# Patient Record
Sex: FEMALE | Race: OTHER | Employment: UNEMPLOYED
[De-identification: names, ages, dates, MRNs, and addresses within clinical notes are randomized per-mention and may not be internally consistent; named-entity substitution may affect disease eponyms.]

---

## 2017-03-07 ENCOUNTER — TELEPHONE (OUTPATIENT)
Dept: NEUROSURGERY | Facility: CLINIC | Age: 42
End: 2017-03-07

## 2017-05-03 ENCOUNTER — APPOINTMENT (OUTPATIENT)
Dept: GENERAL RADIOLOGY | Age: 42
DRG: 042 | End: 2017-05-03
Payer: MEDICARE

## 2017-05-03 ENCOUNTER — HOSPITAL ENCOUNTER (INPATIENT)
Age: 42
LOS: 9 days | Discharge: HOME | DRG: 042 | End: 2017-05-12
Attending: EMERGENCY MEDICINE | Admitting: INTERNAL MEDICINE
Payer: MEDICARE

## 2017-05-03 DIAGNOSIS — D75.839 THROMBOCYTOSIS: ICD-10-CM

## 2017-05-03 DIAGNOSIS — D64.9 ANEMIA, UNSPECIFIED TYPE: ICD-10-CM

## 2017-05-03 DIAGNOSIS — G70.00 MYASTHENIA GRAVIS (HCC): Primary | ICD-10-CM

## 2017-05-03 DIAGNOSIS — R79.89 ELEVATED LFTS: ICD-10-CM

## 2017-05-03 DIAGNOSIS — J10.1 INFLUENZA A: ICD-10-CM

## 2017-05-03 DIAGNOSIS — D72.829 LEUKOCYTOSIS, UNSPECIFIED TYPE: ICD-10-CM

## 2017-05-03 PROBLEM — G70.01 MYASTHENIA GRAVIS IN CRISIS (HCC): Status: ACTIVE | Noted: 2017-05-03

## 2017-05-03 LAB
ABSOLUTE EOS #: 0 K/UL (ref 0–0.4)
ABSOLUTE EOS #: 0.15 K/UL (ref 0–0.4)
ABSOLUTE LYMPH #: 1.04 K/UL (ref 1–4.8)
ABSOLUTE LYMPH #: 3.17 K/UL (ref 1–4.8)
ABSOLUTE MONO #: 0.15 K/UL (ref 0.1–0.8)
ABSOLUTE MONO #: 0.23 K/UL (ref 0.1–0.8)
ABSOLUTE RETIC #: 0.13 M/UL (ref 0.02–0.1)
ALBUMIN SERPL-MCNC: 4 G/DL (ref 3.5–5.2)
ALBUMIN/GLOBULIN RATIO: 1.1 (ref 1–2.5)
ALP BLD-CCNC: 69 U/L (ref 35–104)
ALT SERPL-CCNC: 38 U/L (ref 5–33)
ANGIOTENSIN-CONVERTING ENZYME: 31 U/L (ref 8–52)
ANION GAP SERPL CALCULATED.3IONS-SCNC: 11 MMOL/L (ref 9–17)
AST SERPL-CCNC: 39 U/L
BASOPHILS # BLD: 1 %
BASOPHILS # BLD: 1 %
BASOPHILS ABSOLUTE: 0.12 K/UL (ref 0–0.2)
BASOPHILS ABSOLUTE: 0.15 K/UL (ref 0–0.2)
BILIRUB SERPL-MCNC: 0.41 MG/DL (ref 0.3–1.2)
BUN BLDV-MCNC: 8 MG/DL (ref 6–20)
BUN/CREAT BLD: ABNORMAL (ref 9–20)
C-REACTIVE PROTEIN: 1 MG/L (ref 0–5)
CALCIUM SERPL-MCNC: 8.6 MG/DL (ref 8.6–10.4)
CHLORIDE BLD-SCNC: 103 MMOL/L (ref 98–107)
CO2: 25 MMOL/L (ref 20–31)
CREAT SERPL-MCNC: 0.58 MG/DL (ref 0.5–0.9)
DIFFERENTIAL TYPE: ABNORMAL
DIFFERENTIAL TYPE: ABNORMAL
DIRECT EXAM: ABNORMAL
EOSINOPHILS RELATIVE PERCENT: 0 %
EOSINOPHILS RELATIVE PERCENT: 1 %
FERRITIN: 5 UG/L (ref 13–150)
GFR AFRICAN AMERICAN: >60 ML/MIN
GFR NON-AFRICAN AMERICAN: >60 ML/MIN
GFR SERPL CREATININE-BSD FRML MDRD: ABNORMAL ML/MIN/{1.73_M2}
GFR SERPL CREATININE-BSD FRML MDRD: ABNORMAL ML/MIN/{1.73_M2}
GLUCOSE BLD-MCNC: 128 MG/DL (ref 70–99)
HCT VFR BLD CALC: 28.6 % (ref 36–46)
HCT VFR BLD CALC: 28.9 % (ref 36–46)
HEMOGLOBIN: 8.7 G/DL (ref 12–16)
HEMOGLOBIN: 8.8 G/DL (ref 12–16)
INR BLD: 0.9
IRON SATURATION: 3 % (ref 20–55)
IRON: 16 UG/DL (ref 37–145)
LACTIC ACID, WHOLE BLOOD: 1.5 MMOL/L (ref 0.7–2.1)
LYMPHOCYTES # BLD: 21 %
LYMPHOCYTES # BLD: 9 %
Lab: ABNORMAL
MAGNESIUM: 2.4 MG/DL (ref 1.6–2.6)
MCH RBC QN AUTO: 18 PG (ref 26–34)
MCH RBC QN AUTO: 18.1 PG (ref 26–34)
MCHC RBC AUTO-ENTMCNC: 30.4 G/DL (ref 31–37)
MCHC RBC AUTO-ENTMCNC: 30.4 G/DL (ref 31–37)
MCV RBC AUTO: 59.3 FL (ref 80–100)
MCV RBC AUTO: 59.5 FL (ref 80–100)
MONOCYTES # BLD: 1 %
MONOCYTES # BLD: 2 %
MORPHOLOGY: ABNORMAL
MORPHOLOGY: ABNORMAL
PARTIAL THROMBOPLASTIN TIME: 21.7 SEC (ref 21.3–31.3)
PDW BLD-RTO: 21.9 % (ref 12.5–15.4)
PDW BLD-RTO: 22.3 % (ref 12.5–15.4)
PLATELET # BLD: 718 K/UL (ref 140–450)
PLATELET # BLD: 784 K/UL (ref 140–450)
PLATELET ESTIMATE: ABNORMAL
PLATELET ESTIMATE: ABNORMAL
PMV BLD AUTO: 7.9 FL (ref 6–12)
PMV BLD AUTO: 8.7 FL (ref 6–12)
POTASSIUM SERPL-SCNC: 4.2 MMOL/L (ref 3.7–5.3)
PROTHROMBIN TIME: 9.9 SEC (ref 9.4–12.6)
RBC # BLD: 4.82 M/UL (ref 4–5.2)
RBC # BLD: 4.86 M/UL (ref 4–5.2)
RBC # BLD: ABNORMAL 10*6/UL
RBC # BLD: ABNORMAL 10*6/UL
RETIC %: 2.8 % (ref 0.5–2)
SEDIMENTATION RATE, ERYTHROCYTE: 10 MM (ref 0–20)
SEG NEUTROPHILS: 76 %
SEG NEUTROPHILS: 88 %
SEGMENTED NEUTROPHILS ABSOLUTE COUNT: 10.11 K/UL (ref 1.8–7.7)
SEGMENTED NEUTROPHILS ABSOLUTE COUNT: 11.48 K/UL (ref 1.8–7.7)
SODIUM BLD-SCNC: 139 MMOL/L (ref 135–144)
SPECIMEN DESCRIPTION: ABNORMAL
STATUS: ABNORMAL
TOTAL CK: 36 U/L (ref 26–192)
TOTAL IRON BINDING CAPACITY: 482 UG/DL (ref 250–450)
TOTAL PROTEIN: 7.8 G/DL (ref 6.4–8.3)
UNSATURATED IRON BINDING CAPACITY: 466 UG/DL (ref 112–347)
WBC # BLD: 11.5 K/UL (ref 3.5–11)
WBC # BLD: 15.1 K/UL (ref 3.5–11)
WBC # BLD: ABNORMAL 10*3/UL
WBC # BLD: ABNORMAL 10*3/UL

## 2017-05-03 PROCEDURE — 83516 IMMUNOASSAY NONANTIBODY: CPT

## 2017-05-03 PROCEDURE — 99255 IP/OBS CONSLTJ NEW/EST HI 80: CPT | Performed by: PSYCHIATRY & NEUROLOGY

## 2017-05-03 PROCEDURE — 94150 VITAL CAPACITY TEST: CPT

## 2017-05-03 PROCEDURE — 87804 INFLUENZA ASSAY W/OPTIC: CPT

## 2017-05-03 PROCEDURE — 85610 PROTHROMBIN TIME: CPT

## 2017-05-03 PROCEDURE — 2000000000 HC ICU R&B

## 2017-05-03 PROCEDURE — 82728 ASSAY OF FERRITIN: CPT

## 2017-05-03 PROCEDURE — 86038 ANTINUCLEAR ANTIBODIES: CPT

## 2017-05-03 PROCEDURE — 36415 COLL VENOUS BLD VENIPUNCTURE: CPT

## 2017-05-03 PROCEDURE — 1200000000 HC SEMI PRIVATE

## 2017-05-03 PROCEDURE — 81001 URINALYSIS AUTO W/SCOPE: CPT

## 2017-05-03 PROCEDURE — 85025 COMPLETE CBC W/AUTO DIFF WBC: CPT

## 2017-05-03 PROCEDURE — 2580000003 HC RX 258: Performed by: HOSPITALIST

## 2017-05-03 PROCEDURE — 85651 RBC SED RATE NONAUTOMATED: CPT

## 2017-05-03 PROCEDURE — 86235 NUCLEAR ANTIGEN ANTIBODY: CPT

## 2017-05-03 PROCEDURE — 83550 IRON BINDING TEST: CPT

## 2017-05-03 PROCEDURE — 87040 BLOOD CULTURE FOR BACTERIA: CPT

## 2017-05-03 PROCEDURE — 99285 EMERGENCY DEPT VISIT HI MDM: CPT

## 2017-05-03 PROCEDURE — 94799 UNLISTED PULMONARY SVC/PX: CPT

## 2017-05-03 PROCEDURE — 96375 TX/PRO/DX INJ NEW DRUG ADDON: CPT

## 2017-05-03 PROCEDURE — 82550 ASSAY OF CK (CPK): CPT

## 2017-05-03 PROCEDURE — 87086 URINE CULTURE/COLONY COUNT: CPT

## 2017-05-03 PROCEDURE — 71020 XR CHEST STANDARD TWO VW: CPT

## 2017-05-03 PROCEDURE — 6370000000 HC RX 637 (ALT 250 FOR IP): Performed by: HOSPITALIST

## 2017-05-03 PROCEDURE — 83540 ASSAY OF IRON: CPT

## 2017-05-03 PROCEDURE — 87205 SMEAR GRAM STAIN: CPT

## 2017-05-03 PROCEDURE — 85045 AUTOMATED RETICULOCYTE COUNT: CPT

## 2017-05-03 PROCEDURE — 83605 ASSAY OF LACTIC ACID: CPT

## 2017-05-03 PROCEDURE — 82164 ANGIOTENSIN I ENZYME TEST: CPT

## 2017-05-03 PROCEDURE — 83519 RIA NONANTIBODY: CPT

## 2017-05-03 PROCEDURE — 80053 COMPREHEN METABOLIC PANEL: CPT

## 2017-05-03 PROCEDURE — 6360000002 HC RX W HCPCS: Performed by: HOSPITALIST

## 2017-05-03 PROCEDURE — 96374 THER/PROPH/DIAG INJ IV PUSH: CPT

## 2017-05-03 PROCEDURE — 85730 THROMBOPLASTIN TIME PARTIAL: CPT

## 2017-05-03 PROCEDURE — 83735 ASSAY OF MAGNESIUM: CPT

## 2017-05-03 PROCEDURE — 6360000002 HC RX W HCPCS: Performed by: EMERGENCY MEDICINE

## 2017-05-03 PROCEDURE — 2580000003 HC RX 258: Performed by: EMERGENCY MEDICINE

## 2017-05-03 PROCEDURE — 96376 TX/PRO/DX INJ SAME DRUG ADON: CPT

## 2017-05-03 PROCEDURE — 86140 C-REACTIVE PROTEIN: CPT

## 2017-05-03 RX ORDER — 0.9 % SODIUM CHLORIDE 0.9 %
1000 INTRAVENOUS SOLUTION INTRAVENOUS ONCE
Status: COMPLETED | OUTPATIENT
Start: 2017-05-03 | End: 2017-05-03

## 2017-05-03 RX ORDER — PREDNISONE 20 MG/1
20 TABLET ORAL DAILY
Status: DISCONTINUED | OUTPATIENT
Start: 2017-05-03 | End: 2017-05-12 | Stop reason: HOSPADM

## 2017-05-03 RX ORDER — SODIUM CHLORIDE 0.9 % (FLUSH) 0.9 %
10 SYRINGE (ML) INJECTION PRN
Status: DISCONTINUED | OUTPATIENT
Start: 2017-05-03 | End: 2017-05-12 | Stop reason: HOSPADM

## 2017-05-03 RX ORDER — POTASSIUM CHLORIDE 7.45 MG/ML
10 INJECTION INTRAVENOUS PRN
Status: DISCONTINUED | OUTPATIENT
Start: 2017-05-03 | End: 2017-05-04

## 2017-05-03 RX ORDER — HEPARIN SODIUM 5000 [USP'U]/ML
5000 INJECTION, SOLUTION INTRAVENOUS; SUBCUTANEOUS EVERY OTHER DAY
Status: DISCONTINUED | OUTPATIENT
Start: 2017-05-03 | End: 2017-05-11

## 2017-05-03 RX ORDER — ACETAMINOPHEN 325 MG/1
650 TABLET ORAL EVERY 4 HOURS PRN
Status: DISCONTINUED | OUTPATIENT
Start: 2017-05-03 | End: 2017-05-12 | Stop reason: HOSPADM

## 2017-05-03 RX ORDER — MAGNESIUM SULFATE 1 G/100ML
1 INJECTION INTRAVENOUS PRN
Status: DISCONTINUED | OUTPATIENT
Start: 2017-05-03 | End: 2017-05-12 | Stop reason: HOSPADM

## 2017-05-03 RX ORDER — OSELTAMIVIR PHOSPHATE 75 MG/1
75 CAPSULE ORAL 2 TIMES DAILY
Status: COMPLETED | OUTPATIENT
Start: 2017-05-03 | End: 2017-05-08

## 2017-05-03 RX ORDER — FENTANYL CITRATE 50 UG/ML
50 INJECTION, SOLUTION INTRAMUSCULAR; INTRAVENOUS ONCE
Status: COMPLETED | OUTPATIENT
Start: 2017-05-03 | End: 2017-05-03

## 2017-05-03 RX ORDER — PREDNISONE 20 MG/1
20 TABLET ORAL DAILY
Status: ON HOLD | COMMUNITY
End: 2017-05-12 | Stop reason: HOSPADM

## 2017-05-03 RX ORDER — ACETAMINOPHEN 325 MG/1
650 TABLET ORAL ONCE
Status: DISCONTINUED | OUTPATIENT
Start: 2017-05-03 | End: 2017-05-12 | Stop reason: HOSPADM

## 2017-05-03 RX ORDER — ONDANSETRON 2 MG/ML
4 INJECTION INTRAMUSCULAR; INTRAVENOUS ONCE
Status: COMPLETED | OUTPATIENT
Start: 2017-05-03 | End: 2017-05-03

## 2017-05-03 RX ORDER — ONDANSETRON 2 MG/ML
4 INJECTION INTRAMUSCULAR; INTRAVENOUS EVERY 6 HOURS PRN
Status: DISCONTINUED | OUTPATIENT
Start: 2017-05-03 | End: 2017-05-12 | Stop reason: HOSPADM

## 2017-05-03 RX ORDER — ALBUMIN, HUMAN INJ 5% 5 %
5000 SOLUTION INTRAVENOUS EVERY OTHER DAY
Status: DISCONTINUED | OUTPATIENT
Start: 2017-05-04 | End: 2017-05-12 | Stop reason: HOSPADM

## 2017-05-03 RX ORDER — DIPHENHYDRAMINE HYDROCHLORIDE 50 MG/ML
25 INJECTION INTRAMUSCULAR; INTRAVENOUS EVERY 6 HOURS PRN
Status: DISCONTINUED | OUTPATIENT
Start: 2017-05-03 | End: 2017-05-12 | Stop reason: HOSPADM

## 2017-05-03 RX ORDER — SODIUM CHLORIDE 0.9 % (FLUSH) 0.9 %
10 SYRINGE (ML) INJECTION EVERY 12 HOURS SCHEDULED
Status: DISCONTINUED | OUTPATIENT
Start: 2017-05-03 | End: 2017-05-12 | Stop reason: HOSPADM

## 2017-05-03 RX ORDER — MYCOPHENOLATE MOFETIL 250 MG/1
500 CAPSULE ORAL 2 TIMES DAILY
Status: DISCONTINUED | OUTPATIENT
Start: 2017-05-03 | End: 2017-05-12 | Stop reason: HOSPADM

## 2017-05-03 RX ORDER — DEXTROSE AND SODIUM CHLORIDE 5; .45 G/100ML; G/100ML
INJECTION, SOLUTION INTRAVENOUS CONTINUOUS
Status: DISCONTINUED | OUTPATIENT
Start: 2017-05-03 | End: 2017-05-04

## 2017-05-03 RX ADMIN — OSELTAMIVIR PHOSPHATE 75 MG: 75 CAPSULE ORAL at 21:12

## 2017-05-03 RX ADMIN — ENOXAPARIN SODIUM 40 MG: 40 INJECTION SUBCUTANEOUS at 21:12

## 2017-05-03 RX ADMIN — DEXTROSE AND SODIUM CHLORIDE: 5; 450 INJECTION, SOLUTION INTRAVENOUS at 21:13

## 2017-05-03 RX ADMIN — FENTANYL CITRATE 100 MCG: 50 INJECTION INTRAMUSCULAR; INTRAVENOUS at 14:53

## 2017-05-03 RX ADMIN — FENTANYL CITRATE 50 MCG: 50 INJECTION INTRAMUSCULAR; INTRAVENOUS at 13:34

## 2017-05-03 RX ADMIN — SODIUM CHLORIDE 1000 ML: 9 INJECTION, SOLUTION INTRAVENOUS at 15:12

## 2017-05-03 RX ADMIN — Medication 10 ML: at 21:21

## 2017-05-03 RX ADMIN — VANCOMYCIN HYDROCHLORIDE 1500 MG: 10 INJECTION, POWDER, LYOPHILIZED, FOR SOLUTION INTRAVENOUS at 15:40

## 2017-05-03 RX ADMIN — DIPHENHYDRAMINE HYDROCHLORIDE 25 MG: 50 INJECTION, SOLUTION INTRAMUSCULAR; INTRAVENOUS at 15:12

## 2017-05-03 RX ADMIN — ONDANSETRON 4 MG: 2 INJECTION, SOLUTION INTRAMUSCULAR; INTRAVENOUS at 13:34

## 2017-05-03 ASSESSMENT — PAIN DESCRIPTION - ONSET: ONSET: ON-GOING

## 2017-05-03 ASSESSMENT — PAIN SCALES - GENERAL
PAINLEVEL_OUTOF10: 0
PAINLEVEL_OUTOF10: 8
PAINLEVEL_OUTOF10: 0
PAINLEVEL_OUTOF10: 8

## 2017-05-03 ASSESSMENT — ENCOUNTER SYMPTOMS
DIARRHEA: 0
SORE THROAT: 0
WHEEZING: 0
VOMITING: 0
COUGH: 0

## 2017-05-03 ASSESSMENT — PAIN DESCRIPTION - LOCATION: LOCATION: HEAD

## 2017-05-03 ASSESSMENT — PAIN DESCRIPTION - FREQUENCY: FREQUENCY: CONTINUOUS

## 2017-05-03 ASSESSMENT — PAIN SCALES - WONG BAKER: WONGBAKER_NUMERICALRESPONSE: 8;6

## 2017-05-03 ASSESSMENT — PAIN DESCRIPTION - PAIN TYPE: TYPE: ACUTE PAIN;CHRONIC PAIN

## 2017-05-04 ENCOUNTER — APPOINTMENT (OUTPATIENT)
Dept: GENERAL RADIOLOGY | Age: 42
DRG: 042 | End: 2017-05-04
Payer: MEDICARE

## 2017-05-04 ENCOUNTER — APPOINTMENT (OUTPATIENT)
Dept: MRI IMAGING | Age: 42
DRG: 042 | End: 2017-05-04
Payer: MEDICARE

## 2017-05-04 PROBLEM — J10.1 INFLUENZA A: Status: ACTIVE | Noted: 2017-05-04

## 2017-05-04 PROBLEM — D50.9 IRON DEFICIENCY ANEMIA: Status: ACTIVE | Noted: 2017-05-04

## 2017-05-04 PROBLEM — G70.00 MYASTHENIA GRAVIS (HCC): Status: ACTIVE | Noted: 2017-05-03

## 2017-05-04 PROBLEM — Z90.89 H/O THYMECTOMY: Status: ACTIVE | Noted: 2017-05-04

## 2017-05-04 LAB
ABSOLUTE EOS #: 0.14 K/UL (ref 0–0.4)
ABSOLUTE LYMPH #: 3.24 K/UL (ref 1–4.8)
ABSOLUTE MONO #: 0.68 K/UL (ref 0.1–1.2)
ALBUMIN SERPL-MCNC: 3.4 G/DL (ref 3.5–5.2)
ALBUMIN/GLOBULIN RATIO: 0.9 (ref 1–2.5)
ALP BLD-CCNC: 63 U/L (ref 35–104)
ALT SERPL-CCNC: 38 U/L (ref 5–33)
ANION GAP SERPL CALCULATED.3IONS-SCNC: 14 MMOL/L (ref 9–17)
ANTI SSA: 14 U/ML
ANTI SSB: 42 U/ML
ANTI-NUCLEAR ANTIBODY (ANA): NEGATIVE
AST SERPL-CCNC: 42 U/L
BASOPHILS # BLD: 2 %
BASOPHILS ABSOLUTE: 0.27 K/UL (ref 0–0.2)
BILIRUB SERPL-MCNC: 0.39 MG/DL (ref 0.3–1.2)
BUN BLDV-MCNC: 5 MG/DL (ref 6–20)
BUN/CREAT BLD: ABNORMAL (ref 9–20)
CALCIUM IONIZED: 1.13 MMOL/L (ref 1.13–1.33)
CALCIUM SERPL-MCNC: 8.4 MG/DL (ref 8.6–10.4)
CHLORIDE BLD-SCNC: 99 MMOL/L (ref 98–107)
CHOLESTEROL/HDL RATIO: 2.3
CHOLESTEROL: 115 MG/DL
CO2: 23 MMOL/L (ref 20–31)
CREAT SERPL-MCNC: 0.61 MG/DL (ref 0.5–0.9)
DIFFERENTIAL TYPE: ABNORMAL
EOSINOPHILS RELATIVE PERCENT: 1 %
FIBRINOGEN: 206 MG/DL (ref 140–420)
GFR AFRICAN AMERICAN: >60 ML/MIN
GFR NON-AFRICAN AMERICAN: >60 ML/MIN
GFR SERPL CREATININE-BSD FRML MDRD: ABNORMAL ML/MIN/{1.73_M2}
GFR SERPL CREATININE-BSD FRML MDRD: ABNORMAL ML/MIN/{1.73_M2}
GLUCOSE BLD-MCNC: 103 MG/DL (ref 70–99)
HCT VFR BLD CALC: 26.5 % (ref 36–46)
HDLC SERPL-MCNC: 49 MG/DL
HEMOGLOBIN: 8.2 G/DL (ref 12–16)
LDL CHOLESTEROL: 48 MG/DL (ref 0–130)
LYMPHOCYTES # BLD: 24 %
MCH RBC QN AUTO: 18.3 PG (ref 26–34)
MCHC RBC AUTO-ENTMCNC: 31 G/DL (ref 31–37)
MCV RBC AUTO: 58.9 FL (ref 80–100)
MONOCYTES # BLD: 5 %
MORPHOLOGY: ABNORMAL
PDW BLD-RTO: 21.6 % (ref 12.5–15.4)
PLATELET # BLD: 593 K/UL (ref 140–450)
PLATELET ESTIMATE: ABNORMAL
PMV BLD AUTO: 7.8 FL (ref 6–12)
POTASSIUM SERPL-SCNC: 3.3 MMOL/L (ref 3.7–5.3)
RBC # BLD: 4.5 M/UL (ref 4–5.2)
RBC # BLD: ABNORMAL 10*6/UL
SEG NEUTROPHILS: 68 %
SEGMENTED NEUTROPHILS ABSOLUTE COUNT: 9.17 K/UL (ref 1.8–7.7)
SODIUM BLD-SCNC: 136 MMOL/L (ref 135–144)
SURGICAL PATHOLOGY REPORT: NORMAL
TOTAL PROTEIN: 7.2 G/DL (ref 6.4–8.3)
TRIGL SERPL-MCNC: 88 MG/DL
TSH SERPL DL<=0.05 MIU/L-ACNC: 2.74 MIU/L (ref 0.3–5)
VLDLC SERPL CALC-MCNC: NORMAL MG/DL (ref 1–30)
WBC # BLD: 13.5 K/UL (ref 3.5–11)
WBC # BLD: ABNORMAL 10*3/UL

## 2017-05-04 PROCEDURE — 70551 MRI BRAIN STEM W/O DYE: CPT

## 2017-05-04 PROCEDURE — 02HV33Z INSERTION OF INFUSION DEVICE INTO SUPERIOR VENA CAVA, PERCUTANEOUS APPROACH: ICD-10-PCS | Performed by: EMERGENCY MEDICINE

## 2017-05-04 PROCEDURE — 84443 ASSAY THYROID STIM HORMONE: CPT

## 2017-05-04 PROCEDURE — 99223 1ST HOSP IP/OBS HIGH 75: CPT | Performed by: INTERNAL MEDICINE

## 2017-05-04 PROCEDURE — G8978 MOBILITY CURRENT STATUS: HCPCS

## 2017-05-04 PROCEDURE — 2500000003 HC RX 250 WO HCPCS

## 2017-05-04 PROCEDURE — 71010 XR CHEST PORTABLE: CPT

## 2017-05-04 PROCEDURE — 1200000000 HC SEMI PRIVATE

## 2017-05-04 PROCEDURE — 99233 SBSQ HOSP IP/OBS HIGH 50: CPT | Performed by: PSYCHIATRY & NEUROLOGY

## 2017-05-04 PROCEDURE — 94799 UNLISTED PULMONARY SVC/PX: CPT

## 2017-05-04 PROCEDURE — 6370000000 HC RX 637 (ALT 250 FOR IP): Performed by: PSYCHIATRY & NEUROLOGY

## 2017-05-04 PROCEDURE — 83036 HEMOGLOBIN GLYCOSYLATED A1C: CPT

## 2017-05-04 PROCEDURE — P9041 ALBUMIN (HUMAN),5%, 50ML: HCPCS | Performed by: PSYCHIATRY & NEUROLOGY

## 2017-05-04 PROCEDURE — 76937 US GUIDE VASCULAR ACCESS: CPT

## 2017-05-04 PROCEDURE — 6370000000 HC RX 637 (ALT 250 FOR IP): Performed by: INTERNAL MEDICINE

## 2017-05-04 PROCEDURE — 85384 FIBRINOGEN ACTIVITY: CPT

## 2017-05-04 PROCEDURE — 6360000002 HC RX W HCPCS: Performed by: EMERGENCY MEDICINE

## 2017-05-04 PROCEDURE — G8980 MOBILITY D/C STATUS: HCPCS

## 2017-05-04 PROCEDURE — 2580000003 HC RX 258: Performed by: HOSPITALIST

## 2017-05-04 PROCEDURE — 2580000003 HC RX 258: Performed by: PSYCHIATRY & NEUROLOGY

## 2017-05-04 PROCEDURE — 6370000000 HC RX 637 (ALT 250 FOR IP): Performed by: HOSPITALIST

## 2017-05-04 PROCEDURE — 94762 N-INVAS EAR/PLS OXIMTRY CONT: CPT

## 2017-05-04 PROCEDURE — 85025 COMPLETE CBC W/AUTO DIFF WBC: CPT

## 2017-05-04 PROCEDURE — 36415 COLL VENOUS BLD VENIPUNCTURE: CPT

## 2017-05-04 PROCEDURE — 36556 INSERT NON-TUNNEL CV CATH: CPT

## 2017-05-04 PROCEDURE — G8979 MOBILITY GOAL STATUS: HCPCS

## 2017-05-04 PROCEDURE — 2580000003 HC RX 258: Performed by: STUDENT IN AN ORGANIZED HEALTH CARE EDUCATION/TRAINING PROGRAM

## 2017-05-04 PROCEDURE — 2000000003 HC NEURO ICU R&B

## 2017-05-04 PROCEDURE — 80061 LIPID PANEL: CPT

## 2017-05-04 PROCEDURE — 36515 HC APHERESIS THER W/PLASMA REINF: CPT

## 2017-05-04 PROCEDURE — 6360000002 HC RX W HCPCS: Performed by: PSYCHIATRY & NEUROLOGY

## 2017-05-04 PROCEDURE — 97161 PT EVAL LOW COMPLEX 20 MIN: CPT

## 2017-05-04 PROCEDURE — 80053 COMPREHEN METABOLIC PANEL: CPT

## 2017-05-04 PROCEDURE — 6360000002 HC RX W HCPCS: Performed by: HOSPITALIST

## 2017-05-04 PROCEDURE — 82330 ASSAY OF CALCIUM: CPT

## 2017-05-04 RX ORDER — AZITHROMYCIN 250 MG/1
250 TABLET, FILM COATED ORAL DAILY
Status: COMPLETED | OUTPATIENT
Start: 2017-05-05 | End: 2017-05-08

## 2017-05-04 RX ORDER — POTASSIUM CHLORIDE 20 MEQ/1
40 TABLET, EXTENDED RELEASE ORAL PRN
Status: DISCONTINUED | OUTPATIENT
Start: 2017-05-04 | End: 2017-05-12 | Stop reason: HOSPADM

## 2017-05-04 RX ORDER — AZITHROMYCIN 250 MG/1
500 TABLET, FILM COATED ORAL ONCE
Status: COMPLETED | OUTPATIENT
Start: 2017-05-04 | End: 2017-05-04

## 2017-05-04 RX ORDER — 0.9 % SODIUM CHLORIDE 0.9 %
500 INTRAVENOUS SOLUTION INTRAVENOUS ONCE
Status: COMPLETED | OUTPATIENT
Start: 2017-05-04 | End: 2017-05-04

## 2017-05-04 RX ORDER — HEPARIN SODIUM 5000 [USP'U]/ML
5000 INJECTION, SOLUTION INTRAVENOUS; SUBCUTANEOUS ONCE
Status: COMPLETED | OUTPATIENT
Start: 2017-05-04 | End: 2017-05-04

## 2017-05-04 RX ORDER — POTASSIUM CHLORIDE 7.45 MG/ML
10 INJECTION INTRAVENOUS PRN
Status: DISCONTINUED | OUTPATIENT
Start: 2017-05-04 | End: 2017-05-12 | Stop reason: HOSPADM

## 2017-05-04 RX ORDER — FLUTICASONE PROPIONATE 50 MCG
2 SPRAY, SUSPENSION (ML) NASAL DAILY
Status: DISCONTINUED | OUTPATIENT
Start: 2017-05-04 | End: 2017-05-12 | Stop reason: HOSPADM

## 2017-05-04 RX ORDER — OXYMETAZOLINE HYDROCHLORIDE 0.05 G/100ML
2 SPRAY NASAL 2 TIMES DAILY
Status: DISCONTINUED | OUTPATIENT
Start: 2017-05-04 | End: 2017-05-12 | Stop reason: HOSPADM

## 2017-05-04 RX ORDER — POTASSIUM CHLORIDE 20MEQ/15ML
40 LIQUID (ML) ORAL PRN
Status: DISCONTINUED | OUTPATIENT
Start: 2017-05-04 | End: 2017-05-12 | Stop reason: HOSPADM

## 2017-05-04 RX ORDER — LIDOCAINE HYDROCHLORIDE 10 MG/ML
INJECTION, SOLUTION INFILTRATION; PERINEURAL
Status: COMPLETED
Start: 2017-05-04 | End: 2017-05-04

## 2017-05-04 RX ORDER — SODIUM CHLORIDE 9 MG/ML
INJECTION, SOLUTION INTRAVENOUS CONTINUOUS
Status: DISCONTINUED | OUTPATIENT
Start: 2017-05-04 | End: 2017-05-05

## 2017-05-04 RX ADMIN — SODIUM CHLORIDE 500 ML: 0.9 INJECTION, SOLUTION INTRAVENOUS at 13:57

## 2017-05-04 RX ADMIN — SODIUM CHLORIDE: 9 INJECTION, SOLUTION INTRAVENOUS at 20:46

## 2017-05-04 RX ADMIN — PREDNISONE 20 MG: 20 TABLET ORAL at 08:26

## 2017-05-04 RX ADMIN — CALCIUM GLUCONATE 1 G: 94 INJECTION, SOLUTION INTRAVENOUS at 12:47

## 2017-05-04 RX ADMIN — SODIUM CHLORIDE: 9 INJECTION, SOLUTION INTRAVENOUS at 13:57

## 2017-05-04 RX ADMIN — AZITHROMYCIN 500 MG: 250 TABLET, FILM COATED ORAL at 13:54

## 2017-05-04 RX ADMIN — FLUTICASONE PROPIONATE 2 SPRAY: 50 SPRAY, METERED NASAL at 13:52

## 2017-05-04 RX ADMIN — OSELTAMIVIR PHOSPHATE 75 MG: 75 CAPSULE ORAL at 08:26

## 2017-05-04 RX ADMIN — HEPARIN SODIUM 5000 UNITS: 5000 INJECTION, SOLUTION INTRAVENOUS; SUBCUTANEOUS at 14:10

## 2017-05-04 RX ADMIN — OSELTAMIVIR PHOSPHATE 75 MG: 75 CAPSULE ORAL at 20:40

## 2017-05-04 RX ADMIN — POTASSIUM CHLORIDE 40 MEQ: 20 TABLET, EXTENDED RELEASE ORAL at 11:21

## 2017-05-04 RX ADMIN — ALBUMIN (HUMAN) 5000 ML: 12.5 INJECTION, SOLUTION INTRAVENOUS at 12:00

## 2017-05-04 RX ADMIN — ENOXAPARIN SODIUM 40 MG: 40 INJECTION SUBCUTANEOUS at 08:27

## 2017-05-04 RX ADMIN — MYCOPHENOLATE MOFETIL 500 MG: 250 CAPSULE ORAL at 20:40

## 2017-05-04 RX ADMIN — Medication: at 02:53

## 2017-05-04 RX ADMIN — OXYMETAZOLINE HYDROCHLORIDE 2 SPRAY: 5 SPRAY NASAL at 13:52

## 2017-05-04 RX ADMIN — CALCIUM GLUCONATE 2 G: 94 INJECTION, SOLUTION INTRAVENOUS at 12:05

## 2017-05-04 RX ADMIN — IRON SUCROSE 200 MG: 20 INJECTION, SOLUTION INTRAVENOUS at 04:30

## 2017-05-04 RX ADMIN — MYCOPHENOLATE MOFETIL 500 MG: 250 CAPSULE ORAL at 08:26

## 2017-05-04 RX ADMIN — HEPARIN SODIUM 5000 UNITS: 5000 INJECTION, SOLUTION INTRAVENOUS; SUBCUTANEOUS at 02:49

## 2017-05-04 RX ADMIN — OXYMETAZOLINE HYDROCHLORIDE 2 SPRAY: 5 SPRAY NASAL at 20:41

## 2017-05-04 ASSESSMENT — PAIN SCALES - GENERAL
PAINLEVEL_OUTOF10: 3
PAINLEVEL_OUTOF10: 0

## 2017-05-05 LAB
CULTURE: NORMAL
CULTURE: NORMAL
ESTIMATED AVERAGE GLUCOSE: 137 MG/DL
HBA1C MFR BLD: 6.4 % (ref 4–6)
HCT VFR BLD CALC: 27.3 % (ref 36–46)
HEMOGLOBIN: 8.2 G/DL (ref 12–16)
Lab: NORMAL
MCH RBC QN AUTO: 18.1 PG (ref 26–34)
MCHC RBC AUTO-ENTMCNC: 30.1 G/DL (ref 31–37)
MCV RBC AUTO: 60.2 FL (ref 80–100)
PDW BLD-RTO: 22.1 % (ref 12.5–15.4)
PLATELET # BLD: 328 K/UL (ref 140–450)
PMV BLD AUTO: 8.2 FL (ref 6–12)
RBC # BLD: 4.53 M/UL (ref 4–5.2)
SPECIMEN DESCRIPTION: NORMAL
STATUS: NORMAL
WBC # BLD: 17.8 K/UL (ref 3.5–11)

## 2017-05-05 PROCEDURE — 6360000002 HC RX W HCPCS: Performed by: HOSPITALIST

## 2017-05-05 PROCEDURE — 1200000000 HC SEMI PRIVATE

## 2017-05-05 PROCEDURE — 6370000000 HC RX 637 (ALT 250 FOR IP): Performed by: HOSPITALIST

## 2017-05-05 PROCEDURE — 6370000000 HC RX 637 (ALT 250 FOR IP): Performed by: PSYCHIATRY & NEUROLOGY

## 2017-05-05 PROCEDURE — 85027 COMPLETE CBC AUTOMATED: CPT

## 2017-05-05 PROCEDURE — 99233 SBSQ HOSP IP/OBS HIGH 50: CPT | Performed by: INTERNAL MEDICINE

## 2017-05-05 PROCEDURE — 94799 UNLISTED PULMONARY SVC/PX: CPT

## 2017-05-05 PROCEDURE — 94762 N-INVAS EAR/PLS OXIMTRY CONT: CPT

## 2017-05-05 PROCEDURE — 6360000002 HC RX W HCPCS: Performed by: PSYCHIATRY & NEUROLOGY

## 2017-05-05 PROCEDURE — 36415 COLL VENOUS BLD VENIPUNCTURE: CPT

## 2017-05-05 PROCEDURE — 99233 SBSQ HOSP IP/OBS HIGH 50: CPT | Performed by: PSYCHIATRY & NEUROLOGY

## 2017-05-05 PROCEDURE — 2580000003 HC RX 258: Performed by: HOSPITALIST

## 2017-05-05 RX ADMIN — PREDNISONE 20 MG: 20 TABLET ORAL at 09:04

## 2017-05-05 RX ADMIN — ENOXAPARIN SODIUM 40 MG: 40 INJECTION SUBCUTANEOUS at 09:05

## 2017-05-05 RX ADMIN — AZITHROMYCIN 250 MG: 250 TABLET, FILM COATED ORAL at 09:05

## 2017-05-05 RX ADMIN — FLUTICASONE PROPIONATE 2 SPRAY: 50 SPRAY, METERED NASAL at 09:05

## 2017-05-05 RX ADMIN — IRON SUCROSE 200 MG: 20 INJECTION, SOLUTION INTRAVENOUS at 03:28

## 2017-05-05 RX ADMIN — OSELTAMIVIR PHOSPHATE 75 MG: 75 CAPSULE ORAL at 21:31

## 2017-05-05 RX ADMIN — Medication 10 ML: at 21:00

## 2017-05-05 RX ADMIN — MYCOPHENOLATE MOFETIL 500 MG: 250 CAPSULE ORAL at 21:31

## 2017-05-05 RX ADMIN — OXYMETAZOLINE HYDROCHLORIDE 2 SPRAY: 5 SPRAY NASAL at 09:05

## 2017-05-05 RX ADMIN — MYCOPHENOLATE MOFETIL 500 MG: 250 CAPSULE ORAL at 09:04

## 2017-05-05 RX ADMIN — OSELTAMIVIR PHOSPHATE 75 MG: 75 CAPSULE ORAL at 09:04

## 2017-05-06 ENCOUNTER — APPOINTMENT (OUTPATIENT)
Dept: CT IMAGING | Age: 42
DRG: 042 | End: 2017-05-06
Payer: MEDICARE

## 2017-05-06 PROBLEM — J32.0 RIGHT MAXILLARY SINUSITIS: Status: ACTIVE | Noted: 2017-05-06

## 2017-05-06 LAB
ABO/RH: NORMAL
ABSOLUTE EOS #: 0.25 K/UL (ref 0–0.4)
ABSOLUTE LYMPH #: 4.81 K/UL (ref 1–4.8)
ABSOLUTE MONO #: 1.27 K/UL (ref 0.1–0.8)
ANION GAP SERPL CALCULATED.3IONS-SCNC: 12 MMOL/L (ref 9–17)
ANTIBODY SCREEN: NEGATIVE
ARM BAND NUMBER: NORMAL
BASOPHILS # BLD: 0 %
BASOPHILS ABSOLUTE: 0 K/UL (ref 0–0.2)
BLOOD BANK SPECIMEN: NORMAL
BUN BLDV-MCNC: 9 MG/DL (ref 6–20)
BUN/CREAT BLD: ABNORMAL (ref 9–20)
CALCIUM IONIZED: 1.19 MMOL/L (ref 1.13–1.33)
CALCIUM SERPL-MCNC: 8.2 MG/DL (ref 8.6–10.4)
CHLORIDE BLD-SCNC: 106 MMOL/L (ref 98–107)
CO2: 23 MMOL/L (ref 20–31)
CREAT SERPL-MCNC: 0.54 MG/DL (ref 0.5–0.9)
DIFFERENTIAL TYPE: ABNORMAL
EOSINOPHILS RELATIVE PERCENT: 1 %
EXPIRATION DATE: NORMAL
FIBRINOGEN: <80 MG/DL (ref 140–420)
GFR AFRICAN AMERICAN: >60 ML/MIN
GFR NON-AFRICAN AMERICAN: >60 ML/MIN
GFR SERPL CREATININE-BSD FRML MDRD: ABNORMAL ML/MIN/{1.73_M2}
GFR SERPL CREATININE-BSD FRML MDRD: ABNORMAL ML/MIN/{1.73_M2}
GLUCOSE BLD-MCNC: 93 MG/DL (ref 70–99)
HCT VFR BLD CALC: 26.4 % (ref 36–46)
HEMOGLOBIN: 7.9 G/DL (ref 12–16)
LYMPHOCYTES # BLD: 19 %
MCH RBC QN AUTO: 18.2 PG (ref 26–34)
MCHC RBC AUTO-ENTMCNC: 29.9 G/DL (ref 31–37)
MCV RBC AUTO: 60.9 FL (ref 80–100)
MONOCYTES # BLD: 5 %
MORPHOLOGY: ABNORMAL
PDW BLD-RTO: 22.2 % (ref 12.5–15.4)
PLATELET # BLD: 326 K/UL (ref 140–450)
PLATELET ESTIMATE: ABNORMAL
PMV BLD AUTO: 8.3 FL (ref 6–12)
POTASSIUM SERPL-SCNC: 3.7 MMOL/L (ref 3.7–5.3)
RBC # BLD: 4.33 M/UL (ref 4–5.2)
RBC # BLD: ABNORMAL 10*6/UL
SEG NEUTROPHILS: 75 %
SEGMENTED NEUTROPHILS ABSOLUTE COUNT: 18.97 K/UL (ref 1.8–7.7)
SODIUM BLD-SCNC: 141 MMOL/L (ref 135–144)
WBC # BLD: 25.3 K/UL (ref 3.5–11)
WBC # BLD: ABNORMAL 10*3/UL

## 2017-05-06 PROCEDURE — 82330 ASSAY OF CALCIUM: CPT

## 2017-05-06 PROCEDURE — 1200000000 HC SEMI PRIVATE

## 2017-05-06 PROCEDURE — 86927 PLASMA FRESH FROZEN: CPT

## 2017-05-06 PROCEDURE — 85025 COMPLETE CBC W/AUTO DIFF WBC: CPT

## 2017-05-06 PROCEDURE — 3910000000 HC WEEKEND / HOLIDAY ARC SERVICE

## 2017-05-06 PROCEDURE — 2580000003 HC RX 258: Performed by: PSYCHIATRY & NEUROLOGY

## 2017-05-06 PROCEDURE — 86850 RBC ANTIBODY SCREEN: CPT

## 2017-05-06 PROCEDURE — 99233 SBSQ HOSP IP/OBS HIGH 50: CPT | Performed by: INTERNAL MEDICINE

## 2017-05-06 PROCEDURE — 36515 HC APHERESIS THER W/PLASMA REINF: CPT

## 2017-05-06 PROCEDURE — 36415 COLL VENOUS BLD VENIPUNCTURE: CPT

## 2017-05-06 PROCEDURE — 6370000000 HC RX 637 (ALT 250 FOR IP): Performed by: HOSPITALIST

## 2017-05-06 PROCEDURE — 2060000000 HC ICU INTERMEDIATE R&B

## 2017-05-06 PROCEDURE — 86901 BLOOD TYPING SEROLOGIC RH(D): CPT

## 2017-05-06 PROCEDURE — 6360000002 HC RX W HCPCS

## 2017-05-06 PROCEDURE — 94799 UNLISTED PULMONARY SVC/PX: CPT

## 2017-05-06 PROCEDURE — 71250 CT THORAX DX C-: CPT

## 2017-05-06 PROCEDURE — 85384 FIBRINOGEN ACTIVITY: CPT

## 2017-05-06 PROCEDURE — 99232 SBSQ HOSP IP/OBS MODERATE 35: CPT | Performed by: PSYCHIATRY & NEUROLOGY

## 2017-05-06 PROCEDURE — 6370000000 HC RX 637 (ALT 250 FOR IP): Performed by: PSYCHIATRY & NEUROLOGY

## 2017-05-06 PROCEDURE — 2580000003 HC RX 258: Performed by: HOSPITALIST

## 2017-05-06 PROCEDURE — 6360000002 HC RX W HCPCS: Performed by: PSYCHIATRY & NEUROLOGY

## 2017-05-06 PROCEDURE — 86900 BLOOD TYPING SEROLOGIC ABO: CPT

## 2017-05-06 PROCEDURE — P9017 PLASMA 1 DONOR FRZ W/IN 8 HR: HCPCS

## 2017-05-06 PROCEDURE — 2580000003 HC RX 258: Performed by: STUDENT IN AN ORGANIZED HEALTH CARE EDUCATION/TRAINING PROGRAM

## 2017-05-06 PROCEDURE — 6360000002 HC RX W HCPCS: Performed by: HOSPITALIST

## 2017-05-06 PROCEDURE — 80048 BASIC METABOLIC PNL TOTAL CA: CPT

## 2017-05-06 PROCEDURE — 94762 N-INVAS EAR/PLS OXIMTRY CONT: CPT

## 2017-05-06 RX ORDER — SODIUM CHLORIDE 9 MG/ML
INJECTION, SOLUTION INTRAVENOUS CONTINUOUS
Status: DISCONTINUED | OUTPATIENT
Start: 2017-05-06 | End: 2017-05-07

## 2017-05-06 RX ORDER — ECHINACEA PURPUREA EXTRACT 125 MG
2 TABLET ORAL 2 TIMES DAILY
Status: DISCONTINUED | OUTPATIENT
Start: 2017-05-06 | End: 2017-05-12 | Stop reason: HOSPADM

## 2017-05-06 RX ORDER — 0.9 % SODIUM CHLORIDE 0.9 %
250 INTRAVENOUS SOLUTION INTRAVENOUS ONCE
Status: DISCONTINUED | OUTPATIENT
Start: 2017-05-06 | End: 2017-05-12 | Stop reason: HOSPADM

## 2017-05-06 RX ORDER — HEPARIN SODIUM 5000 [USP'U]/ML
15000 INJECTION, SOLUTION INTRAVENOUS; SUBCUTANEOUS ONCE
Status: COMPLETED | OUTPATIENT
Start: 2017-05-06 | End: 2017-05-06

## 2017-05-06 RX ORDER — LANOLIN ALCOHOL/MO/W.PET/CERES
325 CREAM (GRAM) TOPICAL 2 TIMES DAILY WITH MEALS
Status: DISCONTINUED | OUTPATIENT
Start: 2017-05-07 | End: 2017-05-12 | Stop reason: HOSPADM

## 2017-05-06 RX ORDER — DIPHENHYDRAMINE HYDROCHLORIDE 50 MG/ML
INJECTION INTRAMUSCULAR; INTRAVENOUS
Status: COMPLETED
Start: 2017-05-06 | End: 2017-05-06

## 2017-05-06 RX ADMIN — OSELTAMIVIR PHOSPHATE 75 MG: 75 CAPSULE ORAL at 21:32

## 2017-05-06 RX ADMIN — MYCOPHENOLATE MOFETIL 500 MG: 250 CAPSULE ORAL at 09:05

## 2017-05-06 RX ADMIN — CALCIUM GLUCONATE 1 G: 94 INJECTION, SOLUTION INTRAVENOUS at 17:00

## 2017-05-06 RX ADMIN — CALCIUM GLUCONATE 2 G: 94 INJECTION, SOLUTION INTRAVENOUS at 15:00

## 2017-05-06 RX ADMIN — SALINE NASAL SPRAY 2 SPRAY: 1.5 SOLUTION NASAL at 21:36

## 2017-05-06 RX ADMIN — FLUTICASONE PROPIONATE 2 SPRAY: 50 SPRAY, METERED NASAL at 08:49

## 2017-05-06 RX ADMIN — SODIUM CHLORIDE: 9 INJECTION, SOLUTION INTRAVENOUS at 16:58

## 2017-05-06 RX ADMIN — OXYMETAZOLINE HYDROCHLORIDE 2 SPRAY: 5 SPRAY NASAL at 08:50

## 2017-05-06 RX ADMIN — OSELTAMIVIR PHOSPHATE 75 MG: 75 CAPSULE ORAL at 09:05

## 2017-05-06 RX ADMIN — MYCOPHENOLATE MOFETIL 500 MG: 250 CAPSULE ORAL at 21:32

## 2017-05-06 RX ADMIN — OXYMETAZOLINE HYDROCHLORIDE 2 SPRAY: 5 SPRAY NASAL at 21:32

## 2017-05-06 RX ADMIN — Medication 10 ML: at 21:37

## 2017-05-06 RX ADMIN — PREDNISONE 20 MG: 20 TABLET ORAL at 09:06

## 2017-05-06 RX ADMIN — ENOXAPARIN SODIUM 40 MG: 40 INJECTION SUBCUTANEOUS at 09:05

## 2017-05-06 RX ADMIN — SALINE NASAL SPRAY 2 SPRAY: 1.5 SOLUTION NASAL at 11:30

## 2017-05-06 RX ADMIN — IRON SUCROSE 200 MG: 20 INJECTION, SOLUTION INTRAVENOUS at 03:10

## 2017-05-06 RX ADMIN — AZITHROMYCIN 250 MG: 250 TABLET, FILM COATED ORAL at 09:05

## 2017-05-06 RX ADMIN — DIPHENHYDRAMINE HYDROCHLORIDE 25 MG: 50 INJECTION, SOLUTION INTRAMUSCULAR; INTRAVENOUS at 18:29

## 2017-05-06 RX ADMIN — HEPARIN SODIUM 15000 UNITS: 5000 INJECTION, SOLUTION INTRAVENOUS; SUBCUTANEOUS at 18:10

## 2017-05-06 ASSESSMENT — ENCOUNTER SYMPTOMS
ORTHOPNEA: 0
BLURRED VISION: 0
HEMOPTYSIS: 0
DOUBLE VISION: 0
COUGH: 0
NAUSEA: 0
HEARTBURN: 0
SPUTUM PRODUCTION: 0
VOMITING: 0
ABDOMINAL PAIN: 0

## 2017-05-06 ASSESSMENT — PAIN SCALES - GENERAL: PAINLEVEL_OUTOF10: 0

## 2017-05-07 PROCEDURE — 6370000000 HC RX 637 (ALT 250 FOR IP): Performed by: PSYCHIATRY & NEUROLOGY

## 2017-05-07 PROCEDURE — 99232 SBSQ HOSP IP/OBS MODERATE 35: CPT | Performed by: PSYCHIATRY & NEUROLOGY

## 2017-05-07 PROCEDURE — 6360000002 HC RX W HCPCS: Performed by: PSYCHIATRY & NEUROLOGY

## 2017-05-07 PROCEDURE — 1200000000 HC SEMI PRIVATE

## 2017-05-07 PROCEDURE — 2580000003 HC RX 258: Performed by: HOSPITALIST

## 2017-05-07 PROCEDURE — 6360000002 HC RX W HCPCS: Performed by: HOSPITALIST

## 2017-05-07 PROCEDURE — 2060000000 HC ICU INTERMEDIATE R&B

## 2017-05-07 PROCEDURE — 6370000000 HC RX 637 (ALT 250 FOR IP): Performed by: HOSPITALIST

## 2017-05-07 PROCEDURE — 99232 SBSQ HOSP IP/OBS MODERATE 35: CPT | Performed by: INTERNAL MEDICINE

## 2017-05-07 PROCEDURE — 94762 N-INVAS EAR/PLS OXIMTRY CONT: CPT

## 2017-05-07 RX ADMIN — SALINE NASAL SPRAY 2 SPRAY: 1.5 SOLUTION NASAL at 08:36

## 2017-05-07 RX ADMIN — OSELTAMIVIR PHOSPHATE 75 MG: 75 CAPSULE ORAL at 08:36

## 2017-05-07 RX ADMIN — OXYMETAZOLINE HYDROCHLORIDE 2 SPRAY: 5 SPRAY NASAL at 22:04

## 2017-05-07 RX ADMIN — MYCOPHENOLATE MOFETIL 500 MG: 250 CAPSULE ORAL at 22:05

## 2017-05-07 RX ADMIN — ENOXAPARIN SODIUM 40 MG: 40 INJECTION SUBCUTANEOUS at 08:36

## 2017-05-07 RX ADMIN — FLUTICASONE PROPIONATE 2 SPRAY: 50 SPRAY, METERED NASAL at 08:36

## 2017-05-07 RX ADMIN — SALINE NASAL SPRAY 2 SPRAY: 1.5 SOLUTION NASAL at 22:05

## 2017-05-07 RX ADMIN — OSELTAMIVIR PHOSPHATE 75 MG: 75 CAPSULE ORAL at 22:05

## 2017-05-07 RX ADMIN — OXYMETAZOLINE HYDROCHLORIDE 2 SPRAY: 5 SPRAY NASAL at 08:36

## 2017-05-07 RX ADMIN — Medication 10 ML: at 09:00

## 2017-05-07 RX ADMIN — FERROUS SULFATE TAB EC 325 MG (65 MG FE EQUIVALENT) 325 MG: 325 (65 FE) TABLET DELAYED RESPONSE at 08:36

## 2017-05-07 RX ADMIN — FERROUS SULFATE TAB EC 325 MG (65 MG FE EQUIVALENT) 325 MG: 325 (65 FE) TABLET DELAYED RESPONSE at 17:08

## 2017-05-07 RX ADMIN — PREDNISONE 20 MG: 20 TABLET ORAL at 08:40

## 2017-05-07 RX ADMIN — Medication 10 ML: at 22:06

## 2017-05-07 RX ADMIN — MYCOPHENOLATE MOFETIL 500 MG: 250 CAPSULE ORAL at 08:40

## 2017-05-07 RX ADMIN — AZITHROMYCIN 250 MG: 250 TABLET, FILM COATED ORAL at 08:36

## 2017-05-07 ASSESSMENT — PAIN SCALES - GENERAL
PAINLEVEL_OUTOF10: 0
PAINLEVEL_OUTOF10: 0

## 2017-05-08 LAB
ABSOLUTE EOS #: 0 K/UL (ref 0–0.4)
ABSOLUTE LYMPH #: 4.7 K/UL (ref 1–4.8)
ABSOLUTE MONO #: 1.57 K/UL (ref 0.1–0.8)
ANION GAP SERPL CALCULATED.3IONS-SCNC: 13 MMOL/L (ref 9–17)
BASOPHILS # BLD: 0 %
BASOPHILS ABSOLUTE: 0 K/UL (ref 0–0.2)
BUN BLDV-MCNC: 10 MG/DL (ref 6–20)
BUN/CREAT BLD: ABNORMAL (ref 9–20)
CALCIUM IONIZED: 1.17 MMOL/L (ref 1.13–1.33)
CALCIUM SERPL-MCNC: 8.7 MG/DL (ref 8.6–10.4)
CHLORIDE BLD-SCNC: 97 MMOL/L (ref 98–107)
CO2: 25 MMOL/L (ref 20–31)
CREAT SERPL-MCNC: 0.58 MG/DL (ref 0.5–0.9)
DIFFERENTIAL TYPE: ABNORMAL
EOSINOPHILS RELATIVE PERCENT: 0 %
FIBRINOGEN: 201 MG/DL (ref 140–420)
GFR AFRICAN AMERICAN: >60 ML/MIN
GFR NON-AFRICAN AMERICAN: >60 ML/MIN
GFR SERPL CREATININE-BSD FRML MDRD: ABNORMAL ML/MIN/{1.73_M2}
GFR SERPL CREATININE-BSD FRML MDRD: ABNORMAL ML/MIN/{1.73_M2}
GLUCOSE BLD-MCNC: 98 MG/DL (ref 70–99)
HCT VFR BLD CALC: 25.5 % (ref 36–46)
HEMOGLOBIN: 7.5 G/DL (ref 12–16)
LYMPHOCYTES # BLD: 24 %
MCH RBC QN AUTO: 19.1 PG (ref 26–34)
MCHC RBC AUTO-ENTMCNC: 29.3 G/DL (ref 31–37)
MCV RBC AUTO: 65.3 FL (ref 80–100)
METAMYELOCYTES ABSOLUTE COUNT: 0.2 K/UL
METAMYELOCYTES: 1 %
MONOCYTES # BLD: 8 %
MORPHOLOGY: ABNORMAL
NUCLEATED RED BLOOD CELLS: 1 PER 100 WBC
PATHOLOGIST REVIEW: NORMAL
PDW BLD-RTO: 21.8 % (ref 12.5–15.4)
PLATELET # BLD: 280 K/UL (ref 140–450)
PLATELET ESTIMATE: ABNORMAL
PMV BLD AUTO: 8.5 FL (ref 6–12)
POTASSIUM SERPL-SCNC: 3.3 MMOL/L (ref 3.7–5.3)
RBC # BLD: 3.91 M/UL (ref 4–5.2)
RBC # BLD: ABNORMAL 10*6/UL
SEG NEUTROPHILS: 67 %
SEGMENTED NEUTROPHILS ABSOLUTE COUNT: 13.13 K/UL (ref 1.8–7.7)
SODIUM BLD-SCNC: 135 MMOL/L (ref 135–144)
WBC # BLD: 19.6 K/UL (ref 3.5–11)
WBC # BLD: ABNORMAL 10*3/UL

## 2017-05-08 PROCEDURE — 6370000000 HC RX 637 (ALT 250 FOR IP): Performed by: INTERNAL MEDICINE

## 2017-05-08 PROCEDURE — 80048 BASIC METABOLIC PNL TOTAL CA: CPT

## 2017-05-08 PROCEDURE — 85384 FIBRINOGEN ACTIVITY: CPT

## 2017-05-08 PROCEDURE — 94762 N-INVAS EAR/PLS OXIMTRY CONT: CPT

## 2017-05-08 PROCEDURE — 6370000000 HC RX 637 (ALT 250 FOR IP): Performed by: PSYCHIATRY & NEUROLOGY

## 2017-05-08 PROCEDURE — 36515 HC APHERESIS THER W/PLASMA REINF: CPT

## 2017-05-08 PROCEDURE — 85025 COMPLETE CBC W/AUTO DIFF WBC: CPT

## 2017-05-08 PROCEDURE — 6370000000 HC RX 637 (ALT 250 FOR IP): Performed by: HOSPITALIST

## 2017-05-08 PROCEDURE — 1200000000 HC SEMI PRIVATE

## 2017-05-08 PROCEDURE — 99232 SBSQ HOSP IP/OBS MODERATE 35: CPT | Performed by: INTERNAL MEDICINE

## 2017-05-08 PROCEDURE — 2580000003 HC RX 258: Performed by: HOSPITALIST

## 2017-05-08 PROCEDURE — 82330 ASSAY OF CALCIUM: CPT

## 2017-05-08 PROCEDURE — 6360000002 HC RX W HCPCS: Performed by: HOSPITALIST

## 2017-05-08 PROCEDURE — 94799 UNLISTED PULMONARY SVC/PX: CPT

## 2017-05-08 PROCEDURE — 36415 COLL VENOUS BLD VENIPUNCTURE: CPT

## 2017-05-08 PROCEDURE — 6360000002 HC RX W HCPCS: Performed by: PSYCHIATRY & NEUROLOGY

## 2017-05-08 PROCEDURE — 99232 SBSQ HOSP IP/OBS MODERATE 35: CPT | Performed by: PSYCHIATRY & NEUROLOGY

## 2017-05-08 RX ADMIN — SALINE NASAL SPRAY 2 SPRAY: 1.5 SOLUTION NASAL at 08:28

## 2017-05-08 RX ADMIN — SALINE NASAL SPRAY 2 SPRAY: 1.5 SOLUTION NASAL at 20:32

## 2017-05-08 RX ADMIN — FERROUS SULFATE TAB EC 325 MG (65 MG FE EQUIVALENT) 325 MG: 325 (65 FE) TABLET DELAYED RESPONSE at 18:12

## 2017-05-08 RX ADMIN — ENOXAPARIN SODIUM 40 MG: 40 INJECTION SUBCUTANEOUS at 08:28

## 2017-05-08 RX ADMIN — AZITHROMYCIN 250 MG: 250 TABLET, FILM COATED ORAL at 08:28

## 2017-05-08 RX ADMIN — PREDNISONE 20 MG: 20 TABLET ORAL at 08:28

## 2017-05-08 RX ADMIN — MYCOPHENOLATE MOFETIL 500 MG: 250 CAPSULE ORAL at 08:27

## 2017-05-08 RX ADMIN — Medication 10 ML: at 20:37

## 2017-05-08 RX ADMIN — MYCOPHENOLATE MOFETIL 500 MG: 250 CAPSULE ORAL at 20:32

## 2017-05-08 RX ADMIN — OSELTAMIVIR PHOSPHATE 75 MG: 75 CAPSULE ORAL at 08:28

## 2017-05-08 RX ADMIN — OXYMETAZOLINE HYDROCHLORIDE 2 SPRAY: 5 SPRAY NASAL at 08:28

## 2017-05-08 RX ADMIN — POTASSIUM CHLORIDE 40 MEQ: 20 TABLET, EXTENDED RELEASE ORAL at 17:09

## 2017-05-08 RX ADMIN — FERROUS SULFATE TAB EC 325 MG (65 MG FE EQUIVALENT) 325 MG: 325 (65 FE) TABLET DELAYED RESPONSE at 08:28

## 2017-05-08 RX ADMIN — OXYMETAZOLINE HYDROCHLORIDE 2 SPRAY: 5 SPRAY NASAL at 20:32

## 2017-05-08 RX ADMIN — FLUTICASONE PROPIONATE 2 SPRAY: 50 SPRAY, METERED NASAL at 08:28

## 2017-05-08 ASSESSMENT — PAIN SCALES - GENERAL
PAINLEVEL_OUTOF10: 0
PAINLEVEL_OUTOF10: 0

## 2017-05-09 LAB
CULTURE: NORMAL
HEMOGLOBIN: 7.6 G/DL (ref 12–16)
Lab: NORMAL
SPECIMEN DESCRIPTION: NORMAL
STATUS: NORMAL

## 2017-05-09 PROCEDURE — 99232 SBSQ HOSP IP/OBS MODERATE 35: CPT | Performed by: PSYCHIATRY & NEUROLOGY

## 2017-05-09 PROCEDURE — 94799 UNLISTED PULMONARY SVC/PX: CPT

## 2017-05-09 PROCEDURE — 6370000000 HC RX 637 (ALT 250 FOR IP): Performed by: HOSPITALIST

## 2017-05-09 PROCEDURE — 94762 N-INVAS EAR/PLS OXIMTRY CONT: CPT

## 2017-05-09 PROCEDURE — 2580000003 HC RX 258: Performed by: HOSPITALIST

## 2017-05-09 PROCEDURE — 6360000002 HC RX W HCPCS: Performed by: HOSPITALIST

## 2017-05-09 PROCEDURE — 36415 COLL VENOUS BLD VENIPUNCTURE: CPT

## 2017-05-09 PROCEDURE — 99233 SBSQ HOSP IP/OBS HIGH 50: CPT | Performed by: INTERNAL MEDICINE

## 2017-05-09 PROCEDURE — 85018 HEMOGLOBIN: CPT

## 2017-05-09 PROCEDURE — 1200000000 HC SEMI PRIVATE

## 2017-05-09 PROCEDURE — 6370000000 HC RX 637 (ALT 250 FOR IP): Performed by: PSYCHIATRY & NEUROLOGY

## 2017-05-09 PROCEDURE — 6360000002 HC RX W HCPCS: Performed by: PSYCHIATRY & NEUROLOGY

## 2017-05-09 PROCEDURE — 76937 US GUIDE VASCULAR ACCESS: CPT

## 2017-05-09 RX ADMIN — FERROUS SULFATE TAB EC 325 MG (65 MG FE EQUIVALENT) 325 MG: 325 (65 FE) TABLET DELAYED RESPONSE at 18:48

## 2017-05-09 RX ADMIN — PREDNISONE 20 MG: 20 TABLET ORAL at 11:37

## 2017-05-09 RX ADMIN — SALINE NASAL SPRAY 2 SPRAY: 1.5 SOLUTION NASAL at 21:36

## 2017-05-09 RX ADMIN — MYCOPHENOLATE MOFETIL 500 MG: 250 CAPSULE ORAL at 11:37

## 2017-05-09 RX ADMIN — FLUTICASONE PROPIONATE 2 SPRAY: 50 SPRAY, METERED NASAL at 11:38

## 2017-05-09 RX ADMIN — Medication 10 ML: at 21:38

## 2017-05-09 RX ADMIN — OXYMETAZOLINE HYDROCHLORIDE 2 SPRAY: 5 SPRAY NASAL at 21:36

## 2017-05-09 RX ADMIN — SALINE NASAL SPRAY 2 SPRAY: 1.5 SOLUTION NASAL at 11:38

## 2017-05-09 RX ADMIN — ENOXAPARIN SODIUM 40 MG: 40 INJECTION SUBCUTANEOUS at 11:38

## 2017-05-09 RX ADMIN — Medication 10 ML: at 17:19

## 2017-05-09 RX ADMIN — FERROUS SULFATE TAB EC 325 MG (65 MG FE EQUIVALENT) 325 MG: 325 (65 FE) TABLET DELAYED RESPONSE at 11:37

## 2017-05-09 RX ADMIN — OXYMETAZOLINE HYDROCHLORIDE 2 SPRAY: 5 SPRAY NASAL at 11:38

## 2017-05-09 RX ADMIN — MYCOPHENOLATE MOFETIL 500 MG: 250 CAPSULE ORAL at 21:36

## 2017-05-09 ASSESSMENT — PAIN SCALES - GENERAL: PAINLEVEL_OUTOF10: 0

## 2017-05-10 LAB
ABSOLUTE EOS #: 0.25 K/UL (ref 0–0.4)
ABSOLUTE LYMPH #: 1.26 K/UL (ref 1–4.8)
ABSOLUTE MONO #: 1.26 K/UL (ref 0.1–0.8)
ANION GAP SERPL CALCULATED.3IONS-SCNC: 14 MMOL/L (ref 9–17)
BASOPHILS # BLD: 0 %
BASOPHILS ABSOLUTE: 0 K/UL (ref 0–0.2)
BLD PROD TYP BPU: NORMAL
BUN BLDV-MCNC: 10 MG/DL (ref 6–20)
BUN/CREAT BLD: ABNORMAL (ref 9–20)
CALCIUM IONIZED: 1.17 MMOL/L (ref 1.13–1.33)
CALCIUM SERPL-MCNC: 8.5 MG/DL (ref 8.6–10.4)
CHLORIDE BLD-SCNC: 103 MMOL/L (ref 98–107)
CO2: 21 MMOL/L (ref 20–31)
CREAT SERPL-MCNC: 0.44 MG/DL (ref 0.5–0.9)
DATE, STOOL #1: NORMAL
DATE, STOOL #2: NORMAL
DATE, STOOL #3: NORMAL
DIFFERENTIAL TYPE: ABNORMAL
DISPENSE STATUS BLOOD BANK: NORMAL
EOSINOPHILS RELATIVE PERCENT: 1 %
FIBRINOGEN: 104 MG/DL (ref 140–420)
GFR AFRICAN AMERICAN: >60 ML/MIN
GFR NON-AFRICAN AMERICAN: >60 ML/MIN
GFR SERPL CREATININE-BSD FRML MDRD: ABNORMAL ML/MIN/{1.73_M2}
GFR SERPL CREATININE-BSD FRML MDRD: ABNORMAL ML/MIN/{1.73_M2}
GLUCOSE BLD-MCNC: 108 MG/DL (ref 70–99)
HCT VFR BLD CALC: 26.9 % (ref 36–46)
HEMOCCULT SP1 STL QL: NEGATIVE
HEMOCCULT SP2 STL QL: NORMAL
HEMOCCULT SP3 STL QL: NORMAL
HEMOGLOBIN: 7.9 G/DL (ref 12–16)
LYMPHOCYTES # BLD: 5 %
MCH RBC QN AUTO: 19.8 PG (ref 26–34)
MCHC RBC AUTO-ENTMCNC: 29.2 G/DL (ref 31–37)
MCV RBC AUTO: 67.9 FL (ref 80–100)
MONOCYTES # BLD: 5 %
MORPHOLOGY: ABNORMAL
PDW BLD-RTO: 22.8 % (ref 12.5–15.4)
PLATELET # BLD: 279 K/UL (ref 140–450)
PLATELET ESTIMATE: ABNORMAL
PMV BLD AUTO: 8.5 FL (ref 6–12)
POTASSIUM SERPL-SCNC: 4 MMOL/L (ref 3.7–5.3)
RBC # BLD: 3.96 M/UL (ref 4–5.2)
RBC # BLD: ABNORMAL 10*6/UL
SEG NEUTROPHILS: 89 %
SEGMENTED NEUTROPHILS ABSOLUTE COUNT: 22.43 K/UL (ref 1.8–7.7)
SODIUM BLD-SCNC: 138 MMOL/L (ref 135–144)
TIME, STOOL #1: NORMAL
TIME, STOOL #2: NORMAL
TIME, STOOL #3: NORMAL
TRANSFUSION STATUS: NORMAL
UNIT DIVISION: 0
UNIT NUMBER: NORMAL
WBC # BLD: 25.2 K/UL (ref 3.5–11)
WBC # BLD: ABNORMAL 10*3/UL

## 2017-05-10 PROCEDURE — 2580000003 HC RX 258: Performed by: PSYCHIATRY & NEUROLOGY

## 2017-05-10 PROCEDURE — 6370000000 HC RX 637 (ALT 250 FOR IP): Performed by: HOSPITALIST

## 2017-05-10 PROCEDURE — 6370000000 HC RX 637 (ALT 250 FOR IP): Performed by: PSYCHIATRY & NEUROLOGY

## 2017-05-10 PROCEDURE — P9045 ALBUMIN (HUMAN), 5%, 250 ML: HCPCS | Performed by: PSYCHIATRY & NEUROLOGY

## 2017-05-10 PROCEDURE — 99232 SBSQ HOSP IP/OBS MODERATE 35: CPT | Performed by: PSYCHIATRY & NEUROLOGY

## 2017-05-10 PROCEDURE — 85025 COMPLETE CBC W/AUTO DIFF WBC: CPT

## 2017-05-10 PROCEDURE — 85384 FIBRINOGEN ACTIVITY: CPT

## 2017-05-10 PROCEDURE — G0328 FECAL BLOOD SCRN IMMUNOASSAY: HCPCS

## 2017-05-10 PROCEDURE — 6360000002 HC RX W HCPCS: Performed by: PSYCHIATRY & NEUROLOGY

## 2017-05-10 PROCEDURE — 86927 PLASMA FRESH FROZEN: CPT

## 2017-05-10 PROCEDURE — 99232 SBSQ HOSP IP/OBS MODERATE 35: CPT | Performed by: INTERNAL MEDICINE

## 2017-05-10 PROCEDURE — 94762 N-INVAS EAR/PLS OXIMTRY CONT: CPT

## 2017-05-10 PROCEDURE — 6360000002 HC RX W HCPCS: Performed by: HOSPITALIST

## 2017-05-10 PROCEDURE — 82330 ASSAY OF CALCIUM: CPT

## 2017-05-10 PROCEDURE — 1200000000 HC SEMI PRIVATE

## 2017-05-10 PROCEDURE — 36515 HC APHERESIS THER W/PLASMA REINF: CPT

## 2017-05-10 PROCEDURE — G0108 DIAB MANAGE TRN  PER INDIV: HCPCS

## 2017-05-10 PROCEDURE — 2580000003 HC RX 258: Performed by: HOSPITALIST

## 2017-05-10 PROCEDURE — 94799 UNLISTED PULMONARY SVC/PX: CPT

## 2017-05-10 PROCEDURE — 80048 BASIC METABOLIC PNL TOTAL CA: CPT

## 2017-05-10 PROCEDURE — 36415 COLL VENOUS BLD VENIPUNCTURE: CPT

## 2017-05-10 PROCEDURE — P9017 PLASMA 1 DONOR FRZ W/IN 8 HR: HCPCS

## 2017-05-10 PROCEDURE — 6360000002 HC RX W HCPCS: Performed by: EMERGENCY MEDICINE

## 2017-05-10 RX ORDER — 0.9 % SODIUM CHLORIDE 0.9 %
250 INTRAVENOUS SOLUTION INTRAVENOUS ONCE
Status: DISCONTINUED | OUTPATIENT
Start: 2017-05-10 | End: 2017-05-12 | Stop reason: HOSPADM

## 2017-05-10 RX ADMIN — MYCOPHENOLATE MOFETIL 500 MG: 250 CAPSULE ORAL at 20:48

## 2017-05-10 RX ADMIN — FERROUS SULFATE TAB EC 325 MG (65 MG FE EQUIVALENT) 325 MG: 325 (65 FE) TABLET DELAYED RESPONSE at 10:38

## 2017-05-10 RX ADMIN — OXYMETAZOLINE HYDROCHLORIDE 2 SPRAY: 5 SPRAY NASAL at 10:38

## 2017-05-10 RX ADMIN — PREDNISONE 20 MG: 20 TABLET ORAL at 10:39

## 2017-05-10 RX ADMIN — DIPHENHYDRAMINE HYDROCHLORIDE 25 MG: 50 INJECTION, SOLUTION INTRAMUSCULAR; INTRAVENOUS at 20:30

## 2017-05-10 RX ADMIN — ENOXAPARIN SODIUM 40 MG: 40 INJECTION SUBCUTANEOUS at 10:39

## 2017-05-10 RX ADMIN — SALINE NASAL SPRAY 2 SPRAY: 1.5 SOLUTION NASAL at 10:38

## 2017-05-10 RX ADMIN — FERROUS SULFATE TAB EC 325 MG (65 MG FE EQUIVALENT) 325 MG: 325 (65 FE) TABLET DELAYED RESPONSE at 18:13

## 2017-05-10 RX ADMIN — FLUTICASONE PROPIONATE 2 SPRAY: 50 SPRAY, METERED NASAL at 10:38

## 2017-05-10 RX ADMIN — Medication 10 ML: at 20:46

## 2017-05-10 RX ADMIN — CALCIUM GLUCONATE 1 G: 94 INJECTION, SOLUTION INTRAVENOUS at 21:30

## 2017-05-10 RX ADMIN — CALCIUM GLUCONATE 2 G: 94 INJECTION, SOLUTION INTRAVENOUS at 20:05

## 2017-05-10 RX ADMIN — Medication 10 ML: at 10:44

## 2017-05-10 RX ADMIN — MYCOPHENOLATE MOFETIL 500 MG: 250 CAPSULE ORAL at 10:38

## 2017-05-10 RX ADMIN — ALBUMIN (HUMAN) 5000 ML: 12.5 INJECTION, SOLUTION INTRAVENOUS at 20:02

## 2017-05-10 ASSESSMENT — PAIN SCALES - GENERAL: PAINLEVEL_OUTOF10: 0

## 2017-05-11 LAB
ACETYLCHOL BLOCK AB: 63 % (ref 0–26)
ACETYLCHOLINE BINDING ANTIBODY: 478.4 NMOL/L (ref 0–0.4)
BLD PROD TYP BPU: NORMAL
DISPENSE STATUS BLOOD BANK: NORMAL
STRIATED MUSCLE AB, IGG SCREEN: ABNORMAL
TITIN ANTIBODY: 0.23 IV (ref 0–0.45)
TRANSFUSION STATUS: NORMAL
UNIT DIVISION: 0
UNIT NUMBER: NORMAL

## 2017-05-11 PROCEDURE — 2580000003 HC RX 258: Performed by: HOSPITALIST

## 2017-05-11 PROCEDURE — 99232 SBSQ HOSP IP/OBS MODERATE 35: CPT | Performed by: PSYCHIATRY & NEUROLOGY

## 2017-05-11 PROCEDURE — 6370000000 HC RX 637 (ALT 250 FOR IP): Performed by: HOSPITALIST

## 2017-05-11 PROCEDURE — 6360000002 HC RX W HCPCS: Performed by: PSYCHIATRY & NEUROLOGY

## 2017-05-11 PROCEDURE — 99232 SBSQ HOSP IP/OBS MODERATE 35: CPT | Performed by: INTERNAL MEDICINE

## 2017-05-11 PROCEDURE — 1200000000 HC SEMI PRIVATE

## 2017-05-11 PROCEDURE — 6360000002 HC RX W HCPCS: Performed by: HOSPITALIST

## 2017-05-11 PROCEDURE — 94762 N-INVAS EAR/PLS OXIMTRY CONT: CPT

## 2017-05-11 PROCEDURE — 94799 UNLISTED PULMONARY SVC/PX: CPT

## 2017-05-11 PROCEDURE — 6370000000 HC RX 637 (ALT 250 FOR IP): Performed by: PSYCHIATRY & NEUROLOGY

## 2017-05-11 RX ORDER — HEPARIN SODIUM 5000 [USP'U]/ML
13000 INJECTION, SOLUTION INTRAVENOUS; SUBCUTANEOUS EVERY OTHER DAY
Status: DISCONTINUED | OUTPATIENT
Start: 2017-05-13 | End: 2017-05-11

## 2017-05-11 RX ORDER — HEPARIN SODIUM 5000 [USP'U]/ML
13500 INJECTION, SOLUTION INTRAVENOUS; SUBCUTANEOUS ONCE
Status: DISCONTINUED | OUTPATIENT
Start: 2017-05-12 | End: 2017-05-12 | Stop reason: HOSPADM

## 2017-05-11 RX ORDER — HEPARIN SODIUM 5000 [USP'U]/ML
13500 INJECTION, SOLUTION INTRAVENOUS; SUBCUTANEOUS EVERY OTHER DAY
Status: DISCONTINUED | OUTPATIENT
Start: 2017-05-13 | End: 2017-05-11

## 2017-05-11 RX ADMIN — SALINE NASAL SPRAY 2 SPRAY: 1.5 SOLUTION NASAL at 09:19

## 2017-05-11 RX ADMIN — OXYMETAZOLINE HYDROCHLORIDE 2 SPRAY: 5 SPRAY NASAL at 09:19

## 2017-05-11 RX ADMIN — ENOXAPARIN SODIUM 40 MG: 40 INJECTION SUBCUTANEOUS at 09:20

## 2017-05-11 RX ADMIN — PREDNISONE 20 MG: 20 TABLET ORAL at 09:19

## 2017-05-11 RX ADMIN — SALINE NASAL SPRAY 2 SPRAY: 1.5 SOLUTION NASAL at 21:00

## 2017-05-11 RX ADMIN — MYCOPHENOLATE MOFETIL 500 MG: 250 CAPSULE ORAL at 09:19

## 2017-05-11 RX ADMIN — OXYMETAZOLINE HYDROCHLORIDE 2 SPRAY: 5 SPRAY NASAL at 21:00

## 2017-05-11 RX ADMIN — Medication 10 ML: at 21:01

## 2017-05-11 RX ADMIN — FERROUS SULFATE TAB EC 325 MG (65 MG FE EQUIVALENT) 325 MG: 325 (65 FE) TABLET DELAYED RESPONSE at 09:19

## 2017-05-11 RX ADMIN — Medication 10 ML: at 09:20

## 2017-05-11 RX ADMIN — FLUTICASONE PROPIONATE 2 SPRAY: 50 SPRAY, METERED NASAL at 09:19

## 2017-05-11 RX ADMIN — FERROUS SULFATE TAB EC 325 MG (65 MG FE EQUIVALENT) 325 MG: 325 (65 FE) TABLET DELAYED RESPONSE at 19:26

## 2017-05-11 RX ADMIN — MYCOPHENOLATE MOFETIL 500 MG: 250 CAPSULE ORAL at 21:00

## 2017-05-11 ASSESSMENT — PAIN SCALES - GENERAL: PAINLEVEL_OUTOF10: 0

## 2017-05-12 VITALS
RESPIRATION RATE: 20 BRPM | SYSTOLIC BLOOD PRESSURE: 112 MMHG | HEIGHT: 64 IN | WEIGHT: 158 LBS | DIASTOLIC BLOOD PRESSURE: 77 MMHG | BODY MASS INDEX: 26.98 KG/M2 | HEART RATE: 103 BPM | TEMPERATURE: 97.8 F | OXYGEN SATURATION: 100 %

## 2017-05-12 LAB
ABSOLUTE EOS #: 0 K/UL (ref 0–0.4)
ABSOLUTE LYMPH #: 3.14 K/UL (ref 1–4.8)
ABSOLUTE MONO #: 0.78 K/UL (ref 0.1–0.8)
ANION GAP SERPL CALCULATED.3IONS-SCNC: 11 MMOL/L (ref 9–17)
BASOPHILS # BLD: 0 %
BASOPHILS ABSOLUTE: 0 K/UL (ref 0–0.2)
BUN BLDV-MCNC: 13 MG/DL (ref 6–20)
BUN/CREAT BLD: ABNORMAL (ref 9–20)
CALCIUM IONIZED: 1.08 MMOL/L (ref 1.13–1.33)
CALCIUM SERPL-MCNC: 7.9 MG/DL (ref 8.6–10.4)
CHLORIDE BLD-SCNC: 102 MMOL/L (ref 98–107)
CO2: 22 MMOL/L (ref 20–31)
CREAT SERPL-MCNC: 0.51 MG/DL (ref 0.5–0.9)
DIFFERENTIAL TYPE: ABNORMAL
EOSINOPHILS RELATIVE PERCENT: 0 %
FIBRINOGEN: 151 MG/DL (ref 140–420)
FIBRINOGEN: <80 MG/DL (ref 140–420)
GFR AFRICAN AMERICAN: >60 ML/MIN
GFR NON-AFRICAN AMERICAN: >60 ML/MIN
GFR SERPL CREATININE-BSD FRML MDRD: ABNORMAL ML/MIN/{1.73_M2}
GFR SERPL CREATININE-BSD FRML MDRD: ABNORMAL ML/MIN/{1.73_M2}
GLUCOSE BLD-MCNC: 89 MG/DL (ref 70–99)
HCT VFR BLD CALC: 27.3 % (ref 36–46)
HEMOGLOBIN: 8.1 G/DL (ref 12–16)
LYMPHOCYTES # BLD: 16 %
MCH RBC QN AUTO: 21 PG (ref 26–34)
MCHC RBC AUTO-ENTMCNC: 29.7 G/DL (ref 31–37)
MCV RBC AUTO: 70.6 FL (ref 80–100)
METAMYELOCYTES ABSOLUTE COUNT: 0.2 K/UL
METAMYELOCYTES: 1 %
MONOCYTES # BLD: 4 %
MORPHOLOGY: ABNORMAL
PDW BLD-RTO: 22.3 % (ref 12.5–15.4)
PLATELET # BLD: 314 K/UL (ref 140–450)
PLATELET ESTIMATE: ABNORMAL
PMV BLD AUTO: 10.4 FL (ref 6–12)
POTASSIUM SERPL-SCNC: 3.6 MMOL/L (ref 3.7–5.3)
RBC # BLD: 3.86 M/UL (ref 4–5.2)
RBC # BLD: ABNORMAL 10*6/UL
SEG NEUTROPHILS: 79 %
SEGMENTED NEUTROPHILS ABSOLUTE COUNT: 15.48 K/UL (ref 1.8–7.7)
SODIUM BLD-SCNC: 135 MMOL/L (ref 135–144)
WBC # BLD: 19.6 K/UL (ref 3.5–11)
WBC # BLD: ABNORMAL 10*3/UL

## 2017-05-12 PROCEDURE — 6360000002 HC RX W HCPCS: Performed by: PSYCHIATRY & NEUROLOGY

## 2017-05-12 PROCEDURE — 2580000003 HC RX 258: Performed by: PSYCHIATRY & NEUROLOGY

## 2017-05-12 PROCEDURE — 36415 COLL VENOUS BLD VENIPUNCTURE: CPT

## 2017-05-12 PROCEDURE — 99239 HOSP IP/OBS DSCHRG MGMT >30: CPT | Performed by: INTERNAL MEDICINE

## 2017-05-12 PROCEDURE — 82330 ASSAY OF CALCIUM: CPT

## 2017-05-12 PROCEDURE — 94762 N-INVAS EAR/PLS OXIMTRY CONT: CPT

## 2017-05-12 PROCEDURE — 85384 FIBRINOGEN ACTIVITY: CPT

## 2017-05-12 PROCEDURE — 6370000000 HC RX 637 (ALT 250 FOR IP): Performed by: HOSPITALIST

## 2017-05-12 PROCEDURE — 86927 PLASMA FRESH FROZEN: CPT

## 2017-05-12 PROCEDURE — 6360000002 HC RX W HCPCS: Performed by: HOSPITALIST

## 2017-05-12 PROCEDURE — 80048 BASIC METABOLIC PNL TOTAL CA: CPT

## 2017-05-12 PROCEDURE — 99232 SBSQ HOSP IP/OBS MODERATE 35: CPT | Performed by: PSYCHIATRY & NEUROLOGY

## 2017-05-12 PROCEDURE — 36515 HC APHERESIS THER W/PLASMA REINF: CPT

## 2017-05-12 PROCEDURE — P9045 ALBUMIN (HUMAN), 5%, 250 ML: HCPCS | Performed by: PSYCHIATRY & NEUROLOGY

## 2017-05-12 PROCEDURE — 85025 COMPLETE CBC W/AUTO DIFF WBC: CPT

## 2017-05-12 PROCEDURE — 86965 POOLING BLOOD PLATELETS: CPT

## 2017-05-12 PROCEDURE — 6370000000 HC RX 637 (ALT 250 FOR IP): Performed by: PSYCHIATRY & NEUROLOGY

## 2017-05-12 RX ORDER — MYCOPHENOLATE MOFETIL 250 MG/1
500 CAPSULE ORAL 2 TIMES DAILY
Qty: 90 CAPSULE | Refills: 5 | Status: SHIPPED | OUTPATIENT
Start: 2017-05-12 | End: 2017-07-10 | Stop reason: ALTCHOICE

## 2017-05-12 RX ORDER — PREDNISONE 20 MG/1
20 TABLET ORAL DAILY
Qty: 60 TABLET | Refills: 5 | Status: SHIPPED | OUTPATIENT
Start: 2017-05-12 | End: 2017-05-22

## 2017-05-12 RX ORDER — 0.9 % SODIUM CHLORIDE 0.9 %
250 INTRAVENOUS SOLUTION INTRAVENOUS ONCE
Status: DISCONTINUED | OUTPATIENT
Start: 2017-05-12 | End: 2017-05-12 | Stop reason: HOSPADM

## 2017-05-12 RX ORDER — LANOLIN ALCOHOL/MO/W.PET/CERES
325 CREAM (GRAM) TOPICAL 2 TIMES DAILY WITH MEALS
Qty: 90 TABLET | Refills: 5 | Status: SHIPPED | OUTPATIENT
Start: 2017-05-12 | End: 2017-10-16 | Stop reason: SDUPTHER

## 2017-05-12 RX ADMIN — PREDNISONE 20 MG: 20 TABLET ORAL at 08:22

## 2017-05-12 RX ADMIN — CALCIUM GLUCONATE 2 G: 94 INJECTION, SOLUTION INTRAVENOUS at 09:00

## 2017-05-12 RX ADMIN — ENOXAPARIN SODIUM 40 MG: 40 INJECTION SUBCUTANEOUS at 08:22

## 2017-05-12 RX ADMIN — FLUTICASONE PROPIONATE 2 SPRAY: 50 SPRAY, METERED NASAL at 08:22

## 2017-05-12 RX ADMIN — OXYMETAZOLINE HYDROCHLORIDE 2 SPRAY: 5 SPRAY NASAL at 08:22

## 2017-05-12 RX ADMIN — SALINE NASAL SPRAY 2 SPRAY: 1.5 SOLUTION NASAL at 08:22

## 2017-05-12 RX ADMIN — MYCOPHENOLATE MOFETIL 500 MG: 250 CAPSULE ORAL at 08:22

## 2017-05-12 RX ADMIN — FERROUS SULFATE TAB EC 325 MG (65 MG FE EQUIVALENT) 325 MG: 325 (65 FE) TABLET DELAYED RESPONSE at 08:22

## 2017-05-12 RX ADMIN — ALBUMIN (HUMAN) 5000 ML: 12.5 INJECTION, SOLUTION INTRAVENOUS at 09:00

## 2017-05-12 ASSESSMENT — PAIN SCALES - GENERAL: PAINLEVEL_OUTOF10: 0

## 2017-05-13 LAB
BLD PROD TYP BPU: NORMAL
BLD PROD TYP BPU: NORMAL
DISPENSE STATUS BLOOD BANK: NORMAL
DISPENSE STATUS BLOOD BANK: NORMAL
TRANSFUSION STATUS: NORMAL
TRANSFUSION STATUS: NORMAL
UNIT DIVISION: 0
UNIT DIVISION: 0
UNIT NUMBER: NORMAL
UNIT NUMBER: NORMAL

## 2017-06-02 ENCOUNTER — HOSPITAL ENCOUNTER (OUTPATIENT)
Age: 42
Setting detail: SPECIMEN
Discharge: HOME | End: 2017-06-02
Payer: MEDICARE

## 2017-06-02 ENCOUNTER — OFFICE VISIT (OUTPATIENT)
Dept: INTERNAL MEDICINE | Age: 42
End: 2017-06-02
Payer: MEDICARE

## 2017-06-02 ENCOUNTER — OFFICE VISIT (OUTPATIENT)
Dept: NEUROLOGY | Age: 42
End: 2017-06-02
Payer: MEDICARE

## 2017-06-02 VITALS
HEART RATE: 83 BPM | DIASTOLIC BLOOD PRESSURE: 85 MMHG | HEIGHT: 64 IN | SYSTOLIC BLOOD PRESSURE: 125 MMHG | BODY MASS INDEX: 29.09 KG/M2 | WEIGHT: 170.4 LBS

## 2017-06-02 VITALS
BODY MASS INDEX: 29.19 KG/M2 | DIASTOLIC BLOOD PRESSURE: 80 MMHG | WEIGHT: 171 LBS | HEART RATE: 95 BPM | HEIGHT: 64 IN | SYSTOLIC BLOOD PRESSURE: 119 MMHG

## 2017-06-02 DIAGNOSIS — G70.00 MYASTHENIA GRAVIS (HCC): Primary | ICD-10-CM

## 2017-06-02 DIAGNOSIS — E11.9 TYPE 2 DIABETES MELLITUS WITHOUT COMPLICATION, WITHOUT LONG-TERM CURRENT USE OF INSULIN (HCC): ICD-10-CM

## 2017-06-02 DIAGNOSIS — D50.9 IRON DEFICIENCY ANEMIA, UNSPECIFIED IRON DEFICIENCY ANEMIA TYPE: ICD-10-CM

## 2017-06-02 LAB
CREATININE URINE: 91.5 MG/DL (ref 28–217)
MICROALBUMIN/CREAT 24H UR: <12 MG/L
MICROALBUMIN/CREAT UR-RTO: 13 MCG/MG CREAT
TOTAL PROTEIN, URINE: 16 MG/DL
URINE TOTAL PROTEIN CREATININE RATIO: 0.17 (ref 0–0.2)

## 2017-06-02 PROCEDURE — 99214 OFFICE O/P EST MOD 30 MIN: CPT | Performed by: PSYCHIATRY & NEUROLOGY

## 2017-06-02 PROCEDURE — 99203 OFFICE O/P NEW LOW 30 MIN: CPT | Performed by: HOSPITALIST

## 2017-06-02 RX ORDER — MYCOPHENOLATE MOFETIL 500 MG/1
TABLET ORAL
Qty: 60 TABLET | Refills: 5 | Status: SHIPPED | OUTPATIENT
Start: 2017-06-02 | End: 2017-12-11 | Stop reason: SDUPTHER

## 2017-06-02 RX ORDER — FERROUS SULFATE 325(65) MG
325 TABLET ORAL 2 TIMES DAILY
Qty: 60 TABLET | Refills: 3 | Status: SHIPPED | OUTPATIENT
Start: 2017-06-02 | End: 2017-06-02 | Stop reason: SDUPTHER

## 2017-06-02 RX ORDER — PREDNISONE 20 MG/1
TABLET ORAL
Qty: 30 TABLET | Refills: 5 | Status: SHIPPED | OUTPATIENT
Start: 2017-06-02 | End: 2017-12-11 | Stop reason: SDUPTHER

## 2017-06-05 ENCOUNTER — HOSPITAL ENCOUNTER (OUTPATIENT)
Age: 42
Setting detail: SPECIMEN
Discharge: HOME | End: 2017-06-05
Payer: MEDICARE

## 2017-06-05 DIAGNOSIS — D50.9 IRON DEFICIENCY ANEMIA, UNSPECIFIED IRON DEFICIENCY ANEMIA TYPE: ICD-10-CM

## 2017-06-05 LAB
ABSOLUTE EOS #: 0.29 K/UL (ref 0–0.4)
ABSOLUTE LYMPH #: 3.63 K/UL (ref 1–4.8)
ABSOLUTE MONO #: 1.6 K/UL (ref 0.1–0.8)
ACETYLCHOL MODUL AB: 89 %
BASOPHILS # BLD: 0 %
BASOPHILS ABSOLUTE: 0 K/UL (ref 0–0.2)
DIFFERENTIAL TYPE: ABNORMAL
EOSINOPHILS RELATIVE PERCENT: 2 %
HCT VFR BLD CALC: 37 % (ref 36–46)
HEMOGLOBIN: 11.6 G/DL (ref 12–16)
LYMPHOCYTES # BLD: 25 %
MCH RBC QN AUTO: 25.2 PG (ref 26–34)
MCHC RBC AUTO-ENTMCNC: 31.4 G/DL (ref 31–37)
MCV RBC AUTO: 80.3 FL (ref 80–100)
MONOCYTES # BLD: 11 %
MORPHOLOGY: ABNORMAL
PDW BLD-RTO: 38 % (ref 12.5–15.4)
PLATELET # BLD: 443 K/UL (ref 140–450)
PLATELET ESTIMATE: ABNORMAL
PMV BLD AUTO: 9.6 FL (ref 6–12)
RBC # BLD: 4.6 M/UL (ref 4–5.2)
RBC # BLD: ABNORMAL 10*6/UL
SEG NEUTROPHILS: 62 %
SEGMENTED NEUTROPHILS ABSOLUTE COUNT: 8.98 K/UL (ref 1.8–7.7)
WBC # BLD: 14.5 K/UL (ref 3.5–11)
WBC # BLD: ABNORMAL 10*3/UL

## 2017-06-05 PROCEDURE — 36415 COLL VENOUS BLD VENIPUNCTURE: CPT

## 2017-06-05 PROCEDURE — 85025 COMPLETE CBC W/AUTO DIFF WBC: CPT

## 2017-07-10 ENCOUNTER — HOSPITAL ENCOUNTER (OUTPATIENT)
Age: 42
Setting detail: SPECIMEN
Discharge: HOME | End: 2017-07-10
Payer: MEDICARE

## 2017-07-10 ENCOUNTER — OFFICE VISIT (OUTPATIENT)
Dept: INTERNAL MEDICINE | Age: 42
End: 2017-07-10
Payer: MEDICARE

## 2017-07-10 VITALS
HEIGHT: 70 IN | SYSTOLIC BLOOD PRESSURE: 137 MMHG | DIASTOLIC BLOOD PRESSURE: 101 MMHG | HEART RATE: 99 BPM | WEIGHT: 170 LBS | BODY MASS INDEX: 24.34 KG/M2

## 2017-07-10 DIAGNOSIS — R73.03 PRE-DIABETES: ICD-10-CM

## 2017-07-10 DIAGNOSIS — Z11.4 SCREENING FOR HIV (HUMAN IMMUNODEFICIENCY VIRUS): ICD-10-CM

## 2017-07-10 DIAGNOSIS — G70.00 MYASTHENIA GRAVIS (HCC): Primary | ICD-10-CM

## 2017-07-10 DIAGNOSIS — R74.8 ABNORMAL LIVER ENZYMES: ICD-10-CM

## 2017-07-10 PROBLEM — J10.1 INFLUENZA A: Status: RESOLVED | Noted: 2017-05-04 | Resolved: 2017-07-10

## 2017-07-10 LAB
ALBUMIN SERPL-MCNC: 4.2 G/DL (ref 3.5–5.2)
ALBUMIN/GLOBULIN RATIO: 1.2 (ref 1–2.5)
ALP BLD-CCNC: 77 U/L (ref 35–104)
ALT SERPL-CCNC: 47 U/L (ref 5–33)
AST SERPL-CCNC: 40 U/L
BILIRUB SERPL-MCNC: 0.18 MG/DL (ref 0.3–1.2)
BILIRUBIN DIRECT: 0.1 MG/DL
BILIRUBIN, INDIRECT: 0.08 MG/DL (ref 0–1)
GLOBULIN: ABNORMAL G/DL (ref 1.5–3.8)
HIV AG/AB: NONREACTIVE
TOTAL PROTEIN: 7.8 G/DL (ref 6.4–8.3)

## 2017-07-10 PROCEDURE — 99213 OFFICE O/P EST LOW 20 MIN: CPT | Performed by: STUDENT IN AN ORGANIZED HEALTH CARE EDUCATION/TRAINING PROGRAM

## 2017-07-10 PROCEDURE — 36415 COLL VENOUS BLD VENIPUNCTURE: CPT

## 2017-07-10 PROCEDURE — 80076 HEPATIC FUNCTION PANEL: CPT

## 2017-07-10 PROCEDURE — 87389 HIV-1 AG W/HIV-1&-2 AB AG IA: CPT

## 2017-07-10 RX ORDER — CALCIUM CARBONATE-CHOLECALCIFEROL TAB 250 MG-125 UNIT 250-125 MG-UNIT
1 TAB ORAL DAILY
Qty: 30 TABLET | Refills: 11 | Status: SHIPPED | OUTPATIENT
Start: 2017-07-10 | End: 2018-01-22

## 2017-07-10 RX ORDER — KETOCONAZOLE 20 MG/G
CREAM TOPICAL
Qty: 30 G | Refills: 1 | Status: SHIPPED | OUTPATIENT
Start: 2017-07-10 | End: 2017-12-21 | Stop reason: ALTCHOICE

## 2017-07-10 ASSESSMENT — PATIENT HEALTH QUESTIONNAIRE - PHQ9
4. FEELING TIRED OR HAVING LITTLE ENERGY: 0
9. THOUGHTS THAT YOU WOULD BE BETTER OFF DEAD, OR OF HURTING YOURSELF: 0
2. FEELING DOWN, DEPRESSED OR HOPELESS: 2
SUM OF ALL RESPONSES TO PHQ QUESTIONS 1-9: 2
6. FEELING BAD ABOUT YOURSELF - OR THAT YOU ARE A FAILURE OR HAVE LET YOURSELF OR YOUR FAMILY DOWN: 0
7. TROUBLE CONCENTRATING ON THINGS, SUCH AS READING THE NEWSPAPER OR WATCHING TELEVISION: 0
10. IF YOU CHECKED OFF ANY PROBLEMS, HOW DIFFICULT HAVE THESE PROBLEMS MADE IT FOR YOU TO DO YOUR WORK, TAKE CARE OF THINGS AT HOME, OR GET ALONG WITH OTHER PEOPLE: 0
1. LITTLE INTEREST OR PLEASURE IN DOING THINGS: 0
8. MOVING OR SPEAKING SO SLOWLY THAT OTHER PEOPLE COULD HAVE NOTICED. OR THE OPPOSITE, BEING SO FIGETY OR RESTLESS THAT YOU HAVE BEEN MOVING AROUND A LOT MORE THAN USUAL: 0
SUM OF ALL RESPONSES TO PHQ9 QUESTIONS 1 & 2: 2
3. TROUBLE FALLING OR STAYING ASLEEP: 0
5. POOR APPETITE OR OVEREATING: 0

## 2017-08-02 ENCOUNTER — APPOINTMENT (OUTPATIENT)
Dept: GENERAL RADIOLOGY | Age: 42
DRG: 042 | End: 2017-08-02
Payer: MEDICARE

## 2017-08-02 ENCOUNTER — HOSPITAL ENCOUNTER (INPATIENT)
Age: 42
LOS: 10 days | Discharge: HOME | DRG: 042 | End: 2017-08-13
Attending: EMERGENCY MEDICINE | Admitting: INTERNAL MEDICINE
Payer: MEDICARE

## 2017-08-02 DIAGNOSIS — R25.1 SHAKING: ICD-10-CM

## 2017-08-02 DIAGNOSIS — G70.00 MYASTHENIA GRAVIS (HCC): ICD-10-CM

## 2017-08-02 DIAGNOSIS — R25.8 CLONUS: Primary | ICD-10-CM

## 2017-08-02 LAB
ABSOLUTE EOS #: 0.1 K/UL (ref 0–0.4)
ABSOLUTE LYMPH #: 3.7 K/UL (ref 1–4.8)
ABSOLUTE MONO #: 0.9 K/UL (ref 0.1–1.2)
ANION GAP SERPL CALCULATED.3IONS-SCNC: 17 MMOL/L (ref 9–17)
BASOPHILS # BLD: 1 %
BASOPHILS ABSOLUTE: 0.2 K/UL (ref 0–0.2)
BUN BLDV-MCNC: 8 MG/DL (ref 6–20)
BUN/CREAT BLD: ABNORMAL (ref 9–20)
CALCIUM SERPL-MCNC: 9.1 MG/DL (ref 8.6–10.4)
CHLORIDE BLD-SCNC: 102 MMOL/L (ref 98–107)
CO2: 24 MMOL/L (ref 20–31)
CREAT SERPL-MCNC: 0.52 MG/DL (ref 0.5–0.9)
DIFFERENTIAL TYPE: ABNORMAL
EOSINOPHILS RELATIVE PERCENT: 1 %
GFR AFRICAN AMERICAN: >60 ML/MIN
GFR NON-AFRICAN AMERICAN: >60 ML/MIN
GFR SERPL CREATININE-BSD FRML MDRD: ABNORMAL ML/MIN/{1.73_M2}
GFR SERPL CREATININE-BSD FRML MDRD: ABNORMAL ML/MIN/{1.73_M2}
GLUCOSE BLD-MCNC: 120 MG/DL (ref 70–99)
HCT VFR BLD CALC: 42.1 % (ref 36–46)
HEMOGLOBIN: 13.8 G/DL (ref 12–16)
LYMPHOCYTES # BLD: 27 %
MCH RBC QN AUTO: 27.5 PG (ref 26–34)
MCHC RBC AUTO-ENTMCNC: 32.7 G/DL (ref 31–37)
MCV RBC AUTO: 84.1 FL (ref 80–100)
MONOCYTES # BLD: 7 %
PDW BLD-RTO: 18.6 % (ref 12.5–15.4)
PLATELET # BLD: 490 K/UL (ref 140–450)
PLATELET ESTIMATE: ABNORMAL
PMV BLD AUTO: 9.1 FL (ref 6–12)
POTASSIUM SERPL-SCNC: 3.8 MMOL/L (ref 3.7–5.3)
RBC # BLD: 5.01 M/UL (ref 4–5.2)
RBC # BLD: ABNORMAL 10*6/UL
SEG NEUTROPHILS: 64 %
SEGMENTED NEUTROPHILS ABSOLUTE COUNT: 9.1 K/UL (ref 1.8–7.7)
SODIUM BLD-SCNC: 143 MMOL/L (ref 135–144)
WBC # BLD: 14.1 K/UL (ref 3.5–11)
WBC # BLD: ABNORMAL 10*3/UL

## 2017-08-02 PROCEDURE — 99285 EMERGENCY DEPT VISIT HI MDM: CPT

## 2017-08-02 PROCEDURE — 2580000003 HC RX 258: Performed by: EMERGENCY MEDICINE

## 2017-08-02 PROCEDURE — 71010 XR CHEST PORTABLE: CPT

## 2017-08-02 PROCEDURE — 84703 CHORIONIC GONADOTROPIN ASSAY: CPT

## 2017-08-02 PROCEDURE — 86140 C-REACTIVE PROTEIN: CPT

## 2017-08-02 PROCEDURE — 85025 COMPLETE CBC W/AUTO DIFF WBC: CPT

## 2017-08-02 PROCEDURE — 87040 BLOOD CULTURE FOR BACTERIA: CPT

## 2017-08-02 PROCEDURE — 81001 URINALYSIS AUTO W/SCOPE: CPT

## 2017-08-02 PROCEDURE — 80048 BASIC METABOLIC PNL TOTAL CA: CPT

## 2017-08-02 RX ORDER — 0.9 % SODIUM CHLORIDE 0.9 %
1000 INTRAVENOUS SOLUTION INTRAVENOUS ONCE
Status: COMPLETED | OUTPATIENT
Start: 2017-08-02 | End: 2017-08-03

## 2017-08-02 RX ADMIN — SODIUM CHLORIDE 1000 ML: 9 INJECTION, SOLUTION INTRAVENOUS at 23:47

## 2017-08-03 ENCOUNTER — APPOINTMENT (OUTPATIENT)
Dept: CT IMAGING | Age: 42
DRG: 042 | End: 2017-08-03
Payer: MEDICARE

## 2017-08-03 ENCOUNTER — APPOINTMENT (OUTPATIENT)
Dept: GENERAL RADIOLOGY | Age: 42
DRG: 042 | End: 2017-08-03
Payer: MEDICARE

## 2017-08-03 LAB
-: NORMAL
AMORPHOUS: NORMAL
BACTERIA: NORMAL
BILIRUBIN URINE: NEGATIVE
C-REACTIVE PROTEIN: 0.6 MG/L (ref 0–5)
CASTS UA: NORMAL /LPF (ref 0–8)
COLOR: ABNORMAL
COMMENT UA: ABNORMAL
CRYSTALS, UA: NORMAL /HPF
CULTURE: ABNORMAL
CULTURE: ABNORMAL
EPITHELIAL CELLS UA: NORMAL /HPF (ref 0–5)
GLUCOSE URINE: NEGATIVE
HCG QUALITATIVE: NEGATIVE
KETONES, URINE: NEGATIVE
LEUKOCYTE ESTERASE, URINE: ABNORMAL
Lab: ABNORMAL
MUCUS: NORMAL
NITRITE, URINE: NEGATIVE
OTHER OBSERVATIONS UA: NORMAL
PH UA: 6 (ref 5–8)
PROTEIN UA: NEGATIVE
RBC UA: NORMAL /HPF (ref 0–4)
RENAL EPITHELIAL, UA: NORMAL /HPF
SPECIFIC GRAVITY UA: 1.01 (ref 1–1.03)
SPECIMEN DESCRIPTION: ABNORMAL
STATUS: ABNORMAL
TRICHOMONAS: NORMAL
TURBIDITY: CLEAR
URINE HGB: ABNORMAL
UROBILINOGEN, URINE: NORMAL
WBC UA: NORMAL /HPF (ref 0–5)
YEAST: NORMAL

## 2017-08-03 PROCEDURE — 2060000000 HC ICU INTERMEDIATE R&B

## 2017-08-03 PROCEDURE — 99222 1ST HOSP IP/OBS MODERATE 55: CPT | Performed by: INTERNAL MEDICINE

## 2017-08-03 PROCEDURE — 2580000003 HC RX 258: Performed by: NURSE PRACTITIONER

## 2017-08-03 PROCEDURE — 6360000002 HC RX W HCPCS: Performed by: NURSE PRACTITIONER

## 2017-08-03 PROCEDURE — 92611 MOTION FLUOROSCOPY/SWALLOW: CPT

## 2017-08-03 PROCEDURE — 6370000000 HC RX 637 (ALT 250 FOR IP): Performed by: NURSE PRACTITIONER

## 2017-08-03 PROCEDURE — 99254 IP/OBS CNSLTJ NEW/EST MOD 60: CPT | Performed by: PSYCHIATRY & NEUROLOGY

## 2017-08-03 PROCEDURE — 36415 COLL VENOUS BLD VENIPUNCTURE: CPT

## 2017-08-03 PROCEDURE — 86403 PARTICLE AGGLUT ANTBDY SCRN: CPT

## 2017-08-03 PROCEDURE — 87040 BLOOD CULTURE FOR BACTERIA: CPT

## 2017-08-03 PROCEDURE — 2580000003 HC RX 258: Performed by: FAMILY MEDICINE

## 2017-08-03 PROCEDURE — 74230 X-RAY XM SWLNG FUNCJ C+: CPT

## 2017-08-03 PROCEDURE — 87086 URINE CULTURE/COLONY COUNT: CPT

## 2017-08-03 PROCEDURE — 6360000002 HC RX W HCPCS: Performed by: FAMILY MEDICINE

## 2017-08-03 PROCEDURE — 70450 CT HEAD/BRAIN W/O DYE: CPT

## 2017-08-03 RX ORDER — CALCIUM CARBONATE/VITAMIN D3 250-3.125
1 TABLET ORAL DAILY
Status: DISCONTINUED | OUTPATIENT
Start: 2017-08-03 | End: 2017-08-13 | Stop reason: HOSPADM

## 2017-08-03 RX ORDER — SODIUM CHLORIDE 0.9 % (FLUSH) 0.9 %
10 SYRINGE (ML) INJECTION PRN
Status: DISCONTINUED | OUTPATIENT
Start: 2017-08-03 | End: 2017-08-13 | Stop reason: HOSPADM

## 2017-08-03 RX ORDER — MYCOPHENOLATE MOFETIL 500 MG/1
500 TABLET ORAL 2 TIMES DAILY
Status: DISCONTINUED | OUTPATIENT
Start: 2017-08-03 | End: 2017-08-03 | Stop reason: CLARIF

## 2017-08-03 RX ORDER — CEPHALEXIN 250 MG/1
250 CAPSULE ORAL EVERY 6 HOURS SCHEDULED
Status: DISCONTINUED | OUTPATIENT
Start: 2017-08-04 | End: 2017-08-03

## 2017-08-03 RX ORDER — ACETAMINOPHEN 325 MG/1
650 TABLET ORAL EVERY 4 HOURS PRN
Status: DISCONTINUED | OUTPATIENT
Start: 2017-08-03 | End: 2017-08-13 | Stop reason: HOSPADM

## 2017-08-03 RX ORDER — SODIUM CHLORIDE 0.9 % (FLUSH) 0.9 %
10 SYRINGE (ML) INJECTION EVERY 12 HOURS SCHEDULED
Status: DISCONTINUED | OUTPATIENT
Start: 2017-08-03 | End: 2017-08-13 | Stop reason: HOSPADM

## 2017-08-03 RX ORDER — MYCOPHENOLATE MOFETIL 250 MG/1
500 CAPSULE ORAL 2 TIMES DAILY
Status: DISCONTINUED | OUTPATIENT
Start: 2017-08-03 | End: 2017-08-13 | Stop reason: HOSPADM

## 2017-08-03 RX ORDER — LANOLIN ALCOHOL/MO/W.PET/CERES
325 CREAM (GRAM) TOPICAL 2 TIMES DAILY WITH MEALS
Status: DISCONTINUED | OUTPATIENT
Start: 2017-08-03 | End: 2017-08-13 | Stop reason: HOSPADM

## 2017-08-03 RX ORDER — ONDANSETRON 2 MG/ML
4 INJECTION INTRAMUSCULAR; INTRAVENOUS EVERY 6 HOURS PRN
Status: DISCONTINUED | OUTPATIENT
Start: 2017-08-03 | End: 2017-08-13 | Stop reason: HOSPADM

## 2017-08-03 RX ORDER — ACETAMINOPHEN 650 MG/1
650 SUPPOSITORY RECTAL EVERY 4 HOURS PRN
Status: DISCONTINUED | OUTPATIENT
Start: 2017-08-03 | End: 2017-08-13 | Stop reason: HOSPADM

## 2017-08-03 RX ORDER — SODIUM CHLORIDE 9 MG/ML
INJECTION, SOLUTION INTRAVENOUS CONTINUOUS
Status: DISCONTINUED | OUTPATIENT
Start: 2017-08-03 | End: 2017-08-13 | Stop reason: HOSPADM

## 2017-08-03 RX ORDER — PREDNISONE 20 MG/1
20 TABLET ORAL DAILY
Status: DISCONTINUED | OUTPATIENT
Start: 2017-08-03 | End: 2017-08-13 | Stop reason: HOSPADM

## 2017-08-03 RX ORDER — BISACODYL 10 MG
10 SUPPOSITORY, RECTAL RECTAL DAILY PRN
Status: DISCONTINUED | OUTPATIENT
Start: 2017-08-03 | End: 2017-08-13 | Stop reason: HOSPADM

## 2017-08-03 RX ORDER — MORPHINE SULFATE 2 MG/ML
2 INJECTION, SOLUTION INTRAMUSCULAR; INTRAVENOUS
Status: DISCONTINUED | OUTPATIENT
Start: 2017-08-03 | End: 2017-08-13 | Stop reason: HOSPADM

## 2017-08-03 RX ORDER — NICOTINE 21 MG/24HR
1 PATCH, TRANSDERMAL 24 HOURS TRANSDERMAL DAILY PRN
Status: DISCONTINUED | OUTPATIENT
Start: 2017-08-03 | End: 2017-08-05

## 2017-08-03 RX ADMIN — SODIUM CHLORIDE: 9 INJECTION, SOLUTION INTRAVENOUS at 16:02

## 2017-08-03 RX ADMIN — VANCOMYCIN HYDROCHLORIDE 1250 MG: 5 INJECTION, POWDER, LYOPHILIZED, FOR SOLUTION INTRAVENOUS at 23:20

## 2017-08-03 RX ADMIN — FERROUS SULFATE TAB EC 325 MG (65 MG FE EQUIVALENT) 325 MG: 325 (65 FE) TABLET DELAYED RESPONSE at 16:45

## 2017-08-03 RX ADMIN — ENOXAPARIN SODIUM 40 MG: 40 INJECTION SUBCUTANEOUS at 16:02

## 2017-08-03 RX ADMIN — CALCIUM CARBONATE-CHOLECALCIFEROL TAB 250 MG-125 UNIT 250 MG: 250-125 TAB at 16:45

## 2017-08-03 RX ADMIN — MYCOPHENOLATE MOFETIL 500 MG: 250 CAPSULE ORAL at 21:15

## 2017-08-03 ASSESSMENT — PAIN SCALES - GENERAL: PAINLEVEL_OUTOF10: 0

## 2017-08-03 ASSESSMENT — ENCOUNTER SYMPTOMS
ABDOMINAL PAIN: 0
COUGH: 0
SHORTNESS OF BREATH: 0
NAUSEA: 0
TROUBLE SWALLOWING: 1
DIARRHEA: 0
VOMITING: 0

## 2017-08-04 ENCOUNTER — APPOINTMENT (OUTPATIENT)
Dept: INTERVENTIONAL RADIOLOGY/VASCULAR | Age: 42
DRG: 042 | End: 2017-08-04
Payer: MEDICARE

## 2017-08-04 LAB
ALBUMIN SERPL-MCNC: 3.7 G/DL (ref 3.5–5.2)
ALBUMIN/GLOBULIN RATIO: 1.2 (ref 1–2.5)
ALP BLD-CCNC: 60 U/L (ref 35–104)
ALT SERPL-CCNC: 49 U/L (ref 5–33)
ANION GAP SERPL CALCULATED.3IONS-SCNC: 17 MMOL/L (ref 9–17)
AST SERPL-CCNC: 46 U/L
BILIRUB SERPL-MCNC: 0.23 MG/DL (ref 0.3–1.2)
BUN BLDV-MCNC: 7 MG/DL (ref 6–20)
BUN/CREAT BLD: ABNORMAL (ref 9–20)
CALCIUM IONIZED: 1.08 MMOL/L (ref 1.13–1.33)
CALCIUM SERPL-MCNC: 8.4 MG/DL (ref 8.6–10.4)
CHLORIDE BLD-SCNC: 102 MMOL/L (ref 98–107)
CO2: 21 MMOL/L (ref 20–31)
CREAT SERPL-MCNC: 0.57 MG/DL (ref 0.5–0.9)
FIBRINOGEN: 239 MG/DL (ref 140–420)
GFR AFRICAN AMERICAN: >60 ML/MIN
GFR NON-AFRICAN AMERICAN: >60 ML/MIN
GFR SERPL CREATININE-BSD FRML MDRD: ABNORMAL ML/MIN/{1.73_M2}
GFR SERPL CREATININE-BSD FRML MDRD: ABNORMAL ML/MIN/{1.73_M2}
GLUCOSE BLD-MCNC: 95 MG/DL (ref 70–99)
HCT VFR BLD CALC: 38.8 % (ref 36–46)
HEMOGLOBIN: 12.7 G/DL (ref 12–16)
INR BLD: 1
MCH RBC QN AUTO: 27.4 PG (ref 26–34)
MCHC RBC AUTO-ENTMCNC: 32.6 G/DL (ref 31–37)
MCV RBC AUTO: 84 FL (ref 80–100)
PDW BLD-RTO: 18.2 % (ref 12.5–15.4)
PLATELET # BLD: 407 K/UL (ref 140–450)
PMV BLD AUTO: 9.1 FL (ref 6–12)
POTASSIUM SERPL-SCNC: 3.3 MMOL/L (ref 3.7–5.3)
PROTHROMBIN TIME: 11 SEC (ref 9.4–12.6)
RBC # BLD: 4.62 M/UL (ref 4–5.2)
SODIUM BLD-SCNC: 140 MMOL/L (ref 135–144)
TOTAL PROTEIN: 6.8 G/DL (ref 6.4–8.3)
WBC # BLD: 10.9 K/UL (ref 3.5–11)

## 2017-08-04 PROCEDURE — 99232 SBSQ HOSP IP/OBS MODERATE 35: CPT | Performed by: NURSE PRACTITIONER

## 2017-08-04 PROCEDURE — P9045 ALBUMIN (HUMAN), 5%, 250 ML: HCPCS | Performed by: PSYCHIATRY & NEUROLOGY

## 2017-08-04 PROCEDURE — 85384 FIBRINOGEN ACTIVITY: CPT

## 2017-08-04 PROCEDURE — C1894 INTRO/SHEATH, NON-LASER: HCPCS

## 2017-08-04 PROCEDURE — 85027 COMPLETE CBC AUTOMATED: CPT

## 2017-08-04 PROCEDURE — 6360000002 HC RX W HCPCS: Performed by: NURSE PRACTITIONER

## 2017-08-04 PROCEDURE — 2060000000 HC ICU INTERMEDIATE R&B

## 2017-08-04 PROCEDURE — 6370000000 HC RX 637 (ALT 250 FOR IP): Performed by: NURSE PRACTITIONER

## 2017-08-04 PROCEDURE — 2580000003 HC RX 258: Performed by: FAMILY MEDICINE

## 2017-08-04 PROCEDURE — 2500000003 HC RX 250 WO HCPCS: Performed by: FAMILY MEDICINE

## 2017-08-04 PROCEDURE — 77001 FLUOROGUIDE FOR VEIN DEVICE: CPT

## 2017-08-04 PROCEDURE — 2580000003 HC RX 258: Performed by: PSYCHIATRY & NEUROLOGY

## 2017-08-04 PROCEDURE — 36415 COLL VENOUS BLD VENIPUNCTURE: CPT

## 2017-08-04 PROCEDURE — 02H633Z INSERTION OF INFUSION DEVICE INTO RIGHT ATRIUM, PERCUTANEOUS APPROACH: ICD-10-PCS | Performed by: RADIOLOGY

## 2017-08-04 PROCEDURE — 36515 HC APHERESIS THER W/PLASMA REINF: CPT

## 2017-08-04 PROCEDURE — 76937 US GUIDE VASCULAR ACCESS: CPT

## 2017-08-04 PROCEDURE — 99232 SBSQ HOSP IP/OBS MODERATE 35: CPT | Performed by: INTERNAL MEDICINE

## 2017-08-04 PROCEDURE — 82330 ASSAY OF CALCIUM: CPT

## 2017-08-04 PROCEDURE — 85610 PROTHROMBIN TIME: CPT

## 2017-08-04 PROCEDURE — 94762 N-INVAS EAR/PLS OXIMTRY CONT: CPT

## 2017-08-04 PROCEDURE — 3910000000 HC WEEKEND / HOLIDAY ARC SERVICE

## 2017-08-04 PROCEDURE — 2580000003 HC RX 258: Performed by: NURSE PRACTITIONER

## 2017-08-04 PROCEDURE — 80053 COMPREHEN METABOLIC PANEL: CPT

## 2017-08-04 PROCEDURE — 6360000002 HC RX W HCPCS: Performed by: RADIOLOGY

## 2017-08-04 PROCEDURE — 36556 INSERT NON-TUNNEL CV CATH: CPT

## 2017-08-04 PROCEDURE — 6360000002 HC RX W HCPCS: Performed by: PSYCHIATRY & NEUROLOGY

## 2017-08-04 PROCEDURE — 6370000000 HC RX 637 (ALT 250 FOR IP): Performed by: INTERNAL MEDICINE

## 2017-08-04 RX ORDER — ALBUMIN, HUMAN INJ 5% 5 %
3000 SOLUTION INTRAVENOUS ONCE
Status: COMPLETED | OUTPATIENT
Start: 2017-08-04 | End: 2017-08-04

## 2017-08-04 RX ORDER — POTASSIUM CHLORIDE 7.45 MG/ML
10 INJECTION INTRAVENOUS PRN
Status: DISCONTINUED | OUTPATIENT
Start: 2017-08-04 | End: 2017-08-13 | Stop reason: HOSPADM

## 2017-08-04 RX ORDER — HEPARIN SODIUM 5000 [USP'U]/ML
15000 INJECTION, SOLUTION INTRAVENOUS; SUBCUTANEOUS EVERY 8 HOURS SCHEDULED
Status: DISCONTINUED | OUTPATIENT
Start: 2017-08-04 | End: 2017-08-08

## 2017-08-04 RX ORDER — HEPARIN SODIUM (PORCINE) LOCK FLUSH IV SOLN 100 UNIT/ML 100 UNIT/ML
SOLUTION INTRAVENOUS
Status: COMPLETED | OUTPATIENT
Start: 2017-08-04 | End: 2017-08-04

## 2017-08-04 RX ORDER — SODIUM CHLORIDE 9 MG/ML
INJECTION, SOLUTION INTRAVENOUS CONTINUOUS
Status: DISCONTINUED | OUTPATIENT
Start: 2017-08-04 | End: 2017-08-13 | Stop reason: HOSPADM

## 2017-08-04 RX ORDER — POTASSIUM CHLORIDE 20MEQ/15ML
40 LIQUID (ML) ORAL PRN
Status: DISCONTINUED | OUTPATIENT
Start: 2017-08-04 | End: 2017-08-13 | Stop reason: HOSPADM

## 2017-08-04 RX ORDER — LEVOFLOXACIN 500 MG/1
500 TABLET, FILM COATED ORAL DAILY
Status: DISCONTINUED | OUTPATIENT
Start: 2017-08-04 | End: 2017-08-05

## 2017-08-04 RX ORDER — DIPHENHYDRAMINE HYDROCHLORIDE 50 MG/ML
25 INJECTION INTRAMUSCULAR; INTRAVENOUS EVERY 6 HOURS PRN
Status: DISCONTINUED | OUTPATIENT
Start: 2017-08-04 | End: 2017-08-13 | Stop reason: HOSPADM

## 2017-08-04 RX ORDER — POTASSIUM CHLORIDE 20 MEQ/1
40 TABLET, EXTENDED RELEASE ORAL PRN
Status: DISCONTINUED | OUTPATIENT
Start: 2017-08-04 | End: 2017-08-13 | Stop reason: HOSPADM

## 2017-08-04 RX ADMIN — CALCIUM CARBONATE-CHOLECALCIFEROL TAB 250 MG-125 UNIT 250 MG: 250-125 TAB at 08:15

## 2017-08-04 RX ADMIN — PREDNISONE 20 MG: 20 TABLET ORAL at 08:15

## 2017-08-04 RX ADMIN — DEXTROSE MONOHYDRATE 300 MG: 50 INJECTION, SOLUTION INTRAVENOUS at 21:26

## 2017-08-04 RX ADMIN — POTASSIUM CHLORIDE 40 MEQ: 40 SOLUTION ORAL at 22:54

## 2017-08-04 RX ADMIN — HEPARIN SODIUM 15000 UNITS: 5000 INJECTION, SOLUTION INTRAVENOUS; SUBCUTANEOUS at 19:55

## 2017-08-04 RX ADMIN — FERROUS SULFATE TAB EC 325 MG (65 MG FE EQUIVALENT) 325 MG: 325 (65 FE) TABLET DELAYED RESPONSE at 08:15

## 2017-08-04 RX ADMIN — ENOXAPARIN SODIUM 40 MG: 40 INJECTION SUBCUTANEOUS at 08:15

## 2017-08-04 RX ADMIN — MYCOPHENOLATE MOFETIL 500 MG: 250 CAPSULE ORAL at 08:14

## 2017-08-04 RX ADMIN — DEXTROSE MONOHYDRATE 300 MG: 50 INJECTION, SOLUTION INTRAVENOUS at 08:15

## 2017-08-04 RX ADMIN — Medication 10 ML: at 21:10

## 2017-08-04 RX ADMIN — HEPARIN SODIUM (PORCINE) LOCK FLUSH IV SOLN 100 UNIT/ML 1.2 ML: 100 SOLUTION at 11:16

## 2017-08-04 RX ADMIN — ALBUMIN (HUMAN) 3000 ML: 12.5 INJECTION, SOLUTION INTRAVENOUS at 18:08

## 2017-08-04 RX ADMIN — DEXTROSE MONOHYDRATE 300 MG: 50 INJECTION, SOLUTION INTRAVENOUS at 00:44

## 2017-08-04 RX ADMIN — CALCIUM GLUCONATE 2 G: 94 INJECTION, SOLUTION INTRAVENOUS at 18:44

## 2017-08-04 RX ADMIN — ACETAMINOPHEN 650 MG: 325 TABLET ORAL at 21:09

## 2017-08-04 RX ADMIN — LEVOFLOXACIN 500 MG: 500 TABLET, FILM COATED ORAL at 21:35

## 2017-08-04 RX ADMIN — HEPARIN SODIUM (PORCINE) LOCK FLUSH IV SOLN 100 UNIT/ML 1.3 ML: 100 SOLUTION at 11:16

## 2017-08-04 RX ADMIN — CALCIUM GLUCONATE 1 G: 94 INJECTION, SOLUTION INTRAVENOUS at 18:44

## 2017-08-04 RX ADMIN — MYCOPHENOLATE MOFETIL 500 MG: 250 CAPSULE ORAL at 21:34

## 2017-08-04 ASSESSMENT — PAIN SCALES - GENERAL
PAINLEVEL_OUTOF10: 0
PAINLEVEL_OUTOF10: 6

## 2017-08-04 ASSESSMENT — PAIN DESCRIPTION - ONSET: ONSET: ON-GOING

## 2017-08-04 ASSESSMENT — PAIN DESCRIPTION - ORIENTATION: ORIENTATION: RIGHT

## 2017-08-04 ASSESSMENT — PAIN DESCRIPTION - LOCATION: LOCATION: NECK

## 2017-08-04 ASSESSMENT — PAIN DESCRIPTION - DESCRIPTORS: DESCRIPTORS: ACHING;DISCOMFORT

## 2017-08-04 ASSESSMENT — PAIN DESCRIPTION - PROGRESSION: CLINICAL_PROGRESSION: NOT CHANGED

## 2017-08-04 ASSESSMENT — PAIN DESCRIPTION - FREQUENCY: FREQUENCY: CONTINUOUS

## 2017-08-04 ASSESSMENT — PAIN DESCRIPTION - PAIN TYPE: TYPE: ACUTE PAIN

## 2017-08-05 PROBLEM — N39.0 URINARY TRACT INFECTION IN FEMALE: Status: ACTIVE | Noted: 2017-08-05

## 2017-08-05 PROBLEM — J32.0 RIGHT MAXILLARY SINUSITIS, CHRONIC: Status: ACTIVE | Noted: 2017-08-05

## 2017-08-05 PROBLEM — Z88.0 PENICILLIN ALLERGY: Status: ACTIVE | Noted: 2017-08-05

## 2017-08-05 PROBLEM — F17.200 SMOKER: Status: ACTIVE | Noted: 2017-08-05

## 2017-08-05 PROBLEM — J32.9 UNSPECIFIED SINUSITIS (CHRONIC): Status: ACTIVE | Noted: 2017-08-05

## 2017-08-05 LAB
ABSOLUTE EOS #: 0.3 K/UL (ref 0–0.4)
ABSOLUTE LYMPH #: 2.5 K/UL (ref 1–4.8)
ABSOLUTE MONO #: 0.8 K/UL (ref 0.1–1.2)
ANION GAP SERPL CALCULATED.3IONS-SCNC: 14 MMOL/L (ref 9–17)
BASOPHILS # BLD: 0 %
BASOPHILS ABSOLUTE: 0.1 K/UL (ref 0–0.2)
BUN BLDV-MCNC: 5 MG/DL (ref 6–20)
BUN/CREAT BLD: ABNORMAL (ref 9–20)
CALCIUM IONIZED: 1.2 MMOL/L (ref 1.13–1.33)
CALCIUM SERPL-MCNC: 8 MG/DL (ref 8.6–10.4)
CHLORIDE BLD-SCNC: 108 MMOL/L (ref 98–107)
CO2: 18 MMOL/L (ref 20–31)
CREAT SERPL-MCNC: 0.52 MG/DL (ref 0.5–0.9)
DIFFERENTIAL TYPE: ABNORMAL
EOSINOPHILS RELATIVE PERCENT: 2 %
FIBRINOGEN: <80 MG/DL (ref 140–420)
FIBRINOGEN: <80 MG/DL (ref 140–420)
GFR AFRICAN AMERICAN: >60 ML/MIN
GFR NON-AFRICAN AMERICAN: >60 ML/MIN
GFR SERPL CREATININE-BSD FRML MDRD: ABNORMAL ML/MIN/{1.73_M2}
GFR SERPL CREATININE-BSD FRML MDRD: ABNORMAL ML/MIN/{1.73_M2}
GLUCOSE BLD-MCNC: 97 MG/DL (ref 70–99)
HCT VFR BLD CALC: 39.1 % (ref 36–46)
HEMOGLOBIN: 12.5 G/DL (ref 12–16)
LYMPHOCYTES # BLD: 21 %
MCH RBC QN AUTO: 27.3 PG (ref 26–34)
MCHC RBC AUTO-ENTMCNC: 31.9 G/DL (ref 31–37)
MCV RBC AUTO: 85.8 FL (ref 80–100)
MONOCYTES # BLD: 7 %
PDW BLD-RTO: 18.7 % (ref 12.5–15.4)
PLATELET # BLD: 354 K/UL (ref 140–450)
PLATELET ESTIMATE: ABNORMAL
PMV BLD AUTO: 9.1 FL (ref 6–12)
POTASSIUM SERPL-SCNC: 3.7 MMOL/L (ref 3.7–5.3)
RBC # BLD: 4.56 M/UL (ref 4–5.2)
RBC # BLD: ABNORMAL 10*6/UL
SEG NEUTROPHILS: 70 %
SEGMENTED NEUTROPHILS ABSOLUTE COUNT: 8.2 K/UL (ref 1.8–7.7)
SODIUM BLD-SCNC: 140 MMOL/L (ref 135–144)
WBC # BLD: 11.9 K/UL (ref 3.5–11)
WBC # BLD: ABNORMAL 10*3/UL

## 2017-08-05 PROCEDURE — 6370000000 HC RX 637 (ALT 250 FOR IP): Performed by: NURSE PRACTITIONER

## 2017-08-05 PROCEDURE — 85384 FIBRINOGEN ACTIVITY: CPT

## 2017-08-05 PROCEDURE — 2500000003 HC RX 250 WO HCPCS: Performed by: FAMILY MEDICINE

## 2017-08-05 PROCEDURE — 2060000000 HC ICU INTERMEDIATE R&B

## 2017-08-05 PROCEDURE — 6360000002 HC RX W HCPCS: Performed by: NURSE PRACTITIONER

## 2017-08-05 PROCEDURE — 6370000000 HC RX 637 (ALT 250 FOR IP): Performed by: INTERNAL MEDICINE

## 2017-08-05 PROCEDURE — 2580000003 HC RX 258: Performed by: FAMILY MEDICINE

## 2017-08-05 PROCEDURE — 99233 SBSQ HOSP IP/OBS HIGH 50: CPT | Performed by: FAMILY MEDICINE

## 2017-08-05 PROCEDURE — 82330 ASSAY OF CALCIUM: CPT

## 2017-08-05 PROCEDURE — 94762 N-INVAS EAR/PLS OXIMTRY CONT: CPT

## 2017-08-05 PROCEDURE — 85025 COMPLETE CBC W/AUTO DIFF WBC: CPT

## 2017-08-05 PROCEDURE — 80048 BASIC METABOLIC PNL TOTAL CA: CPT

## 2017-08-05 PROCEDURE — 94799 UNLISTED PULMONARY SVC/PX: CPT

## 2017-08-05 PROCEDURE — 99232 SBSQ HOSP IP/OBS MODERATE 35: CPT | Performed by: PSYCHIATRY & NEUROLOGY

## 2017-08-05 PROCEDURE — 36415 COLL VENOUS BLD VENIPUNCTURE: CPT

## 2017-08-05 RX ORDER — CEPHALEXIN 500 MG/1
500 CAPSULE ORAL EVERY 12 HOURS SCHEDULED
Status: DISCONTINUED | OUTPATIENT
Start: 2017-08-05 | End: 2017-08-07

## 2017-08-05 RX ADMIN — ACETAMINOPHEN 650 MG: 325 TABLET ORAL at 09:54

## 2017-08-05 RX ADMIN — LEVOFLOXACIN 500 MG: 500 TABLET, FILM COATED ORAL at 09:53

## 2017-08-05 RX ADMIN — MYCOPHENOLATE MOFETIL 500 MG: 250 CAPSULE ORAL at 09:54

## 2017-08-05 RX ADMIN — DEXTROSE MONOHYDRATE 300 MG: 50 INJECTION, SOLUTION INTRAVENOUS at 05:29

## 2017-08-05 RX ADMIN — FERROUS SULFATE TAB EC 325 MG (65 MG FE EQUIVALENT) 325 MG: 325 (65 FE) TABLET DELAYED RESPONSE at 18:30

## 2017-08-05 RX ADMIN — DEXTROSE MONOHYDRATE 300 MG: 50 INJECTION, SOLUTION INTRAVENOUS at 13:15

## 2017-08-05 RX ADMIN — FERROUS SULFATE TAB EC 325 MG (65 MG FE EQUIVALENT) 325 MG: 325 (65 FE) TABLET DELAYED RESPONSE at 09:53

## 2017-08-05 RX ADMIN — MYCOPHENOLATE MOFETIL 500 MG: 250 CAPSULE ORAL at 20:27

## 2017-08-05 RX ADMIN — PREDNISONE 20 MG: 20 TABLET ORAL at 09:53

## 2017-08-05 RX ADMIN — ACETAMINOPHEN 650 MG: 325 TABLET ORAL at 22:43

## 2017-08-05 RX ADMIN — ENOXAPARIN SODIUM 40 MG: 40 INJECTION SUBCUTANEOUS at 09:55

## 2017-08-05 RX ADMIN — CALCIUM CARBONATE-CHOLECALCIFEROL TAB 250 MG-125 UNIT 250 MG: 250-125 TAB at 09:53

## 2017-08-05 RX ADMIN — DEXTROSE MONOHYDRATE 300 MG: 50 INJECTION, SOLUTION INTRAVENOUS at 20:27

## 2017-08-05 ASSESSMENT — ENCOUNTER SYMPTOMS
WHEEZING: 0
NAUSEA: 0
ABDOMINAL PAIN: 0
SHORTNESS OF BREATH: 0
DIARRHEA: 0
VOMITING: 0

## 2017-08-05 ASSESSMENT — PAIN SCALES - GENERAL
PAINLEVEL_OUTOF10: 7
PAINLEVEL_OUTOF10: 5

## 2017-08-06 LAB
ABSOLUTE EOS #: 0.2 K/UL (ref 0–0.4)
ABSOLUTE LYMPH #: 3.1 K/UL (ref 1–4.8)
ABSOLUTE MONO #: 1.2 K/UL (ref 0.1–1.2)
ANION GAP SERPL CALCULATED.3IONS-SCNC: 11 MMOL/L (ref 9–17)
BASOPHILS # BLD: 0 %
BASOPHILS ABSOLUTE: 0 K/UL (ref 0–0.2)
BUN BLDV-MCNC: 6 MG/DL (ref 6–20)
BUN/CREAT BLD: ABNORMAL (ref 9–20)
CALCIUM IONIZED: 1.17 MMOL/L (ref 1.13–1.33)
CALCIUM SERPL-MCNC: 8.3 MG/DL (ref 8.6–10.4)
CHLORIDE BLD-SCNC: 105 MMOL/L (ref 98–107)
CO2: 22 MMOL/L (ref 20–31)
CREAT SERPL-MCNC: 0.51 MG/DL (ref 0.5–0.9)
DIFFERENTIAL TYPE: ABNORMAL
EOSINOPHILS RELATIVE PERCENT: 1 %
FIBRINOGEN: <80 MG/DL (ref 140–420)
GFR AFRICAN AMERICAN: >60 ML/MIN
GFR NON-AFRICAN AMERICAN: >60 ML/MIN
GFR SERPL CREATININE-BSD FRML MDRD: ABNORMAL ML/MIN/{1.73_M2}
GFR SERPL CREATININE-BSD FRML MDRD: ABNORMAL ML/MIN/{1.73_M2}
GLUCOSE BLD-MCNC: 101 MG/DL (ref 70–99)
HCT VFR BLD CALC: 33.4 % (ref 36–46)
HEMOGLOBIN: 10.9 G/DL (ref 12–16)
LYMPHOCYTES # BLD: 19 %
MCH RBC QN AUTO: 27.7 PG (ref 26–34)
MCHC RBC AUTO-ENTMCNC: 32.6 G/DL (ref 31–37)
MCV RBC AUTO: 84.8 FL (ref 80–100)
MONOCYTES # BLD: 8 %
PDW BLD-RTO: 18.5 % (ref 12.5–15.4)
PLATELET # BLD: 294 K/UL (ref 140–450)
PLATELET ESTIMATE: ABNORMAL
PMV BLD AUTO: 9.1 FL (ref 6–12)
POTASSIUM SERPL-SCNC: 3.4 MMOL/L (ref 3.7–5.3)
RBC # BLD: 3.94 M/UL (ref 4–5.2)
RBC # BLD: ABNORMAL 10*6/UL
SEG NEUTROPHILS: 72 %
SEGMENTED NEUTROPHILS ABSOLUTE COUNT: 11.3 K/UL (ref 1.8–7.7)
SODIUM BLD-SCNC: 138 MMOL/L (ref 135–144)
WBC # BLD: 15.9 K/UL (ref 3.5–11)
WBC # BLD: ABNORMAL 10*3/UL

## 2017-08-06 PROCEDURE — 3910000000 HC WEEKEND / HOLIDAY ARC SERVICE

## 2017-08-06 PROCEDURE — 6360000002 HC RX W HCPCS: Performed by: PSYCHIATRY & NEUROLOGY

## 2017-08-06 PROCEDURE — 2580000003 HC RX 258: Performed by: PSYCHIATRY & NEUROLOGY

## 2017-08-06 PROCEDURE — 94762 N-INVAS EAR/PLS OXIMTRY CONT: CPT

## 2017-08-06 PROCEDURE — 82330 ASSAY OF CALCIUM: CPT

## 2017-08-06 PROCEDURE — 2500000003 HC RX 250 WO HCPCS: Performed by: FAMILY MEDICINE

## 2017-08-06 PROCEDURE — 85025 COMPLETE CBC W/AUTO DIFF WBC: CPT

## 2017-08-06 PROCEDURE — 2580000003 HC RX 258: Performed by: FAMILY MEDICINE

## 2017-08-06 PROCEDURE — 85384 FIBRINOGEN ACTIVITY: CPT

## 2017-08-06 PROCEDURE — 99232 SBSQ HOSP IP/OBS MODERATE 35: CPT | Performed by: PSYCHIATRY & NEUROLOGY

## 2017-08-06 PROCEDURE — 94799 UNLISTED PULMONARY SVC/PX: CPT

## 2017-08-06 PROCEDURE — 36415 COLL VENOUS BLD VENIPUNCTURE: CPT

## 2017-08-06 PROCEDURE — 6360000002 HC RX W HCPCS: Performed by: NURSE PRACTITIONER

## 2017-08-06 PROCEDURE — 6370000000 HC RX 637 (ALT 250 FOR IP): Performed by: NURSE PRACTITIONER

## 2017-08-06 PROCEDURE — 6360000002 HC RX W HCPCS: Performed by: FAMILY MEDICINE

## 2017-08-06 PROCEDURE — 6370000000 HC RX 637 (ALT 250 FOR IP): Performed by: PSYCHIATRY & NEUROLOGY

## 2017-08-06 PROCEDURE — 36515 HC APHERESIS THER W/PLASMA REINF: CPT

## 2017-08-06 PROCEDURE — 2060000000 HC ICU INTERMEDIATE R&B

## 2017-08-06 PROCEDURE — 80048 BASIC METABOLIC PNL TOTAL CA: CPT

## 2017-08-06 PROCEDURE — 99232 SBSQ HOSP IP/OBS MODERATE 35: CPT | Performed by: FAMILY MEDICINE

## 2017-08-06 PROCEDURE — 86927 PLASMA FRESH FROZEN: CPT

## 2017-08-06 PROCEDURE — P9017 PLASMA 1 DONOR FRZ W/IN 8 HR: HCPCS

## 2017-08-06 RX ADMIN — HEPARIN SODIUM 15000 UNITS: 5000 INJECTION, SOLUTION INTRAVENOUS; SUBCUTANEOUS at 17:25

## 2017-08-06 RX ADMIN — ENOXAPARIN SODIUM 40 MG: 40 INJECTION SUBCUTANEOUS at 08:47

## 2017-08-06 RX ADMIN — DIPHENHYDRAMINE HYDROCHLORIDE 25 MG: 50 INJECTION, SOLUTION INTRAMUSCULAR; INTRAVENOUS at 15:23

## 2017-08-06 RX ADMIN — FERROUS SULFATE TAB EC 325 MG (65 MG FE EQUIVALENT) 325 MG: 325 (65 FE) TABLET DELAYED RESPONSE at 08:46

## 2017-08-06 RX ADMIN — CEPHALEXIN 500 MG: 500 CAPSULE ORAL at 20:11

## 2017-08-06 RX ADMIN — DEXTROSE MONOHYDRATE 300 MG: 50 INJECTION, SOLUTION INTRAVENOUS at 05:25

## 2017-08-06 RX ADMIN — PREDNISONE 20 MG: 20 TABLET ORAL at 08:46

## 2017-08-06 RX ADMIN — CEPHALEXIN 500 MG: 500 CAPSULE ORAL at 00:01

## 2017-08-06 RX ADMIN — FERROUS SULFATE TAB EC 325 MG (65 MG FE EQUIVALENT) 325 MG: 325 (65 FE) TABLET DELAYED RESPONSE at 18:20

## 2017-08-06 RX ADMIN — POTASSIUM CHLORIDE 40 MEQ: 40 SOLUTION ORAL at 21:37

## 2017-08-06 RX ADMIN — CALCIUM CARBONATE-CHOLECALCIFEROL TAB 250 MG-125 UNIT 250 MG: 250-125 TAB at 08:46

## 2017-08-06 RX ADMIN — CALCIUM GLUCONATE 2 G: 94 INJECTION, SOLUTION INTRAVENOUS at 15:35

## 2017-08-06 RX ADMIN — MYCOPHENOLATE MOFETIL 500 MG: 250 CAPSULE ORAL at 20:10

## 2017-08-06 RX ADMIN — DEXTROSE MONOHYDRATE 300 MG: 50 INJECTION, SOLUTION INTRAVENOUS at 18:15

## 2017-08-06 RX ADMIN — CEPHALEXIN 500 MG: 500 CAPSULE ORAL at 08:47

## 2017-08-06 RX ADMIN — MYCOPHENOLATE MOFETIL 500 MG: 250 CAPSULE ORAL at 08:46

## 2017-08-06 ASSESSMENT — ENCOUNTER SYMPTOMS
ABDOMINAL PAIN: 0
WHEEZING: 0
DIARRHEA: 0
NAUSEA: 0
VOMITING: 0
SHORTNESS OF BREATH: 0

## 2017-08-07 LAB
ANION GAP SERPL CALCULATED.3IONS-SCNC: 11 MMOL/L (ref 9–17)
BUN BLDV-MCNC: 8 MG/DL (ref 6–20)
BUN/CREAT BLD: ABNORMAL (ref 9–20)
CALCIUM IONIZED: 1.14 MMOL/L (ref 1.13–1.33)
CALCIUM SERPL-MCNC: 7.9 MG/DL (ref 8.6–10.4)
CHLORIDE BLD-SCNC: 105 MMOL/L (ref 98–107)
CO2: 24 MMOL/L (ref 20–31)
CREAT SERPL-MCNC: 0.54 MG/DL (ref 0.5–0.9)
FIBRINOGEN: 173 MG/DL (ref 140–420)
GFR AFRICAN AMERICAN: >60 ML/MIN
GFR NON-AFRICAN AMERICAN: >60 ML/MIN
GFR SERPL CREATININE-BSD FRML MDRD: ABNORMAL ML/MIN/{1.73_M2}
GFR SERPL CREATININE-BSD FRML MDRD: ABNORMAL ML/MIN/{1.73_M2}
GLUCOSE BLD-MCNC: 88 MG/DL (ref 70–99)
POTASSIUM SERPL-SCNC: 3.8 MMOL/L (ref 3.7–5.3)
SODIUM BLD-SCNC: 140 MMOL/L (ref 135–144)

## 2017-08-07 PROCEDURE — 2580000003 HC RX 258: Performed by: NURSE PRACTITIONER

## 2017-08-07 PROCEDURE — 2060000000 HC ICU INTERMEDIATE R&B

## 2017-08-07 PROCEDURE — 99232 SBSQ HOSP IP/OBS MODERATE 35: CPT | Performed by: FAMILY MEDICINE

## 2017-08-07 PROCEDURE — 2500000003 HC RX 250 WO HCPCS: Performed by: FAMILY MEDICINE

## 2017-08-07 PROCEDURE — 6370000000 HC RX 637 (ALT 250 FOR IP): Performed by: NURSE PRACTITIONER

## 2017-08-07 PROCEDURE — 82330 ASSAY OF CALCIUM: CPT

## 2017-08-07 PROCEDURE — 2580000003 HC RX 258: Performed by: FAMILY MEDICINE

## 2017-08-07 PROCEDURE — 6370000000 HC RX 637 (ALT 250 FOR IP): Performed by: PSYCHIATRY & NEUROLOGY

## 2017-08-07 PROCEDURE — 6360000002 HC RX W HCPCS: Performed by: NURSE PRACTITIONER

## 2017-08-07 PROCEDURE — 36415 COLL VENOUS BLD VENIPUNCTURE: CPT

## 2017-08-07 PROCEDURE — 94799 UNLISTED PULMONARY SVC/PX: CPT

## 2017-08-07 PROCEDURE — 80048 BASIC METABOLIC PNL TOTAL CA: CPT

## 2017-08-07 PROCEDURE — 85384 FIBRINOGEN ACTIVITY: CPT

## 2017-08-07 PROCEDURE — 99232 SBSQ HOSP IP/OBS MODERATE 35: CPT | Performed by: NURSE PRACTITIONER

## 2017-08-07 PROCEDURE — 94762 N-INVAS EAR/PLS OXIMTRY CONT: CPT

## 2017-08-07 RX ADMIN — MYCOPHENOLATE MOFETIL 500 MG: 250 CAPSULE ORAL at 21:02

## 2017-08-07 RX ADMIN — PREDNISONE 20 MG: 20 TABLET ORAL at 09:37

## 2017-08-07 RX ADMIN — DEXTROSE MONOHYDRATE 300 MG: 50 INJECTION, SOLUTION INTRAVENOUS at 18:53

## 2017-08-07 RX ADMIN — DEXTROSE MONOHYDRATE 300 MG: 50 INJECTION, SOLUTION INTRAVENOUS at 04:10

## 2017-08-07 RX ADMIN — CEPHALEXIN 500 MG: 500 CAPSULE ORAL at 09:36

## 2017-08-07 RX ADMIN — ENOXAPARIN SODIUM 40 MG: 40 INJECTION SUBCUTANEOUS at 09:37

## 2017-08-07 RX ADMIN — DEXTROSE MONOHYDRATE 300 MG: 50 INJECTION, SOLUTION INTRAVENOUS at 09:41

## 2017-08-07 RX ADMIN — MYCOPHENOLATE MOFETIL 500 MG: 250 CAPSULE ORAL at 09:36

## 2017-08-07 RX ADMIN — CALCIUM CARBONATE-CHOLECALCIFEROL TAB 250 MG-125 UNIT 250 MG: 250-125 TAB at 09:36

## 2017-08-07 RX ADMIN — FERROUS SULFATE TAB EC 325 MG (65 MG FE EQUIVALENT) 325 MG: 325 (65 FE) TABLET DELAYED RESPONSE at 18:53

## 2017-08-07 RX ADMIN — FERROUS SULFATE TAB EC 325 MG (65 MG FE EQUIVALENT) 325 MG: 325 (65 FE) TABLET DELAYED RESPONSE at 09:37

## 2017-08-07 RX ADMIN — Medication 10 ML: at 09:37

## 2017-08-07 ASSESSMENT — ENCOUNTER SYMPTOMS
VOMITING: 0
SHORTNESS OF BREATH: 0
ABDOMINAL PAIN: 0
NAUSEA: 0
DIARRHEA: 0
WHEEZING: 0

## 2017-08-08 LAB
ABSOLUTE EOS #: 0 K/UL (ref 0–0.4)
ABSOLUTE LYMPH #: 3.2 K/UL (ref 1–4.8)
ABSOLUTE MONO #: 1.12 K/UL (ref 0.1–0.8)
ANION GAP SERPL CALCULATED.3IONS-SCNC: 16 MMOL/L (ref 9–17)
BASOPHILS # BLD: 1 %
BASOPHILS ABSOLUTE: 0.16 K/UL (ref 0–0.2)
BLD PROD TYP BPU: NORMAL
BUN BLDV-MCNC: 11 MG/DL (ref 6–20)
BUN/CREAT BLD: ABNORMAL (ref 9–20)
CALCIUM IONIZED: 1.15 MMOL/L (ref 1.13–1.33)
CALCIUM SERPL-MCNC: 8.2 MG/DL (ref 8.6–10.4)
CHLORIDE BLD-SCNC: 106 MMOL/L (ref 98–107)
CO2: 19 MMOL/L (ref 20–31)
CREAT SERPL-MCNC: 0.5 MG/DL (ref 0.5–0.9)
DIFFERENTIAL TYPE: ABNORMAL
DISPENSE STATUS BLOOD BANK: NORMAL
EOSINOPHILS RELATIVE PERCENT: 0 %
FIBRINOGEN: 171 MG/DL (ref 140–420)
GFR AFRICAN AMERICAN: >60 ML/MIN
GFR NON-AFRICAN AMERICAN: >60 ML/MIN
GFR SERPL CREATININE-BSD FRML MDRD: ABNORMAL ML/MIN/{1.73_M2}
GFR SERPL CREATININE-BSD FRML MDRD: ABNORMAL ML/MIN/{1.73_M2}
GLUCOSE BLD-MCNC: 86 MG/DL (ref 70–99)
HCT VFR BLD CALC: 31.7 % (ref 36–46)
HEMOGLOBIN: 10.4 G/DL (ref 12–16)
LYMPHOCYTES # BLD: 20 %
MCH RBC QN AUTO: 28 PG (ref 26–34)
MCHC RBC AUTO-ENTMCNC: 33 G/DL (ref 31–37)
MCV RBC AUTO: 84.9 FL (ref 80–100)
METAMYELOCYTES ABSOLUTE COUNT: 0.16 K/UL
METAMYELOCYTES: 1 %
MONOCYTES # BLD: 7 %
MORPHOLOGY: ABNORMAL
PDW BLD-RTO: 19.2 % (ref 12.5–15.4)
PLATELET # BLD: 264 K/UL (ref 140–450)
PLATELET ESTIMATE: ABNORMAL
PMV BLD AUTO: 9.3 FL (ref 6–12)
POTASSIUM SERPL-SCNC: 3.9 MMOL/L (ref 3.7–5.3)
RBC # BLD: 3.73 M/UL (ref 4–5.2)
RBC # BLD: ABNORMAL 10*6/UL
SEG NEUTROPHILS: 71 %
SEGMENTED NEUTROPHILS ABSOLUTE COUNT: 11.36 K/UL (ref 1.8–7.7)
SODIUM BLD-SCNC: 141 MMOL/L (ref 135–144)
TRANSFUSION STATUS: NORMAL
UNIT DIVISION: 0
UNIT NUMBER: NORMAL
WBC # BLD: 16 K/UL (ref 3.5–11)
WBC # BLD: ABNORMAL 10*3/UL

## 2017-08-08 PROCEDURE — 2580000003 HC RX 258: Performed by: NURSE PRACTITIONER

## 2017-08-08 PROCEDURE — 2500000003 HC RX 250 WO HCPCS: Performed by: FAMILY MEDICINE

## 2017-08-08 PROCEDURE — P9041 ALBUMIN (HUMAN),5%, 50ML: HCPCS | Performed by: PSYCHIATRY & NEUROLOGY

## 2017-08-08 PROCEDURE — 99232 SBSQ HOSP IP/OBS MODERATE 35: CPT | Performed by: NURSE PRACTITIONER

## 2017-08-08 PROCEDURE — 6360000002 HC RX W HCPCS: Performed by: NURSE PRACTITIONER

## 2017-08-08 PROCEDURE — 6370000000 HC RX 637 (ALT 250 FOR IP): Performed by: NURSE PRACTITIONER

## 2017-08-08 PROCEDURE — 85025 COMPLETE CBC W/AUTO DIFF WBC: CPT

## 2017-08-08 PROCEDURE — 85384 FIBRINOGEN ACTIVITY: CPT

## 2017-08-08 PROCEDURE — 2060000000 HC ICU INTERMEDIATE R&B

## 2017-08-08 PROCEDURE — 2580000003 HC RX 258: Performed by: FAMILY MEDICINE

## 2017-08-08 PROCEDURE — 94762 N-INVAS EAR/PLS OXIMTRY CONT: CPT

## 2017-08-08 PROCEDURE — 80048 BASIC METABOLIC PNL TOTAL CA: CPT

## 2017-08-08 PROCEDURE — 94799 UNLISTED PULMONARY SVC/PX: CPT

## 2017-08-08 PROCEDURE — 6370000000 HC RX 637 (ALT 250 FOR IP): Performed by: FAMILY MEDICINE

## 2017-08-08 PROCEDURE — 82330 ASSAY OF CALCIUM: CPT

## 2017-08-08 PROCEDURE — 6360000002 HC RX W HCPCS: Performed by: PSYCHIATRY & NEUROLOGY

## 2017-08-08 PROCEDURE — 36415 COLL VENOUS BLD VENIPUNCTURE: CPT

## 2017-08-08 PROCEDURE — 36515 HC APHERESIS THER W/PLASMA REINF: CPT

## 2017-08-08 PROCEDURE — 99232 SBSQ HOSP IP/OBS MODERATE 35: CPT | Performed by: FAMILY MEDICINE

## 2017-08-08 RX ORDER — ALBUMIN, HUMAN INJ 5% 5 %
3000 SOLUTION INTRAVENOUS ONCE
Status: COMPLETED | OUTPATIENT
Start: 2017-08-08 | End: 2017-08-08

## 2017-08-08 RX ORDER — ALBUMIN, HUMAN INJ 5% 5 %
25 SOLUTION INTRAVENOUS ONCE
Status: DISCONTINUED | OUTPATIENT
Start: 2017-08-08 | End: 2017-08-08

## 2017-08-08 RX ORDER — HEPARIN SODIUM 5000 [USP'U]/ML
15000 INJECTION, SOLUTION INTRAVENOUS; SUBCUTANEOUS EVERY OTHER DAY
Status: DISCONTINUED | OUTPATIENT
Start: 2017-08-08 | End: 2017-08-13 | Stop reason: HOSPADM

## 2017-08-08 RX ORDER — CLINDAMYCIN HYDROCHLORIDE 150 MG/1
300 CAPSULE ORAL EVERY 8 HOURS SCHEDULED
Status: COMPLETED | OUTPATIENT
Start: 2017-08-08 | End: 2017-08-10

## 2017-08-08 RX ADMIN — CLINDAMYCIN HYDROCHLORIDE 300 MG: 150 CAPSULE ORAL at 17:26

## 2017-08-08 RX ADMIN — MYCOPHENOLATE MOFETIL 500 MG: 250 CAPSULE ORAL at 09:50

## 2017-08-08 RX ADMIN — ALBUMIN (HUMAN) 3000 ML: 12.5 INJECTION, SOLUTION INTRAVENOUS at 16:05

## 2017-08-08 RX ADMIN — MYCOPHENOLATE MOFETIL 500 MG: 250 CAPSULE ORAL at 20:46

## 2017-08-08 RX ADMIN — CALCIUM CARBONATE-CHOLECALCIFEROL TAB 250 MG-125 UNIT 250 MG: 250-125 TAB at 09:51

## 2017-08-08 RX ADMIN — FERROUS SULFATE TAB EC 325 MG (65 MG FE EQUIVALENT) 325 MG: 325 (65 FE) TABLET DELAYED RESPONSE at 17:26

## 2017-08-08 RX ADMIN — CLINDAMYCIN HYDROCHLORIDE 300 MG: 150 CAPSULE ORAL at 20:46

## 2017-08-08 RX ADMIN — FERROUS SULFATE TAB EC 325 MG (65 MG FE EQUIVALENT) 325 MG: 325 (65 FE) TABLET DELAYED RESPONSE at 09:51

## 2017-08-08 RX ADMIN — DEXTROSE MONOHYDRATE 300 MG: 50 INJECTION, SOLUTION INTRAVENOUS at 09:55

## 2017-08-08 RX ADMIN — ENOXAPARIN SODIUM 40 MG: 40 INJECTION SUBCUTANEOUS at 09:51

## 2017-08-08 RX ADMIN — CALCIUM GLUCONATE 2 G: 94 INJECTION, SOLUTION INTRAVENOUS at 16:08

## 2017-08-08 RX ADMIN — PREDNISONE 20 MG: 20 TABLET ORAL at 09:50

## 2017-08-08 RX ADMIN — HEPARIN SODIUM 15000 UNITS: 5000 INJECTION, SOLUTION INTRAVENOUS; SUBCUTANEOUS at 17:09

## 2017-08-08 RX ADMIN — DEXTROSE MONOHYDRATE 300 MG: 50 INJECTION, SOLUTION INTRAVENOUS at 02:45

## 2017-08-08 ASSESSMENT — ENCOUNTER SYMPTOMS
VOMITING: 0
ABDOMINAL PAIN: 0
WHEEZING: 0
NAUSEA: 0
DIARRHEA: 0
SHORTNESS OF BREATH: 0

## 2017-08-09 LAB
CALCIUM IONIZED: 1.11 MMOL/L (ref 1.13–1.33)
CULTURE: NORMAL
CULTURE: NORMAL
FIBRINOGEN: <80 MG/DL (ref 140–420)
Lab: NORMAL
SPECIMEN DESCRIPTION: NORMAL
STATUS: NORMAL

## 2017-08-09 PROCEDURE — 82330 ASSAY OF CALCIUM: CPT

## 2017-08-09 PROCEDURE — 6370000000 HC RX 637 (ALT 250 FOR IP): Performed by: NURSE PRACTITIONER

## 2017-08-09 PROCEDURE — 85384 FIBRINOGEN ACTIVITY: CPT

## 2017-08-09 PROCEDURE — 2580000003 HC RX 258: Performed by: NURSE PRACTITIONER

## 2017-08-09 PROCEDURE — 94150 VITAL CAPACITY TEST: CPT

## 2017-08-09 PROCEDURE — 6360000002 HC RX W HCPCS: Performed by: NURSE PRACTITIONER

## 2017-08-09 PROCEDURE — 2060000000 HC ICU INTERMEDIATE R&B

## 2017-08-09 PROCEDURE — 6370000000 HC RX 637 (ALT 250 FOR IP): Performed by: FAMILY MEDICINE

## 2017-08-09 PROCEDURE — 99232 SBSQ HOSP IP/OBS MODERATE 35: CPT | Performed by: FAMILY MEDICINE

## 2017-08-09 PROCEDURE — 36415 COLL VENOUS BLD VENIPUNCTURE: CPT

## 2017-08-09 PROCEDURE — 94762 N-INVAS EAR/PLS OXIMTRY CONT: CPT

## 2017-08-09 RX ADMIN — CLINDAMYCIN HYDROCHLORIDE 300 MG: 150 CAPSULE ORAL at 06:32

## 2017-08-09 RX ADMIN — MYCOPHENOLATE MOFETIL 500 MG: 250 CAPSULE ORAL at 09:29

## 2017-08-09 RX ADMIN — Medication 10 ML: at 09:30

## 2017-08-09 RX ADMIN — ENOXAPARIN SODIUM 40 MG: 40 INJECTION SUBCUTANEOUS at 09:28

## 2017-08-09 RX ADMIN — MYCOPHENOLATE MOFETIL 500 MG: 250 CAPSULE ORAL at 20:23

## 2017-08-09 RX ADMIN — PREDNISONE 20 MG: 20 TABLET ORAL at 09:29

## 2017-08-09 RX ADMIN — FERROUS SULFATE TAB EC 325 MG (65 MG FE EQUIVALENT) 325 MG: 325 (65 FE) TABLET DELAYED RESPONSE at 09:29

## 2017-08-09 RX ADMIN — FERROUS SULFATE TAB EC 325 MG (65 MG FE EQUIVALENT) 325 MG: 325 (65 FE) TABLET DELAYED RESPONSE at 17:36

## 2017-08-09 RX ADMIN — Medication 10 ML: at 21:00

## 2017-08-09 RX ADMIN — CLINDAMYCIN HYDROCHLORIDE 300 MG: 150 CAPSULE ORAL at 14:24

## 2017-08-09 RX ADMIN — CLINDAMYCIN HYDROCHLORIDE 300 MG: 150 CAPSULE ORAL at 22:20

## 2017-08-09 RX ADMIN — CALCIUM CARBONATE-CHOLECALCIFEROL TAB 250 MG-125 UNIT 1 TABLET: 250-125 TAB at 09:30

## 2017-08-09 ASSESSMENT — PAIN SCALES - GENERAL: PAINLEVEL_OUTOF10: 0

## 2017-08-09 ASSESSMENT — ENCOUNTER SYMPTOMS
WHEEZING: 0
NAUSEA: 0
DIARRHEA: 0
SHORTNESS OF BREATH: 0
VOMITING: 0
ABDOMINAL PAIN: 0

## 2017-08-10 LAB
ABSOLUTE EOS #: 0.93 K/UL (ref 0–0.4)
ABSOLUTE LYMPH #: 3.71 K/UL (ref 1–4.8)
ABSOLUTE MONO #: 2.32 K/UL (ref 0.1–0.8)
ANION GAP SERPL CALCULATED.3IONS-SCNC: 18 MMOL/L (ref 9–17)
BASOPHILS # BLD: 1 %
BASOPHILS ABSOLUTE: 0.23 K/UL (ref 0–0.2)
BUN BLDV-MCNC: 10 MG/DL (ref 6–20)
BUN/CREAT BLD: ABNORMAL (ref 9–20)
CALCIUM IONIZED: 1.06 MMOL/L (ref 1.13–1.33)
CALCIUM IONIZED: 1.16 MMOL/L (ref 1.13–1.33)
CALCIUM SERPL-MCNC: 8.4 MG/DL (ref 8.6–10.4)
CHLORIDE BLD-SCNC: 104 MMOL/L (ref 98–107)
CO2: 17 MMOL/L (ref 20–31)
CREAT SERPL-MCNC: 0.63 MG/DL (ref 0.5–0.9)
CULTURE: NORMAL
CULTURE: NORMAL
DIFFERENTIAL TYPE: ABNORMAL
EOSINOPHILS RELATIVE PERCENT: 4 %
FIBRINOGEN: 113 MG/DL (ref 140–420)
GFR AFRICAN AMERICAN: >60 ML/MIN
GFR NON-AFRICAN AMERICAN: >60 ML/MIN
GFR SERPL CREATININE-BSD FRML MDRD: ABNORMAL ML/MIN/{1.73_M2}
GFR SERPL CREATININE-BSD FRML MDRD: ABNORMAL ML/MIN/{1.73_M2}
GLUCOSE BLD-MCNC: 116 MG/DL (ref 70–99)
HCT VFR BLD CALC: 35.9 % (ref 36–46)
HEMOGLOBIN: 11.7 G/DL (ref 12–16)
LYMPHOCYTES # BLD: 16 %
Lab: NORMAL
MCH RBC QN AUTO: 27.6 PG (ref 26–34)
MCHC RBC AUTO-ENTMCNC: 32.6 G/DL (ref 31–37)
MCV RBC AUTO: 84.8 FL (ref 80–100)
METAMYELOCYTES ABSOLUTE COUNT: 0.23 K/UL
METAMYELOCYTES: 1 %
MONOCYTES # BLD: 10 %
MORPHOLOGY: ABNORMAL
PDW BLD-RTO: 19.6 % (ref 12.5–15.4)
PLATELET # BLD: 322 K/UL (ref 140–450)
PLATELET ESTIMATE: ABNORMAL
PMV BLD AUTO: 9.5 FL (ref 6–12)
POTASSIUM SERPL-SCNC: 3.5 MMOL/L (ref 3.7–5.3)
RBC # BLD: 4.24 M/UL (ref 4–5.2)
RBC # BLD: ABNORMAL 10*6/UL
SEG NEUTROPHILS: 68 %
SEGMENTED NEUTROPHILS ABSOLUTE COUNT: 15.78 K/UL (ref 1.8–7.7)
SODIUM BLD-SCNC: 139 MMOL/L (ref 135–144)
SPECIMEN DESCRIPTION: NORMAL
STATUS: NORMAL
WBC # BLD: 23.2 K/UL (ref 3.5–11)
WBC # BLD: ABNORMAL 10*3/UL

## 2017-08-10 PROCEDURE — 6360000002 HC RX W HCPCS: Performed by: NURSE PRACTITIONER

## 2017-08-10 PROCEDURE — 2580000003 HC RX 258: Performed by: SURGERY

## 2017-08-10 PROCEDURE — 82330 ASSAY OF CALCIUM: CPT

## 2017-08-10 PROCEDURE — 94799 UNLISTED PULMONARY SVC/PX: CPT

## 2017-08-10 PROCEDURE — 99232 SBSQ HOSP IP/OBS MODERATE 35: CPT | Performed by: FAMILY MEDICINE

## 2017-08-10 PROCEDURE — 2580000003 HC RX 258: Performed by: PSYCHIATRY & NEUROLOGY

## 2017-08-10 PROCEDURE — P9017 PLASMA 1 DONOR FRZ W/IN 8 HR: HCPCS

## 2017-08-10 PROCEDURE — 6360000002 HC RX W HCPCS: Performed by: FAMILY MEDICINE

## 2017-08-10 PROCEDURE — 6370000000 HC RX 637 (ALT 250 FOR IP): Performed by: FAMILY MEDICINE

## 2017-08-10 PROCEDURE — 80048 BASIC METABOLIC PNL TOTAL CA: CPT

## 2017-08-10 PROCEDURE — 6370000000 HC RX 637 (ALT 250 FOR IP): Performed by: NURSE PRACTITIONER

## 2017-08-10 PROCEDURE — 2580000003 HC RX 258: Performed by: NURSE PRACTITIONER

## 2017-08-10 PROCEDURE — 99232 SBSQ HOSP IP/OBS MODERATE 35: CPT | Performed by: PSYCHIATRY & NEUROLOGY

## 2017-08-10 PROCEDURE — P9041 ALBUMIN (HUMAN),5%, 50ML: HCPCS | Performed by: PSYCHIATRY & NEUROLOGY

## 2017-08-10 PROCEDURE — 6360000002 HC RX W HCPCS: Performed by: PSYCHIATRY & NEUROLOGY

## 2017-08-10 PROCEDURE — 36515 HC APHERESIS THER W/PLASMA REINF: CPT

## 2017-08-10 PROCEDURE — 2060000000 HC ICU INTERMEDIATE R&B

## 2017-08-10 PROCEDURE — 85384 FIBRINOGEN ACTIVITY: CPT

## 2017-08-10 PROCEDURE — 36415 COLL VENOUS BLD VENIPUNCTURE: CPT

## 2017-08-10 PROCEDURE — 6360000002 HC RX W HCPCS: Performed by: SURGERY

## 2017-08-10 PROCEDURE — 94762 N-INVAS EAR/PLS OXIMTRY CONT: CPT

## 2017-08-10 PROCEDURE — 85025 COMPLETE CBC W/AUTO DIFF WBC: CPT

## 2017-08-10 PROCEDURE — 86927 PLASMA FRESH FROZEN: CPT

## 2017-08-10 RX ORDER — ALBUMIN, HUMAN INJ 5% 5 %
3000 SOLUTION INTRAVENOUS ONCE
Status: COMPLETED | OUTPATIENT
Start: 2017-08-10 | End: 2017-08-10

## 2017-08-10 RX ORDER — ALBUMIN, HUMAN INJ 5% 5 %
3000 SOLUTION INTRAVENOUS EVERY OTHER DAY
Status: DISCONTINUED | OUTPATIENT
Start: 2017-08-12 | End: 2017-08-13 | Stop reason: HOSPADM

## 2017-08-10 RX ADMIN — CALCIUM GLUCONATE 2 G: 94 INJECTION, SOLUTION INTRAVENOUS at 14:42

## 2017-08-10 RX ADMIN — ENOXAPARIN SODIUM 40 MG: 40 INJECTION SUBCUTANEOUS at 09:00

## 2017-08-10 RX ADMIN — MYCOPHENOLATE MOFETIL 500 MG: 250 CAPSULE ORAL at 19:43

## 2017-08-10 RX ADMIN — HEPARIN SODIUM 15000 UNITS: 5000 INJECTION, SOLUTION INTRAVENOUS; SUBCUTANEOUS at 16:08

## 2017-08-10 RX ADMIN — ALBUMIN (HUMAN) 3000 ML: 12.5 INJECTION, SOLUTION INTRAVENOUS at 13:51

## 2017-08-10 RX ADMIN — CLINDAMYCIN HYDROCHLORIDE 300 MG: 150 CAPSULE ORAL at 06:42

## 2017-08-10 RX ADMIN — CLINDAMYCIN HYDROCHLORIDE 300 MG: 150 CAPSULE ORAL at 16:13

## 2017-08-10 RX ADMIN — PREDNISONE 20 MG: 20 TABLET ORAL at 09:00

## 2017-08-10 RX ADMIN — CLINDAMYCIN HYDROCHLORIDE 300 MG: 150 CAPSULE ORAL at 22:35

## 2017-08-10 RX ADMIN — Medication 10 ML: at 09:00

## 2017-08-10 RX ADMIN — CALCIUM CARBONATE-CHOLECALCIFEROL TAB 250 MG-125 UNIT 250 MG: 250-125 TAB at 09:00

## 2017-08-10 RX ADMIN — FERROUS SULFATE TAB EC 325 MG (65 MG FE EQUIVALENT) 325 MG: 325 (65 FE) TABLET DELAYED RESPONSE at 18:41

## 2017-08-10 RX ADMIN — CALCIUM GLUCONATE 1 G: 94 INJECTION, SOLUTION INTRAVENOUS at 14:01

## 2017-08-10 RX ADMIN — Medication 10 ML: at 19:49

## 2017-08-10 RX ADMIN — FERROUS SULFATE TAB EC 325 MG (65 MG FE EQUIVALENT) 325 MG: 325 (65 FE) TABLET DELAYED RESPONSE at 08:00

## 2017-08-10 RX ADMIN — CALCIUM GLUCONATE 1 G: 94 INJECTION, SOLUTION INTRAVENOUS at 16:00

## 2017-08-10 RX ADMIN — DIPHENHYDRAMINE HYDROCHLORIDE 25 MG: 50 INJECTION, SOLUTION INTRAMUSCULAR; INTRAVENOUS at 13:49

## 2017-08-10 RX ADMIN — MYCOPHENOLATE MOFETIL 500 MG: 250 CAPSULE ORAL at 09:00

## 2017-08-10 ASSESSMENT — ENCOUNTER SYMPTOMS
ABDOMINAL PAIN: 0
WHEEZING: 0
DIARRHEA: 0
VOMITING: 0
SHORTNESS OF BREATH: 0
NAUSEA: 0

## 2017-08-10 ASSESSMENT — PAIN SCALES - GENERAL
PAINLEVEL_OUTOF10: 0
PAINLEVEL_OUTOF10: 0

## 2017-08-11 LAB
BLD PROD TYP BPU: NORMAL
CALCIUM IONIZED: 1.15 MMOL/L (ref 1.13–1.33)
DISPENSE STATUS BLOOD BANK: NORMAL
FIBRINOGEN: 180 MG/DL (ref 140–420)
TRANSFUSION STATUS: NORMAL
UNIT DIVISION: 0
UNIT NUMBER: NORMAL

## 2017-08-11 PROCEDURE — 94799 UNLISTED PULMONARY SVC/PX: CPT

## 2017-08-11 PROCEDURE — 85384 FIBRINOGEN ACTIVITY: CPT

## 2017-08-11 PROCEDURE — 94762 N-INVAS EAR/PLS OXIMTRY CONT: CPT

## 2017-08-11 PROCEDURE — 2060000000 HC ICU INTERMEDIATE R&B

## 2017-08-11 PROCEDURE — 99232 SBSQ HOSP IP/OBS MODERATE 35: CPT | Performed by: PSYCHIATRY & NEUROLOGY

## 2017-08-11 PROCEDURE — 6370000000 HC RX 637 (ALT 250 FOR IP): Performed by: NURSE PRACTITIONER

## 2017-08-11 PROCEDURE — 36415 COLL VENOUS BLD VENIPUNCTURE: CPT

## 2017-08-11 PROCEDURE — 6360000002 HC RX W HCPCS: Performed by: NURSE PRACTITIONER

## 2017-08-11 PROCEDURE — 99232 SBSQ HOSP IP/OBS MODERATE 35: CPT | Performed by: FAMILY MEDICINE

## 2017-08-11 PROCEDURE — 2580000003 HC RX 258: Performed by: NURSE PRACTITIONER

## 2017-08-11 PROCEDURE — 82330 ASSAY OF CALCIUM: CPT

## 2017-08-11 RX ADMIN — FERROUS SULFATE TAB EC 325 MG (65 MG FE EQUIVALENT) 325 MG: 325 (65 FE) TABLET DELAYED RESPONSE at 18:08

## 2017-08-11 RX ADMIN — Medication 10 ML: at 19:48

## 2017-08-11 RX ADMIN — FERROUS SULFATE TAB EC 325 MG (65 MG FE EQUIVALENT) 325 MG: 325 (65 FE) TABLET DELAYED RESPONSE at 08:51

## 2017-08-11 RX ADMIN — MYCOPHENOLATE MOFETIL 500 MG: 250 CAPSULE ORAL at 19:47

## 2017-08-11 RX ADMIN — Medication 10 ML: at 08:52

## 2017-08-11 RX ADMIN — PREDNISONE 20 MG: 20 TABLET ORAL at 08:51

## 2017-08-11 RX ADMIN — CALCIUM CARBONATE-CHOLECALCIFEROL TAB 250 MG-125 UNIT 250 MG: 250-125 TAB at 08:51

## 2017-08-11 RX ADMIN — MYCOPHENOLATE MOFETIL 500 MG: 250 CAPSULE ORAL at 08:51

## 2017-08-11 RX ADMIN — ENOXAPARIN SODIUM 40 MG: 40 INJECTION SUBCUTANEOUS at 08:51

## 2017-08-11 ASSESSMENT — PAIN DESCRIPTION - LOCATION: LOCATION: NECK

## 2017-08-11 ASSESSMENT — ENCOUNTER SYMPTOMS
WHEEZING: 0
ABDOMINAL PAIN: 0
VOMITING: 0
NAUSEA: 0
SHORTNESS OF BREATH: 0
DIARRHEA: 0

## 2017-08-11 ASSESSMENT — PAIN DESCRIPTION - DESCRIPTORS: DESCRIPTORS: ACHING

## 2017-08-11 ASSESSMENT — PAIN SCALES - GENERAL
PAINLEVEL_OUTOF10: 0

## 2017-08-11 ASSESSMENT — PAIN DESCRIPTION - FREQUENCY: FREQUENCY: INTERMITTENT

## 2017-08-11 ASSESSMENT — PAIN DESCRIPTION - ORIENTATION: ORIENTATION: MID

## 2017-08-11 ASSESSMENT — PAIN DESCRIPTION - ONSET: ONSET: PROGRESSIVE

## 2017-08-11 ASSESSMENT — PAIN DESCRIPTION - PAIN TYPE: TYPE: ACUTE PAIN

## 2017-08-12 ENCOUNTER — APPOINTMENT (OUTPATIENT)
Dept: GENERAL RADIOLOGY | Age: 42
DRG: 042 | End: 2017-08-12
Payer: MEDICARE

## 2017-08-12 ENCOUNTER — APPOINTMENT (OUTPATIENT)
Dept: CT IMAGING | Age: 42
DRG: 042 | End: 2017-08-12
Payer: MEDICARE

## 2017-08-12 LAB
ABSOLUTE EOS #: 0.19 K/UL (ref 0–0.4)
ABSOLUTE LYMPH #: 3.65 K/UL (ref 1–4.8)
ABSOLUTE MONO #: 1.15 K/UL (ref 0.1–1.2)
ANION GAP SERPL CALCULATED.3IONS-SCNC: 10 MMOL/L (ref 9–17)
BASOPHILS # BLD: 0 %
BASOPHILS ABSOLUTE: 0 K/UL (ref 0–0.2)
BUN BLDV-MCNC: 9 MG/DL (ref 6–20)
BUN/CREAT BLD: ABNORMAL (ref 9–20)
CALCIUM IONIZED: 1.13 MMOL/L (ref 1.13–1.33)
CALCIUM SERPL-MCNC: 8.3 MG/DL (ref 8.6–10.4)
CHLORIDE BLD-SCNC: 104 MMOL/L (ref 98–107)
CO2: 25 MMOL/L (ref 20–31)
CREAT SERPL-MCNC: 0.46 MG/DL (ref 0.5–0.9)
DIFFERENTIAL TYPE: ABNORMAL
EOSINOPHILS RELATIVE PERCENT: 1 %
FIBRINOGEN: 182 MG/DL (ref 140–420)
GFR AFRICAN AMERICAN: >60 ML/MIN
GFR NON-AFRICAN AMERICAN: >60 ML/MIN
GFR SERPL CREATININE-BSD FRML MDRD: ABNORMAL ML/MIN/{1.73_M2}
GFR SERPL CREATININE-BSD FRML MDRD: ABNORMAL ML/MIN/{1.73_M2}
GLUCOSE BLD-MCNC: 94 MG/DL (ref 70–99)
HCT VFR BLD CALC: 32.1 % (ref 36–46)
HEMOGLOBIN: 10.2 G/DL (ref 12–16)
LYMPHOCYTES # BLD: 19 %
MCH RBC QN AUTO: 27.1 PG (ref 26–34)
MCHC RBC AUTO-ENTMCNC: 31.8 G/DL (ref 31–37)
MCV RBC AUTO: 85.4 FL (ref 80–100)
MONOCYTES # BLD: 6 %
MORPHOLOGY: ABNORMAL
PDW BLD-RTO: 19.8 % (ref 12.5–15.4)
PLATELET # BLD: 322 K/UL (ref 140–450)
PLATELET ESTIMATE: ABNORMAL
PMV BLD AUTO: 9.6 FL (ref 6–12)
POTASSIUM SERPL-SCNC: 4 MMOL/L (ref 3.7–5.3)
RBC # BLD: 3.77 M/UL (ref 4–5.2)
RBC # BLD: ABNORMAL 10*6/UL
SEG NEUTROPHILS: 74 %
SEGMENTED NEUTROPHILS ABSOLUTE COUNT: 14.21 K/UL (ref 1.8–7.7)
SODIUM BLD-SCNC: 139 MMOL/L (ref 135–144)
TROPONIN INTERP: NORMAL
TROPONIN T: <0.03 NG/ML
WBC # BLD: 19.2 K/UL (ref 3.5–11)
WBC # BLD: ABNORMAL 10*3/UL

## 2017-08-12 PROCEDURE — 85025 COMPLETE CBC W/AUTO DIFF WBC: CPT

## 2017-08-12 PROCEDURE — 99233 SBSQ HOSP IP/OBS HIGH 50: CPT | Performed by: FAMILY MEDICINE

## 2017-08-12 PROCEDURE — 36415 COLL VENOUS BLD VENIPUNCTURE: CPT

## 2017-08-12 PROCEDURE — 85384 FIBRINOGEN ACTIVITY: CPT

## 2017-08-12 PROCEDURE — 71010 XR CHEST PORTABLE: CPT

## 2017-08-12 PROCEDURE — 99232 SBSQ HOSP IP/OBS MODERATE 35: CPT | Performed by: PSYCHIATRY & NEUROLOGY

## 2017-08-12 PROCEDURE — 94150 VITAL CAPACITY TEST: CPT

## 2017-08-12 PROCEDURE — 6360000002 HC RX W HCPCS: Performed by: NURSE PRACTITIONER

## 2017-08-12 PROCEDURE — 6360000002 HC RX W HCPCS: Performed by: PSYCHIATRY & NEUROLOGY

## 2017-08-12 PROCEDURE — 82330 ASSAY OF CALCIUM: CPT

## 2017-08-12 PROCEDURE — 2580000003 HC RX 258: Performed by: SURGERY

## 2017-08-12 PROCEDURE — P9045 ALBUMIN (HUMAN), 5%, 250 ML: HCPCS | Performed by: PSYCHIATRY & NEUROLOGY

## 2017-08-12 PROCEDURE — 84484 ASSAY OF TROPONIN QUANT: CPT

## 2017-08-12 PROCEDURE — 93005 ELECTROCARDIOGRAM TRACING: CPT

## 2017-08-12 PROCEDURE — 6370000000 HC RX 637 (ALT 250 FOR IP): Performed by: NURSE PRACTITIONER

## 2017-08-12 PROCEDURE — 3910000000 HC WEEKEND / HOLIDAY ARC SERVICE

## 2017-08-12 PROCEDURE — 6360000004 HC RX CONTRAST MEDICATION: Performed by: FAMILY MEDICINE

## 2017-08-12 PROCEDURE — 2580000003 HC RX 258: Performed by: FAMILY MEDICINE

## 2017-08-12 PROCEDURE — 2580000003 HC RX 258: Performed by: NURSE PRACTITIONER

## 2017-08-12 PROCEDURE — 6360000002 HC RX W HCPCS: Performed by: SURGERY

## 2017-08-12 PROCEDURE — 94762 N-INVAS EAR/PLS OXIMTRY CONT: CPT

## 2017-08-12 PROCEDURE — 71260 CT THORAX DX C+: CPT

## 2017-08-12 PROCEDURE — 36515 HC APHERESIS THER W/PLASMA REINF: CPT

## 2017-08-12 PROCEDURE — 2000000000 HC ICU R&B

## 2017-08-12 PROCEDURE — 80048 BASIC METABOLIC PNL TOTAL CA: CPT

## 2017-08-12 RX ORDER — 0.9 % SODIUM CHLORIDE 0.9 %
1000 INTRAVENOUS SOLUTION INTRAVENOUS ONCE
Status: COMPLETED | OUTPATIENT
Start: 2017-08-12 | End: 2017-08-12

## 2017-08-12 RX ADMIN — SODIUM CHLORIDE 1000 ML: 9 INJECTION, SOLUTION INTRAVENOUS at 19:21

## 2017-08-12 RX ADMIN — IOVERSOL 75 ML: 741 INJECTION INTRA-ARTERIAL; INTRAVENOUS at 18:47

## 2017-08-12 RX ADMIN — ENOXAPARIN SODIUM 40 MG: 40 INJECTION SUBCUTANEOUS at 08:36

## 2017-08-12 RX ADMIN — CALCIUM GLUCONATE 2 G: 94 INJECTION, SOLUTION INTRAVENOUS at 13:50

## 2017-08-12 RX ADMIN — ALBUMIN (HUMAN) 3000 ML: 12.5 INJECTION, SOLUTION INTRAVENOUS at 13:44

## 2017-08-12 RX ADMIN — HEPARIN SODIUM 15000 UNITS: 5000 INJECTION, SOLUTION INTRAVENOUS; SUBCUTANEOUS at 14:55

## 2017-08-12 RX ADMIN — Medication 10 ML: at 08:40

## 2017-08-12 RX ADMIN — MYCOPHENOLATE MOFETIL 500 MG: 250 CAPSULE ORAL at 22:08

## 2017-08-12 RX ADMIN — MYCOPHENOLATE MOFETIL 500 MG: 250 CAPSULE ORAL at 08:36

## 2017-08-12 RX ADMIN — FERROUS SULFATE TAB EC 325 MG (65 MG FE EQUIVALENT) 325 MG: 325 (65 FE) TABLET DELAYED RESPONSE at 08:36

## 2017-08-12 RX ADMIN — CALCIUM CARBONATE-CHOLECALCIFEROL TAB 250 MG-125 UNIT 250 MG: 250-125 TAB at 08:36

## 2017-08-12 RX ADMIN — PREDNISONE 20 MG: 20 TABLET ORAL at 08:36

## 2017-08-12 ASSESSMENT — ENCOUNTER SYMPTOMS
NAUSEA: 0
DIARRHEA: 0
VOMITING: 0
WHEEZING: 0
SHORTNESS OF BREATH: 0
ABDOMINAL PAIN: 0

## 2017-08-12 ASSESSMENT — PAIN SCALES - GENERAL: PAINLEVEL_OUTOF10: 0

## 2017-08-13 VITALS
BODY MASS INDEX: 37.5 KG/M2 | OXYGEN SATURATION: 95 % | TEMPERATURE: 98.2 F | HEART RATE: 102 BPM | DIASTOLIC BLOOD PRESSURE: 78 MMHG | RESPIRATION RATE: 21 BRPM | HEIGHT: 63 IN | WEIGHT: 211.64 LBS | SYSTOLIC BLOOD PRESSURE: 111 MMHG

## 2017-08-13 LAB
CALCIUM IONIZED: 1.14 MMOL/L (ref 1.13–1.33)
FIBRINOGEN: <80 MG/DL (ref 140–420)
TROPONIN INTERP: NORMAL
TROPONIN INTERP: NORMAL
TROPONIN T: <0.03 NG/ML
TROPONIN T: <0.03 NG/ML

## 2017-08-13 PROCEDURE — 94762 N-INVAS EAR/PLS OXIMTRY CONT: CPT

## 2017-08-13 PROCEDURE — 99232 SBSQ HOSP IP/OBS MODERATE 35: CPT | Performed by: PSYCHIATRY & NEUROLOGY

## 2017-08-13 PROCEDURE — 99239 HOSP IP/OBS DSCHRG MGMT >30: CPT | Performed by: FAMILY MEDICINE

## 2017-08-13 PROCEDURE — 85384 FIBRINOGEN ACTIVITY: CPT

## 2017-08-13 PROCEDURE — 82330 ASSAY OF CALCIUM: CPT

## 2017-08-13 PROCEDURE — 6370000000 HC RX 637 (ALT 250 FOR IP): Performed by: NURSE PRACTITIONER

## 2017-08-13 PROCEDURE — 2580000003 HC RX 258: Performed by: NURSE PRACTITIONER

## 2017-08-13 PROCEDURE — 84484 ASSAY OF TROPONIN QUANT: CPT

## 2017-08-13 PROCEDURE — 99233 SBSQ HOSP IP/OBS HIGH 50: CPT | Performed by: INTERNAL MEDICINE

## 2017-08-13 PROCEDURE — 6360000002 HC RX W HCPCS: Performed by: NURSE PRACTITIONER

## 2017-08-13 PROCEDURE — 36415 COLL VENOUS BLD VENIPUNCTURE: CPT

## 2017-08-13 RX ADMIN — MYCOPHENOLATE MOFETIL 500 MG: 250 CAPSULE ORAL at 09:15

## 2017-08-13 RX ADMIN — SODIUM CHLORIDE: 9 INJECTION, SOLUTION INTRAVENOUS at 05:32

## 2017-08-13 RX ADMIN — FERROUS SULFATE TAB EC 325 MG (65 MG FE EQUIVALENT) 325 MG: 325 (65 FE) TABLET DELAYED RESPONSE at 09:14

## 2017-08-13 RX ADMIN — CALCIUM CARBONATE-CHOLECALCIFEROL TAB 250 MG-125 UNIT 250 MG: 250-125 TAB at 09:14

## 2017-08-13 RX ADMIN — PREDNISONE 20 MG: 20 TABLET ORAL at 09:15

## 2017-08-13 ASSESSMENT — ENCOUNTER SYMPTOMS
VOMITING: 0
SHORTNESS OF BREATH: 0
DIARRHEA: 0
WHEEZING: 0
NAUSEA: 0
ABDOMINAL PAIN: 0

## 2017-08-14 LAB
EKG ATRIAL RATE: 125 BPM
EKG P AXIS: 55 DEGREES
EKG P-R INTERVAL: 128 MS
EKG Q-T INTERVAL: 322 MS
EKG QRS DURATION: 70 MS
EKG QTC CALCULATION (BAZETT): 464 MS
EKG R AXIS: 30 DEGREES
EKG T AXIS: 31 DEGREES
EKG VENTRICULAR RATE: 125 BPM

## 2017-09-20 ENCOUNTER — APPOINTMENT (OUTPATIENT)
Dept: GENERAL RADIOLOGY | Age: 42
DRG: 042 | End: 2017-09-20
Payer: MEDICARE

## 2017-09-20 ENCOUNTER — HOSPITAL ENCOUNTER (INPATIENT)
Age: 42
LOS: 5 days | Discharge: HOME | DRG: 042 | End: 2017-09-25
Attending: EMERGENCY MEDICINE | Admitting: INTERNAL MEDICINE
Payer: MEDICARE

## 2017-09-20 DIAGNOSIS — R29.810 FACIAL MUSCLE WEAKNESS: Primary | ICD-10-CM

## 2017-09-20 DIAGNOSIS — G70.01 MYASTHENIA EXACERBATION (HCC): ICD-10-CM

## 2017-09-20 LAB
ANION GAP SERPL CALCULATED.3IONS-SCNC: 14 MMOL/L (ref 9–17)
BILIRUBIN URINE: NEGATIVE
BUN BLDV-MCNC: 9 MG/DL (ref 6–20)
BUN/CREAT BLD: ABNORMAL (ref 9–20)
CALCIUM SERPL-MCNC: 8.7 MG/DL (ref 8.6–10.4)
CHLORIDE BLD-SCNC: 102 MMOL/L (ref 98–107)
CO2: 23 MMOL/L (ref 20–31)
COLOR: YELLOW
COMMENT UA: NORMAL
CREAT SERPL-MCNC: 0.62 MG/DL (ref 0.5–0.9)
ESTIMATED AVERAGE GLUCOSE: 134 MG/DL
GFR AFRICAN AMERICAN: >60 ML/MIN
GFR NON-AFRICAN AMERICAN: >60 ML/MIN
GFR SERPL CREATININE-BSD FRML MDRD: ABNORMAL ML/MIN/{1.73_M2}
GFR SERPL CREATININE-BSD FRML MDRD: ABNORMAL ML/MIN/{1.73_M2}
GLUCOSE BLD-MCNC: 144 MG/DL (ref 70–99)
GLUCOSE URINE: NEGATIVE
HBA1C MFR BLD: 6.3 % (ref 4–6)
HCT VFR BLD CALC: 39.4 % (ref 36–46)
HEMOGLOBIN: 12.8 G/DL (ref 12–16)
IGA: 163 MG/DL (ref 70–400)
KETONES, URINE: NEGATIVE
LEUKOCYTE ESTERASE, URINE: NEGATIVE
MCH RBC QN AUTO: 27 PG (ref 26–34)
MCHC RBC AUTO-ENTMCNC: 32.4 G/DL (ref 31–37)
MCV RBC AUTO: 83.2 FL (ref 80–100)
NITRITE, URINE: NEGATIVE
PDW BLD-RTO: 19 % (ref 12.5–15.4)
PH UA: 8 (ref 5–8)
PLATELET # BLD: 533 K/UL (ref 140–450)
PMV BLD AUTO: 9.1 FL (ref 6–12)
POTASSIUM SERPL-SCNC: 3.6 MMOL/L (ref 3.7–5.3)
PROTEIN UA: NEGATIVE
RBC # BLD: 4.73 M/UL (ref 4–5.2)
SODIUM BLD-SCNC: 139 MMOL/L (ref 135–144)
SPECIFIC GRAVITY UA: 1.01 (ref 1–1.03)
TURBIDITY: CLEAR
URINE HGB: NEGATIVE
UROBILINOGEN, URINE: NORMAL
WBC # BLD: 13.5 K/UL (ref 3.5–11)

## 2017-09-20 PROCEDURE — 6370000000 HC RX 637 (ALT 250 FOR IP): Performed by: PSYCHIATRY & NEUROLOGY

## 2017-09-20 PROCEDURE — 71010 XR CHEST PORTABLE: CPT

## 2017-09-20 PROCEDURE — 80048 BASIC METABOLIC PNL TOTAL CA: CPT

## 2017-09-20 PROCEDURE — 83036 HEMOGLOBIN GLYCOSYLATED A1C: CPT

## 2017-09-20 PROCEDURE — 81003 URINALYSIS AUTO W/O SCOPE: CPT

## 2017-09-20 PROCEDURE — 99284 EMERGENCY DEPT VISIT MOD MDM: CPT

## 2017-09-20 PROCEDURE — 82784 ASSAY IGA/IGD/IGG/IGM EACH: CPT

## 2017-09-20 PROCEDURE — 6370000000 HC RX 637 (ALT 250 FOR IP): Performed by: STUDENT IN AN ORGANIZED HEALTH CARE EDUCATION/TRAINING PROGRAM

## 2017-09-20 PROCEDURE — 94150 VITAL CAPACITY TEST: CPT

## 2017-09-20 PROCEDURE — 1200000000 HC SEMI PRIVATE

## 2017-09-20 PROCEDURE — 99223 1ST HOSP IP/OBS HIGH 75: CPT | Performed by: PSYCHIATRY & NEUROLOGY

## 2017-09-20 PROCEDURE — 2580000003 HC RX 258: Performed by: STUDENT IN AN ORGANIZED HEALTH CARE EDUCATION/TRAINING PROGRAM

## 2017-09-20 PROCEDURE — 85027 COMPLETE CBC AUTOMATED: CPT

## 2017-09-20 PROCEDURE — 6360000002 HC RX W HCPCS: Performed by: STUDENT IN AN ORGANIZED HEALTH CARE EDUCATION/TRAINING PROGRAM

## 2017-09-20 RX ORDER — PREDNISONE 20 MG/1
20 TABLET ORAL DAILY
Status: DISCONTINUED | OUTPATIENT
Start: 2017-09-20 | End: 2017-09-25 | Stop reason: HOSPADM

## 2017-09-20 RX ORDER — PYRIDOSTIGMINE BROMIDE 60 MG/1
30 TABLET ORAL EVERY 8 HOURS SCHEDULED
Status: DISCONTINUED | OUTPATIENT
Start: 2017-09-20 | End: 2017-09-25 | Stop reason: HOSPADM

## 2017-09-20 RX ORDER — ONDANSETRON 2 MG/ML
4 INJECTION INTRAMUSCULAR; INTRAVENOUS EVERY 6 HOURS PRN
Status: DISCONTINUED | OUTPATIENT
Start: 2017-09-20 | End: 2017-09-25 | Stop reason: HOSPADM

## 2017-09-20 RX ORDER — LANOLIN ALCOHOL/MO/W.PET/CERES
325 CREAM (GRAM) TOPICAL 2 TIMES DAILY WITH MEALS
Status: DISCONTINUED | OUTPATIENT
Start: 2017-09-20 | End: 2017-09-25 | Stop reason: HOSPADM

## 2017-09-20 RX ORDER — SODIUM CHLORIDE 0.9 % (FLUSH) 0.9 %
10 SYRINGE (ML) INJECTION PRN
Status: DISCONTINUED | OUTPATIENT
Start: 2017-09-20 | End: 2017-09-25 | Stop reason: HOSPADM

## 2017-09-20 RX ORDER — SODIUM CHLORIDE 0.9 % (FLUSH) 0.9 %
10 SYRINGE (ML) INJECTION EVERY 12 HOURS SCHEDULED
Status: DISCONTINUED | OUTPATIENT
Start: 2017-09-20 | End: 2017-09-25 | Stop reason: HOSPADM

## 2017-09-20 RX ORDER — CALCIUM CARBONATE/VITAMIN D3 250-3.125
1 TABLET ORAL DAILY
Status: DISCONTINUED | OUTPATIENT
Start: 2017-09-20 | End: 2017-09-25 | Stop reason: HOSPADM

## 2017-09-20 RX ORDER — MYCOPHENOLATE MOFETIL 250 MG/1
500 CAPSULE ORAL 2 TIMES DAILY
Status: DISCONTINUED | OUTPATIENT
Start: 2017-09-20 | End: 2017-09-25 | Stop reason: HOSPADM

## 2017-09-20 RX ORDER — MYCOPHENOLATE MOFETIL 500 MG/1
500 TABLET ORAL 2 TIMES DAILY
Status: DISCONTINUED | OUTPATIENT
Start: 2017-09-20 | End: 2017-09-20

## 2017-09-20 RX ORDER — ACETAMINOPHEN 325 MG/1
650 TABLET ORAL EVERY 4 HOURS PRN
Status: DISCONTINUED | OUTPATIENT
Start: 2017-09-20 | End: 2017-09-25 | Stop reason: HOSPADM

## 2017-09-20 RX ADMIN — Medication 10 ML: at 21:32

## 2017-09-20 RX ADMIN — PREDNISONE 20 MG: 20 TABLET ORAL at 15:46

## 2017-09-20 RX ADMIN — MYCOPHENOLATE MOFETIL 500 MG: 250 CAPSULE ORAL at 15:44

## 2017-09-20 RX ADMIN — CALCIUM CARBONATE-CHOLECALCIFEROL TAB 250 MG-125 UNIT 250 MG: 250-125 TAB at 15:44

## 2017-09-20 RX ADMIN — ENOXAPARIN SODIUM 40 MG: 40 INJECTION SUBCUTANEOUS at 15:45

## 2017-09-20 RX ADMIN — FERROUS SULFATE TAB EC 325 MG (65 MG FE EQUIVALENT) 325 MG: 325 (65 FE) TABLET DELAYED RESPONSE at 17:18

## 2017-09-20 RX ADMIN — PYRIDOSTIGMINE BROMIDE 30 MG: 60 TABLET ORAL at 22:37

## 2017-09-20 ASSESSMENT — ENCOUNTER SYMPTOMS
BLURRED VISION: 1
DIARRHEA: 0
SORE THROAT: 0
EYE PAIN: 0
ABDOMINAL PAIN: 0
PHOTOPHOBIA: 0
CONSTIPATION: 0
EYE REDNESS: 0
BACK PAIN: 0
GASTROINTESTINAL NEGATIVE: 1
NAUSEA: 0
HEMOPTYSIS: 0
SPUTUM PRODUCTION: 0
COUGH: 0
ABDOMINAL DISTENTION: 0
EYE DISCHARGE: 0
WHEEZING: 0
DOUBLE VISION: 0
SHORTNESS OF BREATH: 0
RHINORRHEA: 0
VOMITING: 0
ORTHOPNEA: 0

## 2017-09-21 LAB
ABSOLUTE EOS #: 0.1 K/UL (ref 0–0.4)
ABSOLUTE LYMPH #: 1.76 K/UL (ref 1–4.8)
ABSOLUTE MONO #: 1.27 K/UL (ref 0.1–0.8)
ALBUMIN SERPL-MCNC: 3.4 G/DL (ref 3.5–5.2)
ALBUMIN/GLOBULIN RATIO: 0.7 (ref 1–2.5)
ALP BLD-CCNC: 59 U/L (ref 35–104)
ALT SERPL-CCNC: 32 U/L (ref 5–33)
ANION GAP SERPL CALCULATED.3IONS-SCNC: 16 MMOL/L (ref 9–17)
AST SERPL-CCNC: 31 U/L
BASOPHILS # BLD: 0 %
BASOPHILS ABSOLUTE: 0 K/UL (ref 0–0.2)
BILIRUB SERPL-MCNC: 0.62 MG/DL (ref 0.3–1.2)
BILIRUBIN DIRECT: 0.18 MG/DL
BILIRUBIN, INDIRECT: 0.44 MG/DL (ref 0–1)
BUN BLDV-MCNC: 7 MG/DL (ref 6–20)
BUN/CREAT BLD: ABNORMAL (ref 9–20)
CALCIUM SERPL-MCNC: 8.6 MG/DL (ref 8.6–10.4)
CHLORIDE BLD-SCNC: 99 MMOL/L (ref 98–107)
CO2: 23 MMOL/L (ref 20–31)
CREAT SERPL-MCNC: 0.56 MG/DL (ref 0.5–0.9)
DIFFERENTIAL TYPE: ABNORMAL
EOSINOPHILS RELATIVE PERCENT: 1 %
GFR AFRICAN AMERICAN: >60 ML/MIN
GFR NON-AFRICAN AMERICAN: >60 ML/MIN
GFR SERPL CREATININE-BSD FRML MDRD: ABNORMAL ML/MIN/{1.73_M2}
GFR SERPL CREATININE-BSD FRML MDRD: ABNORMAL ML/MIN/{1.73_M2}
GLOBULIN: ABNORMAL G/DL (ref 1.5–3.8)
GLUCOSE BLD-MCNC: 81 MG/DL (ref 70–99)
HCT VFR BLD CALC: 38.8 % (ref 36–46)
HEMOGLOBIN: 12.1 G/DL (ref 12–16)
LYMPHOCYTES # BLD: 18 %
MCH RBC QN AUTO: 26.6 PG (ref 26–34)
MCHC RBC AUTO-ENTMCNC: 31.2 G/DL (ref 31–37)
MCV RBC AUTO: 85.4 FL (ref 80–100)
MONOCYTES # BLD: 13 %
MORPHOLOGY: ABNORMAL
PDW BLD-RTO: 19.6 % (ref 12.5–15.4)
PLATELET # BLD: 450 K/UL (ref 140–450)
PLATELET ESTIMATE: ABNORMAL
PMV BLD AUTO: 8.5 FL (ref 6–12)
POTASSIUM SERPL-SCNC: 3.2 MMOL/L (ref 3.7–5.3)
RBC # BLD: 4.54 M/UL (ref 4–5.2)
RBC # BLD: ABNORMAL 10*6/UL
SEG NEUTROPHILS: 68 %
SEGMENTED NEUTROPHILS ABSOLUTE COUNT: 6.67 K/UL (ref 1.8–7.7)
SODIUM BLD-SCNC: 138 MMOL/L (ref 135–144)
TOTAL PROTEIN: 8.2 G/DL (ref 6.4–8.3)
WBC # BLD: 9.8 K/UL (ref 3.5–11)
WBC # BLD: ABNORMAL 10*3/UL

## 2017-09-21 PROCEDURE — 6370000000 HC RX 637 (ALT 250 FOR IP): Performed by: HOSPITALIST

## 2017-09-21 PROCEDURE — 86403 PARTICLE AGGLUT ANTBDY SCRN: CPT

## 2017-09-21 PROCEDURE — 6370000000 HC RX 637 (ALT 250 FOR IP): Performed by: STUDENT IN AN ORGANIZED HEALTH CARE EDUCATION/TRAINING PROGRAM

## 2017-09-21 PROCEDURE — 80076 HEPATIC FUNCTION PANEL: CPT

## 2017-09-21 PROCEDURE — 6360000002 HC RX W HCPCS: Performed by: STUDENT IN AN ORGANIZED HEALTH CARE EDUCATION/TRAINING PROGRAM

## 2017-09-21 PROCEDURE — 87086 URINE CULTURE/COLONY COUNT: CPT

## 2017-09-21 PROCEDURE — 6370000000 HC RX 637 (ALT 250 FOR IP): Performed by: PSYCHIATRY & NEUROLOGY

## 2017-09-21 PROCEDURE — 85025 COMPLETE CBC W/AUTO DIFF WBC: CPT

## 2017-09-21 PROCEDURE — 80048 BASIC METABOLIC PNL TOTAL CA: CPT

## 2017-09-21 PROCEDURE — 99222 1ST HOSP IP/OBS MODERATE 55: CPT | Performed by: INTERNAL MEDICINE

## 2017-09-21 PROCEDURE — 36415 COLL VENOUS BLD VENIPUNCTURE: CPT

## 2017-09-21 PROCEDURE — 1200000000 HC SEMI PRIVATE

## 2017-09-21 PROCEDURE — 2580000003 HC RX 258: Performed by: PSYCHIATRY & NEUROLOGY

## 2017-09-21 PROCEDURE — 99232 SBSQ HOSP IP/OBS MODERATE 35: CPT | Performed by: PSYCHIATRY & NEUROLOGY

## 2017-09-21 PROCEDURE — 6360000002 HC RX W HCPCS: Performed by: PSYCHIATRY & NEUROLOGY

## 2017-09-21 PROCEDURE — 2580000003 HC RX 258: Performed by: STUDENT IN AN ORGANIZED HEALTH CARE EDUCATION/TRAINING PROGRAM

## 2017-09-21 PROCEDURE — 94799 UNLISTED PULMONARY SVC/PX: CPT

## 2017-09-21 RX ORDER — POTASSIUM CHLORIDE 20 MEQ/1
40 TABLET, EXTENDED RELEASE ORAL ONCE
Status: COMPLETED | OUTPATIENT
Start: 2017-09-21 | End: 2017-09-21

## 2017-09-21 RX ORDER — DIPHENHYDRAMINE HCL 25 MG
25 TABLET ORAL EVERY 8 HOURS PRN
Status: DISCONTINUED | OUTPATIENT
Start: 2017-09-21 | End: 2017-09-25 | Stop reason: HOSPADM

## 2017-09-21 RX ADMIN — FERROUS SULFATE TAB EC 325 MG (65 MG FE EQUIVALENT) 325 MG: 325 (65 FE) TABLET DELAYED RESPONSE at 08:31

## 2017-09-21 RX ADMIN — Medication 10 ML: at 21:57

## 2017-09-21 RX ADMIN — PYRIDOSTIGMINE BROMIDE 30 MG: 60 TABLET ORAL at 21:57

## 2017-09-21 RX ADMIN — DIPHENHYDRAMINE HCL 25 MG: 25 TABLET ORAL at 01:34

## 2017-09-21 RX ADMIN — Medication 10 ML: at 08:31

## 2017-09-21 RX ADMIN — ENOXAPARIN SODIUM 40 MG: 40 INJECTION SUBCUTANEOUS at 08:31

## 2017-09-21 RX ADMIN — CALCIUM CARBONATE-CHOLECALCIFEROL TAB 250 MG-125 UNIT 250 MG: 250-125 TAB at 08:31

## 2017-09-21 RX ADMIN — ACETAMINOPHEN 650 MG: 325 TABLET ORAL at 21:56

## 2017-09-21 RX ADMIN — ACETAMINOPHEN 650 MG: 325 TABLET ORAL at 07:40

## 2017-09-21 RX ADMIN — POTASSIUM CHLORIDE 40 MEQ: 20 TABLET, EXTENDED RELEASE ORAL at 15:12

## 2017-09-21 RX ADMIN — MYCOPHENOLATE MOFETIL 500 MG: 250 CAPSULE ORAL at 08:31

## 2017-09-21 RX ADMIN — PYRIDOSTIGMINE BROMIDE 30 MG: 60 TABLET ORAL at 07:36

## 2017-09-21 RX ADMIN — PREDNISONE 20 MG: 20 TABLET ORAL at 08:31

## 2017-09-21 RX ADMIN — FERROUS SULFATE TAB EC 325 MG (65 MG FE EQUIVALENT) 325 MG: 325 (65 FE) TABLET DELAYED RESPONSE at 15:13

## 2017-09-21 RX ADMIN — MYCOPHENOLATE MOFETIL 500 MG: 250 CAPSULE ORAL at 21:56

## 2017-09-21 RX ADMIN — IMMUNE GLOBULIN INTRAVENOUS (HUMAN) 40 G: 5 INJECTION, SOLUTION INTRAVENOUS at 01:03

## 2017-09-21 RX ADMIN — ACETAMINOPHEN 650 MG: 325 TABLET ORAL at 00:51

## 2017-09-21 RX ADMIN — PYRIDOSTIGMINE BROMIDE 30 MG: 60 TABLET ORAL at 15:12

## 2017-09-21 ASSESSMENT — PAIN DESCRIPTION - FREQUENCY
FREQUENCY: CONTINUOUS
FREQUENCY: CONTINUOUS

## 2017-09-21 ASSESSMENT — PAIN DESCRIPTION - LOCATION
LOCATION: ABDOMEN
LOCATION: BACK

## 2017-09-21 ASSESSMENT — PAIN DESCRIPTION - DESCRIPTORS
DESCRIPTORS: CRAMPING
DESCRIPTORS: ACHING

## 2017-09-21 ASSESSMENT — PAIN DESCRIPTION - ONSET
ONSET: ON-GOING
ONSET: ON-GOING

## 2017-09-21 ASSESSMENT — PAIN DESCRIPTION - PROGRESSION: CLINICAL_PROGRESSION: NOT CHANGED

## 2017-09-21 ASSESSMENT — PAIN SCALES - GENERAL
PAINLEVEL_OUTOF10: 5
PAINLEVEL_OUTOF10: 3
PAINLEVEL_OUTOF10: 3
PAINLEVEL_OUTOF10: 1

## 2017-09-21 ASSESSMENT — PAIN DESCRIPTION - ORIENTATION: ORIENTATION: LOWER

## 2017-09-21 ASSESSMENT — PAIN DESCRIPTION - PAIN TYPE
TYPE: ACUTE PAIN
TYPE: ACUTE PAIN

## 2017-09-22 LAB
ABSOLUTE EOS #: 0.53 K/UL (ref 0–0.4)
ABSOLUTE LYMPH #: 2.1 K/UL (ref 1–4.8)
ABSOLUTE MONO #: 1.05 K/UL (ref 0.1–0.8)
ANION GAP SERPL CALCULATED.3IONS-SCNC: 12 MMOL/L (ref 9–17)
BASOPHILS # BLD: 0 %
BASOPHILS ABSOLUTE: 0 K/UL (ref 0–0.2)
BUN BLDV-MCNC: 11 MG/DL (ref 6–20)
BUN/CREAT BLD: ABNORMAL (ref 9–20)
CALCIUM SERPL-MCNC: 8.3 MG/DL (ref 8.6–10.4)
CHLORIDE BLD-SCNC: 102 MMOL/L (ref 98–107)
CO2: 22 MMOL/L (ref 20–31)
CREAT SERPL-MCNC: 0.63 MG/DL (ref 0.5–0.9)
CULTURE: ABNORMAL
CULTURE: ABNORMAL
DIFFERENTIAL TYPE: ABNORMAL
DIRECT EXAM: NORMAL
EOSINOPHILS RELATIVE PERCENT: 5 %
GFR AFRICAN AMERICAN: >60 ML/MIN
GFR NON-AFRICAN AMERICAN: >60 ML/MIN
GFR SERPL CREATININE-BSD FRML MDRD: ABNORMAL ML/MIN/{1.73_M2}
GFR SERPL CREATININE-BSD FRML MDRD: ABNORMAL ML/MIN/{1.73_M2}
GLUCOSE BLD-MCNC: 83 MG/DL (ref 70–99)
HCT VFR BLD CALC: 35.8 % (ref 36–46)
HEMOGLOBIN: 11.4 G/DL (ref 12–16)
LACTOFERRIN, QUAL: NORMAL
LYMPHOCYTES # BLD: 20 %
Lab: ABNORMAL
Lab: NORMAL
Lab: NORMAL
MCH RBC QN AUTO: 26.5 PG (ref 26–34)
MCHC RBC AUTO-ENTMCNC: 31.8 G/DL (ref 31–37)
MCV RBC AUTO: 83.4 FL (ref 80–100)
METAMYELOCYTES ABSOLUTE COUNT: 0.11 K/UL
METAMYELOCYTES: 1 %
MICRO OVA & PARASITES: NORMAL
MONOCYTES # BLD: 10 %
MORPHOLOGY: ABNORMAL
MYELOCYTES ABSOLUTE COUNT: 0.11 K/UL
MYELOCYTES: 1 %
PDW BLD-RTO: 20 % (ref 12.5–15.4)
PLATELET # BLD: 438 K/UL (ref 140–450)
PLATELET ESTIMATE: ABNORMAL
PMV BLD AUTO: 9.3 FL (ref 6–12)
POTASSIUM SERPL-SCNC: 3.6 MMOL/L (ref 3.7–5.3)
RBC # BLD: 4.29 M/UL (ref 4–5.2)
RBC # BLD: ABNORMAL 10*6/UL
SEG NEUTROPHILS: 63 %
SEGMENTED NEUTROPHILS ABSOLUTE COUNT: 6.6 K/UL (ref 1.8–7.7)
SODIUM BLD-SCNC: 136 MMOL/L (ref 135–144)
SPECIMEN DESCRIPTION: ABNORMAL
SPECIMEN DESCRIPTION: NORMAL
SPECIMEN DESCRIPTION: NORMAL
STATUS: ABNORMAL
STATUS: NORMAL
STATUS: NORMAL
WBC # BLD: 10.5 K/UL (ref 3.5–11)
WBC # BLD: ABNORMAL 10*3/UL

## 2017-09-22 PROCEDURE — 36415 COLL VENOUS BLD VENIPUNCTURE: CPT

## 2017-09-22 PROCEDURE — 2580000003 HC RX 258: Performed by: PSYCHIATRY & NEUROLOGY

## 2017-09-22 PROCEDURE — 87328 CRYPTOSPORIDIUM AG IA: CPT

## 2017-09-22 PROCEDURE — 6370000000 HC RX 637 (ALT 250 FOR IP): Performed by: STUDENT IN AN ORGANIZED HEALTH CARE EDUCATION/TRAINING PROGRAM

## 2017-09-22 PROCEDURE — 83630 LACTOFERRIN FECAL (QUAL): CPT

## 2017-09-22 PROCEDURE — 94799 UNLISTED PULMONARY SVC/PX: CPT

## 2017-09-22 PROCEDURE — 85025 COMPLETE CBC W/AUTO DIFF WBC: CPT

## 2017-09-22 PROCEDURE — 6370000000 HC RX 637 (ALT 250 FOR IP): Performed by: PSYCHIATRY & NEUROLOGY

## 2017-09-22 PROCEDURE — 87329 GIARDIA AG IA: CPT

## 2017-09-22 PROCEDURE — 6360000002 HC RX W HCPCS: Performed by: STUDENT IN AN ORGANIZED HEALTH CARE EDUCATION/TRAINING PROGRAM

## 2017-09-22 PROCEDURE — 1200000000 HC SEMI PRIVATE

## 2017-09-22 PROCEDURE — 99232 SBSQ HOSP IP/OBS MODERATE 35: CPT | Performed by: NURSE PRACTITIONER

## 2017-09-22 PROCEDURE — 87505 NFCT AGENT DETECTION GI: CPT

## 2017-09-22 PROCEDURE — 6360000002 HC RX W HCPCS: Performed by: PSYCHIATRY & NEUROLOGY

## 2017-09-22 PROCEDURE — 80048 BASIC METABOLIC PNL TOTAL CA: CPT

## 2017-09-22 PROCEDURE — 99232 SBSQ HOSP IP/OBS MODERATE 35: CPT | Performed by: INTERNAL MEDICINE

## 2017-09-22 PROCEDURE — 2580000003 HC RX 258: Performed by: STUDENT IN AN ORGANIZED HEALTH CARE EDUCATION/TRAINING PROGRAM

## 2017-09-22 RX ORDER — LACTOBACILLUS RHAMNOSUS GG 10B CELL
1 CAPSULE ORAL
Status: DISCONTINUED | OUTPATIENT
Start: 2017-09-22 | End: 2017-09-25 | Stop reason: HOSPADM

## 2017-09-22 RX ADMIN — PYRIDOSTIGMINE BROMIDE 30 MG: 60 TABLET ORAL at 21:20

## 2017-09-22 RX ADMIN — ENOXAPARIN SODIUM 40 MG: 40 INJECTION SUBCUTANEOUS at 08:38

## 2017-09-22 RX ADMIN — IMMUNE GLOBULIN INTRAVENOUS (HUMAN) 40 G: 5 INJECTION, SOLUTION INTRAVENOUS at 01:56

## 2017-09-22 RX ADMIN — MYCOPHENOLATE MOFETIL 500 MG: 250 CAPSULE ORAL at 21:20

## 2017-09-22 RX ADMIN — PYRIDOSTIGMINE BROMIDE 30 MG: 60 TABLET ORAL at 05:56

## 2017-09-22 RX ADMIN — PREDNISONE 20 MG: 20 TABLET ORAL at 08:38

## 2017-09-22 RX ADMIN — Medication 10 ML: at 21:20

## 2017-09-22 RX ADMIN — PYRIDOSTIGMINE BROMIDE 30 MG: 60 TABLET ORAL at 13:32

## 2017-09-22 RX ADMIN — DIPHENHYDRAMINE HCL 25 MG: 25 TABLET ORAL at 00:42

## 2017-09-22 RX ADMIN — CALCIUM CARBONATE-CHOLECALCIFEROL TAB 250 MG-125 UNIT 1 TABLET: 250-125 TAB at 08:38

## 2017-09-22 RX ADMIN — FERROUS SULFATE TAB EC 325 MG (65 MG FE EQUIVALENT) 325 MG: 325 (65 FE) TABLET DELAYED RESPONSE at 16:15

## 2017-09-22 RX ADMIN — Medication 10 ML: at 08:45

## 2017-09-22 RX ADMIN — MYCOPHENOLATE MOFETIL 500 MG: 250 CAPSULE ORAL at 08:38

## 2017-09-22 RX ADMIN — FERROUS SULFATE TAB EC 325 MG (65 MG FE EQUIVALENT) 325 MG: 325 (65 FE) TABLET DELAYED RESPONSE at 08:38

## 2017-09-22 RX ADMIN — Medication 1 CAPSULE: at 09:38

## 2017-09-22 ASSESSMENT — PAIN SCALES - GENERAL
PAINLEVEL_OUTOF10: 0
PAINLEVEL_OUTOF10: 0

## 2017-09-23 LAB
ABSOLUTE EOS #: 0.2 K/UL (ref 0–0.4)
ABSOLUTE LYMPH #: 2 K/UL (ref 1–4.8)
ABSOLUTE MONO #: 0.8 K/UL (ref 0.1–1.2)
ANION GAP SERPL CALCULATED.3IONS-SCNC: 11 MMOL/L (ref 9–17)
BASOPHILS # BLD: 0 %
BASOPHILS ABSOLUTE: 0 K/UL (ref 0–0.2)
BUN BLDV-MCNC: 7 MG/DL (ref 6–20)
BUN/CREAT BLD: ABNORMAL (ref 9–20)
CALCIUM SERPL-MCNC: 8.3 MG/DL (ref 8.6–10.4)
CAMPYLOBACTER PCR: NORMAL
CHLORIDE BLD-SCNC: 101 MMOL/L (ref 98–107)
CO2: 22 MMOL/L (ref 20–31)
CREAT SERPL-MCNC: 0.57 MG/DL (ref 0.5–0.9)
DIFFERENTIAL TYPE: ABNORMAL
EOSINOPHILS RELATIVE PERCENT: 2 %
GFR AFRICAN AMERICAN: >60 ML/MIN
GFR NON-AFRICAN AMERICAN: >60 ML/MIN
GFR SERPL CREATININE-BSD FRML MDRD: ABNORMAL ML/MIN/{1.73_M2}
GFR SERPL CREATININE-BSD FRML MDRD: ABNORMAL ML/MIN/{1.73_M2}
GLUCOSE BLD-MCNC: 97 MG/DL (ref 70–99)
HCT VFR BLD CALC: 35 % (ref 36–46)
HEMOGLOBIN: 11 G/DL (ref 12–16)
LYMPHOCYTES # BLD: 22 %
MCH RBC QN AUTO: 26.8 PG (ref 26–34)
MCHC RBC AUTO-ENTMCNC: 31.4 G/DL (ref 31–37)
MCV RBC AUTO: 85.3 FL (ref 80–100)
MONOCYTES # BLD: 9 %
PDW BLD-RTO: 20.1 % (ref 12.5–15.4)
PLATELET # BLD: 397 K/UL (ref 140–450)
PLATELET ESTIMATE: ABNORMAL
PMV BLD AUTO: 8.8 FL (ref 6–12)
POTASSIUM SERPL-SCNC: 3.2 MMOL/L (ref 3.7–5.3)
RBC # BLD: 4.1 M/UL (ref 4–5.2)
RBC # BLD: ABNORMAL 10*6/UL
SALMONELLA PCR: NORMAL
SEG NEUTROPHILS: 67 %
SEGMENTED NEUTROPHILS ABSOLUTE COUNT: 6.1 K/UL (ref 1.8–7.7)
SHIGATOXIN GENE PCR: NORMAL
SHIGELLA SP PCR: NORMAL
SODIUM BLD-SCNC: 134 MMOL/L (ref 135–144)
SPECIMEN: NORMAL
WBC # BLD: 9.2 K/UL (ref 3.5–11)
WBC # BLD: ABNORMAL 10*3/UL

## 2017-09-23 PROCEDURE — 85025 COMPLETE CBC W/AUTO DIFF WBC: CPT

## 2017-09-23 PROCEDURE — 36415 COLL VENOUS BLD VENIPUNCTURE: CPT

## 2017-09-23 PROCEDURE — 6370000000 HC RX 637 (ALT 250 FOR IP): Performed by: STUDENT IN AN ORGANIZED HEALTH CARE EDUCATION/TRAINING PROGRAM

## 2017-09-23 PROCEDURE — 94150 VITAL CAPACITY TEST: CPT

## 2017-09-23 PROCEDURE — 99233 SBSQ HOSP IP/OBS HIGH 50: CPT | Performed by: INTERNAL MEDICINE

## 2017-09-23 PROCEDURE — 2580000003 HC RX 258: Performed by: PSYCHIATRY & NEUROLOGY

## 2017-09-23 PROCEDURE — 1200000000 HC SEMI PRIVATE

## 2017-09-23 PROCEDURE — 99232 SBSQ HOSP IP/OBS MODERATE 35: CPT | Performed by: PSYCHIATRY & NEUROLOGY

## 2017-09-23 PROCEDURE — 2580000003 HC RX 258: Performed by: STUDENT IN AN ORGANIZED HEALTH CARE EDUCATION/TRAINING PROGRAM

## 2017-09-23 PROCEDURE — 6360000002 HC RX W HCPCS: Performed by: PSYCHIATRY & NEUROLOGY

## 2017-09-23 PROCEDURE — 80048 BASIC METABOLIC PNL TOTAL CA: CPT

## 2017-09-23 PROCEDURE — 6360000002 HC RX W HCPCS: Performed by: STUDENT IN AN ORGANIZED HEALTH CARE EDUCATION/TRAINING PROGRAM

## 2017-09-23 PROCEDURE — 6370000000 HC RX 637 (ALT 250 FOR IP): Performed by: PSYCHIATRY & NEUROLOGY

## 2017-09-23 RX ORDER — POTASSIUM CHLORIDE 7.45 MG/ML
10 INJECTION INTRAVENOUS PRN
Status: DISCONTINUED | OUTPATIENT
Start: 2017-09-23 | End: 2017-09-25 | Stop reason: HOSPADM

## 2017-09-23 RX ORDER — POTASSIUM CHLORIDE 20 MEQ/1
40 TABLET, EXTENDED RELEASE ORAL PRN
Status: DISCONTINUED | OUTPATIENT
Start: 2017-09-23 | End: 2017-09-25 | Stop reason: HOSPADM

## 2017-09-23 RX ORDER — POTASSIUM CHLORIDE 20MEQ/15ML
40 LIQUID (ML) ORAL PRN
Status: DISCONTINUED | OUTPATIENT
Start: 2017-09-23 | End: 2017-09-25 | Stop reason: HOSPADM

## 2017-09-23 RX ADMIN — Medication 10 ML: at 20:30

## 2017-09-23 RX ADMIN — PREDNISONE 20 MG: 20 TABLET ORAL at 09:14

## 2017-09-23 RX ADMIN — MYCOPHENOLATE MOFETIL 500 MG: 250 CAPSULE ORAL at 09:14

## 2017-09-23 RX ADMIN — DIPHENHYDRAMINE HCL 25 MG: 25 TABLET ORAL at 00:24

## 2017-09-23 RX ADMIN — ACETAMINOPHEN 650 MG: 325 TABLET ORAL at 18:29

## 2017-09-23 RX ADMIN — PYRIDOSTIGMINE BROMIDE 30 MG: 60 TABLET ORAL at 20:30

## 2017-09-23 RX ADMIN — Medication 1 CAPSULE: at 09:14

## 2017-09-23 RX ADMIN — IMMUNE GLOBULIN INTRAVENOUS (HUMAN) 40 G: 5 INJECTION, SOLUTION INTRAVENOUS at 00:52

## 2017-09-23 RX ADMIN — Medication 10 ML: at 09:18

## 2017-09-23 RX ADMIN — CALCIUM CARBONATE-CHOLECALCIFEROL TAB 250 MG-125 UNIT 250 MG: 250-125 TAB at 09:14

## 2017-09-23 RX ADMIN — PYRIDOSTIGMINE BROMIDE 30 MG: 60 TABLET ORAL at 15:44

## 2017-09-23 RX ADMIN — MYCOPHENOLATE MOFETIL 500 MG: 250 CAPSULE ORAL at 20:30

## 2017-09-23 RX ADMIN — FERROUS SULFATE TAB EC 325 MG (65 MG FE EQUIVALENT) 325 MG: 325 (65 FE) TABLET DELAYED RESPONSE at 09:14

## 2017-09-23 RX ADMIN — POTASSIUM CHLORIDE 40 MEQ: 20 TABLET, EXTENDED RELEASE ORAL at 11:42

## 2017-09-23 RX ADMIN — ENOXAPARIN SODIUM 40 MG: 40 INJECTION SUBCUTANEOUS at 09:14

## 2017-09-23 RX ADMIN — PYRIDOSTIGMINE BROMIDE 30 MG: 60 TABLET ORAL at 05:44

## 2017-09-23 RX ADMIN — FERROUS SULFATE TAB EC 325 MG (65 MG FE EQUIVALENT) 325 MG: 325 (65 FE) TABLET DELAYED RESPONSE at 15:44

## 2017-09-23 ASSESSMENT — PAIN SCALES - GENERAL: PAINLEVEL_OUTOF10: 4

## 2017-09-24 LAB
ABSOLUTE EOS #: 0.19 K/UL (ref 0–0.4)
ABSOLUTE LYMPH #: 2.07 K/UL (ref 1–4.8)
ABSOLUTE MONO #: 0.94 K/UL (ref 0.1–0.8)
ANION GAP SERPL CALCULATED.3IONS-SCNC: 15 MMOL/L (ref 9–17)
BASOPHILS # BLD: 1 %
BASOPHILS ABSOLUTE: 0.09 K/UL (ref 0–0.2)
BUN BLDV-MCNC: 11 MG/DL (ref 6–20)
BUN/CREAT BLD: ABNORMAL (ref 9–20)
CALCIUM SERPL-MCNC: 8 MG/DL (ref 8.6–10.4)
CHLORIDE BLD-SCNC: 101 MMOL/L (ref 98–107)
CO2: 20 MMOL/L (ref 20–31)
CREAT SERPL-MCNC: 0.72 MG/DL (ref 0.5–0.9)
DIFFERENTIAL TYPE: ABNORMAL
EOSINOPHILS RELATIVE PERCENT: 2 %
GFR AFRICAN AMERICAN: >60 ML/MIN
GFR NON-AFRICAN AMERICAN: >60 ML/MIN
GFR SERPL CREATININE-BSD FRML MDRD: ABNORMAL ML/MIN/{1.73_M2}
GFR SERPL CREATININE-BSD FRML MDRD: ABNORMAL ML/MIN/{1.73_M2}
GLUCOSE BLD-MCNC: 99 MG/DL (ref 70–99)
HCT VFR BLD CALC: 32.4 % (ref 36–46)
HEMOGLOBIN: 10.4 G/DL (ref 12–16)
LYMPHOCYTES # BLD: 22 %
MCH RBC QN AUTO: 27 PG (ref 26–34)
MCHC RBC AUTO-ENTMCNC: 32 G/DL (ref 31–37)
MCV RBC AUTO: 84.3 FL (ref 80–100)
MONOCYTES # BLD: 10 %
MORPHOLOGY: ABNORMAL
PDW BLD-RTO: 19.5 % (ref 12.5–15.4)
PLATELET # BLD: 366 K/UL (ref 140–450)
PLATELET ESTIMATE: ABNORMAL
PMV BLD AUTO: 9.2 FL (ref 6–12)
POTASSIUM SERPL-SCNC: 3.6 MMOL/L (ref 3.7–5.3)
RBC # BLD: 3.84 M/UL (ref 4–5.2)
RBC # BLD: ABNORMAL 10*6/UL
SEG NEUTROPHILS: 65 %
SEGMENTED NEUTROPHILS ABSOLUTE COUNT: 6.11 K/UL (ref 1.8–7.7)
SODIUM BLD-SCNC: 136 MMOL/L (ref 135–144)
WBC # BLD: 9.4 K/UL (ref 3.5–11)
WBC # BLD: ABNORMAL 10*3/UL

## 2017-09-24 PROCEDURE — 6360000002 HC RX W HCPCS: Performed by: PSYCHIATRY & NEUROLOGY

## 2017-09-24 PROCEDURE — 80048 BASIC METABOLIC PNL TOTAL CA: CPT

## 2017-09-24 PROCEDURE — 99232 SBSQ HOSP IP/OBS MODERATE 35: CPT | Performed by: INTERNAL MEDICINE

## 2017-09-24 PROCEDURE — 85025 COMPLETE CBC W/AUTO DIFF WBC: CPT

## 2017-09-24 PROCEDURE — 6370000000 HC RX 637 (ALT 250 FOR IP): Performed by: STUDENT IN AN ORGANIZED HEALTH CARE EDUCATION/TRAINING PROGRAM

## 2017-09-24 PROCEDURE — 99232 SBSQ HOSP IP/OBS MODERATE 35: CPT | Performed by: PSYCHIATRY & NEUROLOGY

## 2017-09-24 PROCEDURE — 6360000002 HC RX W HCPCS: Performed by: STUDENT IN AN ORGANIZED HEALTH CARE EDUCATION/TRAINING PROGRAM

## 2017-09-24 PROCEDURE — 1200000000 HC SEMI PRIVATE

## 2017-09-24 PROCEDURE — 94799 UNLISTED PULMONARY SVC/PX: CPT

## 2017-09-24 PROCEDURE — 2580000003 HC RX 258: Performed by: STUDENT IN AN ORGANIZED HEALTH CARE EDUCATION/TRAINING PROGRAM

## 2017-09-24 PROCEDURE — 2580000003 HC RX 258: Performed by: PSYCHIATRY & NEUROLOGY

## 2017-09-24 PROCEDURE — 6370000000 HC RX 637 (ALT 250 FOR IP): Performed by: PSYCHIATRY & NEUROLOGY

## 2017-09-24 PROCEDURE — 36415 COLL VENOUS BLD VENIPUNCTURE: CPT

## 2017-09-24 RX ADMIN — Medication 10 ML: at 08:42

## 2017-09-24 RX ADMIN — MYCOPHENOLATE MOFETIL 500 MG: 250 CAPSULE ORAL at 08:42

## 2017-09-24 RX ADMIN — Medication 10 ML: at 21:37

## 2017-09-24 RX ADMIN — DIPHENHYDRAMINE HCL 25 MG: 25 TABLET ORAL at 00:33

## 2017-09-24 RX ADMIN — IMMUNE GLOBULIN INTRAVENOUS (HUMAN) 40 G: 5 INJECTION, SOLUTION INTRAVENOUS at 00:26

## 2017-09-24 RX ADMIN — PYRIDOSTIGMINE BROMIDE 30 MG: 60 TABLET ORAL at 21:37

## 2017-09-24 RX ADMIN — PYRIDOSTIGMINE BROMIDE 30 MG: 60 TABLET ORAL at 15:42

## 2017-09-24 RX ADMIN — MYCOPHENOLATE MOFETIL 500 MG: 250 CAPSULE ORAL at 21:37

## 2017-09-24 RX ADMIN — PYRIDOSTIGMINE BROMIDE 30 MG: 60 TABLET ORAL at 06:08

## 2017-09-24 RX ADMIN — Medication 1 CAPSULE: at 08:43

## 2017-09-24 RX ADMIN — CALCIUM CARBONATE-CHOLECALCIFEROL TAB 250 MG-125 UNIT 250 MG: 250-125 TAB at 08:43

## 2017-09-24 RX ADMIN — PREDNISONE 20 MG: 20 TABLET ORAL at 08:42

## 2017-09-24 RX ADMIN — FERROUS SULFATE TAB EC 325 MG (65 MG FE EQUIVALENT) 325 MG: 325 (65 FE) TABLET DELAYED RESPONSE at 08:42

## 2017-09-24 RX ADMIN — FERROUS SULFATE TAB EC 325 MG (65 MG FE EQUIVALENT) 325 MG: 325 (65 FE) TABLET DELAYED RESPONSE at 15:42

## 2017-09-24 RX ADMIN — ENOXAPARIN SODIUM 40 MG: 40 INJECTION SUBCUTANEOUS at 08:42

## 2017-09-25 VITALS
OXYGEN SATURATION: 99 % | HEART RATE: 81 BPM | WEIGHT: 209 LBS | DIASTOLIC BLOOD PRESSURE: 89 MMHG | RESPIRATION RATE: 18 BRPM | TEMPERATURE: 98 F | HEIGHT: 65 IN | SYSTOLIC BLOOD PRESSURE: 133 MMHG | BODY MASS INDEX: 34.82 KG/M2

## 2017-09-25 LAB
ABSOLUTE EOS #: 0.1 K/UL (ref 0–0.4)
ABSOLUTE LYMPH #: 2.42 K/UL (ref 1–4.8)
ABSOLUTE MONO #: 1.01 K/UL (ref 0.1–0.8)
ANION GAP SERPL CALCULATED.3IONS-SCNC: 13 MMOL/L (ref 9–17)
BASOPHILS # BLD: 0 %
BASOPHILS ABSOLUTE: 0 K/UL (ref 0–0.2)
BUN BLDV-MCNC: 7 MG/DL (ref 6–20)
BUN/CREAT BLD: ABNORMAL (ref 9–20)
CALCIUM SERPL-MCNC: 7.9 MG/DL (ref 8.6–10.4)
CHLORIDE BLD-SCNC: 101 MMOL/L (ref 98–107)
CO2: 20 MMOL/L (ref 20–31)
CREAT SERPL-MCNC: 0.63 MG/DL (ref 0.5–0.9)
DIFFERENTIAL TYPE: ABNORMAL
DIRECT EXAM: NORMAL
EOSINOPHILS RELATIVE PERCENT: 1 %
GFR AFRICAN AMERICAN: >60 ML/MIN
GFR NON-AFRICAN AMERICAN: >60 ML/MIN
GFR SERPL CREATININE-BSD FRML MDRD: ABNORMAL ML/MIN/{1.73_M2}
GFR SERPL CREATININE-BSD FRML MDRD: ABNORMAL ML/MIN/{1.73_M2}
GLUCOSE BLD-MCNC: 97 MG/DL (ref 70–99)
HCT VFR BLD CALC: 33.1 % (ref 36–46)
HEMOGLOBIN: 10.6 G/DL (ref 12–16)
LYMPHOCYTES # BLD: 24 %
Lab: NORMAL
MCH RBC QN AUTO: 27.2 PG (ref 26–34)
MCHC RBC AUTO-ENTMCNC: 32.2 G/DL (ref 31–37)
MCV RBC AUTO: 84.4 FL (ref 80–100)
MONOCYTES # BLD: 10 %
MORPHOLOGY: ABNORMAL
PDW BLD-RTO: 19.4 % (ref 12.5–15.4)
PLATELET # BLD: 366 K/UL (ref 140–450)
PLATELET ESTIMATE: ABNORMAL
PMV BLD AUTO: 9.5 FL (ref 6–12)
POTASSIUM SERPL-SCNC: 3.4 MMOL/L (ref 3.7–5.3)
RBC # BLD: 3.92 M/UL (ref 4–5.2)
RBC # BLD: ABNORMAL 10*6/UL
SEG NEUTROPHILS: 65 %
SEGMENTED NEUTROPHILS ABSOLUTE COUNT: 6.57 K/UL (ref 1.8–7.7)
SODIUM BLD-SCNC: 134 MMOL/L (ref 135–144)
SPECIMEN DESCRIPTION: NORMAL
STATUS: NORMAL
WBC # BLD: 10.1 K/UL (ref 3.5–11)
WBC # BLD: ABNORMAL 10*3/UL

## 2017-09-25 PROCEDURE — 94799 UNLISTED PULMONARY SVC/PX: CPT

## 2017-09-25 PROCEDURE — 99232 SBSQ HOSP IP/OBS MODERATE 35: CPT | Performed by: INTERNAL MEDICINE

## 2017-09-25 PROCEDURE — 6370000000 HC RX 637 (ALT 250 FOR IP): Performed by: STUDENT IN AN ORGANIZED HEALTH CARE EDUCATION/TRAINING PROGRAM

## 2017-09-25 PROCEDURE — 2580000003 HC RX 258: Performed by: PSYCHIATRY & NEUROLOGY

## 2017-09-25 PROCEDURE — 36415 COLL VENOUS BLD VENIPUNCTURE: CPT

## 2017-09-25 PROCEDURE — 85025 COMPLETE CBC W/AUTO DIFF WBC: CPT

## 2017-09-25 PROCEDURE — 6360000002 HC RX W HCPCS: Performed by: STUDENT IN AN ORGANIZED HEALTH CARE EDUCATION/TRAINING PROGRAM

## 2017-09-25 PROCEDURE — 80048 BASIC METABOLIC PNL TOTAL CA: CPT

## 2017-09-25 PROCEDURE — 99231 SBSQ HOSP IP/OBS SF/LOW 25: CPT | Performed by: NURSE PRACTITIONER

## 2017-09-25 PROCEDURE — 6360000002 HC RX W HCPCS: Performed by: PSYCHIATRY & NEUROLOGY

## 2017-09-25 PROCEDURE — 6370000000 HC RX 637 (ALT 250 FOR IP): Performed by: PSYCHIATRY & NEUROLOGY

## 2017-09-25 RX ORDER — PYRIDOSTIGMINE BROMIDE 60 MG/1
30 TABLET ORAL EVERY 8 HOURS SCHEDULED
Qty: 30 TABLET | Refills: 0 | Status: SHIPPED | OUTPATIENT
Start: 2017-09-25 | End: 2017-12-21 | Stop reason: SDUPTHER

## 2017-09-25 RX ADMIN — IMMUNE GLOBULIN INTRAVENOUS (HUMAN) 40 G: 5 INJECTION, SOLUTION INTRAVENOUS at 00:50

## 2017-09-25 RX ADMIN — Medication 1 CAPSULE: at 08:58

## 2017-09-25 RX ADMIN — FERROUS SULFATE TAB EC 325 MG (65 MG FE EQUIVALENT) 325 MG: 325 (65 FE) TABLET DELAYED RESPONSE at 08:58

## 2017-09-25 RX ADMIN — DIPHENHYDRAMINE HCL 25 MG: 25 TABLET ORAL at 00:27

## 2017-09-25 RX ADMIN — CALCIUM CARBONATE-CHOLECALCIFEROL TAB 250 MG-125 UNIT 250 MG: 250-125 TAB at 08:58

## 2017-09-25 RX ADMIN — PYRIDOSTIGMINE BROMIDE 30 MG: 60 TABLET ORAL at 06:35

## 2017-09-25 RX ADMIN — ENOXAPARIN SODIUM 40 MG: 40 INJECTION SUBCUTANEOUS at 08:58

## 2017-09-25 RX ADMIN — MYCOPHENOLATE MOFETIL 500 MG: 250 CAPSULE ORAL at 08:58

## 2017-09-25 RX ADMIN — PREDNISONE 20 MG: 20 TABLET ORAL at 08:58

## 2017-10-11 ENCOUNTER — OFFICE VISIT (OUTPATIENT)
Dept: NEUROLOGY | Age: 42
End: 2017-10-11
Payer: MEDICARE

## 2017-10-11 VITALS
HEART RATE: 81 BPM | SYSTOLIC BLOOD PRESSURE: 123 MMHG | HEIGHT: 63 IN | BODY MASS INDEX: 30.79 KG/M2 | WEIGHT: 173.8 LBS | DIASTOLIC BLOOD PRESSURE: 90 MMHG

## 2017-10-11 DIAGNOSIS — G70.00 MYASTHENIA GRAVIS (HCC): Primary | ICD-10-CM

## 2017-10-11 PROCEDURE — 99214 OFFICE O/P EST MOD 30 MIN: CPT | Performed by: PSYCHIATRY & NEUROLOGY

## 2017-10-11 RX ORDER — PYRIDOSTIGMINE BROMIDE 60 MG/1
TABLET ORAL
Qty: 45 TABLET | Refills: 5 | Status: SHIPPED | OUTPATIENT
Start: 2017-10-11 | End: 2018-01-11

## 2017-10-11 NOTE — PROGRESS NOTES
 Smokeless tobacco: Never Used    Alcohol use No    Drug use: No    Sexual activity: No     Other Topics Concern    None     Social History Narrative    None       Current Outpatient Prescriptions   Medication Sig Dispense Refill    pyridostigmine (MESTINON) 60 MG tablet Take 1/2 po tid 45 tablet 5    pyridostigmine (MESTINON) 60 MG tablet Take 0.5 tablets by mouth every 8 hours 30 tablet 0    Calcium Carb-Cholecalciferol (OYSCO D) 250-125 MG-UNIT TABS Take 1 tablet by mouth daily 30 tablet 11    ketoconazole (NIZORAL) 2 % cream Apply topically daily. 30 g 1    mycophenolate (CELLCEPT) 500 MG tablet Take 1 po bid 60 tablet 5    predniSONE (DELTASONE) 20 MG tablet Take 1 po qd 30 tablet 5    ferrous sulfate 325 (65 FE) MG EC tablet Take 1 tablet by mouth 2 times daily (with meals) 90 tablet 5     No current facility-administered medications for this visit. Allergies   Allergen Reactions    Flexeril [Cyclobenzaprine]     Norflex [Orphenadrine Citrate]     Nsaids     Penicillins Rash    Vancomycin Rash       Review of Systems   All other systems reviewed and are negative. Objective:   Physical Exam    Vitals:    10/11/17 0925   BP: (!) 123/90   Pulse: 81     weight: 173 lb 12.8 oz (78.8 kg)  Neurological Examination  Ears /Nose/Throat: external ear . Normal exam  Neck and thyroid . Normal size  Cardiovascular: Auscultation of heart with regular rate and rhythm   Musculoskeletal. Muscle bulk and tone normal   Muscle strength 5/5 throughout   No dysmetria or dysdiadokinesis  No tremor   Normal fine motor  Orientation Alert and oriented x 3   Language process and speech normal . No aphasia   Cranial nerve 2 normal acuety and visual fields  Cranial nerve 3, 4 and 6 . Extraocular muscles are intact . Pupils are equal and reactive . Bilateral ptosis  Cranial nerve 5 . Intact corneal reflex.  Normal facial sensation  Cranial nerve 7 bilateral lower extremity weakness with trouble puffing   Cranial nerve 8. Grossly intact hearing   Cranial nerve 9 and 10. Symmetric palate elevation   Cranial nerve 11 , 5 out of 5 strength   Cranial Nerve 12 midline tongue . No atrophy  Sensation . Normal pinprick and light touch   Deep Tendon Reflexes normal  Plantar response flexor bilaterally    Assessment:      1.  Myasthenia gravis [facility])    Patient is doing well to continue on current medication regimen       Plan:      As above

## 2017-10-11 NOTE — LETTER
[facility] Neurology Specialist  [place] [address]  Phone: [phone]  Fax: [phone]    Charly Salgado MD        October 16, 2017       Patient: Charlee Lawson   MR Number: [id number]   YOB: 1975   Date of Visit: 10/11/2017       Dear Dr. Vivi Diallo: Thank you for the request for consultation for Charlee Lawson. Below are the relevant portions of my assessment and plan of care. Subjective:      Patient ID: Charlee Lawson is a 36 y.o. female. HPI     Active problem myasthenia gravis on mycophenylate, prednisone 20 mg po qd having had prior thymectomy . The condition is she has been hospitalized twice since last seen for bulbar weakness with facial weakness , trouble chewing along with tongue movement in August undergoing plasmapheresis along with in September receiving IgG along with addition of mestinon 30 mg po tid to mycophenylate 500 mg po bid and prednisone 20 mg po qd . She has been home now for 2 weeks doing well with no bulbar weakness, motor weakness or dyspnea  . She remains mycophenylate 500 mg po bid , prednisone 20 mg po qd. She has been on prednisone for 20 years at varying doses and mycophenylate for the past 3 years that stabilized her disease having been on imuran before for three years  . Significant medications mycophenylate 500 mg po bid , prednisone 20 mg po qd, mestinon 30 mg po tid   . Testing MRI of Head with normal brain with right maxillary sinusitis . CRP 1 , TSH normal, ESR 10 ,cholesterol 115 , LDL 48 , TG 88 . Vital capacity 2.9 liters , NIF > - 40 . Hga1c 6.4 , MONET negative , Anti SSA and anti SSB are negative, ACE 31 (8-52) . CT chest subtle increased density within the anterior mediastinum possibly representing residual thickening or scarring.  No discrete anterior mediastinal mass identified .  Acetylcholine binding Ab 478 (0--0.4), blocking Ab 63 (0-26 %) , striated muscle Ab <1:40     Past Medical History:   Diagnosis Date    Anemia     Headache     Hypertension     Myasthenia gravis ([address]     Pre-diabetes        Past Surgical History:   Procedure Laterality Date    CARDIAC SURGERY         History reviewed. No pertinent family history. Social History     Social History    Marital status: Single     Spouse name: N/A    Number of children: N/A    Years of education: N/A     Social History Main Topics    Smoking status: Never Smoker    Smokeless tobacco: Never Used    Alcohol use No    Drug use: No    Sexual activity: No     Other Topics Concern    None     Social History Narrative    None       Current Outpatient Prescriptions   Medication Sig Dispense Refill    pyridostigmine (MESTINON) 60 MG tablet Take 1/2 po tid 45 tablet 5    pyridostigmine (MESTINON) 60 MG tablet Take 0.5 tablets by mouth every 8 hours 30 tablet 0    Calcium Carb-Cholecalciferol (OYSCO D) 250-125 MG-UNIT TABS Take 1 tablet by mouth daily 30 tablet 11    ketoconazole (NIZORAL) 2 % cream Apply topically daily. 30 g 1    mycophenolate (CELLCEPT) 500 MG tablet Take 1 po bid 60 tablet 5    predniSONE (DELTASONE) 20 MG tablet Take 1 po qd 30 tablet 5    ferrous sulfate 325 (65 FE) MG EC tablet Take 1 tablet by mouth 2 times daily (with meals) 90 tablet 5     No current facility-administered medications for this visit. Allergies   Allergen Reactions    Flexeril [Cyclobenzaprine]     Norflex [Orphenadrine Citrate]     Nsaids     Penicillins Rash    Vancomycin Rash       Review of Systems   All other systems reviewed and are negative. Objective:   Physical Exam    Vitals:    10/11/17 0925   BP: (!) 123/90   Pulse: 81     weight: 173 lb 12.8 oz (78.8 kg)  Neurological Examination  Ears /Nose/Throat: external ear . Normal exam  Neck and thyroid . Normal size  Cardiovascular:  Auscultation of heart with regular rate and rhythm Musculoskeletal. Muscle bulk and tone normal   Muscle strength 5/5 throughout   No dysmetria or dysdiadokinesis  No tremor   Normal fine motor  Orientation Alert and oriented x 3   Language process and speech normal . No aphasia   Cranial nerve 2 normal acuety and visual fields  Cranial nerve 3, 4 and 6 . Extraocular muscles are intact . Pupils are equal and reactive . Bilateral ptosis  Cranial nerve 5 . Intact corneal reflex. Normal facial sensation  Cranial nerve 7 bilateral lower extremity weakness with trouble puffing   Cranial nerve 8. Grossly intact hearing   Cranial nerve 9 and 10. Symmetric palate elevation   Cranial nerve 11 , 5 out of 5 strength   Cranial Nerve 12 midline tongue . No atrophy  Sensation . Normal pinprick and light touch   Deep Tendon Reflexes normal  Plantar response flexor bilaterally    Assessment:      1. Myasthenia gravis [facility])    Patient is doing well to continue on current medication regimen       Plan:      As above           If you have questions, please do not hesitate to call me. I look forward to following Atul Santacruz along with you.     Sincerely,        Ibeth Saunders MD    CC providers:  Natalia Reed MD  [place] [address]  VIA In [facility] [address]

## 2017-10-16 ENCOUNTER — OFFICE VISIT (OUTPATIENT)
Dept: INTERNAL MEDICINE | Age: 42
End: 2017-10-16
Payer: MEDICARE

## 2017-10-16 VITALS
HEART RATE: 104 BPM | WEIGHT: 174 LBS | HEIGHT: 63 IN | DIASTOLIC BLOOD PRESSURE: 89 MMHG | SYSTOLIC BLOOD PRESSURE: 131 MMHG | BODY MASS INDEX: 30.83 KG/M2

## 2017-10-16 DIAGNOSIS — Z79.52 CURRENT CHRONIC USE OF SYSTEMIC STEROIDS: ICD-10-CM

## 2017-10-16 DIAGNOSIS — Z92.241 H/O STEROID THERAPY: ICD-10-CM

## 2017-10-16 DIAGNOSIS — D50.9 IRON DEFICIENCY ANEMIA, UNSPECIFIED IRON DEFICIENCY ANEMIA TYPE: ICD-10-CM

## 2017-10-16 DIAGNOSIS — R73.03 PRE-DIABETES: ICD-10-CM

## 2017-10-16 DIAGNOSIS — N94.6 MENORRHALGIA: ICD-10-CM

## 2017-10-16 DIAGNOSIS — G70.00 MYASTHENIA GRAVIS (HCC): Primary | ICD-10-CM

## 2017-10-16 DIAGNOSIS — Z90.89 H/O THYMECTOMY: ICD-10-CM

## 2017-10-16 PROCEDURE — 99214 OFFICE O/P EST MOD 30 MIN: CPT | Performed by: HOSPITALIST

## 2017-10-16 RX ORDER — LANOLIN ALCOHOL/MO/W.PET/CERES
325 CREAM (GRAM) TOPICAL 2 TIMES DAILY WITH MEALS
Qty: 90 TABLET | Refills: 5 | Status: SHIPPED | OUTPATIENT
Start: 2017-10-16 | End: 2017-12-21 | Stop reason: ALTCHOICE

## 2017-10-16 RX ORDER — B-COMPLEX WITH VITAMIN C
1 TABLET ORAL DAILY
Qty: 90 TABLET | Refills: 3 | Status: SHIPPED | OUTPATIENT
Start: 2017-10-16 | End: 2017-12-21 | Stop reason: ALTCHOICE

## 2017-10-16 RX ORDER — CALCIUM CARBONATE-CHOLECALCIFEROL TAB 250 MG-125 UNIT 250-125 MG-UNIT
1 TAB ORAL DAILY
Qty: 30 TABLET | Refills: 11 | Status: CANCELLED | OUTPATIENT
Start: 2017-10-16 | End: 2018-10-16

## 2017-10-16 NOTE — PROGRESS NOTES
ATTENDING PHYSICIAN STATEMENT     I have discussed the care of Cornel Carrasco, including pertinent history and exam findings with the resident. I have reviewed the key elements of all parts of the encounter with the resident. I have seen and examined the patient with the resident and the key elements of all parts of the encounter have been performed by me. I agree with the assessment, and status of the problem list as documented. Additional Comments:  Patient is a Nepali-speaking and interview was done with help of telephone . Patient is here to follow-up for myasthenia gravis, prediabetes, menorrhagia and iron deficiency anemia. Girish Sandoval has improved after starting iron supplements. She still complains of heavy bleeding during her menstrual periods. We will get an pelvic ultrasound done. Her prediabetes is stable without treatment. Neither any symptoms of myasthenia gravis. She is on chronic steroid therapy. Will check DEXA scan to rule out osteoporosis    The plan and orders should include  1. Myasthenia gravis ([address]     2. Pre-diabetes     3. [facility] [place], [place], , OB/GYN [address] [facility]*    Ultrasound pelvis complete transvaginal non OB   4. Iron deficiency anemia, unspecified iron deficiency anemia type  ferrous sulfate 325 (65 Fe) MG EC tablet   5. H/O thymectomy     6. Current chronic use of systemic steroids  Calcium Carbonate-Vitamin D (OYSTER SHELL CALCIUM/D) 500-200 MG-UNIT TABS   7. H/O steroid therapy  DEXA BONE DENSITY 2 SITES          The return visit should be in 3 months .     Violette Almonte MD  Attending and Faculty Internal Medicine  [address] [place] [address]  Internal Medicine [address] [place] [address]   10/16/17 1:51 PM      This note is created with the assistance of a speech-recognition program. While intending to generate a document that actually reflects the content of the visit, the document can still have some mistakes which may not have been identified and corrected by editing.

## 2017-10-16 NOTE — PROGRESS NOTES
[facility]    Progress Note    Date of patient's visit: 10/16/2017  YOB: 1975  Patient's Name:  Inna Packer    Patient Care Team:  Elise Aquino MD as PCP - General (Internal Medicine)    REASON FOR VISIT: Routine outpatient follow     HISTORY OF PRESENT ILLNESS:    History was obtained from the patient. Inna Packer is a 36 y.o. is here for a  follow up visit. Patient has a hx of myasthenia gravis and had an exacerbation recently was admitted in the hospital .She received 5 days of IVIG'S and also was started on mestinon 30mg q8hrs. Currently she is also on prednisone 20mg and mycophenolate 500mg BID . She follows up with      Patient denies any symptoms of weakness , in her eyes or around her mouth . Patient denies increased lacrimation, diarrhoea. Patient states that she had DEXA scan in [facility] 3 years ago and was normal .She takes calcium tab daily    Patient states she has increased menstrual bleeding and her cycle is 21days. She also has palpitations and anxiety during her menstrual period and would like to see a OBGYN.     She is on supplemental iron tab     She also has pre diabetes , but is not on metformin and would like to control her sugars based on her diet     Patient Active Problem List   Diagnosis    Myasthenia gravis with exacerbation, ocular (HCC)    Iron deficiency anemia    H/O thymectomy    Right maxillary sinusitis    Pre-diabetes    Urinary tract infection in female    Penicillin allergy    Right maxillary sinusitis, chronic    Clonus    Suspected pulmonary embolism (HCC)    Facial muscle weakness    Myasthenia exacerbation (HCC)       ALLERGIES      Allergies   Allergen Reactions    Flexeril [Cyclobenzaprine]     Norflex [Orphenadrine Citrate]     Nsaids     Penicillins Rash    Vancomycin Rash         MEDICATIONS:      Current Outpatient Prescriptions on File Prior to Visit no joint tenderness, deformity or swelling  Extremities - peripheral pulses normal, no pedal edema, no clubbing or cyanosis  Skin - skin tags , early on the neck and eyelids b/l     LABORATORY FINDINGS:    CBC:  Lab Results   Component Value Date    WBC 10.1 09/25/2017    HGB 10.6 09/25/2017     09/25/2017     BMP:    Lab Results   Component Value Date     09/25/2017    K 3.4 09/25/2017     09/25/2017    CO2 20 09/25/2017    BUN 7 09/25/2017    CREATININE 0.63 09/25/2017    GLUCOSE 97 09/25/2017     HEMOGLOBIN A1C:   Lab Results   Component Value Date    LABA1C 6.3 09/20/2017     MICROALBUMIN URINE:   Lab Results   Component Value Date    MICROALBUR <12 06/02/2017     FASTING LIPID PANEL:  Lab Results   Component Value Date    CHOL 115 05/04/2017    HDL 49 05/04/2017    TRIG 88 05/04/2017     LIVER PROFILE:  Lab Results   Component Value Date    ALT 32 09/21/2017    AST 31 09/21/2017    PROT 8.2 09/21/2017    BILITOT 0.62 09/21/2017    BILIDIR 0.18 09/21/2017    LABALBU 3.4 09/21/2017      THYROID FUNCTION:   Lab Results   Component Value Date    TSH 2.74 05/04/2017      URINE ANALYSIS: No results found for: LABURIN  ASSESSMENT AND PLAN:    1. Myasthenia gravis ([facility] [address] [facility]  F/u with    On mycophenolate 500mg BID, prednisone 20mg , mestinon 30mg TID     2. Pre-diabetes   - diet restriction    3. Jewel [address] [address] Yohana Perales DO, OB/GYN [facility]*  - Iron supplements    4. Iron deficiency anemia, unspecified iron deficiency anemia type  - Iron supplements    5. H/O thymectomy      6. Current chronic use of systemic steroids    - Calcium Carbonate-Vitamin D (OYSTER SHELL CALCIUM/D) 500-200 MG-UNIT TABS; Take 1 tablet by mouth daily  Dispense: 90 tablet; Refill: 3      FOLLOW UP:       1. Ananth Davies received counseling on the following healthy behaviors: nutrition, exercise and medication adherence  2. Discussed use, benefit, and side effects of prescribed medications.   Barriers to medication compliance addressed. All patient questions answered. Pt voiced understanding. 3.  Reviewed prior labs and health maintenance  4. Continue current medications, diet and exercise.   5. Follow up in 3 months, around Jan 2018         Magaly Calloway MD   PGY 3    Department of Internal Medicine  [facility]         10/16/2017, 1:49 PM

## 2017-10-16 NOTE — PATIENT INSTRUCTIONS
TESTING INSTRUCTIONS    Your doctor has ordered a/an US Pelvis for you, this will be done at any [facility] location of your choice    You will be called by the Scheduling Department to schedule your testing. If you do not hear from them within a week, please call them at [phone] to schedule the appointment. On the day of your appointment, please report to the Admitting Department, located on the main floor of the [facility] behind the information desk. If you cannot keep this appointment, please call [phone] to cancel and reschedule your appointment. TESTING INSTRUCTIONS    Your doctor has ordered a/an Dexa Scan for you, this will be done at any [facility] location of your choice    You will be called by the Scheduling Department to schedule your testing. If you do not hear from them within a week, please call them at [phone] to schedule the appointment. On the day of your appointment, please report to the Admitting Department, located on the main floor of the [facility] behind the information desk. If you cannot keep this appointment, please call [phone] to cancel and reschedule your appointment. Referral to [facility] given to patient and instructed to call the scheduling number if not heard from scheduling department. Follow-up appointment scheduled for 1/22/18, AVS given to patient. It is very important that you keep your appointments, please contact us if you are unable to keep your scheduled appointment.  Thank you bp

## 2017-10-20 ENCOUNTER — HOSPITAL ENCOUNTER (OUTPATIENT)
Dept: WOMENS IMAGING | Age: 42
Discharge: HOME | End: 2017-10-20
Payer: MEDICARE

## 2017-10-20 DIAGNOSIS — N94.6 MENORRHALGIA: ICD-10-CM

## 2017-10-20 DIAGNOSIS — Z92.241 H/O STEROID THERAPY: ICD-10-CM

## 2017-10-20 PROCEDURE — 76856 US EXAM PELVIC COMPLETE: CPT

## 2017-10-20 PROCEDURE — 77080 DXA BONE DENSITY AXIAL: CPT

## 2017-11-08 ENCOUNTER — HOSPITAL ENCOUNTER (OUTPATIENT)
Age: 42
Setting detail: SPECIMEN
Discharge: HOME | End: 2017-11-08
Payer: MEDICARE

## 2017-11-08 ENCOUNTER — OFFICE VISIT (OUTPATIENT)
Dept: OBGYN | Age: 42
End: 2017-11-08
Payer: MEDICARE

## 2017-11-08 VITALS
BODY MASS INDEX: 29.16 KG/M2 | WEIGHT: 175 LBS | DIASTOLIC BLOOD PRESSURE: 86 MMHG | SYSTOLIC BLOOD PRESSURE: 129 MMHG | HEIGHT: 65 IN | HEART RATE: 93 BPM

## 2017-11-08 DIAGNOSIS — N92.0 MENORRHAGIA WITH REGULAR CYCLE: ICD-10-CM

## 2017-11-08 DIAGNOSIS — Z01.419 WELL WOMAN EXAM WITH ROUTINE GYNECOLOGICAL EXAM: Primary | ICD-10-CM

## 2017-11-08 DIAGNOSIS — Z00.00 PREVENTATIVE HEALTH CARE: ICD-10-CM

## 2017-11-08 DIAGNOSIS — Z12.31 SCREENING MAMMOGRAM, ENCOUNTER FOR: ICD-10-CM

## 2017-11-08 LAB
ABSOLUTE EOS #: 0.16 K/UL (ref 0–0.44)
ABSOLUTE IMMATURE GRANULOCYTE: 0.27 K/UL (ref 0–0.3)
ABSOLUTE LYMPH #: 2.88 K/UL (ref 1.1–3.7)
ABSOLUTE MONO #: 1.18 K/UL (ref 0.1–1.2)
BASOPHILS # BLD: 1 % (ref 0–2)
BASOPHILS ABSOLUTE: 0.11 K/UL (ref 0–0.2)
DIFFERENTIAL TYPE: ABNORMAL
DIRECT EXAM: ABNORMAL
EOSINOPHILS RELATIVE PERCENT: 1 % (ref 1–4)
ESTIMATED AVERAGE GLUCOSE: 154 MG/DL
HBA1C MFR BLD: 7 % (ref 4–6)
HCT VFR BLD CALC: 40 % (ref 36.3–47.1)
HEMOGLOBIN: 11.6 G/DL (ref 11.9–15.1)
IMMATURE GRANULOCYTES: 2 %
LYMPHOCYTES # BLD: 20 % (ref 24–43)
Lab: ABNORMAL
MCH RBC QN AUTO: 24.3 PG (ref 25.2–33.5)
MCHC RBC AUTO-ENTMCNC: 29 G/DL (ref 28.4–34.8)
MCV RBC AUTO: 83.7 FL (ref 82.6–102.9)
MONOCYTES # BLD: 8 % (ref 3–12)
PDW BLD-RTO: 16.5 % (ref 11.8–14.4)
PLATELET # BLD: 588 K/UL (ref 138–453)
PLATELET ESTIMATE: ABNORMAL
PMV BLD AUTO: 11.4 FL (ref 8.1–13.5)
RBC # BLD: 4.78 M/UL (ref 3.95–5.11)
RBC # BLD: ABNORMAL 10*6/UL
SEG NEUTROPHILS: 68 % (ref 36–65)
SEGMENTED NEUTROPHILS ABSOLUTE COUNT: 9.86 K/UL (ref 1.5–8.1)
SPECIMEN DESCRIPTION: ABNORMAL
STATUS: ABNORMAL
TSH SERPL DL<=0.05 MIU/L-ACNC: 3.13 MIU/L (ref 0.3–5)
WBC # BLD: 14.5 K/UL (ref 3.5–11.3)
WBC # BLD: ABNORMAL 10*3/UL

## 2017-11-08 PROCEDURE — G0145 SCR C/V CYTO,THINLAYER,RESCR: HCPCS

## 2017-11-08 PROCEDURE — 87510 GARDNER VAG DNA DIR PROBE: CPT

## 2017-11-08 PROCEDURE — 85025 COMPLETE CBC W/AUTO DIFF WBC: CPT

## 2017-11-08 PROCEDURE — 84443 ASSAY THYROID STIM HORMONE: CPT

## 2017-11-08 PROCEDURE — 87660 TRICHOMONAS VAGIN DIR PROBE: CPT

## 2017-11-08 PROCEDURE — 83036 HEMOGLOBIN GLYCOSYLATED A1C: CPT

## 2017-11-08 PROCEDURE — 87624 HPV HI-RISK TYP POOLED RSLT: CPT

## 2017-11-08 PROCEDURE — 87491 CHLMYD TRACH DNA AMP PROBE: CPT

## 2017-11-08 PROCEDURE — 87480 CANDIDA DNA DIR PROBE: CPT

## 2017-11-08 PROCEDURE — 87591 N.GONORRHOEAE DNA AMP PROB: CPT

## 2017-11-08 PROCEDURE — 99386 PREV VISIT NEW AGE 40-64: CPT | Performed by: OBSTETRICS & GYNECOLOGY

## 2017-11-08 PROCEDURE — 36415 COLL VENOUS BLD VENIPUNCTURE: CPT

## 2017-11-08 NOTE — PATIENT INSTRUCTIONS
un formulario de consentimiento que indica que comprende los riesgos de la prueba y que acepta Burrell Jesus. ¿Qué ocurre raf la prueba? · Se recostará boca arriba en rosalie law de exploración. Tendrá los pies apoyados en estribos. · Es posible que el médico utilice un aerosol o rosalie inyección para adormecerle el schuyler uterino. El schuyler uterino es la abertura del [place]. · El médico usará un instrumento llamado espéculo para denys el schuyler uterino. · A continuación, el médico introducirá un tubo hardin por el schuyler uterino para michael [place] del revestimiento del [place]. Tadeo vez sienta un cólico alma cuando el médico tome la [place]. · La muestra es enviada a un laboratorio. ¿Cuánto tiempo dura la prueba? La prueba durará entre 5 y 15 minutos, aproximadamente. ¿Qué ocurre después de la prueba? · Probablemente pueda irse a casa de inmediato. · Es probable que tenga un leve sangrado vaginal y retortijones por unos días después de la prueba. Los retortijones pueden sentirse lorenzo cólicos menstruales cheikh. · Pregúntele a chun médico si puede michael un analgésico de venta merissa, lorenzo acetaminofén (Tylenol), ibuprofeno (Advil, Motrin) o naproxeno (Aleve). Sea berto con los medicamentos. Yumiko y siga todas las instrucciones de la [place]. · No tenga relaciones sexuales, no use tampones ni se natty lavados vaginales hasta que el manchado se detenga. Use toallas sanitarias para el sangrado o flujo vaginal.  · No natty ejercicio intenso ni levante nada pesado raf un día después de la biopsia. La atención de seguimiento es rosalie parte clave de chun tratamiento y seguridad. Asegúrese de hacer y acudir a todas las citas, y llame a chun médico si está teniendo problemas. También es rosalie buena idea mantener rosalie lista de los medicamentos que madyd. Pregúntele a chun médico cuándo puede esperar [employer] de la prueba. ¿Dónde puede encontrar más información en inglés?   Emily Curly a [email or web address] e ingrese a chun cuenta de MyChart. Pinky Holguinmelonie V957 en el cuadro \"Search Health Information\" para más información (en inglés) sobre \"Biopsia de endometrio: Sobre esta prueba - [ Endometrial Biopsy: About This Test ]. \"     Si no tiene rosalie cuenta, natty clorquidea en el enlace \"Sign Up Now\". Revisado: 13 octubre, 2016  Versión del contenido: 11.3  © 0405-7064 [employer]. Las instrucciones de cuidado fueron adaptadas bajo licencia por [facility] CARE ([facility]). Si usted tiene Flovilla Speonk afección médica o sobre estas instrucciones, siempre pregunte a chun profesional de radha. [employer] niega toda garantía o responsabilidad por chun uso de esta información.

## 2017-11-08 NOTE — PROGRESS NOTES
Main Topics    Smoking status: Never Smoker    Smokeless tobacco: Never Used    Alcohol use No    Drug use: No    Sexual activity: No     Other Topics Concern    Not on file     Social History Narrative    No narrative on file       MEDICATIONS:  Current Outpatient Prescriptions   Medication Sig Dispense Refill    ferrous sulfate 325 (65 Fe) MG EC tablet Take 1 tablet by mouth 2 times daily (with meals) 90 tablet 5    Calcium Carbonate-Vitamin D (OYSTER SHELL CALCIUM/D) 500-200 MG-UNIT TABS Take 1 tablet by mouth daily 90 tablet 3    pyridostigmine (MESTINON) 60 MG tablet Take 1/2 po tid 45 tablet 5    Calcium Carb-Cholecalciferol (OYSCO D) 250-125 MG-UNIT TABS Take 1 tablet by mouth daily 30 tablet 11    ketoconazole (NIZORAL) 2 % cream Apply topically daily. 30 g 1    mycophenolate (CELLCEPT) 500 MG tablet Take 1 po bid 60 tablet 5    predniSONE (DELTASONE) 20 MG tablet Take 1 po qd 30 tablet 5    pyridostigmine (MESTINON) 60 MG tablet Take 0.5 tablets by mouth every 8 hours 30 tablet 0     No current facility-administered medications for this visit. ALLERGIES:  Allergies as of 11/08/2017 - Review Complete 11/08/2017   Allergen Reaction Noted    Flexeril [cyclobenzaprine]  05/03/2017    Norflex [orphenadrine citrate]  05/03/2017    Nsaids  05/03/2017    Penicillins Rash 05/03/2017    Vancomycin Rash 08/04/2017       Symptoms of decreased mood absent  Symptoms of anhedonia absent    Immunization status: stated as current, but no records available. Gynecologic History:  Menarche: 3 yo  Menopause: n/a     Patient's last menstrual period was 10/16/2017 (exact date).     Sexually Active: Yes - pt's partner is unable to have children, they don't use condoms    STD History: No     Permanent Sterilization: No   Reversible Birth Control: No        Hormone Replacement Exposure: No      Genetic Qualified Family History of Breast, Ovarian , Colon or Uterine Cancer: Yes - 2 paternal aunts had breast cancer, they were both over 49 yo when diagnosed. Pt's father had colon cancer at age 62 - pt will need colonoscopy at age 51     If YES see scanned worksheet. Preventative Health Testing:    Health Maintenance:  Health Maintenance Due   Topic Date Due    DTaP/Tdap/Td vaccine (1 - Tdap) 05/27/1993    Flu vaccine (1) 09/01/2017       Date of Last Pap Smear: 2 years ago - pt reports it was normal  Abnormal Pap Smear History: denies  Colposcopy History: denies  Date of Last Mammogram: at age 26 and 39 - pt reports \"dysplasia\" in her breasts  Date of Last Colonoscopy: pt needs colonoscopy at age 51  Date of Last Bone Density: n/a      ________________________________________________________________________      REVIEW OF SYSTEMS:       A minimum of an eleven point review of systems was completed. Review Of Systems (11 point):  Constitutional: No fever, chills or malaise; No weight change or fatigue  Head and Eyes: No vision, Headache, Dizziness or trauma in last 12 months  ENT ROS: No hearing, Tinnitis, sinus or taste problems  Hematological and Lymphatic ROS:No Lymphoma, Von Willebrand's, Hemophillia or Bleeding History  Psych ROS: No Depression, Homicidal thoughts,suicidal thoughts, or anxiety  Breast ROS: No prior breast abnormalities or lumps  Respiratory ROS: No SOB, Pneumoniae,Cough, or Pulmonary Embolism History  Cardiovascular ROS: No Chest Pain with Exertion, Palpitations, Syncope, Edema, Arrhythmia  Gastrointestinal ROS: No Indigestion, Heartburn, Nausea, vomiting, Diarrhea, Constipation,or Bowel Changes; No Bloody Stools or melena  Genito-Urinary ROS:+heavy and painful periods. No Dysuria, Hematuria or Nocturia.  No Urinary Incontinence or Vaginal Discharge  Musculoskeletal ROS: No Arthralgia, Arthritis,Gout,Osteoporosis or Rheumatism  Neurological ROS: No CVA, Migraines, Epilepsy, Seizure Hx, or Limb Weakness  Dermatological ROS: No Rash, Itching, Hives, Mole Changes or Cancer stripe: Endometrial stripe is within normal limits.       Right Ovary: Right ovary is within normal limits.       Left Ovary:  Left ovary is within normal limits.       Free Fluid: No evidence of free fluid.           Impression   Uterine fundus appears mildly prominent with heterogeneous echotexture which   can be seen with fibroid disease though no discrete lesion identified. Otherwise unremarkable pelvic ultrasound. ASSESSMENT:      36 y.o. Annual  1. Well woman exam with routine gynecological exam  PAP Smear    Vaginitis DNA Probe    C. Trachomatis / N. Gonorrhoeae, DNA    KIERSTEN DIGITAL SCREEN W CAD BILATERAL   2. Menorrhagia with regular cycle  Vaginitis DNA Probe    C. Trachomatis / N. Gonorrhoeae, DNA    CBC Auto Differential    TSH with Reflex    Hemoglobin A1C   3. Preventative health care  PAP Smear    KIERSTEN DIGITAL SCREEN W CAD BILATERAL   4. Screening mammogram, encounter for            Chief Complaint   Patient presents with    Gynecologic Exam    Menorrhagia          Past Medical History:   Diagnosis Date    Anemia     Headache     Hypertension     Myasthenia gravis ([address]     Pre-diabetes          Patient Active Problem List    Diagnosis Date Noted    Myasthenia exacerbation ([address]     Facial muscle weakness     Suspected pulmonary embolism ([address]     Clonus     Urinary tract infection in female 08/05/2017    Penicillin allergy 08/05/2017    Right maxillary sinusitis, chronic 08/05/2017    Pre-diabetes 07/10/2017    Right maxillary sinusitis 05/06/2017    Iron deficiency anemia 05/04/2017    H/O thymectomy 05/04/2017    Myasthenia gravis with exacerbation, ocular ([address] 05/03/2017          Hereditary Breast, Ovarian, Colon and Uterine Cancer screening Done. Tobacco & Secondary smoke risks reviewed; instructed on cessation and avoidance      Counseling Completed:  Preventative Health Recommendations and Follow up.   The patient was informed of the recommended

## 2017-11-09 LAB
C TRACH DNA GENITAL QL NAA+PROBE: NEGATIVE
N. GONORRHOEAE DNA: NEGATIVE

## 2017-11-09 RX ORDER — FLUCONAZOLE 150 MG/1
150 TABLET ORAL ONCE
Qty: 2 TABLET | Refills: 0 | Status: SHIPPED | OUTPATIENT
Start: 2017-11-09 | End: 2017-11-09

## 2017-11-10 LAB
HPV SAMPLE: NORMAL
HPV SOURCE: NORMAL
HPV, GENOTYPE 16: NOT DETECTED
HPV, GENOTYPE 18: NOT DETECTED
HPV, HIGH RISK OTHER: NOT DETECTED
HPV, INTERPRETATION: NORMAL

## 2017-11-16 LAB — CYTOLOGY REPORT: NORMAL

## 2017-11-20 ENCOUNTER — HOSPITAL ENCOUNTER (OUTPATIENT)
Dept: WOMENS IMAGING | Age: 42
Discharge: HOME | End: 2017-11-20
Payer: MEDICARE

## 2017-11-20 DIAGNOSIS — Z00.00 PREVENTATIVE HEALTH CARE: ICD-10-CM

## 2017-11-20 DIAGNOSIS — Z01.419 WELL WOMAN EXAM WITH ROUTINE GYNECOLOGICAL EXAM: ICD-10-CM

## 2017-11-20 PROCEDURE — 77063 BREAST TOMOSYNTHESIS BI: CPT

## 2017-11-29 ENCOUNTER — PROCEDURE VISIT (OUTPATIENT)
Dept: OBGYN | Age: 42
End: 2017-11-29
Payer: MEDICARE

## 2017-11-29 ENCOUNTER — HOSPITAL ENCOUNTER (OUTPATIENT)
Age: 42
Setting detail: SPECIMEN
Discharge: HOME | End: 2017-11-29
Payer: MEDICARE

## 2017-11-29 VITALS
SYSTOLIC BLOOD PRESSURE: 125 MMHG | BODY MASS INDEX: 29.65 KG/M2 | WEIGHT: 178.2 LBS | HEART RATE: 95 BPM | DIASTOLIC BLOOD PRESSURE: 89 MMHG

## 2017-11-29 DIAGNOSIS — N93.9 ABNORMAL UTERINE BLEEDING (AUB): Primary | ICD-10-CM

## 2017-11-29 LAB
CONTROL: NORMAL
PREGNANCY TEST URINE, POC: NEGATIVE

## 2017-11-29 PROCEDURE — 58558 HYSTEROSCOPY BIOPSY: CPT | Performed by: OBSTETRICS & GYNECOLOGY

## 2017-11-29 PROCEDURE — 81025 URINE PREGNANCY TEST: CPT | Performed by: OBSTETRICS & GYNECOLOGY

## 2017-11-29 NOTE — PROGRESS NOTES
EndoSee  Hysteroscopy Procedure Note      [place] Julesreynal[place]  11/29/2017  No LMP recorded. Chief Complaint   Patient presents with    Procedure     EMB with h-scope         Blood pressure 125/89, pulse 95, weight 178 lb 3.2 oz (80.8 kg). UltraSound Findings:   Narrative   EXAMINATION:   PELVIC ULTRASOUND       10/20/2017       TECHNIQUE:   Transabdominal pelvic ultrasound was performed.       COMPARISON:   None       HISTORY:   ORDERING SYSTEM PROVIDED HISTORY: Menorrhalgia           FINDINGS:   Measurements:       Uterus:  11.0 x 7.0 x 8.4 cm       Endometrial stripe:  6 mm       Right Ovary:  2.7 x 1.8 x 1.9 cm       Left Ovary:  3.2 x 2.1 x 2.5 cm       Ultrasound Findings:       Uterus: Uterine fundus appears mildly prominent with heterogeneous   echotexture.       Endometrial stripe: Endometrial stripe is within normal limits.       Right Ovary: Right ovary is within normal limits.       Left Ovary:  Left ovary is within normal limits.       Free Fluid: No evidence of free fluid.           Impression   Uterine fundus appears mildly prominent with heterogeneous echotexture which   can be seen with fibroid disease though no discrete lesion identified. Otherwise unremarkable pelvic ultrasound. UPT: negative    Diagnosis:   1.  Abnormal uterine bleeding (AUB)  POCT urine pregnancy    NC HYSTEROSCOPY,W/ENDO BX     Patient Active Problem List    Diagnosis Date Noted    Myasthenia exacerbation ([address]     Facial muscle weakness     Suspected pulmonary embolism ([address]     Clonus     Urinary tract infection in female 08/05/2017    Penicillin allergy 08/05/2017    Right maxillary sinusitis, chronic 08/05/2017    Pre-diabetes 07/10/2017    Right maxillary sinusitis 05/06/2017    Iron deficiency anemia 05/04/2017    H/O thymectomy 05/04/2017    Myasthenia gravis with exacerbation, ocular ([address] 05/03/2017         Procedure Note:  After voiding, the patient returned to the exam room where she was placed in the dorsal- lithotomy position. A bimanual examination was performed without pathological changes from her most recent history and physical examination. A bi-valve speculum was then placed into the vaginal vault allowing visualization of the cervix. The surgical field was then cleansed with betadine. The uterus was sounded to 10 cm. The EndoSee hysteroscope was then placed thru the endocervical canal and into the endometrial cavity without any complications. A total of 60ml of normal saline was instilled to aid in visualization of the endometrial anatomy, a picture of the endocervical canal was taken, once I was able to visualize the intrauterine cavity, I did not see any polyps or masses, however, before pictures could be taken, the catheter camera stopped functioning and I was unable to obtain any further images. The EndoSee was removed at this point. . During the hysteroscopic procedure an endometrial sampling was performed without incident. Pathology is pending. The patient tolerated the procedure well, the Allis was removed off the anterior cervical lip and there was noted to be adequate hemostasis. The patient was given restrictions and will follow-up in the office in 10-14 days. She will report any abdominal pain, heavy vaginal bleeding or a temperature of greater than 100.4. Hysteroscopic Findings:  Normal endocervical canal, normal appearing endometrial lining, unable to appreciate the Fallopian tube ostia, no masses/polyps appreciated. Assessment:  1.  Abnormal uterine bleeding (AUB)  POCT urine pregnancy    GA HYSTEROSCOPY,W/ENDO BX     Patient Active Problem List    Diagnosis Date Noted    Myasthenia exacerbation ([address]     Facial muscle weakness     Suspected pulmonary embolism ([address]     Clonus     Urinary tract infection in female 08/05/2017    Penicillin allergy 08/05/2017    Right maxillary sinusitis, chronic 08/05/2017    Pre-diabetes 07/10/2017    Right maxillary sinusitis 05/06/2017    Iron deficiency anemia 05/04/2017    H/O thymectomy 05/04/2017    Myasthenia gravis with exacerbation, ocular ([facility] 05/03/2017           Recommendations:  Pt to follow up in 2 weeks to discuss results of EMB. Patient counseled on medical options to help with her heavy periods, including all forms of birth control. Pt states she has tried Nuvaring and IUD but they have always fallen out or her body pushing them out/rejected them. Patient counseled on OCPs as well, but states she is a [place] and should not be taking these. She also said that a doctor in [facility] told her because of the myasthenia gravis, she should avoid any unnecessary medications. Explained to patient that there is no medical contraindication to any form of birth control and her MG. I also suggested an endometrial ablation to help with her periods, but pt is also concerned because she is [place] and should not have this procedure if it could effect future pregnancy (also to note, pt has had 5 elective ABs because she conceived and didn't want to have any issues with pregnancy and her MG). Pt to consider her options and we can discuss them further at her follow up: We can proceed with medical management, surgical, or no interventions if her workup is negative. Pt's HgbA1C is elevated at 7, pt knows she is \"pre-diabetic\" because of the steroids she is on, but I encouraged her to follow up with her PCP about her HgbA1C. Pt voiced her understanding. The entire conversation and the review of the written consent of the procedure was aided through the help of the  over the phone. Pt given copy of mammogram and pap smear results for immigration because pt is trying to become a citizen.     Sascha Byrd, DO

## 2017-11-29 NOTE — PATIENT INSTRUCTIONS
Patient Education        Ablación de endometrio: Antes del procedimiento - [ Endometrial Ablation: Before Your Procedure ]  ¿Qué es la ablación de endometrio? La ablación de endometrio es un tipo de procedimiento que se [place] frecuentemente para tratar el sangrado menstrual abundante. También puede utilizarse en el marga de otros tipos de sangrado en [place]. No se recomienda si usted planea quedar embarazada. La ablación funciona destruyendo el revestimiento del útero. A medida que neal, el revestimiento cicatrizará. Esta cicatrización reduce o previene el sangrado. Es posible que chun médico le dé un medicamento para ayudarla a relajarse. También podría darle un medicamento para ayudar a aliviar el dolor. Sobia, el médico le insertará en la vagina un instrumento especial. Amanda se llama espéculo. Sirve para separar [employer] costados de la vagina. Después, el médico podría insertar un tubo con lila a través del schuyler uterino. Amanda tubo se llama histeroscopio o endoscopio. Sirve lorenzo ayuda a chun médico para denys dentro del útero. Luego, el médico insertará un dispositivo para destruir el revestimiento. Amanda dispositivo puede funcionar de rosalie de [facility]. Puede ser que utilice un narayan láser, calor, electricidad, congelamiento o microondas. La ablación se puede hacer en un consultorio médico. O puede hacerse en un hospital. Rachelle Metz. Usted podrá regresar a chun hogar después del procedimiento. La atención de seguimiento es rosalie parte clave de chun tratamiento y seguridad. Asegúrese de hacer y acudir a todas las citas, y llame a chun médico si está teniendo problemas. También es rosalie buena idea saber los resultados de los exámenes y mantener rosalie lista de los medicamentos que maddy. ¿Qué ocurre antes del procedimiento? Los procedimientos pueden ser estresantes. Esta información la ayudará a entender qué puede esperar. Y la ayudará a prepararse de manera martinez para chun procedimiento.   Cómo prepararse para el procedimiento  · [place] exactamente qué procedimiento está planificado, junto con los [place], los beneficios y las otras alternativas. · Infórmeles a obdulio médicos sobre [employer], las vitaminas, los suplementos y los mihai herbarios que maddy. Algunos de [employer] aumentar el riesgo de sangrados o interactuar con la anestesia. · Si maddy anticoagulantes, lorenzo warfarina (Coumadin), clopidogrel (Plavix) o aspirina, asegúrese de hablar con chun médico. Le dirá si debe dejar de michael estos medicamentos antes del procedimiento. Asegúrese de entender exactamente lo que chun médico quiere que natty. · Chun médico le dirá qué medicamentos michael o dejar de michael antes del procedimiento. Es posible que deba dejar de michael ciertos medicamentos rosalie semana o más antes del procedimiento, así que hable con chun médico tan pronto lorenzo pueda. · Informe a chun médico si tiene instrucciones médicas por anticipado. Estas pueden incluir un testamento vital y un poder legal permanente para la atención médica. Lleve consigo rosalie copia cuando vaya al hospital. Si no las tiene, sería conveniente prepararlas. Darryle Hussain Davin, [place] médico y seres queridos sabrán obdulio deseos sobre la [facility]. Los médicos recomiendan que todas las personas preparen estos documentos antes de cualquier tipo de [place] o procedimiento. ¿Qué ocurre el día del procedimiento? · Siga en forma precisa las instrucciones sobre cuándo debe dejar de comer y beber. Si no lo hace, el procedimiento podría ser cancelado. Si chun médico le dijo que tome obdulio medicamentos el día del procedimiento, tómelos con un solo sorbo de agua. · Báñese o dúchese antes de registrarse para el procedimiento. No use lociones, perfumes, desodorantes ni esmalte de uñas. · Billee Junes todas las joyas y los \"piercings\". Si lleva lentes de contacto, quíteselos también. En el hospital o consultorio médico  · [employer] un documento de identidad con foto.   · El [employer] hará sentir inocencia [address]. Es posible que se le dé un medicamento que la relajará o adormecerá ligeramente. · El procedimiento durará menos de 1 hora. El regreso a chun hogar  · Asegúrese de que alguien la lleve a chun hogar. La anestesia y los analgésicos (medicamentos para el dolor) hacen que no sea seguro que usted [facility]. · Recibirá instrucciones más específicas acerca de la recuperación del procedimiento. Cubrirán [employer] alimentación, el cuidado de las heridas, la atención de seguimiento, el manejo de vehículos y la vuelta a chun rutina normal.  ¿Cuándo debe llamar a chun médico?  · Tiene preguntas o inquietudes. · No entiende cómo debe prepararse para el procedimiento. · Se enferma antes del procedimiento (por ejemplo, tiene fiebre, un resfriado o gripe). · Necesita reprogramar el procedimiento o cambió de opinión acerca de hacerse el procedimiento. ¿Dónde puede encontrar más información en [facility]? Sandhya Robertson a [email or web address] e ingrese a chun cuenta de MyChartEric Dykes U438 en el cuadro \"Search Health Information\" para más información (en inglés) sobre \"Ablación de endometrio: Antes del procedimiento - [ Endometrial Ablation: Before Your Procedure ]. \"     Si no tiene rosalie cuenta, natty sally en el enlace \"Sign Up Now\". Revisado: 13 octubre, 2016  Versión del contenido: 11.3  © 3072-3588 [employer]. Las instrucciones de cuidado fueron adaptadas bajo licencia por [facility] ([facility]). Si usted tiene Emden Arminto afección médica o sobre estas instrucciones, siempre pregunte a chun profesional de radha. [facility] niega toda garantía o responsabilidad por chun uso de esta información. Patient Education        Jesus Mon métodos anticonceptivos: Píldoras combinadas - [ Learning About Birth Control: Combination Pills ]  ¿Qué son las píldoras combinadas? Las píldoras combinadas se [place] para prevenir Ivonne Jorge.  [address] \"la píldora\". Las píldoras combinadas liberan rosalie dosis regular de [address], estrógeno y progestina. Estas previenen el embarazo de [employer]. Espesan el moco del schuyler uterino. Ferney hace que al esperma le sea difícil alcanzar el [place]ison [place]. Y adelgazan el revestimiento del útero. Ferney dificulta que un óvulo fertilizado se adhiera al Alison Patch. Además, las hormonas pueden evitar que los ovarios liberen un óvulo cada mes (ovulación). Tiene que michael rosalie píldora todos los días para prevenir el embarazo. Los envases de estas píldoras son diferentes. El más común contiene píldoras de hormonas para 3 semanas y píldoras de azúcar para 1 semana. Las píldoras de azúcar no contienen hormonas. Usted tendrá chun período raf sanam semana. Anthony otros paquetes no tienen píldoras de azúcar. Si maddy píldoras de hormonas raf todo el mes, no tendrá chun período con tanta frecuencia. O puede no tenerlo en absoluto. ¿Cuál es chun eficacia? En el primer año de uso:  · [address] [place] píldoras combinadas se amada exactamente lorenzo se indica, menos de 1 adryan de cada 100 tiene un embarazo no planificado. · [address] píldoras no se amada exactamente lorenzo se indica, lorenzo por ejemplo olvidar tomarlas de vez en cuando, 9 de cada 100 mujeres tienen un embarazo no planificado. Asegúrese de informar a chun médico si tiene algún problema de radha o si maddy algún medicamento. Él puede ayudarla a elegir el método anticonceptivo que sea adecuado para usted. ¿Cuáles son las ventajas de las píldoras combinadas? · Estas píldoras funcionan mejor que los métodos anticonceptivos de [place]. Estos incluyen condones y diafragmas. · Pueden reducir el acné y el sangrado abundante. También pueden ayudar a reducir los cólicos y otros síntomas del síndrome premenstrual.  · Las píldoras le permiten controlar obdulio períodos. Puede tener períodos cada mes o cada pocos meses. O puede elegir no tenerlos en absoluto.   · No tiene que [employer] [address] píldoras. ¿Cuáles son las desventajas de las píldoras combinadas? · Tiene que michael rosalie píldora a la misma hora todos los días para prevenir el embarazo. · Las píldoras combinadas no protegen contra las infecciones de transmisión sexual (STI, por obdulio siglas en inglés), lorenzo el herpes o el VIH/SIDA. Si no está martinez de si chun jason sexual pudiera tener rosalie STI, utilice un condón para protegerse de [place] enfermedad. · Pueden causar cambios en chun período. Es posible que tenga poco sangrado, períodos omitidos o [place]. · Pueden causar [place] de ánimo, menos interés en el sexo o aumento de [place]. · Las píldoras combinadas contienen estrógeno. Pueden no ser adecuadas para usted si tiene [email or web address]. ¿Dónde puede encontrar más información en [facility]? Greg De Los Santos a [email or web address] e ingrese a chun cuenta de MyChart. Livia Jarrell U595 en el [facility] \"Search Health Information\" para más información (en [facility]) sobre \"Aprenda sobre métodos anticonceptivos: Píldoras combinadas - [ Learning About Birth Control: Combination Pills ]. \"     Si no tiene rosalie cuenta, natty sally en el enlace \"Sign Up Now\". Revisado: [address] [place] [address], 2017  Versión del contenido: 11.3  © 0010-2350 [employer]. Las instrucciones de cuidado fueron adaptadas bajo licencia por [facility] ([facility]). Si usted tiene [place] afección médica o sobre estas instrucciones, siempre pregunte a chun profesional de radha. [employer]orporated niega toda garantía o responsabilidad por chun uso de esta información.

## 2017-11-30 ENCOUNTER — TELEPHONE (OUTPATIENT)
Dept: NEUROLOGY | Age: 42
End: 2017-11-30

## 2017-12-01 LAB — SURGICAL PATHOLOGY REPORT: NORMAL

## 2017-12-07 NOTE — TELEPHONE ENCOUNTER
Letter written and called Bere with [employer] and [employer] that the letter is ready. She can come and pick it up. If she wants us to send it somewhere direct then she will need to sign a RR.

## 2017-12-11 RX ORDER — PREDNISONE 20 MG/1
TABLET ORAL
Qty: 30 TABLET | Refills: 0 | Status: SHIPPED | OUTPATIENT
Start: 2017-12-11 | End: 2017-12-21 | Stop reason: SDUPTHER

## 2017-12-11 RX ORDER — MYCOPHENOLATE MOFETIL 500 MG/1
TABLET ORAL
Qty: 60 TABLET | Refills: 0 | Status: SHIPPED | OUTPATIENT
Start: 2017-12-11 | End: 2017-12-21 | Stop reason: SDUPTHER

## 2017-12-13 ENCOUNTER — OFFICE VISIT (OUTPATIENT)
Dept: OBGYN | Age: 42
End: 2017-12-13
Payer: MEDICARE

## 2017-12-13 VITALS
DIASTOLIC BLOOD PRESSURE: 83 MMHG | SYSTOLIC BLOOD PRESSURE: 120 MMHG | WEIGHT: 138 LBS | BODY MASS INDEX: 22.96 KG/M2 | HEART RATE: 102 BPM

## 2017-12-13 DIAGNOSIS — N93.9 ABNORMAL UTERINE BLEEDING (AUB): Primary | ICD-10-CM

## 2017-12-13 PROCEDURE — 99213 OFFICE O/P EST LOW 20 MIN: CPT | Performed by: OBSTETRICS & GYNECOLOGY

## 2017-12-13 PROCEDURE — G8427 DOCREV CUR MEDS BY ELIG CLIN: HCPCS | Performed by: OBSTETRICS & GYNECOLOGY

## 2017-12-13 PROCEDURE — 1036F TOBACCO NON-USER: CPT | Performed by: OBSTETRICS & GYNECOLOGY

## 2017-12-13 PROCEDURE — G8484 FLU IMMUNIZE NO ADMIN: HCPCS | Performed by: OBSTETRICS & GYNECOLOGY

## 2017-12-13 PROCEDURE — G8420 CALC BMI NORM PARAMETERS: HCPCS | Performed by: OBSTETRICS & GYNECOLOGY

## 2017-12-13 RX ORDER — IBUPROFEN 800 MG/1
800 TABLET ORAL EVERY 8 HOURS PRN
Qty: 60 TABLET | Refills: 3 | Status: SHIPPED | OUTPATIENT
Start: 2017-12-13 | End: 2018-04-16 | Stop reason: ALTCHOICE

## 2017-12-13 NOTE — PROGRESS NOTES
Baljit Anna  2017    YOB: 1975          The patient was seen today. She is here regarding follow up on EMB for AUB . Her bowels are regular and she is voiding without difficulty. HPI:  Baljit Anna is a 36 y.o. female      Pt here for follow up on her EMB.  used via the phone. Patient has had no issues since her procedure, no bleeding or pain, denies fever/chills. We reviewed the pathology report which was negative for hyperplasia and atypia. We discussed how to proceed from here. Pt states the biggest concern is the discomfort that she has with her period, from the cramping and passing of blood clots. I explained that some form of birth control could help with this, but she is not interested in this because of difficulties with it in the past, and for [employer] reasons. I also offered NSAIDs to help with the cramping - pt is willing to try this. Explained that she should start taking the ibuprofen 800 mg every 8 hours starting 1-2 days before her menses, and continue on it until her period ends. Pt voiced her understanding. If this does not help, then patient may need to have a discussion with her spiritual provide about possibly using some form of birth control to help regulate her periods and help with the discomfort. We would not be using to prevent pregnancy, but to treat her symptoms. Obstetric History       T0      L0     SAB0   TAB0   Ectopic0   Molar0   Multiple0   Live Births0           Past Medical History:   Diagnosis Date    Anemia     Headache     Hypertension     Myasthenia gravis ([address]     Pre-diabetes        Past Surgical History:   Procedure Laterality Date    CARDIAC SURGERY         History reviewed. No pertinent family history.     Social History     Social History    Marital status: Single     Spouse name: N/A    Number of children: N/A    Years of education: N/A Occupational History    Not on file. Social History Main Topics    Smoking status: Never Smoker    Smokeless tobacco: Never Used    Alcohol use No    Drug use: No    Sexual activity: No     Other Topics Concern    Not on file     Social History Narrative    No narrative on file         MEDICATIONS:  Current Outpatient Prescriptions   Medication Sig Dispense Refill    ibuprofen (ADVIL;MOTRIN) 800 MG tablet Take 1 tablet by mouth every 8 hours as needed for Pain Pt to start 1-2 days before her menses and stay on it until the end of menses 60 tablet 3    mycophenolate (CELLCEPT) 500 MG tablet Take 1 po bid 60 tablet 0    predniSONE (DELTASONE) 20 MG tablet Take 1 po qd 30 tablet 0    ferrous sulfate 325 (65 Fe) MG EC tablet Take 1 tablet by mouth 2 times daily (with meals) 90 tablet 5    Calcium Carbonate-Vitamin D (OYSTER SHELL CALCIUM/D) 500-200 MG-UNIT TABS Take 1 tablet by mouth daily 90 tablet 3    pyridostigmine (MESTINON) 60 MG tablet Take 1/2 po tid 45 tablet 5    Calcium Carb-Cholecalciferol (OYSCO D) 250-125 MG-UNIT TABS Take 1 tablet by mouth daily 30 tablet 11    ketoconazole (NIZORAL) 2 % cream Apply topically daily. 30 g 1    pyridostigmine (MESTINON) 60 MG tablet Take 0.5 tablets by mouth every 8 hours 30 tablet 0     No current facility-administered medications for this visit. ALLERGIES:  Allergies as of 12/13/2017 - Review Complete 12/13/2017   Allergen Reaction Noted    Flexeril [cyclobenzaprine]  05/03/2017    Norflex [orphenadrine citrate]  05/03/2017    Nsaids  05/03/2017    Penicillins Rash 05/03/2017    Vancomycin Rash 08/04/2017         REVIEW OF SYSTEMS:       A minimum of an eleven point review of systems was completed. Review Of Systems (11 point):  Constitutional: No fever, chills or malaise;  No weight change or fatigue  Head and Eyes: No vision, Headache, Dizziness or trauma in last 12 months  ENT ROS: No hearing, Tinnitis, sinus or taste problems  Hematological and Lymphatic ROS:No Lymphoma, Von Willebrand's, Hemophillia or Bleeding History  Psych ROS: No Depression, Homicidal thoughts,suicidal thoughts, or anxiety  Breast ROS: No prior breast abnormalities or lumps  Respiratory ROS: No SOB, Pneumoniae,Cough, or Pulmonary Embolism History  Cardiovascular ROS: No Chest Pain with Exertion, Palpitations, Syncope, Edema, Arrhythmia  Gastrointestinal ROS: No Indigestion, Heartburn, Nausea, vomiting, Diarrhea, Constipation,or Bowel Changes; No Bloody Stools or melena  Genito-Urinary ROS:+dysmenorrhea/heavy periods. No Dysuria, Hematuria or Nocturia. No Urinary Incontinence or Vaginal Discharge  Musculoskeletal ROS: No Arthralgia, Arthritis,Gout,Osteoporosis or Rheumatism  Neurological ROS: No CVA, Migraines, Epilepsy, Seizure Hx, or Limb Weakness  Dermatological ROS: No Rash, Itching, Hives, Mole Changes or Cancer          Blood pressure 120/83, pulse 102, weight 138 lb (62.6 kg). Abdomen: Soft non-tender; good bowel sounds. No guarding, rebound or rigidity. No CVA tenderness bilaterally. Extremities: No calf tenderness, DTR 2/4, and No edema bilaterally          Lab Results:  Results for orders placed or performed during the hospital encounter of 11/29/17   Surgical Pathology   Result Value Ref Range    Surgical Pathology Report       (NOTE)  [id number]  [address] [place] [address]. [place], [address]  [phone]  Fax: [phone]    [address] [place] [address]    Patient Name: Annie Ash. Med Rec: [id number]  Path Number: [id number]  Collected: 11/29/2017  Received: 11/30/2017  Reported: 12/1/2017 09:09    -- Diagnosis --    ENDOMETRIAL BIOPSY:        SECRETORY ENDOMETRIUM. NEGATIVE FOR ATYPIA MALIGNANCY.         DIXIE Gale  **Electronically Signed Out**         rdd/12/1/2017      Clinical Information  Pre-op Diagnosis:  ABNORMAL UTERINE BLEEDING    Operative Findings:  EMB (PER BOTTLE)  Operation Performed:  HYSTEROSCOPY, W/ENDO BX     Source of Specimen  1: EMB (PER CONTAINER)    Gross Description  \"JACQUELINE Skelton Rajesh, EMB\" Pink-red fragments, 4.5 x 3.4 x 0.3  cm in aggregate. Entirely 2cs. rh tm      Microscopic Description  Microscopic examination performed. Assessment:  1. Abnormal uterine bleeding (AUB)       Patient Active Problem List    Diagnosis Date Noted    Myasthenia exacerbation ([address]     Facial muscle weakness     Suspected pulmonary embolism (HCC)     Clonus     Urinary tract infection in female 08/05/2017    Penicillin allergy 08/05/2017    Right maxillary sinusitis, chronic 08/05/2017    Pre-diabetes 07/10/2017    Right maxillary sinusitis 05/06/2017    Iron deficiency anemia 05/04/2017    H/O thymectomy 05/04/2017    Myasthenia gravis with exacerbation, ocular ([address] 05/03/2017           PLAN:  Pt to try ibuprofen to see if it helps with the cramping/discomfort of her menses  If it does not help, then she is to call and make an appt  Return 11/2018, for Annual Exam.    Repeat Annual every 1 year  Cervical Cytology Evaluation begins at 24years old. If Negative Cytology, Follow-up screening per current guidelines. Counseled on preventative health maintenance follow-up. Patient was seen with total face to face time of 15 minutes. More than 50% of this visit was counseling and education regarding The encounter diagnosis was Abnormal uterine bleeding (AUB). and Follow-up   as well as  counseling on preventative health maintenance follow-up.     Pricilla Dillon DO

## 2017-12-14 ENCOUNTER — OFFICE VISIT (OUTPATIENT)
Dept: INTERNAL MEDICINE | Age: 42
End: 2017-12-14
Payer: MEDICARE

## 2017-12-14 ENCOUNTER — TELEPHONE (OUTPATIENT)
Dept: INTERNAL MEDICINE | Age: 42
End: 2017-12-14

## 2017-12-14 VITALS — HEART RATE: 114 BPM | SYSTOLIC BLOOD PRESSURE: 141 MMHG | DIASTOLIC BLOOD PRESSURE: 96 MMHG

## 2017-12-14 DIAGNOSIS — R03.0 ELEVATED BP WITHOUT DIAGNOSIS OF HYPERTENSION: Primary | ICD-10-CM

## 2017-12-14 PROBLEM — N39.0 URINARY TRACT INFECTION IN FEMALE: Status: RESOLVED | Noted: 2017-08-05 | Resolved: 2017-12-14

## 2017-12-14 PROBLEM — J32.0 RIGHT MAXILLARY SINUSITIS: Status: RESOLVED | Noted: 2017-05-06 | Resolved: 2017-12-14

## 2017-12-14 PROBLEM — G70.01: Status: RESOLVED | Noted: 2017-05-03 | Resolved: 2017-12-14

## 2017-12-14 PROBLEM — Z88.0 PENICILLIN ALLERGY: Status: RESOLVED | Noted: 2017-08-05 | Resolved: 2017-12-14

## 2017-12-14 PROBLEM — Z90.89 H/O THYMECTOMY: Status: RESOLVED | Noted: 2017-05-04 | Resolved: 2017-12-14

## 2017-12-14 PROCEDURE — G8420 CALC BMI NORM PARAMETERS: HCPCS | Performed by: HOSPITALIST

## 2017-12-14 PROCEDURE — G8427 DOCREV CUR MEDS BY ELIG CLIN: HCPCS | Performed by: HOSPITALIST

## 2017-12-14 PROCEDURE — 99213 OFFICE O/P EST LOW 20 MIN: CPT | Performed by: HOSPITALIST

## 2017-12-14 PROCEDURE — 1036F TOBACCO NON-USER: CPT | Performed by: HOSPITALIST

## 2017-12-14 PROCEDURE — G8484 FLU IMMUNIZE NO ADMIN: HCPCS | Performed by: HOSPITALIST

## 2017-12-14 NOTE — TELEPHONE ENCOUNTER
Received phone call from an immigration doctor stating that the pt brought a form in to the office needing signed stating that we will follow her for her diabetes and that we refused to sign the form. Immigration physician was very rude and nasty on the office and demanding that we sign this form. He stated that the pt is spilling sugar in her urine so she is a diabetic and that we need to follow her for her diabetes. Tried to explain to him that I could not guarantee that her physician would sign it. He stated that it was for her immigration and that we need to sign it keep the immigration happy. Told him to have her bring the form in and we can see if we could get it signed. Health Maintenance   Topic Date Due    DTaP/Tdap/Td vaccine (1 - Tdap) 05/27/1993    Flu vaccine (1) 09/01/2017    Lipid screen  05/04/2022    Cervical cancer screen  11/08/2022    HIV screen  Completed             (applicable per patient's age: Cancer Screenings, Depression Screening, Fall Risk Screening, Immunizations)    Hemoglobin A1C (%)   Date Value   11/08/2017 7.0 (H)   09/20/2017 6.3 (H)   05/04/2017 6.4 (H)     Microalb/Crt.  Ratio (mcg/mg creat)   Date Value   06/02/2017 13     LDL Cholesterol (mg/dL)   Date Value   05/04/2017 48     AST (U/L)   Date Value   09/21/2017 31     ALT (U/L)   Date Value   09/21/2017 32     BUN (mg/dL)   Date Value   09/25/2017 7      (goal A1C is < 7)   (goal LDL is <100) need 30-50% reduction from baseline     BP Readings from Last 3 Encounters:   12/13/17 120/83   11/29/17 125/89   11/08/17 129/86    (goal /80)      All Future Testing planned in [facility]:  Lab Frequency Next Occurrence   Transfuse fresh frozen plasma     Transfuse fresh frozen plasma         Next Visit Date:  Future Appointments  Date Time Provider Richard Pinto   12/21/2017 11:40 AM Cornel Powers MD [facility]   1/22/2018 1:00 PM [facility]            Patient Active

## 2017-12-14 NOTE — PROGRESS NOTES
[facility]  [facility] [address] [facility]  [address] [facility] [address] [facility]  Dept: [phone]  Dept Fax: [phone]    Office Progress/Follow Up Note  Date of patient's visit: 12/14/2017  Patient's Name:  Gabriel Mora YOB: 1975            Patient Care Team:  Sarah Schaffer MD as PCP - General (Internal Medicine)    REASON FOR VISIT: Same day visit    HISTORY OF PRESENT ILLNESS:      Chief Complaint   Patient presents with    Forms     immigration forms, verifying that she has DM        History was obtained from: patient. Gabriel Mora is a 36 y.o. is here for a same day appointment. Patient speaks Citizen of [place] only and interpretor is required. Patient is here to get her immigration papers filled out which apparently she could not get done on previous visit. Denies any other issues. BP was elevated in 160s. Repeat BP was 141/96. Patient states she was stressed this morning due to getting the papers filled. She went to OBGYN yesterday and BP was normal without any medications. Will cont to observe her BP.       Patient Active Problem List   Diagnosis    Myasthenia gravis with exacerbation, ocular (HCC)    Iron deficiency anemia    H/O thymectomy    Right maxillary sinusitis    Pre-diabetes    Urinary tract infection in female    Penicillin allergy    Right maxillary sinusitis, chronic    Clonus    Suspected pulmonary embolism (HCC)    Facial muscle weakness    Myasthenia exacerbation (HCC)         Health Maintenance Due   Topic Date Due    DTaP/Tdap/Td vaccine (1 - Tdap) 05/27/1993    Flu vaccine (1) 09/01/2017         Allergies   Allergen Reactions    Flexeril [Cyclobenzaprine]     Norflex [Orphenadrine Citrate]     Nsaids     Penicillins Rash    Vancomycin Rash         MEDICATIONS:      Current Outpatient Prescriptions   Medication Sig Dispense Refill    ibuprofen (ADVIL;MOTRIN) 800 MG tablet Take 1 tablet by mouth every 8 hours as needed for Pain Pt to start 1-2 days before her menses and stay on it until the end of menses 60 tablet 3    mycophenolate (CELLCEPT) 500 MG tablet Take 1 po bid 60 tablet 0    predniSONE (DELTASONE) 20 MG tablet Take 1 po qd 30 tablet 0    ferrous sulfate 325 (65 Fe) MG EC tablet Take 1 tablet by mouth 2 times daily (with meals) 90 tablet 5    Calcium Carbonate-Vitamin D (OYSTER SHELL CALCIUM/D) 500-200 MG-UNIT TABS Take 1 tablet by mouth daily 90 tablet 3    pyridostigmine (MESTINON) 60 MG tablet Take 1/2 po tid 45 tablet 5    Calcium Carb-Cholecalciferol (OYSCO D) 250-125 MG-UNIT TABS Take 1 tablet by mouth daily 30 tablet 11    ketoconazole (NIZORAL) 2 % cream Apply topically daily. 30 g 1    pyridostigmine (MESTINON) 60 MG tablet Take 0.5 tablets by mouth every 8 hours 30 tablet 0     No current facility-administered medications for this visit. SOCIAL HISTORY    Reviewed and no change from previous record. Funmilayo Severe  reports that she has never smoked.  She has never used smokeless tobacco.    FAMILY HISTORY:    Reviewed and No change from previous visit    REVIEW OF SYSTEMS:    CONSTITUTIONAL: Denies: fever, chills  PSYCH: Denies: anxiety, depression  ALLERGIES: Denies: urticaria  EYES: Denies: blurry vision, decreased vision, photophobia  ENT: Denies: sore throat, nasal congestion  CARDIOVASCULAR: Denies: chest pain, dyspnea on exertion  RESPIRATORY: Denies: cough, hemoptysis, shortness of breath  GI: Denies: Denies: abdominal pain, flank pain  : Denies: Denies: dysuria, frequency/urgency  NEURO: Denies: dizzy/vertigo, headache  MUSCULOSKELETAL: Denies: back pain, joint pain  SKIN: Denies: rash, itching    PHYSICAL EXAM:      Vitals:    12/14/17 1330   BP: (!) 160/101   Site: Left Arm   Position: Sitting   Cuff Size: Medium Adult   Pulse: 108     BP Readings from Last 3 Encounters:   12/14/17 (!) 160/101   12/13/17 120/83   11/29/17 125/89      General appearance - alert, well appearing, and in no distress    LABORATORY FINDINGS:    CBC:  Lab Results   Component Value Date    WBC 14.5 11/08/2017    HGB 11.6 11/08/2017     11/08/2017       BMP:    Lab Results   Component Value Date     09/25/2017    K 3.4 09/25/2017     09/25/2017    CO2 20 09/25/2017    BUN 7 09/25/2017    CREATININE 0.63 09/25/2017    GLUCOSE 97 09/25/2017       HEMOGLOBIN A1C:   Lab Results   Component Value Date    LABA1C 7.0 11/08/2017       FASTING LIPID PANEL:  Lab Results   Component Value Date    CHOL 115 05/04/2017    HDL 49 05/04/2017    TRIG 88 05/04/2017       ASSESSMENT AND PLAN:      Forms to be signed. Immigration forms needed to be signed  Has appointment next month     FOLLOW UP AND INSTRUCTIONS:   · No Follow-up on file. · Dixie Werner received counseling on the following healthy behaviors: nutrition and exercise    · Discussed use, benefit, and side effects of prescribed medications. Barriers to medication compliance addressed. All patient questions answered. Pt voiced understanding.      · Patient given educational materials - see patient instructions    Sherie Hagan MD  PGY-2 Internal Medicine Resident

## 2017-12-14 NOTE — PROGRESS NOTES
Visit Information    Have you changed or started any medications since your last visit including any over-the-counter medicines, vitamins, or herbal medicines? no   Have you stopped taking any of your medications? Is so, why? -  no  Are you having any side effects from any of your medications? - no    Have you seen any other physician or provider since your last visit? yes    Have you had any other diagnostic tests since your last visit? yes    Have you been seen in the emergency room and/or had an admission in a hospital since we last saw you?  no   Have you had your routine dental cleaning in the past 6 months?  no     Do you have an active MyChart account? If no, what is the barrier?   No: pt declined     Patient Care Team:  Laura Valdes MD as PCP - General (Internal Medicine)    Medical History Review  Past Medical, Family, and Social History reviewed and does contribute to the patient presenting condition    Health Maintenance   Topic Date Due    DTaP/Tdap/Td vaccine (1 - Tdap) 05/27/1993    Flu vaccine (1) 09/01/2017    Lipid screen  05/04/2022    Cervical cancer screen  11/08/2022    HIV screen  Completed

## 2017-12-21 ENCOUNTER — OFFICE VISIT (OUTPATIENT)
Dept: NEUROLOGY | Age: 42
End: 2017-12-21
Payer: MEDICARE

## 2017-12-21 VITALS
WEIGHT: 180 LBS | HEART RATE: 96 BPM | SYSTOLIC BLOOD PRESSURE: 123 MMHG | BODY MASS INDEX: 29.95 KG/M2 | DIASTOLIC BLOOD PRESSURE: 79 MMHG

## 2017-12-21 DIAGNOSIS — G70.00 MYASTHENIA GRAVIS (HCC): Primary | ICD-10-CM

## 2017-12-21 PROCEDURE — G8417 CALC BMI ABV UP PARAM F/U: HCPCS | Performed by: PSYCHIATRY & NEUROLOGY

## 2017-12-21 PROCEDURE — G8427 DOCREV CUR MEDS BY ELIG CLIN: HCPCS | Performed by: PSYCHIATRY & NEUROLOGY

## 2017-12-21 PROCEDURE — G8484 FLU IMMUNIZE NO ADMIN: HCPCS | Performed by: PSYCHIATRY & NEUROLOGY

## 2017-12-21 PROCEDURE — 99214 OFFICE O/P EST MOD 30 MIN: CPT | Performed by: PSYCHIATRY & NEUROLOGY

## 2017-12-21 PROCEDURE — 1036F TOBACCO NON-USER: CPT | Performed by: PSYCHIATRY & NEUROLOGY

## 2017-12-21 RX ORDER — MYCOPHENOLATE MOFETIL 500 MG/1
TABLET ORAL
Qty: 60 TABLET | Refills: 5 | Status: SHIPPED | OUTPATIENT
Start: 2017-12-21 | End: 2019-01-29 | Stop reason: SDUPTHER

## 2017-12-21 RX ORDER — PREDNISONE 20 MG/1
TABLET ORAL
Qty: 30 TABLET | Refills: 5 | Status: SHIPPED | OUTPATIENT
Start: 2017-12-21 | End: 2018-07-12 | Stop reason: SDUPTHER

## 2017-12-21 ASSESSMENT — ENCOUNTER SYMPTOMS
RESPIRATORY NEGATIVE: 1
EYES NEGATIVE: 1
GASTROINTESTINAL NEGATIVE: 1

## 2017-12-21 NOTE — PROGRESS NOTES
Subjective:      Patient ID: Danny Higginbotham is a 36 y.o. female. HPI     Active problem myasthenia gravis on mycophenylate, prednisone 20 mg po qd, mestinon 30 mg po tid having had prior thymectomy . The condition is she is doing well with no bulbar weakness , facial weakness or trouble chewing or dysphagia . There is no limb weakness or dyspnea . 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 She remains mycophenylate 500 mg po bid , prednisone 20 mg po qd. She has been on imuran in the past  .Significant medications mycophenylate 500 mg po bid , prednisone 20 mg po qd, mestinon 30 mg po tid   . Testing MRI of Head with normal brain with right maxillary sinusitis . CRP 1 , TSH normal, ESR 10 ,cholesterol 115 , LDL 48 , TG 88 . Vital capacity 2.9 liters , NIF > - 40 . Hga1c 6.4 , MONET negative , Anti SSA and anti SSB are negative, ACE 31 (8-52) . CT chest subtle increased density within the anterior mediastinum possibly representing residual thickening or scarring.  No discrete anterior mediastinal mass identified . Acetylcholine binding Ab 478 (0--0.4), blocking Ab 63 (0-26 %) , striated muscle Ab <1:40     Past Medical History:   Diagnosis Date    Anemia     Headache     Hypertension     Myasthenia gravis ([address]     Pre-diabetes        Past Surgical History:   Procedure Laterality Date    CARDIAC SURGERY         History reviewed. No pertinent family history.     Social History     Social History    Marital status: Single     Spouse name: N/A    Number of children: N/A    Years of education: N/A     Social History Main Topics    Smoking status: Never Smoker    Smokeless tobacco: Never Used    Alcohol use No    Drug use: No    Sexual activity: No     Other Topics Concern    None     Social History Narrative    None       Current Outpatient Prescriptions   Medication Sig 7 bilateral lower extremity weakness with trouble puffing   Cranial nerve 8. Grossly intact hearing   Cranial nerve 9 and 10. Symmetric palate elevation   Cranial nerve 11 , 5 out of 5 strength   Cranial Nerve 12 midline tongue . No atrophy  Sensation . Normal pinprick and light touch   Deep Tendon Reflexes normal  Plantar response flexor bilaterally    Assessment:      1. Myasthenia gravis [facility])    Patient is doing well to continue on current medication regimen .  She however does have fatigue on exertion unable to work at this time       Plan:      As above

## 2017-12-21 NOTE — LETTER
[facility] Neurology Specialist  [facility] [address]  Phone: [phone]  Fax: [phone]    Ivory Christianson MD        December 26, 2017       Patient: Gabriel Mora   MR Number: [id number]   YOB: 1975   Date of Visit: 12/21/2017       Dear Dr. Green Hashimoto: Thank you for the request for consultation for Gabriel Mora . Below are the relevant portions of my assessment and plan of care. Subjective:      Patient ID: Gabriel Mora is a 36 y.o. female. HPI     Active problem myasthenia gravis on mycophenylate, prednisone 20 mg po qd, mestinon 30 mg po tid having had prior thymectomy . The condition is she is doing well with no bulbar weakness , facial weakness or trouble chewing or dysphagia . There is no limb weakness or dyspnea . 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 She remains mycophenylate 500 mg po bid , prednisone 20 mg po qd. She has been on imuran in the past  .Significant medications mycophenylate 500 mg po bid , prednisone 20 mg po qd, mestinon 30 mg po tid   . Testing MRI of Head with normal brain with right maxillary sinusitis . CRP 1 , TSH normal, ESR 10 ,cholesterol 115 , LDL 48 , TG 88 . Vital capacity 2.9 liters , NIF > - 40 . Hga1c 6.4 , MONET negative , Anti SSA and anti SSB are negative, ACE 31 (8-52) . CT chest subtle increased density within the anterior mediastinum possibly representing residual thickening or scarring.  No discrete anterior mediastinal mass identified .  Acetylcholine binding Ab 478 (0--0.4), blocking Ab 63 (0-26 %) , striated muscle Ab <1:40     Past Medical History:   Diagnosis Date    Anemia     Headache     Hypertension     Myasthenia gravis ([address]     Pre-diabetes        Past Surgical History:   Procedure Laterality Date    CARDIAC SURGERY History reviewed. No pertinent family history. Social History     Social History    Marital status: Single     Spouse name: N/A    Number of children: N/A    Years of education: N/A     Social History Main Topics    Smoking status: Never Smoker    Smokeless tobacco: Never Used    Alcohol use No    Drug use: No    Sexual activity: No     Other Topics Concern    None     Social History Narrative    None       Current Outpatient Prescriptions   Medication Sig Dispense Refill    predniSONE (DELTASONE) 20 MG tablet Take 1 po qd 30 tablet 5    mycophenolate (CELLCEPT) 500 MG tablet Take 1 po bid 60 tablet 5    ibuprofen (ADVIL;MOTRIN) 800 MG tablet Take 1 tablet by mouth every 8 hours as needed for Pain Pt to start 1-2 days before her menses and stay on it until the end of menses 60 tablet 3    pyridostigmine (MESTINON) 60 MG tablet Take 1/2 po tid 45 tablet 5    Calcium Carb-Cholecalciferol (OYSCO D) 250-125 MG-UNIT TABS Take 1 tablet by mouth daily 30 tablet 11     No current facility-administered medications for this visit. Allergies   Allergen Reactions    Flexeril [Cyclobenzaprine]     Norflex [Orphenadrine Citrate]     Nsaids     Penicillins Rash    Vancomycin Rash       Review of Systems   Constitutional: Negative. HENT: Negative. Eyes: Negative. Respiratory: Negative. Cardiovascular: Negative. Gastrointestinal: Negative. Endocrine: Negative. Genitourinary: Negative. Musculoskeletal: Negative. Skin: Negative. Neurological: Negative. Hematological: Negative. Psychiatric/Behavioral: Positive for dysphoric mood. Objective:   Physical Exam    Vitals:    12/21/17 1130   BP: 123/79   Pulse: 96     weight: 180 lb (81.6 kg)  Neurological Examination  Ears /Nose/Throat: external ear . Normal exam  Neck and thyroid . Normal size  Cardiovascular:  Auscultation of heart with regular rate and rhythm   Musculoskeletal. Muscle bulk and tone normal Muscle strength 5/5 throughout   No dysmetria or dysdiadokinesis  No tremor   Normal fine motor  Orientation Alert and oriented x 3   Short term memory normal   Attention and concentration normal   Language process and speech normal . No aphasia   Cranial nerve 2 normal acuety and visual fields  Cranial nerve 3, 4 and 6 . Extraocular muscles are intact . Pupils are equal and reactive . Bilateral ptosis  Cranial nerve 5 . Intact corneal reflex. Normal facial sensation  Cranial nerve 7 bilateral lower extremity weakness with trouble puffing   Cranial nerve 8. Grossly intact hearing   Cranial nerve 9 and 10. Symmetric palate elevation   Cranial nerve 11 , 5 out of 5 strength   Cranial Nerve 12 midline tongue . No atrophy  Sensation . Normal pinprick and light touch   Deep Tendon Reflexes normal  Plantar response flexor bilaterally    Assessment:      1. Myasthenia gravis [facility])    Patient is doing well to continue on current medication regimen . She however does have fatigue on exertion unable to work at this time       Plan:      As above           If you have questions, please do not hesitate to call me. I look forward to following [employer] along with you.     Sincerely,        Lyle Tamez MD    CC providers:  Claudia Piedra MD  [place] [address]  [facility] [facility] [address]

## 2017-12-22 ENCOUNTER — HOSPITAL ENCOUNTER (OUTPATIENT)
Age: 42
Discharge: HOME | End: 2017-12-22
Payer: MEDICARE

## 2017-12-22 DIAGNOSIS — G70.00 MYASTHENIA GRAVIS (HCC): ICD-10-CM

## 2017-12-22 LAB
ALT SERPL-CCNC: 42 U/L (ref 5–33)
AST SERPL-CCNC: 42 U/L
HCT VFR BLD CALC: 36.2 % (ref 36.3–47.1)
HEMOGLOBIN: 10.4 G/DL (ref 11.9–15.1)
MCH RBC QN AUTO: 22 PG (ref 25.2–33.5)
MCHC RBC AUTO-ENTMCNC: 28.7 G/DL (ref 28.4–34.8)
MCV RBC AUTO: 76.7 FL (ref 82.6–102.9)
PDW BLD-RTO: 19.1 % (ref 11.8–14.4)
PLATELET # BLD: 577 K/UL (ref 138–453)
PMV BLD AUTO: 10 FL (ref 8.1–13.5)
RBC # BLD: 4.72 M/UL (ref 3.95–5.11)
WBC # BLD: 14.7 K/UL (ref 3.5–11.3)

## 2017-12-22 PROCEDURE — 36415 COLL VENOUS BLD VENIPUNCTURE: CPT

## 2017-12-22 PROCEDURE — 84450 TRANSFERASE (AST) (SGOT): CPT

## 2017-12-22 PROCEDURE — 85027 COMPLETE CBC AUTOMATED: CPT

## 2017-12-22 PROCEDURE — 84460 ALANINE AMINO (ALT) (SGPT): CPT

## 2017-12-26 ENCOUNTER — TELEPHONE (OUTPATIENT)
Dept: NEUROLOGY | Age: 42
End: 2017-12-26

## 2017-12-26 NOTE — COMMUNICATION BODY
Subjective:      Patient ID: Naima Paula is a 36 y.o. female. HPI     Active problem myasthenia gravis on mycophenylate, prednisone 20 mg po qd, mestinon 30 mg po tid having had prior thymectomy . The condition is she is doing well with no bulbar weakness , facial weakness or trouble chewing or dysphagia . There is no limb weakness or dyspnea . 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 She remains mycophenylate 500 mg po bid , prednisone 20 mg po qd. She has been on imuran in the past  .Significant medications mycophenylate 500 mg po bid , prednisone 20 mg po qd, mestinon 30 mg po tid   . Testing MRI of Head with normal brain with right maxillary sinusitis . CRP 1 , TSH normal, ESR 10 ,cholesterol 115 , LDL 48 , TG 88 . Vital capacity 2.9 liters , NIF > - 40 . Hga1c 6.4 , MONET negative , Anti SSA and anti SSB are negative, ACE 31 (8-52) . CT chest subtle increased density within the anterior mediastinum possibly representing residual thickening or scarring.  No discrete anterior mediastinal mass identified . Acetylcholine binding Ab 478 (0--0.4), blocking Ab 63 (0-26 %) , striated muscle Ab <1:40     Past Medical History:   Diagnosis Date    Anemia     Headache     Hypertension     Myasthenia gravis ([address]     Pre-diabetes        Past Surgical History:   Procedure Laterality Date    CARDIAC SURGERY         History reviewed. No pertinent family history.     Social History     Social History    Marital status: Single     Spouse name: N/A    Number of children: N/A    Years of education: N/A     Social History Main Topics    Smoking status: Never Smoker    Smokeless tobacco: Never Used    Alcohol use No    Drug use: No    Sexual activity: No     Other Topics Concern    None     Social History Narrative    None       Current Outpatient Prescriptions   Medication Sig 7 bilateral lower extremity weakness with trouble puffing   Cranial nerve 8. Grossly intact hearing   Cranial nerve 9 and 10. Symmetric palate elevation   Cranial nerve 11 , 5 out of 5 strength   Cranial Nerve 12 midline tongue . No atrophy  Sensation . Normal pinprick and light touch   Deep Tendon Reflexes normal  Plantar response flexor bilaterally    Assessment:      1. Myasthenia gravis [facility])    Patient is doing well to continue on current medication regimen .  She however does have fatigue on exertion unable to work at this time       Plan:      As above

## 2017-12-26 NOTE — TELEPHONE ENCOUNTER
Nadia Fisher stopped in today. She has a form from [facility] that she needs a note from us stating she has Myasthenia Gravis and is unable to work. I explained he will not be in until this afternoon and I can ask him then.

## 2018-01-11 ENCOUNTER — HOSPITAL ENCOUNTER (EMERGENCY)
Age: 43
Discharge: HOME | DRG: 042 | End: 2018-01-11
Attending: EMERGENCY MEDICINE
Payer: MEDICARE

## 2018-01-11 VITALS
TEMPERATURE: 98.4 F | BODY MASS INDEX: 29.95 KG/M2 | HEART RATE: 97 BPM | DIASTOLIC BLOOD PRESSURE: 88 MMHG | OXYGEN SATURATION: 100 % | SYSTOLIC BLOOD PRESSURE: 124 MMHG | RESPIRATION RATE: 15 BRPM | WEIGHT: 180 LBS

## 2018-01-11 DIAGNOSIS — J32.9 CHRONIC SINUSITIS, UNSPECIFIED LOCATION: Primary | ICD-10-CM

## 2018-01-11 LAB
ABSOLUTE EOS #: 0.16 K/UL (ref 0–0.4)
ABSOLUTE IMMATURE GRANULOCYTE: 0.32 K/UL (ref 0–0.3)
ABSOLUTE LYMPH #: 2.59 K/UL (ref 1–4.8)
ABSOLUTE MONO #: 0.97 K/UL (ref 0.1–0.8)
ALBUMIN SERPL-MCNC: 3.7 G/DL (ref 3.5–5.2)
ALBUMIN/GLOBULIN RATIO: 1.1 (ref 1–2.5)
ALP BLD-CCNC: 68 U/L (ref 35–104)
ALT SERPL-CCNC: 41 U/L (ref 5–33)
ANION GAP SERPL CALCULATED.3IONS-SCNC: 15 MMOL/L (ref 9–17)
AST SERPL-CCNC: 46 U/L
BASOPHILS # BLD: 1 % (ref 0–2)
BASOPHILS ABSOLUTE: 0.16 K/UL (ref 0–0.2)
BILIRUB SERPL-MCNC: 0.44 MG/DL (ref 0.3–1.2)
BILIRUBIN DIRECT: 0.16 MG/DL
BILIRUBIN URINE: NEGATIVE
BILIRUBIN, INDIRECT: 0.28 MG/DL (ref 0–1)
BUN BLDV-MCNC: 8 MG/DL (ref 6–20)
BUN/CREAT BLD: ABNORMAL (ref 9–20)
CALCIUM SERPL-MCNC: 8.5 MG/DL (ref 8.6–10.4)
CHLORIDE BLD-SCNC: 98 MMOL/L (ref 98–107)
CO2: 22 MMOL/L (ref 20–31)
COLOR: YELLOW
COMMENT UA: NORMAL
CREAT SERPL-MCNC: 0.64 MG/DL (ref 0.5–0.9)
DIFFERENTIAL TYPE: ABNORMAL
DIRECT EXAM: NORMAL
EOSINOPHILS RELATIVE PERCENT: 1 % (ref 1–4)
GFR AFRICAN AMERICAN: >60 ML/MIN
GFR NON-AFRICAN AMERICAN: >60 ML/MIN
GFR SERPL CREATININE-BSD FRML MDRD: ABNORMAL ML/MIN/{1.73_M2}
GFR SERPL CREATININE-BSD FRML MDRD: ABNORMAL ML/MIN/{1.73_M2}
GLOBULIN: ABNORMAL G/DL (ref 1.5–3.8)
GLUCOSE BLD-MCNC: 119 MG/DL (ref 70–99)
GLUCOSE URINE: NEGATIVE
HCT VFR BLD CALC: 34.8 % (ref 36.3–47.1)
HEMOGLOBIN: 10.2 G/DL (ref 11.9–15.1)
IMMATURE GRANULOCYTES: 2 %
INR BLD: 1
KETONES, URINE: NEGATIVE
LACTIC ACID, WHOLE BLOOD: 1.3 MMOL/L (ref 0.7–2.1)
LEUKOCYTE ESTERASE, URINE: NEGATIVE
LYMPHOCYTES # BLD: 16 % (ref 24–44)
Lab: NORMAL
MCH RBC QN AUTO: 21.2 PG (ref 25.2–33.5)
MCHC RBC AUTO-ENTMCNC: 29.3 G/DL (ref 28.4–34.8)
MCV RBC AUTO: 72.2 FL (ref 82.6–102.9)
MONOCYTES # BLD: 6 % (ref 1–7)
MORPHOLOGY: ABNORMAL
NITRITE, URINE: NEGATIVE
PARTIAL THROMBOPLASTIN TIME: 22.9 SEC (ref 21.3–31.3)
PDW BLD-RTO: 20.1 % (ref 11.8–14.4)
PH UA: 5.5 (ref 5–8)
PLATELET # BLD: 550 K/UL (ref 138–453)
PLATELET ESTIMATE: ABNORMAL
PMV BLD AUTO: 11.1 FL (ref 8.1–13.5)
POTASSIUM SERPL-SCNC: 3.5 MMOL/L (ref 3.7–5.3)
PROTEIN UA: NEGATIVE
PROTHROMBIN TIME: 10.5 SEC (ref 9.4–12.6)
RBC # BLD: 4.82 M/UL (ref 3.95–5.11)
RBC # BLD: ABNORMAL 10*6/UL
SEG NEUTROPHILS: 74 % (ref 36–66)
SEGMENTED NEUTROPHILS ABSOLUTE COUNT: 12 K/UL (ref 1.8–7.7)
SODIUM BLD-SCNC: 135 MMOL/L (ref 135–144)
SPECIFIC GRAVITY UA: 1.01 (ref 1–1.03)
SPECIMEN DESCRIPTION: NORMAL
STATUS: NORMAL
TOTAL PROTEIN: 7.1 G/DL (ref 6.4–8.3)
TURBIDITY: CLEAR
URINE HGB: NEGATIVE
UROBILINOGEN, URINE: NORMAL
WBC # BLD: 16.2 K/UL (ref 3.5–11.3)
WBC # BLD: ABNORMAL 10*3/UL

## 2018-01-11 PROCEDURE — 85610 PROTHROMBIN TIME: CPT

## 2018-01-11 PROCEDURE — 83605 ASSAY OF LACTIC ACID: CPT

## 2018-01-11 PROCEDURE — 2580000003 HC RX 258: Performed by: STUDENT IN AN ORGANIZED HEALTH CARE EDUCATION/TRAINING PROGRAM

## 2018-01-11 PROCEDURE — 85025 COMPLETE CBC W/AUTO DIFF WBC: CPT

## 2018-01-11 PROCEDURE — 99284 EMERGENCY DEPT VISIT MOD MDM: CPT

## 2018-01-11 PROCEDURE — 80048 BASIC METABOLIC PNL TOTAL CA: CPT

## 2018-01-11 PROCEDURE — 6370000000 HC RX 637 (ALT 250 FOR IP): Performed by: STUDENT IN AN ORGANIZED HEALTH CARE EDUCATION/TRAINING PROGRAM

## 2018-01-11 PROCEDURE — 85730 THROMBOPLASTIN TIME PARTIAL: CPT

## 2018-01-11 PROCEDURE — 87804 INFLUENZA ASSAY W/OPTIC: CPT

## 2018-01-11 PROCEDURE — 80076 HEPATIC FUNCTION PANEL: CPT

## 2018-01-11 PROCEDURE — 81003 URINALYSIS AUTO W/O SCOPE: CPT

## 2018-01-11 PROCEDURE — 87040 BLOOD CULTURE FOR BACTERIA: CPT

## 2018-01-11 RX ORDER — CEPHALEXIN 500 MG/1
500 CAPSULE ORAL 2 TIMES DAILY
Qty: 20 CAPSULE | Refills: 0 | Status: ON HOLD | OUTPATIENT
Start: 2018-01-11 | End: 2018-01-18 | Stop reason: HOSPADM

## 2018-01-11 RX ORDER — PYRIDOSTIGMINE BROMIDE 60 MG/1
60 TABLET ORAL 3 TIMES DAILY
Qty: 45 TABLET | Refills: 0 | Status: ON HOLD | OUTPATIENT
Start: 2018-01-11 | End: 2018-01-18 | Stop reason: HOSPADM

## 2018-01-11 RX ORDER — CEPHALEXIN 500 MG/1
500 CAPSULE ORAL ONCE
Status: COMPLETED | OUTPATIENT
Start: 2018-01-11 | End: 2018-01-11

## 2018-01-11 RX ORDER — CLINDAMYCIN HYDROCHLORIDE 150 MG/1
150 CAPSULE ORAL ONCE
Status: COMPLETED | OUTPATIENT
Start: 2018-01-11 | End: 2018-01-11

## 2018-01-11 RX ORDER — CLINDAMYCIN HYDROCHLORIDE 150 MG/1
150 CAPSULE ORAL 3 TIMES DAILY
Qty: 30 CAPSULE | Refills: 0 | Status: ON HOLD | OUTPATIENT
Start: 2018-01-11 | End: 2018-01-18 | Stop reason: HOSPADM

## 2018-01-11 RX ORDER — 0.9 % SODIUM CHLORIDE 0.9 %
500 INTRAVENOUS SOLUTION INTRAVENOUS ONCE
Status: COMPLETED | OUTPATIENT
Start: 2018-01-11 | End: 2018-01-11

## 2018-01-11 RX ADMIN — CLINDAMYCIN HYDROCHLORIDE 150 MG: 150 CAPSULE ORAL at 14:01

## 2018-01-11 RX ADMIN — CEPHALEXIN 500 MG: 500 CAPSULE ORAL at 14:00

## 2018-01-11 RX ADMIN — SODIUM CHLORIDE 500 ML: 0.9 INJECTION, SOLUTION INTRAVENOUS at 10:09

## 2018-01-11 ASSESSMENT — ENCOUNTER SYMPTOMS
ABDOMINAL PAIN: 0
RHINORRHEA: 1
VOMITING: 0
COUGH: 0
CHOKING: 0
DIARRHEA: 0
TROUBLE SWALLOWING: 0
SHORTNESS OF BREATH: 0
NAUSEA: 0
SORE THROAT: 0

## 2018-01-11 NOTE — ED PROVIDER NOTES
Musculoskeletal: Positive for myalgias. Neurological: Positive for weakness. Negative for speech difficulty. PHYSICAL EXAM   (up to 7 for level 4, 8 or more for level 5)      INITIAL VITALS:   /88   Pulse 97   Temp 98.4 °F (36.9 °C) (Oral)   Resp 15   Wt 180 lb (81.6 kg)   LMP 12/28/2017   SpO2 100%   BMI 29.95 kg/m²     Physical Exam   Constitutional: She is oriented to person, place, and time. She appears well-developed and well-nourished. HENT:   Head: Normocephalic and atraumatic. Maxillary and frontal sinus pain   Eyes: Conjunctivae and EOM are normal. Pupils are equal, round, and reactive to light. Cardiovascular: Regular rhythm, normal heart sounds and intact distal pulses. Exam reveals no gallop and no friction rub. No murmur heard. Tachycardic   Pulmonary/Chest: Effort normal. No respiratory distress. She has no wheezes. She has no rales. Abdominal: Soft. Bowel sounds are normal. She exhibits no distension. There is no tenderness. There is no rebound and no guarding. Neurological: She is alert and oriented to person, place, and time. No cranial nerve deficit. Strength and sensation grossly intact and symmetrical   Skin: Skin is warm and dry.        DIFFERENTIAL  DIAGNOSIS     PLAN (LABS / IMAGING / EKG):  Orders Placed This Encounter   Procedures    RAPID INFLUENZA A/B ANTIGENS    Culture Blood #1    Culture Blood #1    CBC Auto Differential    BASIC METABOLIC PANEL    Protime-INR    APTT    UA W/REFLEX CULTURE    LACTIC ACID, WHOLE BLOOD    Hepatic Function Panel    Vital signs    Inpatient consult to Neurology    Insert peripheral IV       MEDICATIONS ORDERED:  Orders Placed This Encounter   Medications    0.9 % sodium chloride bolus    clindamycin (CLEOCIN) capsule 150 mg    cephALEXin (KEFLEX) capsule 500 mg    pyridostigmine (MESTINON) 60 MG tablet     Sig: Take 1 tablet by mouth 3 times daily     Dispense:  45 tablet     Refill:  0    Type NOT REPORTED     WBC Morphology NOT REPORTED     RBC Morphology NOT REPORTED     Platelet Estimate NOT REPORTED     Immature Granulocytes 2 (H) 0 %    Seg Neutrophils 74 (H) 36 - 66 %    Lymphocytes 16 (L) 24 - 44 %    Monocytes 6 1 - 7 %    Eosinophils % 1 1 - 4 %    Basophils 1 0 - 2 %    Absolute Immature Granulocyte 0.32 (H) 0.00 - 0.30 k/uL    Segs Absolute 12.00 (H) 1.8 - 7.7 k/uL    Absolute Lymph # 2.59 1.0 - 4.8 k/uL    Absolute Mono # 0.97 (H) 0.1 - 0.8 k/uL    Absolute Eos # 0.16 0.0 - 0.4 k/uL    Basophils # 0.16 0.0 - 0.2 k/uL    Morphology ANISOCYTOSIS PRESENT    BASIC METABOLIC PANEL   Result Value Ref Range    Glucose 119 (H) 70 - 99 mg/dL    BUN 8 6 - 20 mg/dL    CREATININE 0.64 0.50 - 0.90 mg/dL    Bun/Cre Ratio NOT REPORTED 9 - 20    Calcium 8.5 (L) 8.6 - 10.4 mg/dL    Sodium 135 135 - 144 mmol/L    Potassium 3.5 (L) 3.7 - 5.3 mmol/L    Chloride 98 98 - 107 mmol/L    CO2 22 20 - 31 mmol/L    Anion Gap 15 9 - 17 mmol/L    GFR Non-African American >60 >60 mL/min    GFR African American >60 >60 mL/min    GFR Comment          GFR Staging NOT REPORTED    Protime-INR   Result Value Ref Range    Protime 10.5 9.4 - 12.6 sec    INR 1.0    APTT   Result Value Ref Range    PTT 22.9 21.3 - 31.3 sec   UA W/REFLEX CULTURE   Result Value Ref Range    Color, UA YELLOW YEL    Turbidity UA CLEAR CLEAR    Glucose, Ur NEGATIVE NEG    Bilirubin Urine NEGATIVE NEG    Ketones, Urine NEGATIVE NEG    Specific Gravity, UA 1.007 1.005 - 1.030    Urine Hgb NEGATIVE NEG    pH, UA 5.5 5.0 - 8.0    Protein, UA NEGATIVE NEG    Urobilinogen, Urine Normal NORM    Nitrite, Urine NEGATIVE NEG    Leukocyte Esterase, Urine NEGATIVE NEG    Urinalysis Comments       Microscopic exam not performed based on chemical results unless requested in   LACTIC ACID, WHOLE BLOOD   Result Value Ref Range    Lactic Acid, Whole Blood 1.3 0.7 - 2.1 mmol/L   Hepatic Function Panel   Result Value Ref Range    Alb 3.7 3.5 - 5.2 g/dL    Alkaline Phosphatase 68 35 - 104 U/L    ALT 41 (H) 5 - 33 U/L    AST 46 (H) <32 U/L    Total Bilirubin 0.44 0.3 - 1.2 mg/dL    Bilirubin, Direct 0.16 <0.31 mg/dL    Bilirubin, Indirect 0.28 0.00 - 1.00 mg/dL    Total Protein 7.1 6.4 - 8.3 g/dL    Globulin NOT REPORTED 1.5 - 3.8 g/dL    Albumin/Globulin Ratio 1.1 1.0 - 2.5       IMPRESSION: acute sinusitis    RADIOLOGY:  None    EKG  None    All EKG's are interpreted by the Emergency Department Physician who either signs or Co-signs this chart in the absence of a cardiologist.    EMERGENCY DEPARTMENT COURSE:  Sepsis Times and Checklist  Vital Signs: BP: 124/88  Pulse: 97  Resp: 15  Temp: 98.4 °F (36.9 °C) SpO2: 100 %  SIRS (>2)   Temp > 38.3C or < 36C   HR > 90   RR > 20   WBC > 12 or < 4 or >10% bands  SIRS (>2) and confirmed or suspected source of infection = Sepsis    Sepsis Identified   Date: 1/11/18  Time: 10:25 AM   Sepsis Orders:   CBC: Yes   CMP: Yes   PT/PTT: Yes   Blood Cultures x2: Yes   Urinalysis and Urine Culture: Yes   Lactate: Yes   Broad Spectrum Antibiotics Given (within 3 hours of sepsis identification, after blood cultures): Yes              IV Crystalloid given: Yes    If lactate >2.0 MUST repeat within 6 hours    Is lactate > 4.0:  No  If lactate >4.0 OR hypotension 30ml/kg crystalloid MUST be ordered. Fluids must be completed within 3 hours of sepsis identification. 11:44 AM- Patient re-evaluated, no change. Discussed with neurology who will evaluate  Vital signs within normal limits prior to discharge. Leukocytosis is likely due to chronic steroid use. Neurology evaluated the patient and recommended increasing pyridostigmine dose to 60 mg 3 times a day. We do not feel the patient requires hospitalization due to myasthenia gravis symptoms. I discussed this patient with the pharmacist, who recommended clindamycin and Keflex which he'll have very little interaction with her myasthenia gravis medications.   Patient states that she has taken Keflex in the past and has tolerated it, despite documented allergy to penicillins. PROCEDURES:  None    CONSULTS:  IP CONSULT TO NEUROLOGY    CRITICAL CARE:  None    FINAL IMPRESSION      1. Chronic sinusitis, unspecified location          DISPOSITION / PLAN     DISPOSITION Decision To Discharge 01/11/2018 01:35:55 PM      PATIENT REFERRED TO:  Bruce Martinez MD  [address] [facility]  [phone]    Schedule an appointment as soon as possible for a visit       [facility] ED  [address]    If symptoms worsen    Yan Hill MD  [facility], [address] [place] [address] [address] [facility]  [phone]    Schedule an appointment as soon as possible for a visit         DISCHARGE MEDICATIONS:  Discharge Medication List as of 1/11/2018  1:49 PM      START taking these medications    Details   clindamycin (CLEOCIN) 150 MG capsule Take 1 capsule by mouth 3 times daily for 10 days, Disp-30 capsule, R-0Print      cephALEXin (KEFLEX) 500 MG capsule Take 1 capsule by mouth 2 times daily for 10 days, Disp-20 capsule, R-0Print             Jani Barnes D.O.   Emergency Medicine Resident    (Please note that portions of this note were completed with a voice recognition program.  Efforts were made to edit the dictations but occasionally words are mis-transcribed.)     Deja Stout MD  01/11/18 0659

## 2018-01-11 NOTE — ED PROVIDER NOTES
[facility]     Emergency Department     Faculty Attestation    I performed a history and physical examination of the patient and discussed management with the resident. I have reviewed and agree with the residents findings including all diagnostic interpretations, and treatment plans as written. Any areas of disagreement are noted on the chart. I was personally present for the key portions of any procedures. I have documented in the chart those procedures where I was not present during the key portions. I have reviewed the emergency nurses triage note. I agree with the chief complaint, past medical history, past surgical history, allergies, medications, social and family history as documented unless otherwise noted below. Documentation of the HPI, Physical Exam and Medical Decision Making performed by scribindira is based on my personal performance of the HPI, PE and MDM. For Physician Assistant/ Nurse Practitioner cases/documentation I have personally evaluated this patient and have completed at least one if not all key elements of the E/M (history, physical exam, and MDM). Additional findings are as noted. The patient presents with complaints of a headache with intermittent blurred vision which she has Normal Symptoms for Myasthenia Gravis However He Says Is Worse in the Last Couple Days. She Does Complaints of Fullness in Purulent Yellow Discharge from Her Nose. She Does Complain Some Back Pain but Denies Any Difficulty Urinating. Ayad Leonardo MD, [facility]  Attending Emergency Medicine Physician        Ralf Dos Santos MD  01/11/18 2559

## 2018-01-12 ENCOUNTER — HOSPITAL ENCOUNTER (INPATIENT)
Age: 43
LOS: 6 days | Discharge: HOME | DRG: 042 | End: 2018-01-18
Attending: EMERGENCY MEDICINE | Admitting: INTERNAL MEDICINE
Payer: MEDICARE

## 2018-01-12 DIAGNOSIS — G70.01 MYASTHENIC CRISIS (HCC): Primary | ICD-10-CM

## 2018-01-12 DIAGNOSIS — R00.0 TACHYCARDIA: ICD-10-CM

## 2018-01-12 PROBLEM — J32.9 SINUSITIS: Status: ACTIVE | Noted: 2018-01-12

## 2018-01-12 LAB
DIRECT EXAM: NORMAL
Lab: NORMAL
SPECIMEN DESCRIPTION: NORMAL
STATUS: NORMAL

## 2018-01-12 PROCEDURE — 87804 INFLUENZA ASSAY W/OPTIC: CPT

## 2018-01-12 PROCEDURE — 87801 DETECT AGNT MULT DNA AMPLI: CPT

## 2018-01-12 PROCEDURE — 99223 1ST HOSP IP/OBS HIGH 75: CPT | Performed by: INTERNAL MEDICINE

## 2018-01-12 PROCEDURE — 6370000000 HC RX 637 (ALT 250 FOR IP): Performed by: HOSPITALIST

## 2018-01-12 PROCEDURE — 99254 IP/OBS CNSLTJ NEW/EST MOD 60: CPT | Performed by: PSYCHIATRY & NEUROLOGY

## 2018-01-12 PROCEDURE — 2580000003 HC RX 258: Performed by: HOSPITALIST

## 2018-01-12 PROCEDURE — 6360000002 HC RX W HCPCS: Performed by: HOSPITALIST

## 2018-01-12 PROCEDURE — 2060000000 HC ICU INTERMEDIATE R&B

## 2018-01-12 PROCEDURE — 6370000000 HC RX 637 (ALT 250 FOR IP): Performed by: PSYCHIATRY & NEUROLOGY

## 2018-01-12 PROCEDURE — 2580000003 HC RX 258: Performed by: EMERGENCY MEDICINE

## 2018-01-12 PROCEDURE — 99285 EMERGENCY DEPT VISIT HI MDM: CPT

## 2018-01-12 RX ORDER — 0.9 % SODIUM CHLORIDE 0.9 %
1000 INTRAVENOUS SOLUTION INTRAVENOUS ONCE
Status: COMPLETED | OUTPATIENT
Start: 2018-01-12 | End: 2018-01-12

## 2018-01-12 RX ORDER — POTASSIUM CHLORIDE 20MEQ/15ML
40 LIQUID (ML) ORAL PRN
Status: DISCONTINUED | OUTPATIENT
Start: 2018-01-12 | End: 2018-01-18 | Stop reason: HOSPADM

## 2018-01-12 RX ORDER — ECHINACEA PURPUREA EXTRACT 125 MG
1 TABLET ORAL PRN
Status: DISCONTINUED | OUTPATIENT
Start: 2018-01-12 | End: 2018-01-18 | Stop reason: HOSPADM

## 2018-01-12 RX ORDER — POTASSIUM CHLORIDE 20 MEQ/1
40 TABLET, EXTENDED RELEASE ORAL PRN
Status: DISCONTINUED | OUTPATIENT
Start: 2018-01-12 | End: 2018-01-18 | Stop reason: HOSPADM

## 2018-01-12 RX ORDER — PYRIDOSTIGMINE BROMIDE 60 MG/1
60 TABLET ORAL 3 TIMES DAILY
Status: DISCONTINUED | OUTPATIENT
Start: 2018-01-12 | End: 2018-01-12

## 2018-01-12 RX ORDER — CALCIUM CARBONATE/VITAMIN D3 250-3.125
1 TABLET ORAL DAILY
Status: DISCONTINUED | OUTPATIENT
Start: 2018-01-12 | End: 2018-01-18 | Stop reason: HOSPADM

## 2018-01-12 RX ORDER — METHYLPREDNISOLONE SODIUM SUCCINATE 40 MG/ML
30 INJECTION, POWDER, LYOPHILIZED, FOR SOLUTION INTRAMUSCULAR; INTRAVENOUS EVERY 12 HOURS
Status: DISCONTINUED | OUTPATIENT
Start: 2018-01-12 | End: 2018-01-12

## 2018-01-12 RX ORDER — SODIUM CHLORIDE 0.9 % (FLUSH) 0.9 %
10 SYRINGE (ML) INJECTION EVERY 12 HOURS SCHEDULED
Status: DISCONTINUED | OUTPATIENT
Start: 2018-01-12 | End: 2018-01-18 | Stop reason: HOSPADM

## 2018-01-12 RX ORDER — PYRIDOSTIGMINE BROMIDE 60 MG/1
60 TABLET ORAL EVERY EVENING
Status: DISCONTINUED | OUTPATIENT
Start: 2018-01-12 | End: 2018-01-18 | Stop reason: HOSPADM

## 2018-01-12 RX ORDER — PYRIDOSTIGMINE BROMIDE 60 MG/1
30 TABLET ORAL 2 TIMES DAILY
Status: DISCONTINUED | OUTPATIENT
Start: 2018-01-12 | End: 2018-01-18 | Stop reason: HOSPADM

## 2018-01-12 RX ORDER — POTASSIUM CHLORIDE 7.45 MG/ML
10 INJECTION INTRAVENOUS PRN
Status: DISCONTINUED | OUTPATIENT
Start: 2018-01-12 | End: 2018-01-18 | Stop reason: HOSPADM

## 2018-01-12 RX ORDER — SODIUM CHLORIDE 0.9 % (FLUSH) 0.9 %
10 SYRINGE (ML) INJECTION PRN
Status: DISCONTINUED | OUTPATIENT
Start: 2018-01-12 | End: 2018-01-18 | Stop reason: HOSPADM

## 2018-01-12 RX ORDER — SODIUM CHLORIDE 9 MG/ML
INJECTION, SOLUTION INTRAVENOUS CONTINUOUS
Status: DISCONTINUED | OUTPATIENT
Start: 2018-01-12 | End: 2018-01-18 | Stop reason: HOSPADM

## 2018-01-12 RX ORDER — ONDANSETRON 2 MG/ML
4 INJECTION INTRAMUSCULAR; INTRAVENOUS EVERY 6 HOURS PRN
Status: DISCONTINUED | OUTPATIENT
Start: 2018-01-12 | End: 2018-01-18 | Stop reason: HOSPADM

## 2018-01-12 RX ORDER — PREDNISONE 20 MG/1
20 TABLET ORAL DAILY
Status: CANCELLED | OUTPATIENT
Start: 2018-01-12

## 2018-01-12 RX ORDER — METHYLPREDNISOLONE SODIUM SUCCINATE 40 MG/ML
30 INJECTION, POWDER, LYOPHILIZED, FOR SOLUTION INTRAMUSCULAR; INTRAVENOUS EVERY 12 HOURS
Status: DISCONTINUED | OUTPATIENT
Start: 2018-01-12 | End: 2018-01-13

## 2018-01-12 RX ORDER — PREDNISONE 20 MG/1
20 TABLET ORAL DAILY
Status: DISCONTINUED | OUTPATIENT
Start: 2018-01-12 | End: 2018-01-12

## 2018-01-12 RX ORDER — ACETAMINOPHEN 325 MG/1
650 TABLET ORAL EVERY 4 HOURS PRN
Status: DISCONTINUED | OUTPATIENT
Start: 2018-01-12 | End: 2018-01-18 | Stop reason: HOSPADM

## 2018-01-12 RX ORDER — MYCOPHENOLATE MOFETIL 250 MG/1
500 CAPSULE ORAL 2 TIMES DAILY
Status: DISCONTINUED | OUTPATIENT
Start: 2018-01-12 | End: 2018-01-18 | Stop reason: HOSPADM

## 2018-01-12 RX ADMIN — SODIUM CHLORIDE: 9 INJECTION, SOLUTION INTRAVENOUS at 13:17

## 2018-01-12 RX ADMIN — CALCIUM CARBONATE-CHOLECALCIFEROL TAB 250 MG-125 UNIT 250 MG: 250-125 TAB at 14:56

## 2018-01-12 RX ADMIN — PYRIDOSTIGMINE BROMIDE 30 MG: 60 TABLET ORAL at 15:00

## 2018-01-12 RX ADMIN — MYCOPHENOLATE MOFETIL 500 MG: 250 CAPSULE ORAL at 21:09

## 2018-01-12 RX ADMIN — SODIUM CHLORIDE 1000 ML: 9 INJECTION, SOLUTION INTRAVENOUS at 10:32

## 2018-01-12 RX ADMIN — PYRIDOSTIGMINE BROMIDE 60 MG: 60 TABLET ORAL at 21:10

## 2018-01-12 ASSESSMENT — ENCOUNTER SYMPTOMS
ABDOMINAL PAIN: 0
RHINORRHEA: 0
SINUS PAIN: 1
DIARRHEA: 1
COLOR CHANGE: 0
COUGH: 0
WHEEZING: 0
VOMITING: 0
NAUSEA: 0
SHORTNESS OF BREATH: 0
BLURRED VISION: 1

## 2018-01-12 ASSESSMENT — PAIN SCALES - GENERAL: PAINLEVEL_OUTOF10: 0

## 2018-01-12 NOTE — ED PROVIDER NOTES
[facility]     Emergency Department     Faculty Attestation    I performed a history and physical examination of the patient and discussed management with the resident. I reviewed the residents note and agree with the documented findings and plan of care. Any areas of disagreement are noted on the chart. I was personally present for the key portions of any procedures. I have documented in the chart those procedures where I was not present during the key portions. I have reviewed the emergency nurses triage note. I agree with the chief complaint, past medical history, past surgical history, allergies, medications, social and family history as documented unless otherwise noted below. For Physician Assistant/ Nurse Practitioner cases/documentation I have personally evaluated this patient and have completed at least one if not all key elements of the E/M (history, physical exam, and MDM). Additional findings are as noted.       Primary Care Physician:  Anais Monroy MD    CHIEF COMPLAINT       Chief Complaint   Patient presents with    Diarrhea     since prescribed ATB yesterday in ED       RECENT VITALS:   Temp: 98.6 °F (37 °C),  Pulse: 112, Resp: 18, BP: (!) 135/93    LABS:  Labs Reviewed - No data to display      PERTINENT ATTENDING PHYSICIAN COMMENTS:    Patient presents with complaints of diarrhea but also physical weakness she has a history of myasthenia gravis and wheezes having a flare case was discussed with her neurologist basic labs hydration will be given     [address],  MD, [facility]  Attending Emergency  Physician              Gricel Workman MD  01/12/18 4148

## 2018-01-12 NOTE — FLOWSHEET NOTE
· Subjective: Patient does not speak [place]. I did not see that in the chart until after the visit. Patient looked at me and said I don't speak [place]. · Objective: Patient sitting up in bed talking to someone on the phone in [address] · Assessment: After she told me she did not speak Urdu I showed her my 's badge but then realized she probably didn't know what  meant. So I folded my hands in a prayer like sign and she realized who I was and shook her head yes and bowed her head to pray. · Plan:  prayed with the patient, and patient smiled indicating thanks.        01/12/18 1756   Encounter Summary   Services provided to: Patient   Referral/Consult From: [address] [place] [address] Unknown   Place of [employer] Unknown   Contact Church No   Continue Visiting (01/12/18)   Complexity of Encounter Low   Length of Encounter 15 minutes   Spiritual Assessment Completed Yes   Routine   Type Initial   Spiritual/Jehovah's witness   Type Spiritual support   Assessment Approachable   Intervention Prayer   Outcome Expressed gratitude

## 2018-01-12 NOTE — ED NOTES
Pt relaxing on cot, nad, rr even and unlabored. No needs.  Awaiting bed placement     Laury Pascual RN  01/12/18 6137

## 2018-01-12 NOTE — PROGRESS NOTES
Smoking Cessation - topics covered   []  Health Risks  []  Benefits of Quitting   []  Smoking Cessation  [x]  Patient has no history of tobacco use  []  Patient is former smoker. [x]  No need for tobacco cessation education. []  Booklet given  []  Patient verbalizes understanding. []  Patient denies need for tobacco cessation education.   Palomo Carrolls  12:44 PM

## 2018-01-12 NOTE — PROGRESS NOTES
mg  -possible IVIG  -solumedrol 30 Q12H  -Resume home dose of mycophenolate 500 mg BID  -IV fluids as pt is dehydrated from diarrhea   -BMP, CBC  -follow up with neuro recs    Sinusitis  -D/C clindamycin as it can worsen symptoms of myasthenia gravis  -Keflex  -flonase  -nasal saline    DVT PPX with lovenox  PT/OT    Cait Rader MD, PGY-1  Internal Medicine  [address]  1/12/18  5:22 PM

## 2018-01-12 NOTE — CONSULTS
Dizziness: absent, Weakness: present, Numbness: absent Tremor: absent, Spasm: absent, Speech difficulty: absent, Headache: absent, Light sensitivity: absent    PSYCHIATRIC Anxiety: absent, Hallucination: absent, Mood disorder: absent    HEMATOLOGIC Abnormal bleeding: absent, Anemia: absent, Clotting disorder: absent, Lymph gland changes: absent       VITALS  BP (!) 135/93   Pulse 112   Temp 98.6 °F (37 °C) (Oral)   Resp 18   Ht 5' 4\" (1.626 m)   Wt 180 lb (81.6 kg)   LMP 12/28/2017   SpO2 97%   BMI 30.90 kg/m²      PHYSICAL EXAMINATION    General appearance: cooperative  Skin: no rash or skin lesions. HEENT: normocephalic  Optic Fundi: deferred  Neck: supple, no cervcical adenopathy or carotid bruit  Lungs: clear to auscultation  Heart: Regular rate and rhythm, normal S1, S2. No murmurs, clicks or gallops. Peripheral pulses: radial pulses palpable  Abdominal: BS present, soft, NT, ND  Extremities: no edema    NEUROLOGICAL EXAMINATION    MS: awake, alert and oriented. No aphasia, dysarthria, or neglect  CNs: PERRLA, not engage in upgaze, VF full, sensation intact, face symmetric, hearing intact, soft palate rises on phonation, sternocleidomastoid and trapezius intact. Tongue midline, no fasciculations. Motor: no abnormal movements, tone and bulk okay. RUE: delta 4+/5, biceps 4+/5, triceps 4+/5,  4+/5  LUE: delta 4+/5, biceps 4+/5, triceps 4+/5,  4+/5  RLE: hf 4+/5, ke 4+/5, df 4+/5, pf 5/5  LLE: hf 4+/5, ke 4+/5, df 4+/5, pf 5/5  Reflexes: 2+ throughout, symmetric, babinski not present. Coordination: FNF no dysmetria, heel to shin okay, VIKTORIA okay, negative Rhomberg. Gait: not tested. Sensory: normal to light touch/temp/pp/vibration, intact joint position sense, no extinction.     Labs and Tests  Lab Results   Component Value Date    WBC 16.2 (H) 01/11/2018    HGB 10.2 (L) 01/11/2018    HCT 34.8 (L) 01/11/2018    MCV 72.2 (L) 01/11/2018     (H) 01/11/2018         ASSESSMENT/PLAN:  // Diarrhea, fatigue  - diarrhea most likely secondary to increased mestinon dose since yesterday    Generalized weakness likely due to recent URI, also diarrhea, there is also suspicious functional components on exam  - reduce mestinon to [id number] from current 60mg TID  - hydration, continue treating URI    // History of MG  - continue home dose steroid and cellcept. Recommend admission for continue monitoring since pt does have long standing MG. Call neurology consult team if pt's condition gets worse. followup with Dr. Brooklynn Mcfadden 2-3 weeks after discharge for further mestinon titration. Thank you for referring Ms. Alyssa Shrestha to me, shall you have any questions, please do not hesitate to let me know. Thank you.     Sincerely,    Cherlynn Lundborg, MD, [facility]    Department of Neurology  [address] [facility]

## 2018-01-12 NOTE — H&P
INTERNAL MEDICINE H & P/ CONSULT      Patient:  Annel Gomez  MRN: [id number]      [facility] Care Physician: Doris Barron MD     Chief complaints: Runny nose, weakness of facial muscles, diarrhea  HISTORY OF PRESENT ILLNESS:       The patient is a 36 y.o. female Wolof speaking patient and I used  below phone and an  stated she came in with runny nose since one day and patient came to the ER was diagnosed with sinusitis and was started on Keflex and clindamycin. Since this morning patient has increased weakness of her facial muscles, double vision since this morning and 4 episodes of diarrhea since 4 AM.    Patient has a history of myasthenia gravis and was on Mestinon 30 mg 3 times a day and was increased to 60 mg 3 times a day yesterday. Patient states that she has pain in her abdomen and the diarrhea is green in color with no blood. Patient states she has subjective chills. She states that she feels very weak. Patient denies any fever, nausea, vomiting, shortness of breath, chest pain. Patient also has been on prednisone 20 mg daily, mycophenolate 500 mg 2 times a day for myasthenia. She follows up with neurologist Dr. Maximiliano Guo. She has a history of thymectomy and had 2 episodes of exacerbation of myasthenia last year. Past Medical History:        Diagnosis Date    Anemia     Headache     Hypertension     Myasthenia gravis ([address]     Pre-diabetes        Past Surgical History:        Procedure Laterality Date    CARDIAC SURGERY         Allergies: Allergies   Allergen Reactions    Flexeril [Cyclobenzaprine]     Norflex [Orphenadrine Citrate]     Nsaids     Penicillins Rash    Vancomycin Rash         Home Meds:   Prior to Admission medications    Medication Sig Start Date End Date Taking?  Authorizing Provider   pyridostigmine (MESTINON) 60 MG tablet Take 1 tablet by mouth 3 times daily moist, pharynx normal without lesions  Neck - supple, no significant adenopathy  Chest - clear to auscultation, no wheezes, rales or rhonchi, symmetric air entry  Heart - normal rate, regular rhythm, normal S1, S2, no murmurs, rubs, clicks or gallops  Abdomen - soft, nontender, nondistended, no masses or organomegaly  Neurological - alert, oriented, normal speech, weakness of the facial muscles, drooping of the eyelids, extremities power is 4 out of 5, lower extremities power is 4 out of 5. Extremities - peripheral pulses normal, no pedal edema, no clubbing or cyanosis  Skin - normal coloration and turgor, no rashes, no suspicious skin lesions noted     Medications:Current Inpatient    Scheduled Meds:  Continuous Infusions:  PRN Meds:      LABS:-    CBC:   Recent Labs      01/11/18   1004   WBC  16.2*   HGB  10.2*   PLT  550*     BMP:    Recent Labs      01/11/18   1004   NA  135   K  3.5*   CL  98   CO2  22   BUN  8   CREATININE  0.64   GLUCOSE  119*     Calcium:  Recent Labs      01/11/18   1004   CALCIUM  8.5*     Ionized Calcium:No results for input(s): IONCA in the last 72 hours. Magnesium:No results for input(s): MG in the last 72 hours. Phosphorus:No results for input(s): PHOS in the last 72 hours. BNP:No results for input(s): BNP in the last 72 hours. Glucose:No results for input(s): POCGLU in the last 72 hours. HgbA1C: No results for input(s): LABA1C in the last 72 hours. INR:   Recent Labs      01/11/18   1056   INR  1.0     Hepatic:   Recent Labs      01/11/18   1004   ALKPHOS  68   ALT  41*   AST  46*   PROT  7.1   BILITOT  0.44   BILIDIR  0.16   LABALBU  3.7     Amylase and Lipase:No results for input(s): LACTA, AMYLASE in the last 72 hours. Lactic Acid: No results for input(s): LACTA in the last 72 hours. CARDIAC ENZYMES:No results for input(s): CKTOTAL, CKMB, CKMBINDEX, TROPONINI in the last 72 hours. BNP: No results for input(s): BNP in the last 72 hours.   Lipids: No results for input(s):

## 2018-01-12 NOTE — ED NOTES
Pt resting on cot, eyes closed. NAD noted. RR even and unlabored.       Marcos Ormond, RN  01/12/18 0534

## 2018-01-13 LAB
ABSOLUTE EOS #: 0.27 K/UL (ref 0–0.44)
ABSOLUTE IMMATURE GRANULOCYTE: 0.27 K/UL (ref 0–0.3)
ABSOLUTE LYMPH #: 3.75 K/UL (ref 1.1–3.7)
ABSOLUTE MONO #: 1.07 K/UL (ref 0.1–1.2)
ALBUMIN SERPL-MCNC: 3.6 G/DL (ref 3.5–5.2)
ALBUMIN/GLOBULIN RATIO: 1.2 (ref 1–2.5)
ALP BLD-CCNC: 63 U/L (ref 35–104)
ALT SERPL-CCNC: 38 U/L (ref 5–33)
ANION GAP SERPL CALCULATED.3IONS-SCNC: 15 MMOL/L (ref 9–17)
AST SERPL-CCNC: 38 U/L
BASOPHILS # BLD: 1 % (ref 0–2)
BASOPHILS ABSOLUTE: 0.13 K/UL (ref 0–0.2)
BILIRUB SERPL-MCNC: 0.5 MG/DL (ref 0.3–1.2)
BUN BLDV-MCNC: 4 MG/DL (ref 6–20)
BUN/CREAT BLD: ABNORMAL (ref 9–20)
CALCIUM SERPL-MCNC: 7.6 MG/DL (ref 8.6–10.4)
CHLORIDE BLD-SCNC: 104 MMOL/L (ref 98–107)
CO2: 20 MMOL/L (ref 20–31)
CREAT SERPL-MCNC: 0.56 MG/DL (ref 0.5–0.9)
DIFFERENTIAL TYPE: ABNORMAL
DIRECT EXAM: NORMAL
EOSINOPHILS RELATIVE PERCENT: 2 % (ref 1–4)
GFR AFRICAN AMERICAN: >60 ML/MIN
GFR NON-AFRICAN AMERICAN: >60 ML/MIN
GFR SERPL CREATININE-BSD FRML MDRD: ABNORMAL ML/MIN/{1.73_M2}
GFR SERPL CREATININE-BSD FRML MDRD: ABNORMAL ML/MIN/{1.73_M2}
GLUCOSE BLD-MCNC: 102 MG/DL (ref 70–99)
HCT VFR BLD CALC: 35.2 % (ref 36.3–47.1)
HEMOGLOBIN: 9.9 G/DL (ref 11.9–15.1)
IMMATURE GRANULOCYTES: 2 %
LYMPHOCYTES # BLD: 28 % (ref 24–43)
Lab: NORMAL
MAGNESIUM: 1.9 MG/DL (ref 1.6–2.6)
MCH RBC QN AUTO: 21.4 PG (ref 25.2–33.5)
MCHC RBC AUTO-ENTMCNC: 28.1 G/DL (ref 28.4–34.8)
MCV RBC AUTO: 76 FL (ref 82.6–102.9)
MONOCYTES # BLD: 8 % (ref 3–12)
MORPHOLOGY: ABNORMAL
PDW BLD-RTO: 20.5 % (ref 11.8–14.4)
PLATELET # BLD: 585 K/UL (ref 138–453)
PLATELET ESTIMATE: ABNORMAL
PMV BLD AUTO: 10.3 FL (ref 8.1–13.5)
POTASSIUM SERPL-SCNC: 3.5 MMOL/L (ref 3.7–5.3)
RBC # BLD: 4.63 M/UL (ref 3.95–5.11)
RBC # BLD: ABNORMAL 10*6/UL
SEG NEUTROPHILS: 59 % (ref 36–65)
SEGMENTED NEUTROPHILS ABSOLUTE COUNT: 7.91 K/UL (ref 1.5–8.1)
SODIUM BLD-SCNC: 139 MMOL/L (ref 135–144)
SPECIMEN DESCRIPTION: NORMAL
STATUS: NORMAL
TOTAL PROTEIN: 6.7 G/DL (ref 6.4–8.3)
WBC # BLD: 13.4 K/UL (ref 3.5–11.3)
WBC # BLD: ABNORMAL 10*3/UL

## 2018-01-13 PROCEDURE — G8987 SELF CARE CURRENT STATUS: HCPCS

## 2018-01-13 PROCEDURE — 80053 COMPREHEN METABOLIC PANEL: CPT

## 2018-01-13 PROCEDURE — 94799 UNLISTED PULMONARY SVC/PX: CPT

## 2018-01-13 PROCEDURE — 2060000000 HC ICU INTERMEDIATE R&B

## 2018-01-13 PROCEDURE — 97161 PT EVAL LOW COMPLEX 20 MIN: CPT

## 2018-01-13 PROCEDURE — G8979 MOBILITY GOAL STATUS: HCPCS

## 2018-01-13 PROCEDURE — 99232 SBSQ HOSP IP/OBS MODERATE 35: CPT | Performed by: INTERNAL MEDICINE

## 2018-01-13 PROCEDURE — 83735 ASSAY OF MAGNESIUM: CPT

## 2018-01-13 PROCEDURE — 99232 SBSQ HOSP IP/OBS MODERATE 35: CPT | Performed by: PSYCHIATRY & NEUROLOGY

## 2018-01-13 PROCEDURE — 6360000002 HC RX W HCPCS: Performed by: PSYCHIATRY & NEUROLOGY

## 2018-01-13 PROCEDURE — 6360000002 HC RX W HCPCS: Performed by: HOSPITALIST

## 2018-01-13 PROCEDURE — 30233S1 TRANSFUSION OF NONAUTOLOGOUS GLOBULIN INTO PERIPHERAL VEIN, PERCUTANEOUS APPROACH: ICD-10-PCS | Performed by: PSYCHIATRY & NEUROLOGY

## 2018-01-13 PROCEDURE — 94762 N-INVAS EAR/PLS OXIMTRY CONT: CPT

## 2018-01-13 PROCEDURE — 85025 COMPLETE CBC W/AUTO DIFF WBC: CPT

## 2018-01-13 PROCEDURE — G8978 MOBILITY CURRENT STATUS: HCPCS

## 2018-01-13 PROCEDURE — 6370000000 HC RX 637 (ALT 250 FOR IP): Performed by: STUDENT IN AN ORGANIZED HEALTH CARE EDUCATION/TRAINING PROGRAM

## 2018-01-13 PROCEDURE — 36415 COLL VENOUS BLD VENIPUNCTURE: CPT

## 2018-01-13 PROCEDURE — 2580000003 HC RX 258: Performed by: HOSPITALIST

## 2018-01-13 PROCEDURE — 97165 OT EVAL LOW COMPLEX 30 MIN: CPT

## 2018-01-13 PROCEDURE — G8988 SELF CARE GOAL STATUS: HCPCS

## 2018-01-13 PROCEDURE — 6370000000 HC RX 637 (ALT 250 FOR IP): Performed by: PSYCHIATRY & NEUROLOGY

## 2018-01-13 PROCEDURE — 6370000000 HC RX 637 (ALT 250 FOR IP): Performed by: HOSPITALIST

## 2018-01-13 RX ORDER — LANOLIN ALCOHOL/MO/W.PET/CERES
325 CREAM (GRAM) TOPICAL
Status: DISCONTINUED | OUTPATIENT
Start: 2018-01-14 | End: 2018-01-14

## 2018-01-13 RX ORDER — AZITHROMYCIN 250 MG/1
500 TABLET, FILM COATED ORAL DAILY
Status: COMPLETED | OUTPATIENT
Start: 2018-01-13 | End: 2018-01-13

## 2018-01-13 RX ORDER — AZITHROMYCIN 250 MG/1
250 TABLET, FILM COATED ORAL DAILY
Status: COMPLETED | OUTPATIENT
Start: 2018-01-14 | End: 2018-01-17

## 2018-01-13 RX ORDER — LANOLIN ALCOHOL/MO/W.PET/CERES
325 CREAM (GRAM) TOPICAL 2 TIMES DAILY WITH MEALS
Status: DISCONTINUED | OUTPATIENT
Start: 2018-01-13 | End: 2018-01-13

## 2018-01-13 RX ORDER — PREDNISONE 20 MG/1
20 TABLET ORAL DAILY
Status: DISCONTINUED | OUTPATIENT
Start: 2018-01-13 | End: 2018-01-18 | Stop reason: HOSPADM

## 2018-01-13 RX ORDER — POTASSIUM CHLORIDE 20 MEQ/1
40 TABLET, EXTENDED RELEASE ORAL ONCE
Status: COMPLETED | OUTPATIENT
Start: 2018-01-13 | End: 2018-01-13

## 2018-01-13 RX ADMIN — MYCOPHENOLATE MOFETIL 500 MG: 250 CAPSULE ORAL at 09:23

## 2018-01-13 RX ADMIN — AZITHROMYCIN 500 MG: 250 TABLET, FILM COATED ORAL at 13:25

## 2018-01-13 RX ADMIN — ENOXAPARIN SODIUM 40 MG: 40 INJECTION SUBCUTANEOUS at 09:23

## 2018-01-13 RX ADMIN — SODIUM CHLORIDE: 9 INJECTION, SOLUTION INTRAVENOUS at 09:23

## 2018-01-13 RX ADMIN — PREDNISONE 20 MG: 20 TABLET ORAL at 10:07

## 2018-01-13 RX ADMIN — CALCIUM CARBONATE-CHOLECALCIFEROL TAB 250 MG-125 UNIT 250 MG: 250-125 TAB at 09:22

## 2018-01-13 RX ADMIN — PYRIDOSTIGMINE BROMIDE 60 MG: 60 TABLET ORAL at 17:59

## 2018-01-13 RX ADMIN — ACETAMINOPHEN 650 MG: 325 TABLET ORAL at 17:58

## 2018-01-13 RX ADMIN — PYRIDOSTIGMINE BROMIDE 30 MG: 60 TABLET ORAL at 13:26

## 2018-01-13 RX ADMIN — PYRIDOSTIGMINE BROMIDE 30 MG: 60 TABLET ORAL at 09:22

## 2018-01-13 RX ADMIN — POTASSIUM CHLORIDE 40 MEQ: 20 TABLET, EXTENDED RELEASE ORAL at 09:23

## 2018-01-13 RX ADMIN — MYCOPHENOLATE MOFETIL 500 MG: 250 CAPSULE ORAL at 20:45

## 2018-01-13 RX ADMIN — IMMUNE GLOBULIN INTRAVENOUS (HUMAN) 35 G: 5 INJECTION, SOLUTION INTRAVENOUS at 18:27

## 2018-01-13 ASSESSMENT — PAIN SCALES - GENERAL
PAINLEVEL_OUTOF10: 0
PAINLEVEL_OUTOF10: 0

## 2018-01-13 NOTE — PROGRESS NOTES
Patient tolerating IVIG infusion at starting rate. Tylenol given prior to infusion as patient states she gets febrile from the infusions at times. Remains afebrile at this time.

## 2018-01-13 NOTE — PROGRESS NOTES
per neuro, decrease dose of Pyridostigmine to 30mg, 30 mg, 60 mg  -possible IVIG  -PO prednisone 20 mg daily   -Resume home dose of mycophenolate 500 mg BID  -IV fluids  -BMP wnl  -Leukocytosis, likely due to steroids, vs sinusitis, CBC robbin am  -follow up with neuro recs     Sinusitis  -D/C clindamycin as it can worsen symptoms of myasthenia gravis  - D/C Keflex  -Start Z-[place]  -flonase  -nasal saline     DVT PPX with lovenox  PT/OT    Cierra Boston MD, PGY-1  Internal Medicine  [address]  1/13/18  12:28 PM

## 2018-01-13 NOTE — PROGRESS NOTES
Occupational Therapy   Occupational Therapy Initial Assessment  Date: 2018   Patient Name: Hannah Davis  MRN: [id number]     : 1975    Patient Diagnosis(es): The primary encounter diagnosis was Myasthenic crisis [facility]). A diagnosis of Tachycardia was also pertinent to this visit. has a past medical history of Anemia; Headache; Hypertension; Myasthenia gravis ([address] and Pre-diabetes. has a past surgical history that includes Cardiac surgery. Restrictions  Restrictions/Precautions  Restrictions/Precautions: Fall Risk  Required Braces or Orthoses?: No  Position Activity Restriction  Other position/activity restrictions: up with assist    Subjective   General  Chart Reviewed: No  Patient assessed for rehabilitation services?: Yes  Family / Caregiver Present: No  General Comment  Comments: RN ok'd for therapy this AM. Pt reporting fatigue however agreeable to participate.   Pain Assessment  Patient Currently in Pain: Denies  Pain Assessment: 0-10  Pain Level: 0    Oxygen Therapy  SpO2: 98 %  O2 Device: None (Room air)     Social/Functional History  Social/Functional History  Lives With: Significant other  Type of Home: Apartment  Home Layout: One level  Home Access: Stairs to enter with rails  Entrance Stairs - Number of Steps: 4  Receives Help From: Family  ADL Assistance: Independent  Homemaking Assistance: Independent  Homemaking Responsibilities: Yes  Ambulation Assistance: Independent  Transfer Assistance: Independent  Active : No  Patient's  Info: fiance drives  Mode of Transportation: Cab     Objective   Vision: Within Functional Limits (Pt with complaint of \"heavy\" eyes however reports no visual deficits)  Hearing: Within functional limits    Orientation  Overall Orientation Status: Within Functional Limits     Balance  Sitting Balance: Independent  Standing Balance: Stand by assistance  Standing Balance  Time: ~5 minutes  Activity: functional mobility within

## 2018-01-13 NOTE — PROGRESS NOTES
In full upright sitting position in the bed the vital capacity is 1.75 and the NIF is greater than -40

## 2018-01-13 NOTE — PROGRESS NOTES
around  Mobility: Walking and Moving Around Current Status ():  At least 20 percent but less than 40 percent impaired, limited or restricted  Mobility: Walking and Moving Around Goal Status (): 0 percent impaired, limited or restricted    Goals  Short term goals  Time Frame for Short term goals: 10 visits  Short term goal 1: Perform functional transfers independently  Short term goal 2: Ambulate 300ft without AD independently  Short term goal 3: Ascend/descend 4 steps with 1 HR independently  Short term goal 4: Demo good dynamic standing balance to decrease risk of falls       Therapy Time   Individual Concurrent Group Co-treatment   Time In 1021         Time Out 1037         Minutes [facility], PT

## 2018-01-14 LAB
ANION GAP SERPL CALCULATED.3IONS-SCNC: 17 MMOL/L (ref 9–17)
BUN BLDV-MCNC: 6 MG/DL (ref 6–20)
BUN/CREAT BLD: ABNORMAL (ref 9–20)
CALCIUM SERPL-MCNC: 7.9 MG/DL (ref 8.6–10.4)
CHLORIDE BLD-SCNC: 103 MMOL/L (ref 98–107)
CO2: 18 MMOL/L (ref 20–31)
CREAT SERPL-MCNC: 0.54 MG/DL (ref 0.5–0.9)
DIRECT EXAM: NORMAL
DIRECT EXAM: NORMAL
GFR AFRICAN AMERICAN: >60 ML/MIN
GFR NON-AFRICAN AMERICAN: >60 ML/MIN
GFR SERPL CREATININE-BSD FRML MDRD: ABNORMAL ML/MIN/{1.73_M2}
GFR SERPL CREATININE-BSD FRML MDRD: ABNORMAL ML/MIN/{1.73_M2}
GLUCOSE BLD-MCNC: 94 MG/DL (ref 70–99)
HCT VFR BLD CALC: 32.7 % (ref 36.3–47.1)
HEMOGLOBIN: 9.4 G/DL (ref 11.9–15.1)
IRON SATURATION: 8 % (ref 20–55)
IRON: 29 UG/DL (ref 37–145)
Lab: NORMAL
MCH RBC QN AUTO: 21.3 PG (ref 25.2–33.5)
MCHC RBC AUTO-ENTMCNC: 28.7 G/DL (ref 28.4–34.8)
MCV RBC AUTO: 74.1 FL (ref 82.6–102.9)
PDW BLD-RTO: 20.2 % (ref 11.8–14.4)
PLATELET # BLD: 520 K/UL (ref 138–453)
PMV BLD AUTO: 10.5 FL (ref 8.1–13.5)
POTASSIUM SERPL-SCNC: 3.9 MMOL/L (ref 3.7–5.3)
RBC # BLD: 4.41 M/UL (ref 3.95–5.11)
SODIUM BLD-SCNC: 138 MMOL/L (ref 135–144)
SPECIMEN DESCRIPTION: NORMAL
STATUS: NORMAL
TOTAL IRON BINDING CAPACITY: 372 UG/DL (ref 250–450)
UNSATURATED IRON BINDING CAPACITY: 343 UG/DL (ref 112–347)
WBC # BLD: 11.2 K/UL (ref 3.5–11.3)

## 2018-01-14 PROCEDURE — 6370000000 HC RX 637 (ALT 250 FOR IP): Performed by: STUDENT IN AN ORGANIZED HEALTH CARE EDUCATION/TRAINING PROGRAM

## 2018-01-14 PROCEDURE — 99232 SBSQ HOSP IP/OBS MODERATE 35: CPT | Performed by: PSYCHIATRY & NEUROLOGY

## 2018-01-14 PROCEDURE — 6370000000 HC RX 637 (ALT 250 FOR IP): Performed by: PSYCHIATRY & NEUROLOGY

## 2018-01-14 PROCEDURE — 6360000002 HC RX W HCPCS: Performed by: HOSPITALIST

## 2018-01-14 PROCEDURE — 80048 BASIC METABOLIC PNL TOTAL CA: CPT

## 2018-01-14 PROCEDURE — 94762 N-INVAS EAR/PLS OXIMTRY CONT: CPT

## 2018-01-14 PROCEDURE — 2580000003 HC RX 258: Performed by: STUDENT IN AN ORGANIZED HEALTH CARE EDUCATION/TRAINING PROGRAM

## 2018-01-14 PROCEDURE — 6360000002 HC RX W HCPCS: Performed by: PSYCHIATRY & NEUROLOGY

## 2018-01-14 PROCEDURE — 36415 COLL VENOUS BLD VENIPUNCTURE: CPT

## 2018-01-14 PROCEDURE — 6360000002 HC RX W HCPCS: Performed by: STUDENT IN AN ORGANIZED HEALTH CARE EDUCATION/TRAINING PROGRAM

## 2018-01-14 PROCEDURE — 85027 COMPLETE CBC AUTOMATED: CPT

## 2018-01-14 PROCEDURE — 83540 ASSAY OF IRON: CPT

## 2018-01-14 PROCEDURE — 6370000000 HC RX 637 (ALT 250 FOR IP): Performed by: HOSPITALIST

## 2018-01-14 PROCEDURE — 99233 SBSQ HOSP IP/OBS HIGH 50: CPT | Performed by: INTERNAL MEDICINE

## 2018-01-14 PROCEDURE — 83550 IRON BINDING TEST: CPT

## 2018-01-14 PROCEDURE — 2060000000 HC ICU INTERMEDIATE R&B

## 2018-01-14 RX ADMIN — IMMUNE GLOBULIN INTRAVENOUS (HUMAN) 35 G: 5 INJECTION, SOLUTION INTRAVENOUS at 19:00

## 2018-01-14 RX ADMIN — ACETAMINOPHEN 650 MG: 325 TABLET ORAL at 00:10

## 2018-01-14 RX ADMIN — PYRIDOSTIGMINE BROMIDE 30 MG: 60 TABLET ORAL at 13:19

## 2018-01-14 RX ADMIN — MYCOPHENOLATE MOFETIL 500 MG: 250 CAPSULE ORAL at 09:03

## 2018-01-14 RX ADMIN — PYRIDOSTIGMINE BROMIDE 60 MG: 60 TABLET ORAL at 18:30

## 2018-01-14 RX ADMIN — ENOXAPARIN SODIUM 40 MG: 40 INJECTION SUBCUTANEOUS at 09:03

## 2018-01-14 RX ADMIN — FERROUS SULFATE TAB EC 325 MG (65 MG FE EQUIVALENT) 325 MG: 325 (65 FE) TABLET DELAYED RESPONSE at 09:03

## 2018-01-14 RX ADMIN — CALCIUM CARBONATE-CHOLECALCIFEROL TAB 250 MG-125 UNIT 250 MG: 250-125 TAB at 09:03

## 2018-01-14 RX ADMIN — PREDNISONE 20 MG: 20 TABLET ORAL at 09:03

## 2018-01-14 RX ADMIN — IRON SUCROSE 300 MG: 20 INJECTION, SOLUTION INTRAVENOUS at 13:17

## 2018-01-14 RX ADMIN — PYRIDOSTIGMINE BROMIDE 30 MG: 60 TABLET ORAL at 09:03

## 2018-01-14 RX ADMIN — AZITHROMYCIN 250 MG: 250 TABLET, FILM COATED ORAL at 09:03

## 2018-01-14 RX ADMIN — ACETAMINOPHEN 650 MG: 325 TABLET ORAL at 18:30

## 2018-01-14 RX ADMIN — MYCOPHENOLATE MOFETIL 500 MG: 250 CAPSULE ORAL at 22:08

## 2018-01-14 ASSESSMENT — PAIN DESCRIPTION - ONSET: ONSET: GRADUAL

## 2018-01-14 ASSESSMENT — PAIN SCALES - GENERAL
PAINLEVEL_OUTOF10: 0
PAINLEVEL_OUTOF10: 8
PAINLEVEL_OUTOF10: 0

## 2018-01-14 ASSESSMENT — PAIN DESCRIPTION - FREQUENCY: FREQUENCY: INTERMITTENT

## 2018-01-14 ASSESSMENT — PAIN DESCRIPTION - PAIN TYPE: TYPE: ACUTE PAIN

## 2018-01-14 ASSESSMENT — PAIN DESCRIPTION - DESCRIPTORS: DESCRIPTORS: HEADACHE

## 2018-01-14 ASSESSMENT — PAIN DESCRIPTION - LOCATION: LOCATION: HEAD

## 2018-01-14 NOTE — PROGRESS NOTES
Problems:    Iron deficiency anemia    Sinusitis    Myasthenic crisis (HCC)      PLAN:  Myasthenia Gravis exacerbations  -Pyridostigmine to 30mg, 30 mg, 60 mg  -as perneuro, IgG 0.4 grams /kg qd x 5 days  - NIF, VC , TV q 12 hours  -PO prednisone 20 mg daily   -mycophenolate 500 mg BID  -IV fluids  -Leukocytosis resolved  -rapid flu negative     Sinusitis  -Continue Z-Milton  -flonase  -nasal saline    Iron deficiency anemia  -IV venofer 300 mg daily for 3 doses  -reactive thrombocytosis       DVT PPX with lovenox  PT/OT    Dominick Romero MD, PGY-1  Internal Medicine  [facility]  1/14/18  12:00 PM

## 2018-01-14 NOTE — PROGRESS NOTES
30 mg at 01/14/18 1319       Allergies   Allergen Reactions    Flexeril [Cyclobenzaprine]     Norflex [Orphenadrine Citrate]     Nsaids     Penicillins Rash    Vancomycin Rash       ROS:   Constitutional                  Negative for fever and chills   HEENT                            Positive for sinus pressure    Eyes                                Negative for photophobia, pain and discharge  Respiratory                      Negative for hemoptysis and sputum  Cardiovascular                Negative for orthopnea, claudication and PND  Gastrointestinal               Negative for abdominal pain, diarrhea, blood in stool  Musculoskeletal               Negative for joint pain, negative for myalgia  Skin                                 Negative for rash or itching  Endo/heme/allergies       Negative for polydipsia, environmental allergy  Psychiatric                       Negative for suicidal ideation. Patient is not anxious    Vitals:    01/14/18 1117   BP: (!) 108/52   Pulse:    Resp:    Temp: 98.6 °F (37 °C)   SpO2: 97%     Admission weight: 180 lb (81.6 kg)    Neurological Examination  Ears /Nose/Throat: external ear . Normal exam  Neck and thyroid . Normal size  Cardiovascular: Auscultation of heart with regular rate and rhythm   Musculoskeletal. Muscle bulk and tone normal   Muscle strength 5/5 strength throughout    No dysmetria or dysdiadokinesis  No tremor   Normal fine motor  Orientation Alert and oriented x 3   Short term memory normal   Attention and concentration normal   Language process and speech normal . No aphasia   Cranial nerve 2 normal acuety and visual fields  Cranial nerve 3, 4 and 6 . Extraocular muscles are intact . Pupils are equal and reactive . Cranial nerve 5 . Intact corneal reflex. Normal facial sensation  Cranial nerve 7 bilateral lower extremity weakness with trouble puffing   Cranial nerve 8. Grossly intact hearing   Cranial nerve 9 and 10.  Symmetric palate elevation   Cranial

## 2018-01-14 NOTE — PLAN OF CARE
Problem: Falls - Risk of:  Goal: Will remain free from falls  Will remain free from falls   Outcome: Ongoing  Fall assessment preformed. Bed in low locked position with call light and tray table within reach. Education given. Will continue to monitor.       Problem: Pain:  Goal: Pain level will decrease  Pain level will decrease   Outcome: Ongoing

## 2018-01-15 PROCEDURE — 99233 SBSQ HOSP IP/OBS HIGH 50: CPT | Performed by: INTERNAL MEDICINE

## 2018-01-15 PROCEDURE — 6370000000 HC RX 637 (ALT 250 FOR IP): Performed by: HOSPITALIST

## 2018-01-15 PROCEDURE — 6360000002 HC RX W HCPCS: Performed by: HOSPITALIST

## 2018-01-15 PROCEDURE — 94799 UNLISTED PULMONARY SVC/PX: CPT

## 2018-01-15 PROCEDURE — 2580000003 HC RX 258: Performed by: STUDENT IN AN ORGANIZED HEALTH CARE EDUCATION/TRAINING PROGRAM

## 2018-01-15 PROCEDURE — 99233 SBSQ HOSP IP/OBS HIGH 50: CPT | Performed by: PSYCHIATRY & NEUROLOGY

## 2018-01-15 PROCEDURE — 6370000000 HC RX 637 (ALT 250 FOR IP): Performed by: PSYCHIATRY & NEUROLOGY

## 2018-01-15 PROCEDURE — 2060000000 HC ICU INTERMEDIATE R&B

## 2018-01-15 PROCEDURE — 6360000002 HC RX W HCPCS: Performed by: PSYCHIATRY & NEUROLOGY

## 2018-01-15 PROCEDURE — 6360000002 HC RX W HCPCS: Performed by: STUDENT IN AN ORGANIZED HEALTH CARE EDUCATION/TRAINING PROGRAM

## 2018-01-15 PROCEDURE — 76937 US GUIDE VASCULAR ACCESS: CPT

## 2018-01-15 PROCEDURE — 6370000000 HC RX 637 (ALT 250 FOR IP): Performed by: STUDENT IN AN ORGANIZED HEALTH CARE EDUCATION/TRAINING PROGRAM

## 2018-01-15 RX ADMIN — MYCOPHENOLATE MOFETIL 500 MG: 250 CAPSULE ORAL at 09:00

## 2018-01-15 RX ADMIN — CALCIUM CARBONATE-CHOLECALCIFEROL TAB 250 MG-125 UNIT 250 MG: 250-125 TAB at 09:00

## 2018-01-15 RX ADMIN — IRON SUCROSE 300 MG: 20 INJECTION, SOLUTION INTRAVENOUS at 12:00

## 2018-01-15 RX ADMIN — PYRIDOSTIGMINE BROMIDE 60 MG: 60 TABLET ORAL at 21:13

## 2018-01-15 RX ADMIN — PYRIDOSTIGMINE BROMIDE 30 MG: 60 TABLET ORAL at 08:00

## 2018-01-15 RX ADMIN — ACETAMINOPHEN 650 MG: 325 TABLET ORAL at 19:49

## 2018-01-15 RX ADMIN — MYCOPHENOLATE MOFETIL 500 MG: 250 CAPSULE ORAL at 21:12

## 2018-01-15 RX ADMIN — PREDNISONE 20 MG: 20 TABLET ORAL at 09:00

## 2018-01-15 RX ADMIN — AZITHROMYCIN 250 MG: 250 TABLET, FILM COATED ORAL at 09:00

## 2018-01-15 RX ADMIN — IMMUNE GLOBULIN INTRAVENOUS (HUMAN) 35 G: 5 INJECTION, SOLUTION INTRAVENOUS at 19:49

## 2018-01-15 RX ADMIN — ENOXAPARIN SODIUM 40 MG: 40 INJECTION SUBCUTANEOUS at 09:00

## 2018-01-15 RX ADMIN — PYRIDOSTIGMINE BROMIDE 30 MG: 60 TABLET ORAL at 14:00

## 2018-01-15 ASSESSMENT — PAIN SCALES - GENERAL
PAINLEVEL_OUTOF10: 0
PAINLEVEL_OUTOF10: 0
PAINLEVEL_OUTOF10: 3

## 2018-01-15 NOTE — PROGRESS NOTES
NEUROLOGY INPATIENT PROGRESS NOTE    1/15/2018         Subjective: Maxwell Amanda is a  36 y.o. female admitted on 1/12/2018 with Myasthenia gravis with exacerbation, juvenile form ([facility] [address] [G70.01]   Day 3/5 IVIG      Briefly, this is a  36 y.o. female admitted on 1/12/2018 with myasthenia gravis exacerbation with sinusitis with prior thymectomy undergoing IgG  . The condition is she reports thst facial weakness has improved with stil some residual still having some fatigue on chewing with improved limb strength in arms and legs . She is tolerating IgG well today being second out of 5 treaments  . There is no dyspnea  . Significant medications IgG qd x 5 days, mycophenylate 500 mg po bid , prednisone 20 mg po qd, mestinon 30-30-60 , keflex. She has been on imuran in the past  . Testing MRI of Head with normal brain with right maxillary sinusitis . CRP 1 , TSH normal, ESR 10 ,cholesterol 115 , LDL 48 , TG 88 . Hga1c 6.4 , MONET negative , Anti SSA and anti SSB are negative, ACE 31 (8-52) . CT chest subtle increased density within the anterior mediastinum possibly representing residual thickening or scarring.  No discrete anterior mediastinal mass identified . Acetylcholine binding Ab 478 (0--0.4), blocking Ab 63 (0-26 %) , striated muscle Ab <1:40 . Most recent FVC 1.72L, NF -40                      No current facility-administered medications on file prior to encounter.       Current Outpatient Prescriptions on File Prior to Encounter   Medication Sig Dispense Refill    pyridostigmine (MESTINON) 60 MG tablet Take 1 tablet by mouth 3 times daily 45 tablet 0    clindamycin (CLEOCIN) 150 MG capsule Take 1 capsule by mouth 3 times daily for 10 days 30 capsule 0    cephALEXin (KEFLEX) 500 MG capsule Take 1 capsule by mouth 2 times daily for 10 days 20 capsule 0    predniSONE (DELTASONE) 20 MG tablet Take 1 po qd 30 tablet 5    mycophenolate (CELLCEPT) 500 MG tablet Take 1 po bid 60 tablet 5    ibuprofen (ADVIL;MOTRIN) 800 MG tablet Take 1 tablet by mouth every 8 hours as needed for Pain Pt to start 1-2 days before her menses and stay on it until the end of menses 60 tablet 3    Calcium Carb-Cholecalciferol (OYSCO D) 250-125 MG-UNIT TABS Take 1 tablet by mouth daily 30 tablet 11       Allergies: Tia Trujillo is allergic to flexeril [cyclobenzaprine]; norflex [orphenadrine citrate]; nsaids; penicillins; and vancomycin. Past Medical History:   Diagnosis Date    Anemia     Headache     Hypertension     Myasthenia gravis ([address]     Pre-diabetes        Past Surgical History:   Procedure Laterality Date    CARDIAC SURGERY             Medications:     iron sucrose  300 mg Intravenous Q24H    predniSONE  20 mg Oral Daily    azithromycin  250 mg Oral Daily    Immune Globulin 10% IV INFUSION  35 g Intravenous Daily    oyster shell calcium/vitamin D  1 tablet Oral Daily    mycophenolate  500 mg Oral BID    sodium chloride flush  10 mL Intravenous 2 times per day    enoxaparin  40 mg Subcutaneous Daily    pyridostigmine  60 mg Oral QPM    pyridostigmine  30 mg Oral BID- 8&2     PRN Meds include: sodium chloride flush, acetaminophen, magnesium hydroxide, ondansetron, potassium chloride **OR** potassium chloride **OR** potassium chloride, sodium chloride    Objective:   /68   Pulse 94   Temp 98.3 °F (36.8 °C) (Oral)   Resp 20   Ht 5' 4\" (1.626 m)   Wt 180 lb (81.6 kg)   LMP 12/28/2017   SpO2 98%   BMI 30.90 kg/m²     Blood pressure range: Systolic (98XUB), JRP:138 , Min:108 , HFV:508   ; Diastolic (41JIQ), VDT:07, Min:52, Max:88  Alert and coherent with normal mental status exam.  Cranial nerves II through XII intact to specifically noting EOMI, no diplopia, no ptosis, no facial weakness (though the patient is complaining of a funny feeling around her lips diffusely), no bulbar weakness. Neck flexion and extension full strength.   Full strength deltoids, EDC with no EDC

## 2018-01-15 NOTE — PROGRESS NOTES
Internal Medicine - Daily Progress Note      Date and time: 1/15/2018 10:00 AM  Patient's name:  Curtis Ye  Medical Record Number: [id number]  Patient's account/billing number: [de-identified]  Patient's YOB: 1975  Age: 36 y.o. Date of Admission: 1/12/2018  7:26 AM      Primary Care Physician: Isaiah Hay MD  Attending Physician:    Code Status: Full Code    Chief complaint: Weakness, diarrhea, Myasthenia Gravis crisis    Problem List:   Patient Active Problem List   Diagnosis    Iron deficiency anemia    Pre-diabetes    Right maxillary sinusitis, chronic    Clonus    Myasthenia gravis with exacerbation, juvenile form ([address]    Myasthenia gravis with exacerbation ([address]    Sinusitis    Myasthenic crisis ([address]       Allergies: Allergies   Allergen Reactions    Flexeril [Cyclobenzaprine]     Norflex [Orphenadrine Citrate]     Nsaids     Penicillins Rash    Vancomycin Rash          SUBJECTIVE:     Interval History : Patient seen and examined at bedside. Communicated with translating sasha. Patient feeling much better today, Weakness in face and sinusitis symptoms improving. Patient feels stronger. As per neuro patient will get total 5 doses of IVIG (day 3). Continue Z pack for sinusitis (day 3). Patient's diarrhea has resolved.      Review of Systems -   General ROS: Completed and except as mentioned above were negative   Psychological ROS:  Completed and except as mentioned above were negative  Allergy and Immunology ROS:  Completed and except as mentioned above were negative  Hematological and Lymphatic ROS:  Completed and except as mentioned above were negative  Respiratory ROS:  Completed and except as mentioned above were negative  Cardiovascular ROS:  Completed and except as mentioned above were negative  Gastrointestinal ROS: Completed and except as mentioned above were negative  Genito-Urinary ROS:  Completed and except as mentioned above were negative  Musculoskeletal ROS:  Completed and except as mentioned above were negative  Neurological ROS:  Completed and except as mentioned above were negative  Dermatological ROS:  Completed and except as mentioned above were negative      OBJECTIVE:    Vitals: /68   Pulse 94   Temp 98.3 °F (36.8 °C) (Oral)   Resp 20   Ht 5' 4\" (1.626 m)   Wt 180 lb (81.6 kg)   LMP 12/28/2017   SpO2 98%   BMI 30.90 kg/m²     Last Body weight:   Wt Readings from Last 3 Encounters:   01/12/18 180 lb (81.6 kg)   01/11/18 180 lb (81.6 kg)   12/21/17 180 lb (81.6 kg)       Intake and Output summary:     Intake/Output Summary (Last 24 hours) at 01/15/18 1000  Last data filed at 01/14/18 1848   Gross per 24 hour   Intake              265 ml   Output                0 ml   Net              265 ml         PHYSICAL EXAMINATION:  Vitals:    01/14/18 2100 01/14/18 2335 01/15/18 0410 01/15/18 0721   BP: 134/88 124/77 138/83 126/68   Pulse: 93 95 94    Resp: 21 25 20    Temp: 99.1 °F (37.3 °C) 98.1 °F (36.7 °C) 97.9 °F (36.6 °C) 98.3 °F (36.8 °C)   TempSrc: Oral Oral Oral Oral   SpO2: 98% 98% 97% 98%   Weight:       Height:         Constitutional: This is a well developed, well nourished. Head:normocephalic and atraumatic. EENT:   KATIA. No conjunctival injections. No thrush was noted. Neck: Supple without thyromegaly. No elevated JVP. Trachea was midline. Respiratory: Chest was symmetrical without dullness to percussion. Breath sounds bilaterally were clear to auscultation. There were no wheezes, rhonchi or rales. There is no intercostal retraction or use of accessory muscles. Cardiovascular: Regular without murmur, clicks, gallops or rubs. There is no left or right ventricular heave. Abdomen: Slightly rounded and soft without organomegaly. No rebound tenderness, rigidity or guarding was appreciated. Lymphatic: No lymphadenopathy. Musculoskeletal: Normal curvature of the spine. No gross muscle weakness. Problems:    Iron deficiency anemia    Sinusitis    Myasthenic crisis ([facility] [address]      PLAN:  Myasthenia Gravis exacerbations  -Pyridostigmine to 30mg, 30 mg, 60 mg  -as per neuro, IgG 0.4 grams /kg qd x 5 days (day 3/5)  - NIF, VC , TV q 12 hours  -PO prednisone 20 mg daily   -mycophenolate 500 mg BID  -IV fluids  -Leukocytosis resolved  -rapid flu negative     Sinusitis  -Continue Z-Milton (day 3/5)  -flonase  -nasal saline    Iron deficiency anemia  -IV venofer 300 mg daily for 3 doses  -reactive thrombocytosis       DVT PPX with lovenox  PT/OT    Outpatient sleep study     Mahogany Novoa MD, PGY-1  Internal Medicine  [address]  1/15/18  10:03 AM  Attending Physician Statement  I have discussed the care of Palomo Pacheco, including pertinent history and exam findings,  with the resident. I have seen and examined the patient and the key elements of all parts of the encounter have been performed by me. I agree with the assessment, plan and orders as documented by the resident.      Wilmer Boo MD  1/15/2018  9:59 PM

## 2018-01-16 PROCEDURE — 6370000000 HC RX 637 (ALT 250 FOR IP): Performed by: STUDENT IN AN ORGANIZED HEALTH CARE EDUCATION/TRAINING PROGRAM

## 2018-01-16 PROCEDURE — 6360000002 HC RX W HCPCS: Performed by: STUDENT IN AN ORGANIZED HEALTH CARE EDUCATION/TRAINING PROGRAM

## 2018-01-16 PROCEDURE — 6370000000 HC RX 637 (ALT 250 FOR IP): Performed by: PSYCHIATRY & NEUROLOGY

## 2018-01-16 PROCEDURE — 6370000000 HC RX 637 (ALT 250 FOR IP): Performed by: HOSPITALIST

## 2018-01-16 PROCEDURE — 6360000002 HC RX W HCPCS: Performed by: PSYCHIATRY & NEUROLOGY

## 2018-01-16 PROCEDURE — 2580000003 HC RX 258: Performed by: STUDENT IN AN ORGANIZED HEALTH CARE EDUCATION/TRAINING PROGRAM

## 2018-01-16 PROCEDURE — 99232 SBSQ HOSP IP/OBS MODERATE 35: CPT | Performed by: PSYCHIATRY & NEUROLOGY

## 2018-01-16 PROCEDURE — 94762 N-INVAS EAR/PLS OXIMTRY CONT: CPT

## 2018-01-16 PROCEDURE — 99232 SBSQ HOSP IP/OBS MODERATE 35: CPT | Performed by: INTERNAL MEDICINE

## 2018-01-16 PROCEDURE — 2060000000 HC ICU INTERMEDIATE R&B

## 2018-01-16 PROCEDURE — 2580000003 HC RX 258: Performed by: HOSPITALIST

## 2018-01-16 PROCEDURE — 6360000002 HC RX W HCPCS: Performed by: HOSPITALIST

## 2018-01-16 RX ORDER — ASCORBIC ACID 500 MG
500 TABLET ORAL DAILY
Status: DISCONTINUED | OUTPATIENT
Start: 2018-01-16 | End: 2018-01-18 | Stop reason: HOSPADM

## 2018-01-16 RX ADMIN — MYCOPHENOLATE MOFETIL 500 MG: 250 CAPSULE ORAL at 22:03

## 2018-01-16 RX ADMIN — IMMUNE GLOBULIN INTRAVENOUS (HUMAN) 35 G: 5 INJECTION, SOLUTION INTRAVENOUS at 22:21

## 2018-01-16 RX ADMIN — PYRIDOSTIGMINE BROMIDE 30 MG: 60 TABLET ORAL at 15:19

## 2018-01-16 RX ADMIN — Medication 10 ML: at 22:36

## 2018-01-16 RX ADMIN — PYRIDOSTIGMINE BROMIDE 30 MG: 60 TABLET ORAL at 08:57

## 2018-01-16 RX ADMIN — AZITHROMYCIN 250 MG: 250 TABLET, FILM COATED ORAL at 08:56

## 2018-01-16 RX ADMIN — MYCOPHENOLATE MOFETIL 500 MG: 250 CAPSULE ORAL at 08:56

## 2018-01-16 RX ADMIN — IRON SUCROSE 300 MG: 20 INJECTION, SOLUTION INTRAVENOUS at 15:17

## 2018-01-16 RX ADMIN — PREDNISONE 20 MG: 20 TABLET ORAL at 08:55

## 2018-01-16 RX ADMIN — PYRIDOSTIGMINE BROMIDE 60 MG: 60 TABLET ORAL at 22:03

## 2018-01-16 RX ADMIN — CALCIUM CARBONATE-CHOLECALCIFEROL TAB 250 MG-125 UNIT 250 MG: 250-125 TAB at 08:55

## 2018-01-16 RX ADMIN — ENOXAPARIN SODIUM 40 MG: 40 INJECTION SUBCUTANEOUS at 08:56

## 2018-01-16 RX ADMIN — Medication 500 MG: at 10:48

## 2018-01-16 RX ADMIN — Medication 10 ML: at 08:58

## 2018-01-16 RX ADMIN — ACETAMINOPHEN 650 MG: 325 TABLET ORAL at 22:03

## 2018-01-16 ASSESSMENT — PAIN SCALES - GENERAL
PAINLEVEL_OUTOF10: 4
PAINLEVEL_OUTOF10: 2
PAINLEVEL_OUTOF10: 0

## 2018-01-16 ASSESSMENT — PAIN DESCRIPTION - PAIN TYPE: TYPE: ACUTE PAIN

## 2018-01-16 ASSESSMENT — PAIN DESCRIPTION - LOCATION: LOCATION: HEAD

## 2018-01-16 NOTE — PROGRESS NOTES
NEUROLOGY INPATIENT PROGRESS NOTE    1/16/2018         Subjective: Francia Boone is a  36 y.o. female admitted on 1/12/2018 with Myasthenia gravis with exacerbation, juvenile form ([facility] [address] [G70.01]   Day 3/5 IVIG      Briefly, this is a  36 y.o. female admitted on 1/12/2018 with myasthenia gravis exacerbation with sinusitis with prior thymectomy undergoing IgG  . The condition is she reports thst facial weakness has improved with stil some residual still having some fatigue on chewing with improved limb strength in arms and legs . She is tolerating IgG well today being second out of 5 treaments  . There is no dyspnea  . Significant medications IgG qd x 5 days, mycophenylate 500 mg po bid , prednisone 20 mg po qd, mestinon 30-30-60 , keflex. She has been on imuran in the past  . Testing MRI of Head with normal brain with right maxillary sinusitis . CRP 1 , TSH normal, ESR 10 ,cholesterol 115 , LDL 48 , TG 88 . Hga1c 6.4 , MONET negative , Anti SSA and anti SSB are negative, ACE 31 (8-52) . CT chest subtle increased density within the anterior mediastinum possibly representing residual thickening or scarring.  No discrete anterior mediastinal mass identified . Acetylcholine binding Ab 478 (0--0.4), blocking Ab 63 (0-26 %) , striated muscle Ab <1:40 . Most recent FVC 1.65 L, NF -40                      No current facility-administered medications on file prior to encounter.       Current Outpatient Prescriptions on File Prior to Encounter   Medication Sig Dispense Refill    pyridostigmine (MESTINON) 60 MG tablet Take 1 tablet by mouth 3 times daily 45 tablet 0    clindamycin (CLEOCIN) 150 MG capsule Take 1 capsule by mouth 3 times daily for 10 days 30 capsule 0    cephALEXin (KEFLEX) 500 MG capsule Take 1 capsule by mouth 2 times daily for 10 days 20 capsule 0    predniSONE (DELTASONE) 20 MG tablet Take 1 po qd 30 tablet 5    mycophenolate (CELLCEPT) 500 MG tablet Take 1 po bid 60 tablet 5    ibuprofen

## 2018-01-16 NOTE — PLAN OF CARE
Problem: Falls - Risk of:  Goal: Absence of physical injury  Absence of physical injury   Outcome: Ongoing  Siderails up x 2  Hourly rounding. Call light in reach. Instructed to call for assist before attempting out of bed. Remains free from falls and accidental injury at this time. Floor free from obstacles, and bed is locked and in lowest position. Adequate lighting provided. Bed alarm on, red falling star and Stay with Me signs posted.

## 2018-01-16 NOTE — PROGRESS NOTES
exacerbations  -Pyridostigmine to 30mg, 30 mg, 60 mg  -as per neuro, IgG 0.4 grams /kg qd x 5 days (day 4/5)  - NIF, VC , TV q 12 hours  -PO prednisone 20 mg daily   -mycophenolate 500 mg BID  -IV fluids  -Leukocytosis resolved  -rapid flu negative     Sinusitis  -Continue Z-Milton (day 4/5)  -flonase  -nasal saline    Iron deficiency anemia  -IV venofer 300 mg daily for 3 doses  -reactive thrombocytosis      DVT PPX with lovenox  PT/OT    Outpatient sleep study     Likely D/C robbin Rey MD, PGY-1  Internal Medicine  [address]  1/16/18  11:17 AM

## 2018-01-16 NOTE — PROGRESS NOTES
IVIG started on patient. Patient without any complaints without signs of reaction with the start of administration. Patient educated on what signs and symptoms to report and watch for, and when to notify the nurse.

## 2018-01-17 LAB
CULTURE: NORMAL
Lab: NORMAL
Lab: NORMAL
SPECIMEN DESCRIPTION: NORMAL
SPECIMEN DESCRIPTION: NORMAL
STATUS: NORMAL
STATUS: NORMAL

## 2018-01-17 PROCEDURE — 2060000000 HC ICU INTERMEDIATE R&B

## 2018-01-17 PROCEDURE — 6360000002 HC RX W HCPCS: Performed by: PSYCHIATRY & NEUROLOGY

## 2018-01-17 PROCEDURE — 6370000000 HC RX 637 (ALT 250 FOR IP): Performed by: PSYCHIATRY & NEUROLOGY

## 2018-01-17 PROCEDURE — 94762 N-INVAS EAR/PLS OXIMTRY CONT: CPT

## 2018-01-17 PROCEDURE — 6370000000 HC RX 637 (ALT 250 FOR IP): Performed by: STUDENT IN AN ORGANIZED HEALTH CARE EDUCATION/TRAINING PROGRAM

## 2018-01-17 PROCEDURE — 6360000002 HC RX W HCPCS: Performed by: HOSPITALIST

## 2018-01-17 PROCEDURE — 6370000000 HC RX 637 (ALT 250 FOR IP): Performed by: HOSPITALIST

## 2018-01-17 PROCEDURE — 2580000003 HC RX 258: Performed by: HOSPITALIST

## 2018-01-17 PROCEDURE — 99232 SBSQ HOSP IP/OBS MODERATE 35: CPT | Performed by: INTERNAL MEDICINE

## 2018-01-17 PROCEDURE — 99232 SBSQ HOSP IP/OBS MODERATE 35: CPT | Performed by: PSYCHIATRY & NEUROLOGY

## 2018-01-17 RX ADMIN — MYCOPHENOLATE MOFETIL 500 MG: 250 CAPSULE ORAL at 21:04

## 2018-01-17 RX ADMIN — CALCIUM CARBONATE-CHOLECALCIFEROL TAB 250 MG-125 UNIT 250 MG: 250-125 TAB at 08:19

## 2018-01-17 RX ADMIN — IMMUNE GLOBULIN INTRAVENOUS (HUMAN) 35 G: 5 INJECTION, SOLUTION INTRAVENOUS at 21:04

## 2018-01-17 RX ADMIN — PYRIDOSTIGMINE BROMIDE 60 MG: 60 TABLET ORAL at 18:27

## 2018-01-17 RX ADMIN — Medication 500 MG: at 08:19

## 2018-01-17 RX ADMIN — Medication 10 ML: at 08:21

## 2018-01-17 RX ADMIN — MYCOPHENOLATE MOFETIL 500 MG: 250 CAPSULE ORAL at 08:20

## 2018-01-17 RX ADMIN — PYRIDOSTIGMINE BROMIDE 30 MG: 60 TABLET ORAL at 08:18

## 2018-01-17 RX ADMIN — AZITHROMYCIN 250 MG: 250 TABLET, FILM COATED ORAL at 08:20

## 2018-01-17 RX ADMIN — Medication 10 ML: at 21:04

## 2018-01-17 RX ADMIN — PREDNISONE 20 MG: 20 TABLET ORAL at 08:19

## 2018-01-17 RX ADMIN — ENOXAPARIN SODIUM 40 MG: 40 INJECTION SUBCUTANEOUS at 08:20

## 2018-01-17 RX ADMIN — ACETAMINOPHEN 650 MG: 325 TABLET ORAL at 21:04

## 2018-01-17 RX ADMIN — PYRIDOSTIGMINE BROMIDE 30 MG: 60 TABLET ORAL at 14:33

## 2018-01-17 ASSESSMENT — PAIN SCALES - GENERAL
PAINLEVEL_OUTOF10: 0
PAINLEVEL_OUTOF10: 0

## 2018-01-17 NOTE — PROGRESS NOTES
30 tablet 5    mycophenolate (CELLCEPT) 500 MG tablet Take 1 po bid 60 tablet 5    ibuprofen (ADVIL;MOTRIN) 800 MG tablet Take 1 tablet by mouth every 8 hours as needed for Pain Pt to start 1-2 days before her menses and stay on it until the end of menses 60 tablet 3    Calcium Carb-Cholecalciferol (OYSCO D) 250-125 MG-UNIT TABS Take 1 tablet by mouth daily 30 tablet 11       Allergies: Tia Trujillo is allergic to flexeril [cyclobenzaprine]; norflex [orphenadrine citrate]; nsaids; penicillins; and vancomycin. Past Medical History:   Diagnosis Date    Anemia     Headache     Hypertension     Myasthenia gravis ([facility] [address]     Pre-diabetes        Past Surgical History:   Procedure Laterality Date    CARDIAC SURGERY             Medications:     vitamin C  500 mg Oral Daily    predniSONE  20 mg Oral Daily    Immune Globulin 10% IV INFUSION  35 g Intravenous Daily    oyster shell calcium/vitamin D  1 tablet Oral Daily    mycophenolate  500 mg Oral BID    sodium chloride flush  10 mL Intravenous 2 times per day    enoxaparin  40 mg Subcutaneous Daily    pyridostigmine  60 mg Oral QPM    pyridostigmine  30 mg Oral BID- 8&2     PRN Meds include: sodium chloride flush, acetaminophen, magnesium hydroxide, ondansetron, potassium chloride **OR** potassium chloride **OR** potassium chloride, sodium chloride    Objective:   /81   Pulse 86   Temp 98.3 °F (36.8 °C) (Oral)   Resp 21   Ht 5' 4\" (1.626 m)   Wt 180 lb (81.6 kg)   LMP 12/28/2017   SpO2 98%   BMI 30.90 kg/m²     Blood pressure range: Systolic (19TOM), BOX:526 , Min:122 , HARIS:508   ; Diastolic (82TTY), IGZ:67, Min:81, Max:100  Alert and coherent with normal mental status exam.  Cranial nerves II through XII intact to specifically noting EOMI, no diplopia, no ptosis, no facial weakness (though the patient is complaining of a funny feeling around her lips diffusely), no bulbar weakness. Neck flexion and extension full strength.

## 2018-01-17 NOTE — PROGRESS NOTES
extremity edema, ulcerations, tenderness, varicosities or erythema. Muscle size, tone and strength are normal.  No involuntary movements are noted. Skin:  Warm and dry. Good color, turgor and pigmentation. No lesions or scars. No cyanosis or clubbing  Neurological/Psychiatric: The patient's general behavior, level of consciousness, thought content and emotional status is normal.            Medications:    Scheduled Meds:   vitamin C  500 mg Oral Daily    predniSONE  20 mg Oral Daily    Immune Globulin 10% IV INFUSION  35 g Intravenous Daily    oyster shell calcium/vitamin D  1 tablet Oral Daily    mycophenolate  500 mg Oral BID    sodium chloride flush  10 mL Intravenous 2 times per day    enoxaparin  40 mg Subcutaneous Daily    pyridostigmine  60 mg Oral QPM    pyridostigmine  30 mg Oral BID- 8&2     Continuous Infusions:   sodium chloride Stopped (01/13/18 1830)     PRN Medications: sodium chloride flush, acetaminophen, magnesium hydroxide, ondansetron, potassium chloride **OR** potassium chloride **OR** potassium chloride, sodium chloride    Diet: DIET GENERAL;    Laboratory findings:    CBC:   No results for input(s): WBC, HGB, HCT, PLT in the last 72 hours. BMP:   No results for input(s): NA, K, CL, CO2, BUN, CREATININE, GLUCOSE in the last 72 hours. Invalid input(s):  CA,  PHOS  Hepatic functions:   No results for input(s): AST, ALT, ALB, BILITOT, ALKPHOS in the last 72 hours. INR:   No results for input(s): INR in the last 72 hours. S. Lactic Acid: No results for input(s): LACTA in the last 72 hours. Cardiac enzymes:No results for input(s): CKTOTAL, CKMB, CKMBINDEX, TROPONINI in the last 72 hours.       ASSESSMENT:      Principal Problem:    Myasthenia gravis with exacerbation (HCC)  Active Problems:    Iron deficiency anemia    Sinusitis    Myasthenic crisis (HCC)      PLAN:    Myasthenia Gravis exacerbations  -Pyridostigmine to 30mg, 30 mg, 60 mg  -as per neuro, IgG 0.4 grams /kg qd x 5 days (day 5/5)  - NIF, VC , TV q 12 hours  -PO prednisone 20 mg daily   -mycophenolate 500 mg BID  -IV fluids  -Leukocytosis resolved  -rapid flu negative     Sinusitis  -Continue Z-Milton (day 5/5)  -flonase  -nasal saline    Iron deficiency anemia  -IV venofer 300 mg daily for 3 doses, complete  -reactive thrombocytosis      DVT PPX with lovenox  PT/OT    Outpatient sleep study     Cleared for DC today after last dose of IVIG given.  As per patient, her transportation would be easier tomorrow morning as opposed to later tonight so can DC robbin Valdivia MD, PGY-1  Internal Medicine  [address]  1/17/18  12:54 PM

## 2018-01-18 VITALS
RESPIRATION RATE: 18 BRPM | OXYGEN SATURATION: 98 % | TEMPERATURE: 97.7 F | HEIGHT: 64 IN | SYSTOLIC BLOOD PRESSURE: 132 MMHG | WEIGHT: 180 LBS | DIASTOLIC BLOOD PRESSURE: 92 MMHG | BODY MASS INDEX: 30.73 KG/M2 | HEART RATE: 104 BPM

## 2018-01-18 PROCEDURE — 6370000000 HC RX 637 (ALT 250 FOR IP): Performed by: HOSPITALIST

## 2018-01-18 PROCEDURE — 6370000000 HC RX 637 (ALT 250 FOR IP): Performed by: PSYCHIATRY & NEUROLOGY

## 2018-01-18 PROCEDURE — 99232 SBSQ HOSP IP/OBS MODERATE 35: CPT | Performed by: PSYCHIATRY & NEUROLOGY

## 2018-01-18 PROCEDURE — 6370000000 HC RX 637 (ALT 250 FOR IP): Performed by: STUDENT IN AN ORGANIZED HEALTH CARE EDUCATION/TRAINING PROGRAM

## 2018-01-18 PROCEDURE — 6360000002 HC RX W HCPCS: Performed by: HOSPITALIST

## 2018-01-18 PROCEDURE — 99232 SBSQ HOSP IP/OBS MODERATE 35: CPT | Performed by: INTERNAL MEDICINE

## 2018-01-18 RX ORDER — PYRIDOSTIGMINE BROMIDE 60 MG/1
60 TABLET ORAL EVERY EVENING
Qty: 60 TABLET | Refills: 3 | Status: SHIPPED | OUTPATIENT
Start: 2018-01-18 | End: 2018-02-05 | Stop reason: DRUGHIGH

## 2018-01-18 RX ORDER — PYRIDOSTIGMINE BROMIDE 60 MG/1
30 TABLET ORAL 2 TIMES DAILY
Qty: 60 TABLET | Refills: 3 | Status: SHIPPED | OUTPATIENT
Start: 2018-01-18 | End: 2018-02-05 | Stop reason: DRUGHIGH

## 2018-01-18 RX ORDER — ECHINACEA PURPUREA EXTRACT 125 MG
1 TABLET ORAL PRN
Qty: 1 BOTTLE | Refills: 2 | Status: SHIPPED | OUTPATIENT
Start: 2018-01-18 | End: 2018-04-16 | Stop reason: ALTCHOICE

## 2018-01-18 RX ORDER — ASCORBIC ACID 500 MG
500 TABLET ORAL DAILY
Qty: 30 TABLET | Refills: 3 | Status: SHIPPED | OUTPATIENT
Start: 2018-01-18 | End: 2018-05-31 | Stop reason: SDUPTHER

## 2018-01-18 RX ADMIN — Medication 500 MG: at 08:27

## 2018-01-18 RX ADMIN — PREDNISONE 20 MG: 20 TABLET ORAL at 08:27

## 2018-01-18 RX ADMIN — MYCOPHENOLATE MOFETIL 500 MG: 250 CAPSULE ORAL at 08:27

## 2018-01-18 RX ADMIN — PYRIDOSTIGMINE BROMIDE 30 MG: 60 TABLET ORAL at 08:28

## 2018-01-18 RX ADMIN — CALCIUM CARBONATE-CHOLECALCIFEROL TAB 250 MG-125 UNIT 250 MG: 250-125 TAB at 08:27

## 2018-01-18 NOTE — PROGRESS NOTES
above were negative  Neurological ROS:  Completed and except as mentioned above were negative  Dermatological ROS:  Completed and except as mentioned above were negative      OBJECTIVE:    Vitals: BP (!) 138/98   Pulse 78   Temp 97.7 °F (36.5 °C) (Oral)   Resp 21   Ht 5' 4\" (1.626 m)   Wt 180 lb (81.6 kg)   LMP 12/28/2017   SpO2 97%   BMI 30.90 kg/m²     Last Body weight:   Wt Readings from Last 3 Encounters:   01/12/18 180 lb (81.6 kg)   01/11/18 180 lb (81.6 kg)   12/21/17 180 lb (81.6 kg)       Intake and Output summary:     Intake/Output Summary (Last 24 hours) at 01/18/18 0715  Last data filed at 01/18/18 1994   Gross per 24 hour   Intake             1600 ml   Output                0 ml   Net             1600 ml         PHYSICAL EXAMINATION:  Vitals:    01/17/18 2230 01/17/18 2300 01/17/18 2330 01/18/18 0400   BP: 111/78 116/77 117/71 (!) 138/98   Pulse: 90 81 86 78   Resp: 20 22 17 21   Temp:   97.6 °F (36.4 °C) 97.7 °F (36.5 °C)   TempSrc:   Oral Oral   SpO2:   96% 97%   Weight:       Height:         Constitutional: This is a well developed, well nourished. Head:normocephalic and atraumatic. EENT:   KATIA. No conjunctival injections. No thrush was noted. Neck: Supple without thyromegaly. No elevated JVP. Trachea was midline. Respiratory: Chest was symmetrical without dullness to percussion. Breath sounds bilaterally were clear to auscultation. There were no wheezes, rhonchi or rales. There is no intercostal retraction or use of accessory muscles. Cardiovascular: Regular without murmur, clicks, gallops or rubs. There is no left or right ventricular heave. Abdomen: Slightly rounded and soft without organomegaly. No rebound tenderness, rigidity or guarding was appreciated. Lymphatic: No lymphadenopathy. Musculoskeletal: Normal curvature of the spine. No gross muscle weakness. Extremities:  No lower extremity edema, ulcerations, tenderness, varicosities or erythema.   Muscle size, tone and strength are normal.  No involuntary movements are noted. Skin:  Warm and dry. Good color, turgor and pigmentation. No lesions or scars. No cyanosis or clubbing  Neurological/Psychiatric: The patient's general behavior, level of consciousness, thought content and emotional status is normal.            Medications:    Scheduled Meds:   vitamin C  500 mg Oral Daily    predniSONE  20 mg Oral Daily    oyster shell calcium/vitamin D  1 tablet Oral Daily    mycophenolate  500 mg Oral BID    sodium chloride flush  10 mL Intravenous 2 times per day    enoxaparin  40 mg Subcutaneous Daily    pyridostigmine  60 mg Oral QPM    pyridostigmine  30 mg Oral BID- 8&2     Continuous Infusions:   sodium chloride Stopped (01/13/18 1830)     PRN Medications: sodium chloride flush, acetaminophen, magnesium hydroxide, ondansetron, potassium chloride **OR** potassium chloride **OR** potassium chloride, sodium chloride    Diet: DIET GENERAL;    Laboratory findings:    CBC:   No results for input(s): WBC, HGB, HCT, PLT in the last 72 hours. BMP:   No results for input(s): NA, K, CL, CO2, BUN, CREATININE, GLUCOSE in the last 72 hours. Invalid input(s):  CA,  PHOS  Hepatic functions:   No results for input(s): AST, ALT, ALB, BILITOT, ALKPHOS in the last 72 hours. INR:   No results for input(s): INR in the last 72 hours. S. Lactic Acid: No results for input(s): LACTA in the last 72 hours. Cardiac enzymes:No results for input(s): CKTOTAL, CKMB, CKMBINDEX, TROPONINI in the last 72 hours.       ASSESSMENT:      Principal Problem:    Myasthenia gravis with exacerbation (HCC)  Active Problems:    Iron deficiency anemia    Sinusitis    Myasthenic crisis (HCC)      PLAN:  Myasthenia Gravis exacerbations  -Pyridostigmine to 30mg, 30 mg, 60 mg  -as per neuro, IgG 0.4 grams /kg qd x 5 days complete  - NIF, VC , TV q 12 hours  -PO prednisone 20 mg daily   -mycophenolate 500 mg BID  -Leukocytosis resolved  -rapid flu negative     Sinusitis  -Z- pack complete  -flonase  -nasal saline    Iron deficiency anemia  -IV venofer 300 mg daily for 3 doses  -reactive thrombocytosis      DVT PPX with lovenox  PT/OT    Outpatient sleep study     DC today     Uyen Castillo MD, PGY-1  Internal Medicine  [facility] [place]  1/18/18  7:16 AM

## 2018-01-18 NOTE — PROGRESS NOTES
250-125 MG-UNIT TABS Take 1 tablet by mouth daily 30 tablet 11       Allergies: Jayedn Adamson is allergic to flexeril [cyclobenzaprine]; norflex [orphenadrine citrate]; nsaids; penicillins; and vancomycin. Past Medical History:   Diagnosis Date    Anemia     Headache     Hypertension     [facility]ia [facility]is ([facility] [address]     Pre-diabetes        Past Surgical History:   Procedure Laterality Date    CARDIAC SURGERY             Medications:     vitamin C  500 mg Oral Daily    predniSONE  20 mg Oral Daily    oyster shell calcium/vitamin D  1 tablet Oral Daily    mycophenolate  500 mg Oral BID    sodium chloride flush  10 mL Intravenous 2 times per day    enoxaparin  40 mg Subcutaneous Daily    pyridostigmine  60 mg Oral QPM    pyridostigmine  30 mg Oral BID- 8&2     PRN Meds include: sodium chloride flush, acetaminophen, magnesium hydroxide, ondansetron, potassium chloride **OR** potassium chloride **OR** potassium chloride, sodium chloride    Objective:   BP (!) 132/92   Pulse 104   Temp 97.7 °F (36.5 °C) (Oral)   Resp 18   Ht 5' 4\" (1.626 m)   Wt 180 lb (81.6 kg)   LMP 12/28/2017   SpO2 98%   BMI 30.90 kg/m²     Blood pressure range: Systolic (97THL), TBV:029 , Min:103 , YZU:586   ; Diastolic (45TEE), QYW:89, Min:62, Max:98  Alert and coherent with normal mental status exam.  Cranial nerves II through XII intact to specifically noting EOMI, no diplopia, no ptosis, no facial weakness (though the patient is complaining of a funny feeling around her lips diffusely), no bulbar weakness. Neck flexion and extension full strength. Full strength deltoids, EDC with no EDC fatigue. Data:    Lab Results:   CBC:   No results for input(s): WBC, HGB, PLT in the last 72 hours. BMP:    No results for input(s): NA, K, CL, CO2, BUN, CREATININE, GLUCOSE in the last 72 hours.       Lab Results   Component Value Date    CHOL 115 05/04/2017    LDLCHOLESTEROL 48 05/04/2017    HDL 49 05/04/2017    TRIG 88 05/04/2017    ALT 38 (H) 01/13/2018    AST 38 (H) 01/13/2018    TSH 3.13 11/08/2017    INR 1.0 01/11/2018    LABA1C 7.0 (H) 11/08/2017    LABMICR 13 06/02/2017     Impression and recommendations    Relatively mild generalized MG exacerbation with no apparent myasthenic weakness on exam today. This is day 4/5 of a planned 5 day course of IVIG at total 2 g/kg. Patient takes 3 doses Mestinon daily at 6 hour intervals beginning at 08 100 with the first 2 doses 30 mg and the third dose 60 mg. Neurologically clear.

## 2018-01-22 ENCOUNTER — OFFICE VISIT (OUTPATIENT)
Dept: INTERNAL MEDICINE | Age: 43
End: 2018-01-22
Payer: MEDICARE

## 2018-01-22 VITALS
DIASTOLIC BLOOD PRESSURE: 88 MMHG | WEIGHT: 181 LBS | BODY MASS INDEX: 31.07 KG/M2 | HEART RATE: 84 BPM | SYSTOLIC BLOOD PRESSURE: 136 MMHG

## 2018-01-22 DIAGNOSIS — G70.00 MYASTHENIA GRAVIS (HCC): Primary | ICD-10-CM

## 2018-01-22 DIAGNOSIS — M85.80 OSTEOPENIA, UNSPECIFIED LOCATION: ICD-10-CM

## 2018-01-22 DIAGNOSIS — B35.1 ONYCHOMYCOSIS: ICD-10-CM

## 2018-01-22 DIAGNOSIS — D50.9 IRON DEFICIENCY ANEMIA, UNSPECIFIED IRON DEFICIENCY ANEMIA TYPE: ICD-10-CM

## 2018-01-22 DIAGNOSIS — R03.0 PRE-HYPERTENSION: ICD-10-CM

## 2018-01-22 DIAGNOSIS — E11.8 TYPE 2 DIABETES MELLITUS WITH COMPLICATION, UNSPECIFIED LONG TERM INSULIN USE STATUS: ICD-10-CM

## 2018-01-22 PROCEDURE — 1111F DSCHRG MED/CURRENT MED MERGE: CPT | Performed by: HOSPITALIST

## 2018-01-22 PROCEDURE — G8417 CALC BMI ABV UP PARAM F/U: HCPCS | Performed by: HOSPITALIST

## 2018-01-22 PROCEDURE — 1036F TOBACCO NON-USER: CPT | Performed by: HOSPITALIST

## 2018-01-22 PROCEDURE — 3046F HEMOGLOBIN A1C LEVEL >9.0%: CPT | Performed by: HOSPITALIST

## 2018-01-22 PROCEDURE — 99214 OFFICE O/P EST MOD 30 MIN: CPT | Performed by: HOSPITALIST

## 2018-01-22 PROCEDURE — G8427 DOCREV CUR MEDS BY ELIG CLIN: HCPCS | Performed by: HOSPITALIST

## 2018-01-22 PROCEDURE — G8484 FLU IMMUNIZE NO ADMIN: HCPCS | Performed by: HOSPITALIST

## 2018-01-22 RX ORDER — BLOOD PRESSURE TEST KIT
KIT MISCELLANEOUS
Qty: 1 KIT | Refills: 0 | Status: SHIPPED | OUTPATIENT
Start: 2018-01-22 | End: 2018-02-05

## 2018-01-22 RX ORDER — BLOOD-GLUCOSE METER
KIT MISCELLANEOUS
Qty: 1 KIT | Refills: 0 | Status: SHIPPED | OUTPATIENT
Start: 2018-01-22

## 2018-01-22 RX ORDER — LANOLIN ALCOHOL/MO/W.PET/CERES
325 CREAM (GRAM) TOPICAL
Qty: 90 TABLET | Refills: 5 | Status: SHIPPED | OUTPATIENT
Start: 2018-01-22 | End: 2018-10-29

## 2018-01-22 RX ORDER — LANCETS
EACH MISCELLANEOUS
Qty: 100 EACH | Refills: 3 | Status: SHIPPED | OUTPATIENT
Start: 2018-01-22 | End: 2018-04-16 | Stop reason: SDUPTHER

## 2018-01-22 RX ORDER — B-COMPLEX WITH VITAMIN C
2 TABLET ORAL DAILY
Qty: 60 TABLET | Refills: 2 | Status: SHIPPED | OUTPATIENT
Start: 2018-01-22 | End: 2018-07-20 | Stop reason: SDUPTHER

## 2018-01-22 RX ORDER — GLUCOSAMINE HCL/CHONDROITIN SU 500-400 MG
CAPSULE ORAL
Qty: 100 STRIP | Refills: 11 | Status: SHIPPED | OUTPATIENT
Start: 2018-01-22 | End: 2018-10-23 | Stop reason: SDUPTHER

## 2018-01-22 NOTE — PROGRESS NOTES
Visit Information    Have you changed or started any medications since your last visit including any over-the-counter medicines, vitamins, or herbal medicines? no   Are you having any side effects from any of your medications? -  no  Have you stopped taking any of your medications? Is so, why? -  no    Have you seen any other physician or provider since your last visit? No  Have you had any other diagnostic tests since your last visit? No  Have you been seen in the emergency room and/or had an admission to a hospital since we last saw you? Yes - Records Obtained  Have you had your routine dental cleaning in the past 6 months? no    Have you activated your [employer] account? If not, what are your barriers?  No:      Patient Care Team:  Chanelle Jeffries MD as PCP - General (Internal Medicine)    Medical History Review  Past Medical, Family, and Social History reviewed and does contribute to the patient presenting condition    Health Maintenance   Topic Date Due    DTaP/Tdap/Td vaccine (1 - Tdap) 05/27/1993    Flu vaccine (1) 09/01/2017    A1C test (Diabetic or Prediabetic)  11/08/2018    Lipid screen  05/04/2022    Cervical cancer screen  11/08/2022    HIV screen  Completed

## 2018-01-22 NOTE — PROGRESS NOTES
[facility]    Progress Note    Date of patient's visit: 1/22/2018  YOB: 1975  Patient's Name:  Harvinder Sultana    Patient Care Team:  Juan Jose Jones MD as PCP - General (Internal Medicine)    REASON FOR VISIT: Routine outpatient follow     HISTORY OF PRESENT ILLNESS:    History was obtained from the patient. Harvinder Sultana is a 36 y.o. is here for a  Follow-up visit. Patient has a history of myasthenia gravis and is on prednisone 20 mg daily, mycophenolate 500 mg 2 times a day and Mestinon 30 mg in the morning, noon and 60 mg nightly. She had a recent exacerbation of myasthenia and was discharged from the hospital, she was treated with IV immunoglobulins. Patient also has elevated blood pressure during last clinic visit in December and today. We will give her blood pressure measuring kit. Patient's HbA1c in November 2017 was 7 and has been elevated in the past as well but all these days patient has been refusing diabetic this treatment and agrees to  start on treatment now. She is okay with starting metformin. Patient also has iron deficiency anemia and is on ferrous sulfate. She had a DEXA scan and is taking calcium 500 mg daily.         Patient Active Problem List   Diagnosis    Iron deficiency anemia    Pre-diabetes    Right maxillary sinusitis, chronic    Clonus    Myasthenia gravis with exacerbation, juvenile form ([address]    Myasthenia gravis with exacerbation (HCC)    Sinusitis    Myasthenic crisis (HCC)       ALLERGIES      Allergies   Allergen Reactions    Flexeril [Cyclobenzaprine]     Norflex [Orphenadrine Citrate]     Nsaids     Penicillins Rash    Vancomycin Rash         MEDICATIONS:      Current Outpatient Prescriptions on File Prior to Visit   Medication Sig Dispense Refill    pyridostigmine (MESTINON) 60 MG tablet Take 1 tablet by mouth every evening 60 tablet 3    predniSONE (DELTASONE) 20 MG

## 2018-01-22 NOTE — PATIENT INSTRUCTIONS
Your medications for this visit were escribed to your preferred pharmacy. Your script for blood pressure kit, alcohol prep pads, and glucose machine has been printed and given to you, you can take it to the pharmacy of your choice. Avs was given and reviewed appt card given with next appt.      MS

## 2018-01-26 ENCOUNTER — TELEPHONE (OUTPATIENT)
Dept: INTERNAL MEDICINE | Age: 43
End: 2018-01-26

## 2018-01-26 DIAGNOSIS — R03.0 PREHYPERTENSION: Primary | ICD-10-CM

## 2018-02-05 ENCOUNTER — TELEPHONE (OUTPATIENT)
Dept: NEUROLOGY | Age: 43
End: 2018-02-05

## 2018-02-05 ENCOUNTER — OFFICE VISIT (OUTPATIENT)
Dept: INTERNAL MEDICINE | Age: 43
End: 2018-02-05
Payer: MEDICARE

## 2018-02-05 VITALS
TEMPERATURE: 97.9 F | SYSTOLIC BLOOD PRESSURE: 118 MMHG | BODY MASS INDEX: 29.9 KG/M2 | WEIGHT: 174.2 LBS | DIASTOLIC BLOOD PRESSURE: 86 MMHG | HEART RATE: 85 BPM

## 2018-02-05 DIAGNOSIS — R19.7 DIARRHEA, UNSPECIFIED TYPE: ICD-10-CM

## 2018-02-05 DIAGNOSIS — G70.00 MYASTHENIA GRAVIS (HCC): Primary | ICD-10-CM

## 2018-02-05 DIAGNOSIS — R30.0 DYSURIA: ICD-10-CM

## 2018-02-05 LAB
BILIRUBIN, POC: NEGATIVE
BLOOD URINE, POC: NEGATIVE
CLARITY, POC: CLEAR
COLOR, POC: YELLOW
GLUCOSE URINE, POC: NEGATIVE
KETONES, POC: NORMAL
LEUKOCYTE EST, POC: NEGATIVE
NITRITE, POC: NEGATIVE
PH, POC: 5
PROTEIN, POC: NEGATIVE
SPECIFIC GRAVITY, POC: 1
UROBILINOGEN, POC: 0.2

## 2018-02-05 PROCEDURE — G8427 DOCREV CUR MEDS BY ELIG CLIN: HCPCS | Performed by: HOSPITALIST

## 2018-02-05 PROCEDURE — 1111F DSCHRG MED/CURRENT MED MERGE: CPT | Performed by: HOSPITALIST

## 2018-02-05 PROCEDURE — 1036F TOBACCO NON-USER: CPT | Performed by: HOSPITALIST

## 2018-02-05 PROCEDURE — G8484 FLU IMMUNIZE NO ADMIN: HCPCS | Performed by: HOSPITALIST

## 2018-02-05 PROCEDURE — G8417 CALC BMI ABV UP PARAM F/U: HCPCS | Performed by: HOSPITALIST

## 2018-02-05 PROCEDURE — 99213 OFFICE O/P EST LOW 20 MIN: CPT | Performed by: HOSPITALIST

## 2018-02-05 RX ORDER — BLOOD PRESSURE TEST KIT
KIT MISCELLANEOUS
COMMUNITY
Start: 2018-01-23 | End: 2018-04-28 | Stop reason: SDUPTHER

## 2018-02-05 NOTE — PROGRESS NOTES
[facility]    Progress Note    Date of patient's visit: 2/5/2018  YOB: 1975  Patient's Name:  Maxwell Amanda    Patient Care Team:  Chito Riojas MD as PCP - General (Internal Medicine)    REASON FOR VISIT: Routine outpatient follow     HISTORY OF PRESENT ILLNESS:    History was obtained from the patient. Maxwell Parent is a 36 y.o. is here for a same day visit . Patient has a history of known myasthenia gravis and was recently in the hospital for myasthenia exacerbation  She is on prednisone 20 mg daily, mycophenolate 500 mg twice a day and Mestinon 30 mg 3 times a day. She states that since she has been on Mestinon she has been having diarrhea, sweats, shivers and dark urine along with spotting. Patient states that she has some weakness around her facial muscles. She has diarrhea at least 3 times a day which is watery and happens as soon as she eats. He did not have any bowel movement today because she has not taken her Mestinon  She called her neurologist Dr. Rosie Gloria office and was asked to go to her primary care's clinic as  was on vacation and the person in the office did not think her symptoms were due to her medications. She does not think that her myasthenia is acting up. Patient denies any fever, nausea, vomiting, dizziness.         Patient Active Problem List   Diagnosis    Iron deficiency anemia    Pre-diabetes    Right maxillary sinusitis, chronic    Clonus    Myasthenia gravis with exacerbation, juvenile form ([facility] [address]    Myasthenia gravis with exacerbation (HCC)    Sinusitis    Myasthenic crisis (HCC)       ALLERGIES      Allergies   Allergen Reactions    Flexeril [Cyclobenzaprine]     Norflex [Orphenadrine Citrate]     Nsaids     Penicillins Rash    Vancomycin Rash         MEDICATIONS:      Current Outpatient Prescriptions on File Prior to Visit   Medication Sig Dispense Refill    facial muscles  Dermatological: negative    PHYSICAL EXAM:      Vitals:    02/05/18 1059   BP: 118/86   Pulse: 85   Temp: 97.9 °F (36.6 °C)     General appearance - alert, well appearing, and in no distress  Mental status - alert, oriented to person, place, and time  Eyes - pupils equal and reactive, extraocular eye movements intact  Ears - bilateral TM's and external ear canals normal  Nose - normal and patent, no erythema, discharge or polyps  Mouth - mucous membranes moist, pharynx normal without lesions  Chest - clear to auscultation, no wheezes, rales or rhonchi, symmetric air entry  Heart - normal rate, regular rhythm, normal S1, S2, no murmurs, rubs, clicks or gallops  Abdomen - soft, nontender, nondistended, no masses or organomegaly  Neurological - alert, oriented, normal speech, no focal findings or movement disorder noted, unable to blow, there was a leak  Musculoskeletal - no joint tenderness, deformity or swelling  Extremities - peripheral pulses normal, no pedal edema, no clubbing or cyanosis    LABORATORY FINDINGS:    CBC:  Lab Results   Component Value Date    WBC 11.2 01/14/2018    HGB 9.4 01/14/2018     01/14/2018     BMP:    Lab Results   Component Value Date     01/14/2018    K 3.9 01/14/2018     01/14/2018    CO2 18 01/14/2018    BUN 6 01/14/2018    CREATININE 0.54 01/14/2018    GLUCOSE 94 01/14/2018     HEMOGLOBIN A1C:   Lab Results   Component Value Date    LABA1C 7.0 11/08/2017     MICROALBUMIN URINE:   Lab Results   Component Value Date    MICROALBUR <12 06/02/2017     FASTING LIPID PANEL:  Lab Results   Component Value Date    CHOL 115 05/04/2017    HDL 49 05/04/2017    TRIG 88 05/04/2017     LIVER PROFILE:  Lab Results   Component Value Date    ALT 38 01/13/2018    AST 38 01/13/2018    PROT 6.7 01/13/2018    BILITOT 0.50 01/13/2018    BILIDIR 0.16 01/11/2018    LABALBU 3.6 01/13/2018      THYROID FUNCTION:   Lab Results   Component Value Date    TSH 3.13 11/08/2017 URINE ANALYSIS: No results found for: LABURIN  ASSESSMENT AND PLAN:    1. Myasthenia gravis (HCC)  - Continue prednisone 20 mg daily, mycophenolate 500 mg 2 times a day  - We will reduce the dose of Mestinon to 30 mg daily  - Follow up in clinic in 4-5 days, if symptoms worsen please call the office or go to the ER immediately    2. Diarrhea, unspecified type  - We will reduce the dose of Mestinon to 30 mg daily    3. Dysuria    - POCT Urinalysis no Micro  - normal            FOLLOW UP:       1. Edwin Maldonado received counseling on the following healthy behaviors: nutrition, exercise and medication adherence  2. Discussed use, benefit, and side effects of prescribed medications. Barriers to medication compliance addressed. All patient questions answered. Pt voiced understanding. 3.  Reviewed prior labs and health maintenance  4. Continue current medications, diet and exercise.   5. F/u on Thursday or Friday 2/8/18 or 2/9/18       If symptoms worsen please call the office or ER        Alisha Garcia MD    PGY 3   Department of Internal Medicine  [facility]         2/5/2018, 12:23 PM

## 2018-02-09 ENCOUNTER — OFFICE VISIT (OUTPATIENT)
Dept: INTERNAL MEDICINE | Age: 43
End: 2018-02-09
Payer: MEDICARE

## 2018-02-09 VITALS
HEART RATE: 92 BPM | SYSTOLIC BLOOD PRESSURE: 123 MMHG | DIASTOLIC BLOOD PRESSURE: 92 MMHG | HEIGHT: 64 IN | BODY MASS INDEX: 29.88 KG/M2 | WEIGHT: 175 LBS

## 2018-02-09 DIAGNOSIS — R19.7 DIARRHEA, UNSPECIFIED TYPE: ICD-10-CM

## 2018-02-09 DIAGNOSIS — E11.8 TYPE 2 DIABETES MELLITUS WITH COMPLICATION, UNSPECIFIED LONG TERM INSULIN USE STATUS: ICD-10-CM

## 2018-02-09 DIAGNOSIS — G70.00 MYASTHENIA GRAVIS (HCC): Primary | ICD-10-CM

## 2018-02-09 DIAGNOSIS — R30.0 DYSURIA: ICD-10-CM

## 2018-02-09 DIAGNOSIS — D50.9 IRON DEFICIENCY ANEMIA, UNSPECIFIED IRON DEFICIENCY ANEMIA TYPE: ICD-10-CM

## 2018-02-09 PROCEDURE — G8427 DOCREV CUR MEDS BY ELIG CLIN: HCPCS | Performed by: STUDENT IN AN ORGANIZED HEALTH CARE EDUCATION/TRAINING PROGRAM

## 2018-02-09 PROCEDURE — 3046F HEMOGLOBIN A1C LEVEL >9.0%: CPT | Performed by: STUDENT IN AN ORGANIZED HEALTH CARE EDUCATION/TRAINING PROGRAM

## 2018-02-09 PROCEDURE — 1111F DSCHRG MED/CURRENT MED MERGE: CPT | Performed by: STUDENT IN AN ORGANIZED HEALTH CARE EDUCATION/TRAINING PROGRAM

## 2018-02-09 PROCEDURE — 1036F TOBACCO NON-USER: CPT | Performed by: STUDENT IN AN ORGANIZED HEALTH CARE EDUCATION/TRAINING PROGRAM

## 2018-02-09 PROCEDURE — 99213 OFFICE O/P EST LOW 20 MIN: CPT | Performed by: STUDENT IN AN ORGANIZED HEALTH CARE EDUCATION/TRAINING PROGRAM

## 2018-02-09 PROCEDURE — G8484 FLU IMMUNIZE NO ADMIN: HCPCS | Performed by: STUDENT IN AN ORGANIZED HEALTH CARE EDUCATION/TRAINING PROGRAM

## 2018-02-09 PROCEDURE — G8417 CALC BMI ABV UP PARAM F/U: HCPCS | Performed by: STUDENT IN AN ORGANIZED HEALTH CARE EDUCATION/TRAINING PROGRAM

## 2018-02-09 RX ORDER — PYRIDOSTIGMINE BROMIDE 60 MG/1
30 TABLET ORAL DAILY
Qty: 60 TABLET | Refills: 3 | Status: SHIPPED | OUTPATIENT
Start: 2018-02-09 | End: 2018-10-15 | Stop reason: CLARIF

## 2018-02-13 ENCOUNTER — OFFICE VISIT (OUTPATIENT)
Dept: OBGYN | Age: 43
End: 2018-02-13
Payer: MEDICARE

## 2018-02-13 VITALS
SYSTOLIC BLOOD PRESSURE: 126 MMHG | DIASTOLIC BLOOD PRESSURE: 89 MMHG | WEIGHT: 170 LBS | HEIGHT: 64 IN | HEART RATE: 106 BPM | BODY MASS INDEX: 29.02 KG/M2

## 2018-02-13 DIAGNOSIS — N92.2 EXCESSIVE MENSTRUATION AT PUBERTY: ICD-10-CM

## 2018-02-13 DIAGNOSIS — N93.9 VAGINAL BLEEDING: Primary | ICD-10-CM

## 2018-02-13 PROBLEM — N92.0 HEAVY PERIOD: Status: ACTIVE | Noted: 2018-02-13

## 2018-02-13 PROCEDURE — 99213 OFFICE O/P EST LOW 20 MIN: CPT | Performed by: OBSTETRICS & GYNECOLOGY

## 2018-02-13 PROCEDURE — G8484 FLU IMMUNIZE NO ADMIN: HCPCS | Performed by: OBSTETRICS & GYNECOLOGY

## 2018-02-13 PROCEDURE — 1111F DSCHRG MED/CURRENT MED MERGE: CPT | Performed by: OBSTETRICS & GYNECOLOGY

## 2018-02-13 PROCEDURE — G8427 DOCREV CUR MEDS BY ELIG CLIN: HCPCS | Performed by: OBSTETRICS & GYNECOLOGY

## 2018-02-13 PROCEDURE — 1036F TOBACCO NON-USER: CPT | Performed by: OBSTETRICS & GYNECOLOGY

## 2018-02-13 PROCEDURE — G8417 CALC BMI ABV UP PARAM F/U: HCPCS | Performed by: OBSTETRICS & GYNECOLOGY

## 2018-02-13 NOTE — PROGRESS NOTES
alternatives to Mirena IUD including but not limited to risk of uterine perforation, cervical laceration, IUD expulsion, abnormal bleeding, cramping, IUD malplacement requiring possible further surgery, and, in the rare case of pregnancy, 20% risk of ectopic pregnancy. Patient requests to proceed with IUD placement. Will return to clinic in next few weeks for placement. She has no further questions at this time. Obstetric History       T0      L0     SAB0   TAB0   Ectopic0   Molar0   Multiple0   Live Births0           Past Medical History:   Diagnosis Date    Anemia     Headache     Hypertension     [facility]ia [facility]is ([facility] [address]     Pre-diabetes        Past Surgical History:   Procedure Laterality Date    CARDIAC SURGERY         History reviewed. No pertinent family history. Social History     Social History    Marital status: Single     Spouse name: N/A    Number of children: N/A    Years of education: N/A     Occupational History    Not on file.      Social History Main Topics    Smoking status: Never Smoker    Smokeless tobacco: Never Used    Alcohol use No    Drug use: No    Sexual activity: No     Other Topics Concern    Not on file     Social History Narrative    No narrative on file         MEDICATIONS:  Current Outpatient Prescriptions   Medication Sig Dispense Refill    pyridostigmine (MESTINON) 60 MG tablet Take 0.5 tablets by mouth daily 60 tablet 3    metFORMIN (GLUCOPHAGE) 500 MG tablet Take 1 tablet by mouth 2 times daily (with meals) 60 tablet 3    Calcium Carbonate-Vitamin D (OYSTER SHELL CALCIUM/D) 500-200 MG-UNIT TABS Take 2 tablets by mouth daily 60 tablet 2    ferrous sulfate 325 (65 Fe) MG EC tablet Take 1 tablet by mouth 3 times daily (with meals) 90 tablet 5    Glucose Blood (BLOOD GLUCOSE TEST STRIPS) STRP Test 4 times daily Diagnosis 100 strip 11    glucose monitoring kit (FREESTYLE) monitoring kit Daily check 1 kit 0    sodium chloride (OCEAN) 0.65 % nasal spray 1 spray by Nasal route as needed for Congestion 1 Bottle 2    vitamin C (VITAMIN C) 500 MG tablet Take 1 tablet by mouth daily 30 tablet 3    predniSONE (DELTASONE) 20 MG tablet Take 1 po qd 30 tablet 5    mycophenolate (CELLCEPT) 500 MG tablet Take 1 po bid 60 tablet 5    ibuprofen (ADVIL;MOTRIN) 800 MG tablet Take 1 tablet by mouth every 8 hours as needed for Pain Pt to start 1-2 days before her menses and stay on it until the end of menses 60 tablet 3    Alcohol Swabs PADS       ACCU-CHEK SOFTCLIX LANCETS MISC Daily 100 each 3     No current facility-administered medications for this visit. ALLERGIES:  Allergies as of 02/13/2018 - Review Complete 02/13/2018   Allergen Reaction Noted    Flexeril [cyclobenzaprine]  05/03/2017    Norflex [orphenadrine citrate]  05/03/2017    Nsaids  05/03/2017    Penicillins Rash 05/03/2017    Vancomycin Rash 08/04/2017         REVIEW OF SYSTEMS:       A minimum of an eleven point review of systems was completed. Review Of Systems (11 point):  Constitutional: No fever, chills or malaise; No weight change or fatigue  Head and Eyes: No vision, Headache, Dizziness or trauma in last 12 months  ENT ROS: No hearing, Tinnitis, sinus or taste problems  Hematological and Lymphatic ROS:No Lymphoma, Von Willebrand's, Hemophillia or Bleeding History  Psych ROS: No Depression, Homicidal thoughts,suicidal thoughts, or anxiety  Breast ROS: No prior breast abnormalities or lumps  Respiratory ROS: No SOB, Pneumoniae,Cough, or Pulmonary Embolism History  Cardiovascular ROS: No Chest Pain with Exertion, Palpitations, Syncope, Edema, Arrhythmia  Gastrointestinal ROS: No Indigestion, Heartburn, Nausea, vomiting, Diarrhea, Constipation,or Bowel Changes; No Bloody Stools or melena  Genito-Urinary ROS:+heavy periods and cramping. No Dysuria, Hematuria or Nocturia.  No Urinary Incontinence or Vaginal Discharge  Musculoskeletal ROS: No Arthralgia, in visit on 02/05/18   POCT Urinalysis no Micro   Result Value Ref Range    Color, UA Yellow     Clarity, UA Clear     Glucose, UA POC Negative     Bilirubin, UA Negative     Ketones, UA Trace     Spec Grav, UA 1.005     Blood, UA POC Negative     pH, UA 5.0     Protein, UA POC Negative     Urobilinogen, UA 0.2     Leukocytes, UA Negative     Nitrite, UA Negative          Assessment:  1. Vaginal bleeding     2. Heavy Periods/Dysmenorrhea      Patient Active Problem List    Diagnosis Date Noted    Heavy period/Dysmenorrhea 02/13/2018     Pt to return for IUD in 2 weeks      Myasthenia gravis with exacerbation, juvenile form ([facility] [address] 01/12/2018    Myasthenia gravis with exacerbation ([facility] [address] 01/12/2018    Sinusitis 01/12/2018    Clonus     Right maxillary sinusitis, chronic 08/05/2017    Pre-diabetes 07/10/2017    Iron deficiency anemia 05/04/2017           PLAN:  Pt to return to office in two weeks for IUD insertion to help with bleeding/cramping  Return in about 2 weeks (around 2/27/2018) for IUD insertion (please make it for a Tuesday morning). Repeat Annual every 1 year  Cervical Cytology Evaluation begins at 24years old. If Negative Cytology, Follow-up screening per current guidelines. Counseled on preventative health maintenance follow-up. Patient was seen with total face to face time of 15 minutes. More than 50% of this visit was counseling and education regarding The primary encounter diagnosis was Vaginal bleeding. A diagnosis of Excessive menstruation at puberty was also pertinent to this visit. and Vaginal Bleeding   as well as  counseling on preventative health maintenance follow-up.     Stevenson Vincent DO

## 2018-03-06 ENCOUNTER — HOSPITAL ENCOUNTER (OUTPATIENT)
Age: 43
Setting detail: SPECIMEN
Discharge: HOME | End: 2018-03-06
Payer: MEDICARE

## 2018-03-06 ENCOUNTER — HOSPITAL ENCOUNTER (EMERGENCY)
Age: 43
Discharge: HOME | End: 2018-03-06
Attending: EMERGENCY MEDICINE | Admitting: INTERNAL MEDICINE
Payer: MEDICARE

## 2018-03-06 ENCOUNTER — PROCEDURE VISIT (OUTPATIENT)
Dept: OBGYN | Age: 43
End: 2018-03-06
Payer: MEDICARE

## 2018-03-06 VITALS
WEIGHT: 171 LBS | SYSTOLIC BLOOD PRESSURE: 136 MMHG | DIASTOLIC BLOOD PRESSURE: 86 MMHG | HEART RATE: 101 BPM | BODY MASS INDEX: 29.35 KG/M2

## 2018-03-06 VITALS
HEART RATE: 88 BPM | OXYGEN SATURATION: 96 % | BODY MASS INDEX: 28.49 KG/M2 | HEIGHT: 65 IN | DIASTOLIC BLOOD PRESSURE: 86 MMHG | RESPIRATION RATE: 17 BRPM | TEMPERATURE: 98.1 F | SYSTOLIC BLOOD PRESSURE: 125 MMHG | WEIGHT: 171 LBS

## 2018-03-06 DIAGNOSIS — N89.8 VAGINAL DISCHARGE: Primary | ICD-10-CM

## 2018-03-06 DIAGNOSIS — N95.1 PERIMENOPAUSAL SYMPTOMS: ICD-10-CM

## 2018-03-06 DIAGNOSIS — G70.00 MYASTHENIA GRAVIS (HCC): Primary | ICD-10-CM

## 2018-03-06 LAB
ABSOLUTE EOS #: 0 K/UL (ref 0–0.44)
ABSOLUTE IMMATURE GRANULOCYTE: 0.14 K/UL (ref 0–0.3)
ABSOLUTE LYMPH #: 1.37 K/UL (ref 1.1–3.7)
ABSOLUTE MONO #: 0.27 K/UL (ref 0.1–1.2)
ANION GAP SERPL CALCULATED.3IONS-SCNC: 12 MMOL/L (ref 9–17)
BASOPHILS # BLD: 0 % (ref 0–2)
BASOPHILS ABSOLUTE: 0 K/UL (ref 0–0.2)
BUN BLDV-MCNC: 7 MG/DL (ref 6–20)
BUN/CREAT BLD: ABNORMAL (ref 9–20)
CALCIUM SERPL-MCNC: 9 MG/DL (ref 8.6–10.4)
CHLORIDE BLD-SCNC: 101 MMOL/L (ref 98–107)
CO2: 24 MMOL/L (ref 20–31)
CREAT SERPL-MCNC: 0.62 MG/DL (ref 0.5–0.9)
DIFFERENTIAL TYPE: ABNORMAL
DIRECT EXAM: ABNORMAL
EKG ATRIAL RATE: 90 BPM
EKG P AXIS: 38 DEGREES
EKG P-R INTERVAL: 132 MS
EKG Q-T INTERVAL: 350 MS
EKG QRS DURATION: 90 MS
EKG QTC CALCULATION (BAZETT): 428 MS
EKG R AXIS: 1 DEGREES
EKG T AXIS: 18 DEGREES
EKG VENTRICULAR RATE: 90 BPM
EOSINOPHILS RELATIVE PERCENT: 0 % (ref 1–4)
GFR AFRICAN AMERICAN: >60 ML/MIN
GFR NON-AFRICAN AMERICAN: >60 ML/MIN
GFR SERPL CREATININE-BSD FRML MDRD: ABNORMAL ML/MIN/{1.73_M2}
GFR SERPL CREATININE-BSD FRML MDRD: ABNORMAL ML/MIN/{1.73_M2}
GLUCOSE BLD-MCNC: 125 MG/DL (ref 70–99)
HCT VFR BLD CALC: 49 % (ref 36.3–47.1)
HEMOGLOBIN: 15.1 G/DL (ref 11.9–15.1)
IMMATURE GRANULOCYTES: 1 %
LYMPHOCYTES # BLD: 10 % (ref 24–43)
Lab: ABNORMAL
MAGNESIUM: 2.3 MG/DL (ref 1.6–2.6)
MCH RBC QN AUTO: 28.3 PG (ref 25.2–33.5)
MCHC RBC AUTO-ENTMCNC: 30.8 G/DL (ref 28.4–34.8)
MCV RBC AUTO: 91.9 FL (ref 82.6–102.9)
MONOCYTES # BLD: 2 % (ref 3–12)
MORPHOLOGY: ABNORMAL
NRBC AUTOMATED: 0 PER 100 WBC
PDW BLD-RTO: 25.9 % (ref 11.8–14.4)
PHOSPHORUS: 2.2 MG/DL (ref 2.6–4.5)
PLATELET # BLD: ABNORMAL K/UL (ref 138–453)
PLATELET ESTIMATE: ABNORMAL
PLATELET, FLUORESCENCE: 354 K/UL (ref 138–453)
PLATELET, IMMATURE FRACTION: 4.2 % (ref 1.1–10.3)
PMV BLD AUTO: ABNORMAL FL (ref 8.1–13.5)
POTASSIUM SERPL-SCNC: 3.9 MMOL/L (ref 3.7–5.3)
RBC # BLD: 5.33 M/UL (ref 3.95–5.11)
RBC # BLD: ABNORMAL 10*6/UL
SEG NEUTROPHILS: 87 % (ref 36–65)
SEGMENTED NEUTROPHILS ABSOLUTE COUNT: 11.92 K/UL (ref 1.5–8.1)
SODIUM BLD-SCNC: 137 MMOL/L (ref 135–144)
SPECIMEN DESCRIPTION: ABNORMAL
STATUS: ABNORMAL
WBC # BLD: 13.7 K/UL (ref 3.5–11.3)
WBC # BLD: ABNORMAL 10*3/UL

## 2018-03-06 PROCEDURE — 85055 RETICULATED PLATELET ASSAY: CPT

## 2018-03-06 PROCEDURE — G8484 FLU IMMUNIZE NO ADMIN: HCPCS | Performed by: OBSTETRICS & GYNECOLOGY

## 2018-03-06 PROCEDURE — 1036F TOBACCO NON-USER: CPT | Performed by: OBSTETRICS & GYNECOLOGY

## 2018-03-06 PROCEDURE — 99213 OFFICE O/P EST LOW 20 MIN: CPT | Performed by: OBSTETRICS & GYNECOLOGY

## 2018-03-06 PROCEDURE — 99284 EMERGENCY DEPT VISIT MOD MDM: CPT

## 2018-03-06 PROCEDURE — G8427 DOCREV CUR MEDS BY ELIG CLIN: HCPCS | Performed by: OBSTETRICS & GYNECOLOGY

## 2018-03-06 PROCEDURE — 80048 BASIC METABOLIC PNL TOTAL CA: CPT

## 2018-03-06 PROCEDURE — 84100 ASSAY OF PHOSPHORUS: CPT

## 2018-03-06 PROCEDURE — 85025 COMPLETE CBC W/AUTO DIFF WBC: CPT

## 2018-03-06 PROCEDURE — 83735 ASSAY OF MAGNESIUM: CPT

## 2018-03-06 PROCEDURE — 99255 IP/OBS CONSLTJ NEW/EST HI 80: CPT | Performed by: PSYCHIATRY & NEUROLOGY

## 2018-03-06 PROCEDURE — G8417 CALC BMI ABV UP PARAM F/U: HCPCS | Performed by: OBSTETRICS & GYNECOLOGY

## 2018-03-06 PROCEDURE — 93005 ELECTROCARDIOGRAM TRACING: CPT

## 2018-03-06 RX ORDER — PREDNISONE 20 MG/1
20 TABLET ORAL DAILY
Status: DISCONTINUED | OUTPATIENT
Start: 2018-03-06 | End: 2018-03-06 | Stop reason: HOSPADM

## 2018-03-06 RX ORDER — VENLAFAXINE HYDROCHLORIDE 75 MG/1
75 CAPSULE, EXTENDED RELEASE ORAL DAILY
Qty: 30 CAPSULE | Refills: 3 | Status: SHIPPED | OUTPATIENT
Start: 2018-03-06 | End: 2019-01-29

## 2018-03-06 RX ORDER — SODIUM CHLORIDE 0.9 % (FLUSH) 0.9 %
10 SYRINGE (ML) INJECTION EVERY 12 HOURS SCHEDULED
Status: CANCELLED | OUTPATIENT
Start: 2018-03-06

## 2018-03-06 RX ORDER — PYRIDOSTIGMINE BROMIDE 60 MG/1
30 TABLET ORAL
Status: DISCONTINUED | OUTPATIENT
Start: 2018-03-06 | End: 2018-03-06 | Stop reason: HOSPADM

## 2018-03-06 RX ORDER — MYCOPHENOLATE MOFETIL 250 MG/1
500 CAPSULE ORAL 2 TIMES DAILY
Status: DISCONTINUED | OUTPATIENT
Start: 2018-03-06 | End: 2018-03-06 | Stop reason: HOSPADM

## 2018-03-06 RX ORDER — ACETAMINOPHEN 325 MG/1
650 TABLET ORAL EVERY 4 HOURS PRN
Status: CANCELLED | OUTPATIENT
Start: 2018-03-06

## 2018-03-06 RX ORDER — SODIUM CHLORIDE 0.9 % (FLUSH) 0.9 %
10 SYRINGE (ML) INJECTION PRN
Status: CANCELLED | OUTPATIENT
Start: 2018-03-06

## 2018-03-06 ASSESSMENT — ENCOUNTER SYMPTOMS
TROUBLE SWALLOWING: 0
VOMITING: 0
SHORTNESS OF BREATH: 0
PHOTOPHOBIA: 1
NAUSEA: 0
VOICE CHANGE: 0

## 2018-03-06 NOTE — CARE COORDINATION
03/06 seen pt for possible obs admission, Dr Jay Shore at bedside. Internal med at bedside, decision to admit cancelled.

## 2018-03-06 NOTE — PROGRESS NOTES
Tia Trujillo  3/6/2018    YOB: 1975    HPI:  Tia Trujillo is a 36 y.o. female . The patient was seen today with Dr. Jesse Ireland to help with translating. Pt is here to follow up and was initially scheduled for an IUD placement. Patient is tearful in the exam room and stating that she is miserable every time she has a cycle. Patient has some difficulty articulating what bothers her during her menstrual cycles, but focuses on an \"abnormal secretion\" coming from the uterus (caramel mucus), back pain that radiates into her groin, hot flushes/ shakes, and an overwhelming feeling of sadness. Patient states that she cycles every 21 days for 3-5 days. Further, she states that she believes her myasthensia gravis symptoms are worsened during her cycles. Patient clarifies that she does not have heavy bleeding or clots as she mentioned last week and she is very concerned that this is abnormal. Finally patient admits to dry mouth. She does not desire an IUD today as she believes that this will make her symptoms worse. Reviewed with patient if she had ever taken an SSRI and she states that she has not as she is limited in the medications she can take due to her myasthenia gravis. Obstetric History       T0      L0     SAB0   TAB0   Ectopic0   Molar0   Multiple0   Live Births0           Past Medical History:   Diagnosis Date    Anemia     Headache     Hypertension     Myasthenia gravis ([facility] [address]     Pre-diabetes        Past Surgical History:   Procedure Laterality Date    CARDIAC SURGERY         History reviewed. No pertinent family history. Social History     Social History    Marital status: Single     Spouse name: N/A    Number of children: N/A    Years of education: N/A     Occupational History    Not on file.      Social History Main Topics    Smoking status: Never Smoker    Smokeless tobacco: Never Used    Alcohol use No    Drug use: Range    Color, UA Yellow     Clarity, UA Clear     Glucose, UA POC Negative     Bilirubin, UA Negative     Ketones, UA Trace     Spec Grav, UA 1.005     Blood, UA POC Negative     pH, UA 5.0     Protein, UA POC Negative     Urobilinogen, UA 0.2     Leukocytes, UA Negative     Nitrite, UA Negative          Assessment:  1. Vaginal discharge  VAGINITIS DNA PROBE    C. Trachomatis / N. Gonorrhoeae, DNA   2. Perimenopausal symptoms       Patient Active Problem List    Diagnosis Date Noted    Heavy period/Dysmenorrhea 02/13/2018     Pt to return for IUD in 2 weeks      Myasthenia gravis with exacerbation, juvenile form ([facility] [address] 01/12/2018    Myasthenia gravis with exacerbation ([facility] [address] 01/12/2018    Sinusitis 01/12/2018    Clonus     Right maxillary sinusitis, chronic 08/05/2017    Pre-diabetes 07/10/2017    Iron deficiency anemia 05/04/2017       PLAN:  Vaginal cultures obtained - will treat if appropriate  Pt counseled on medical options to potentially help with her perimenopausal symptoms of hot flashes and mood swings. Pt willing to try SSRI or SNRI - will give Rx for Effexor XR 75mg daily  Pt to return in 4 weeks for follow up on symptomology    Return in about 4 weeks (around 4/3/2018) for follow up on medications. Effexor 75mg started for presumed perimenopausal symptoms  Repeat Annual every 1 year  Cervical Cytology Evaluation begins at 24years old. If Negative Cytology, Follow-up screening per current guidelines. Return to the office in 4 weeks. Counseled on preventative health maintenance follow-up. Orders Placed This Encounter   Procedures    VAGINITIS DNA PROBE    C. Trachomatis / N. Gonorrhoeae, DNA       Patient was seen with total face to face time of 15 minutes. More than 50% of this visit was counseling and education regarding The primary encounter diagnosis was Vaginal discharge. A diagnosis of Perimenopausal symptoms was also pertinent to this visit.  and Gynecologic Exam   as well as

## 2018-03-06 NOTE — ED NOTES
Pt resting in stretcher NAD noted at this time.  Will continue to monitor      Mary Anne Trejo RN  03/06/18 1490

## 2018-03-06 NOTE — Clinical Note
Patient Class: Inpatient [101]  REQUIRED: Diagnosis: Myasthenia gravis ([facility] [[id number]]  Estimated Length of Stay: Estimated stay of more than 2 midnights  Future Attending Provider: Nena Alan [[id number]]  Telemetry Bed Required?: Yes

## 2018-03-06 NOTE — CONSULTS
depression and lack of support at home primarily. Plan:     Continue Mycophenolate  500 mg po bid , prednisone 20 mg po qd, mestinon 30-30-30-30 on every 4 hour basis beginning at 0600 each day. Follow up with Dr. Peter Turner as outpatient     Tailbsierra Rios MD PGY 2  Internal Medicine Resident    [facility]   Attending Physician Statement:    I have discussed the care of Winsome Ariza, including pertinent history and examination findings, with the medical residents. I have independently seen and examined the patient and the key elements of all parts of the encounter have been performed by me. I have reviewed medications, clinical laboratory, X rays and other diagnostic tests with the residents. I agree with the assessment, plan and orders as documented by the resident.      Maximo Nunes MD 3/6/2018 3:47 PM

## 2018-03-06 NOTE — ED PROVIDER NOTES
75 MG extended release capsule Take 1 capsule by mouth daily 3/6/18   Keri Mccall, DO   pyridostigmine (MESTINON) 60 MG tablet Take 0.5 tablets by mouth daily 2/9/18   Bj Landry MD   Alcohol Swabs PADS  1/23/18   Historical MD Zachary   metFORMIN (GLUCOPHAGE) 500 MG tablet Take 1 tablet by mouth 2 times daily (with meals) 1/22/18   Cale Marr MD   Calcium Carbonate-Vitamin D (OYSTER SHELL CALCIUM/D) 500-200 MG-UNIT TABS Take 2 tablets by mouth daily 1/22/18 1/22/19  Cale Marr MD   ferrous sulfate 325 (65 Fe) MG EC tablet Take 1 tablet by mouth 3 times daily (with meals) 1/22/18   Pop Burt MD   ACCU-CHEK TERI [facility] CTR LANCETS [address] [facility] Daily 1/22/18   Gagan Bush MD   Glucose Blood (BLOOD GLUCOSE TEST STRIPS) STRP Test 4 times daily Diagnosis 1/22/18   Gagan Bush MD   glucose monitoring kit (FREESTYLE) monitoring kit Daily check 1/22/18   Gagan Bush MD   sodium chloride (OCEAN) 0.65 % nasal spray 1 spray by Nasal route as needed for Congestion 1/18/18   Bruno Simpson MD   vitamin C (VITAMIN C) 500 MG tablet Take 1 tablet by mouth daily 1/18/18   Bruno Simpson MD   predniSONE (DELTASONE) 20 MG tablet Take 1 po qd 12/21/17   Gary Edwards MD   mycophenolate (CELLCEPT) 500 MG tablet Take 1 po bid 12/21/17   Gary Edwards MD   ibuprofen (ADVIL;MOTRIN) 800 MG tablet Take 1 tablet by mouth every 8 hours as needed for Pain Pt to start 1-2 days before her menses and stay on it until the end of menses 12/13/17   Keri Mccall, DO       REVIEW OF SYSTEMS    (2-9 systems for level 4, 10 or more for level 5)      Review of Systems   Constitutional: Negative for chills and fever. HENT: Negative for congestion, nosebleeds, trouble swallowing and voice change. Eyes: Positive for photophobia and visual disturbance. Respiratory: Negative for shortness of breath. Cardiovascular: Negative for chest pain.    Gastrointestinal: dictations but occasionally words are mis-transcribed.)     Nelson Carl MD  03/06/18 [id number]

## 2018-03-07 LAB
C TRACH DNA GENITAL QL NAA+PROBE: NEGATIVE
N. GONORRHOEAE DNA: NEGATIVE

## 2018-03-07 RX ORDER — FLUCONAZOLE 150 MG/1
150 TABLET ORAL ONCE
Qty: 2 TABLET | Refills: 0 | Status: SHIPPED | OUTPATIENT
Start: 2018-03-07 | End: 2018-03-07

## 2018-03-11 ENCOUNTER — HOSPITAL ENCOUNTER (OUTPATIENT)
Age: 43
Setting detail: OBSERVATION
Discharge: HOME | End: 2018-03-13
Attending: EMERGENCY MEDICINE | Admitting: INTERNAL MEDICINE
Payer: MEDICARE

## 2018-03-11 DIAGNOSIS — Z86.69 HX OF MYASTHENIA GRAVIS: ICD-10-CM

## 2018-03-11 DIAGNOSIS — R53.83 OTHER FATIGUE: Primary | ICD-10-CM

## 2018-03-11 PROBLEM — R53.82 CHRONIC FATIGUE: Status: ACTIVE | Noted: 2018-03-11

## 2018-03-11 PROBLEM — Z87.898 H/O FATIGUE: Status: ACTIVE | Noted: 2018-03-11

## 2018-03-11 LAB
-: ABNORMAL
ABSOLUTE EOS #: 0.11 K/UL (ref 0–0.4)
ABSOLUTE IMMATURE GRANULOCYTE: 0.11 K/UL (ref 0–0.3)
ABSOLUTE LYMPH #: 1.55 K/UL (ref 1–4.8)
ABSOLUTE MONO #: 0.56 K/UL (ref 0.1–0.8)
ALBUMIN SERPL-MCNC: 3.8 G/DL (ref 3.5–5.2)
ALBUMIN/GLOBULIN RATIO: 1.3 (ref 1–2.5)
ALP BLD-CCNC: 56 U/L (ref 35–104)
ALT SERPL-CCNC: 71 U/L (ref 5–33)
AMORPHOUS: ABNORMAL
ANION GAP SERPL CALCULATED.3IONS-SCNC: 13 MMOL/L (ref 9–17)
AST SERPL-CCNC: 59 U/L
BACTERIA: ABNORMAL
BASOPHILS # BLD: 0 % (ref 0–2)
BASOPHILS ABSOLUTE: 0 K/UL (ref 0–0.2)
BILIRUB SERPL-MCNC: 0.29 MG/DL (ref 0.3–1.2)
BILIRUBIN URINE: NEGATIVE
BUN BLDV-MCNC: 7 MG/DL (ref 6–20)
BUN/CREAT BLD: ABNORMAL (ref 9–20)
C-REACTIVE PROTEIN: 0.8 MG/L (ref 0–5)
CALCIUM SERPL-MCNC: 8.7 MG/DL (ref 8.6–10.4)
CASTS UA: ABNORMAL /LPF (ref 0–8)
CHLORIDE BLD-SCNC: 102 MMOL/L (ref 98–107)
CO2: 24 MMOL/L (ref 20–31)
COLOR: YELLOW
COMMENT UA: ABNORMAL
CREAT SERPL-MCNC: 0.5 MG/DL (ref 0.5–0.9)
CRYSTALS, UA: ABNORMAL /HPF
DIFFERENTIAL TYPE: ABNORMAL
EOSINOPHILS RELATIVE PERCENT: 1 % (ref 1–4)
EPITHELIAL CELLS UA: ABNORMAL /HPF (ref 0–5)
GFR AFRICAN AMERICAN: >60 ML/MIN
GFR NON-AFRICAN AMERICAN: >60 ML/MIN
GFR SERPL CREATININE-BSD FRML MDRD: ABNORMAL ML/MIN/{1.73_M2}
GFR SERPL CREATININE-BSD FRML MDRD: ABNORMAL ML/MIN/{1.73_M2}
GLUCOSE BLD-MCNC: 130 MG/DL (ref 65–105)
GLUCOSE BLD-MCNC: 156 MG/DL (ref 70–99)
GLUCOSE URINE: NEGATIVE
HCT VFR BLD CALC: 44.2 % (ref 36.3–47.1)
HEMOGLOBIN: 14.3 G/DL (ref 11.9–15.1)
IMMATURE GRANULOCYTES: 1 %
KETONES, URINE: NEGATIVE
LEUKOCYTE ESTERASE, URINE: ABNORMAL
LYMPHOCYTES # BLD: 14 % (ref 24–44)
MCH RBC QN AUTO: 28.2 PG (ref 25.2–33.5)
MCHC RBC AUTO-ENTMCNC: 32.4 G/DL (ref 28.4–34.8)
MCV RBC AUTO: 87.2 FL (ref 82.6–102.9)
MONOCYTES # BLD: 5 % (ref 1–7)
MORPHOLOGY: ABNORMAL
MUCUS: ABNORMAL
NITRITE, URINE: NEGATIVE
NRBC AUTOMATED: 0 PER 100 WBC
OTHER OBSERVATIONS UA: ABNORMAL
PDW BLD-RTO: 24.5 % (ref 11.8–14.4)
PH UA: 6 (ref 5–8)
PLATELET # BLD: ABNORMAL K/UL (ref 138–453)
PLATELET ESTIMATE: ABNORMAL
PLATELET, FLUORESCENCE: NORMAL K/UL (ref 138–453)
PLATELET, IMMATURE FRACTION: NORMAL % (ref 1.1–10.3)
PMV BLD AUTO: ABNORMAL FL (ref 8.1–13.5)
POTASSIUM SERPL-SCNC: 4.2 MMOL/L (ref 3.7–5.3)
PROCALCITONIN: 0.05 NG/ML
PROTEIN UA: NEGATIVE
RBC # BLD: 5.07 M/UL (ref 3.95–5.11)
RBC # BLD: ABNORMAL 10*6/UL
RBC UA: ABNORMAL /HPF (ref 0–4)
RENAL EPITHELIAL, UA: ABNORMAL /HPF
SEG NEUTROPHILS: 79 % (ref 36–66)
SEGMENTED NEUTROPHILS ABSOLUTE COUNT: 8.77 K/UL (ref 1.8–7.7)
SODIUM BLD-SCNC: 139 MMOL/L (ref 135–144)
SPECIFIC GRAVITY UA: 1 (ref 1–1.03)
TOTAL PROTEIN: 6.8 G/DL (ref 6.4–8.3)
TRICHOMONAS: ABNORMAL
TURBIDITY: CLEAR
URINE HGB: ABNORMAL
UROBILINOGEN, URINE: NORMAL
WBC # BLD: 11.1 K/UL (ref 3.5–11.3)
WBC # BLD: ABNORMAL 10*3/UL
WBC UA: ABNORMAL /HPF (ref 0–5)
YEAST: ABNORMAL

## 2018-03-11 PROCEDURE — 85055 RETICULATED PLATELET ASSAY: CPT

## 2018-03-11 PROCEDURE — 2060000000 HC ICU INTERMEDIATE R&B

## 2018-03-11 PROCEDURE — 99284 EMERGENCY DEPT VISIT MOD MDM: CPT

## 2018-03-11 PROCEDURE — 84145 PROCALCITONIN (PCT): CPT

## 2018-03-11 PROCEDURE — 94799 UNLISTED PULMONARY SVC/PX: CPT

## 2018-03-11 PROCEDURE — 86140 C-REACTIVE PROTEIN: CPT

## 2018-03-11 PROCEDURE — G0378 HOSPITAL OBSERVATION PER HR: HCPCS

## 2018-03-11 PROCEDURE — 6360000002 HC RX W HCPCS: Performed by: STUDENT IN AN ORGANIZED HEALTH CARE EDUCATION/TRAINING PROGRAM

## 2018-03-11 PROCEDURE — 82947 ASSAY GLUCOSE BLOOD QUANT: CPT

## 2018-03-11 PROCEDURE — 80053 COMPREHEN METABOLIC PANEL: CPT

## 2018-03-11 PROCEDURE — 6370000000 HC RX 637 (ALT 250 FOR IP): Performed by: PSYCHIATRY & NEUROLOGY

## 2018-03-11 PROCEDURE — 85025 COMPLETE CBC W/AUTO DIFF WBC: CPT

## 2018-03-11 PROCEDURE — 87077 CULTURE AEROBIC IDENTIFY: CPT

## 2018-03-11 PROCEDURE — 86403 PARTICLE AGGLUT ANTBDY SCRN: CPT

## 2018-03-11 PROCEDURE — 81001 URINALYSIS AUTO W/SCOPE: CPT

## 2018-03-11 PROCEDURE — 96372 THER/PROPH/DIAG INJ SC/IM: CPT

## 2018-03-11 PROCEDURE — 99254 IP/OBS CNSLTJ NEW/EST MOD 60: CPT | Performed by: PSYCHIATRY & NEUROLOGY

## 2018-03-11 PROCEDURE — 87186 SC STD MICRODIL/AGAR DIL: CPT

## 2018-03-11 PROCEDURE — 2580000003 HC RX 258: Performed by: STUDENT IN AN ORGANIZED HEALTH CARE EDUCATION/TRAINING PROGRAM

## 2018-03-11 PROCEDURE — 87086 URINE CULTURE/COLONY COUNT: CPT

## 2018-03-11 RX ORDER — DOCUSATE SODIUM 100 MG/1
100 CAPSULE, LIQUID FILLED ORAL 2 TIMES DAILY
Status: DISCONTINUED | OUTPATIENT
Start: 2018-03-11 | End: 2018-03-13 | Stop reason: HOSPADM

## 2018-03-11 RX ORDER — DEXTROSE MONOHYDRATE 25 G/50ML
12.5 INJECTION, SOLUTION INTRAVENOUS PRN
Status: DISCONTINUED | OUTPATIENT
Start: 2018-03-11 | End: 2018-03-13 | Stop reason: HOSPADM

## 2018-03-11 RX ORDER — NICOTINE POLACRILEX 4 MG
15 LOZENGE BUCCAL PRN
Status: DISCONTINUED | OUTPATIENT
Start: 2018-03-11 | End: 2018-03-13 | Stop reason: HOSPADM

## 2018-03-11 RX ORDER — ECHINACEA PURPUREA EXTRACT 125 MG
1 TABLET ORAL PRN
Status: DISCONTINUED | OUTPATIENT
Start: 2018-03-11 | End: 2018-03-13 | Stop reason: HOSPADM

## 2018-03-11 RX ORDER — POTASSIUM CHLORIDE 20 MEQ/1
40 TABLET, EXTENDED RELEASE ORAL PRN
Status: DISCONTINUED | OUTPATIENT
Start: 2018-03-11 | End: 2018-03-13 | Stop reason: HOSPADM

## 2018-03-11 RX ORDER — DEXTROSE MONOHYDRATE 50 MG/ML
100 INJECTION, SOLUTION INTRAVENOUS PRN
Status: DISCONTINUED | OUTPATIENT
Start: 2018-03-11 | End: 2018-03-13 | Stop reason: HOSPADM

## 2018-03-11 RX ORDER — PREDNISONE 20 MG/1
20 TABLET ORAL DAILY
Status: DISCONTINUED | OUTPATIENT
Start: 2018-03-11 | End: 2018-03-13 | Stop reason: HOSPADM

## 2018-03-11 RX ORDER — LANOLIN ALCOHOL/MO/W.PET/CERES
325 CREAM (GRAM) TOPICAL
Status: DISCONTINUED | OUTPATIENT
Start: 2018-03-11 | End: 2018-03-13 | Stop reason: HOSPADM

## 2018-03-11 RX ORDER — VENLAFAXINE HYDROCHLORIDE 75 MG/1
75 CAPSULE, EXTENDED RELEASE ORAL DAILY
Status: DISCONTINUED | OUTPATIENT
Start: 2018-03-11 | End: 2018-03-13 | Stop reason: HOSPADM

## 2018-03-11 RX ORDER — PYRIDOSTIGMINE BROMIDE 60 MG/1
30 TABLET ORAL 4 TIMES DAILY
Status: DISCONTINUED | OUTPATIENT
Start: 2018-03-11 | End: 2018-03-13 | Stop reason: HOSPADM

## 2018-03-11 RX ORDER — PYRIDOSTIGMINE BROMIDE 60 MG/1
30 TABLET ORAL DAILY
Status: DISCONTINUED | OUTPATIENT
Start: 2018-03-11 | End: 2018-03-11

## 2018-03-11 RX ORDER — SODIUM CHLORIDE 0.9 % (FLUSH) 0.9 %
10 SYRINGE (ML) INJECTION PRN
Status: DISCONTINUED | OUTPATIENT
Start: 2018-03-11 | End: 2018-03-13 | Stop reason: HOSPADM

## 2018-03-11 RX ORDER — ASCORBIC ACID 500 MG
500 TABLET ORAL DAILY
Status: DISCONTINUED | OUTPATIENT
Start: 2018-03-11 | End: 2018-03-13 | Stop reason: HOSPADM

## 2018-03-11 RX ORDER — PREDNISONE 20 MG/1
20 TABLET ORAL DAILY
Status: DISCONTINUED | OUTPATIENT
Start: 2018-03-12 | End: 2018-03-11

## 2018-03-11 RX ORDER — ACETAMINOPHEN 325 MG/1
650 TABLET ORAL EVERY 4 HOURS PRN
Status: DISCONTINUED | OUTPATIENT
Start: 2018-03-11 | End: 2018-03-13 | Stop reason: HOSPADM

## 2018-03-11 RX ORDER — ONDANSETRON 2 MG/ML
4 INJECTION INTRAMUSCULAR; INTRAVENOUS EVERY 6 HOURS PRN
Status: DISCONTINUED | OUTPATIENT
Start: 2018-03-11 | End: 2018-03-13 | Stop reason: HOSPADM

## 2018-03-11 RX ORDER — SODIUM CHLORIDE 0.9 % (FLUSH) 0.9 %
10 SYRINGE (ML) INJECTION EVERY 12 HOURS SCHEDULED
Status: DISCONTINUED | OUTPATIENT
Start: 2018-03-11 | End: 2018-03-13 | Stop reason: HOSPADM

## 2018-03-11 RX ORDER — ACETAMINOPHEN 325 MG/1
650 TABLET ORAL EVERY 4 HOURS PRN
Status: DISCONTINUED | OUTPATIENT
Start: 2018-03-11 | End: 2018-03-11 | Stop reason: SDUPTHER

## 2018-03-11 RX ORDER — POTASSIUM CHLORIDE 20MEQ/15ML
40 LIQUID (ML) ORAL PRN
Status: DISCONTINUED | OUTPATIENT
Start: 2018-03-11 | End: 2018-03-13 | Stop reason: HOSPADM

## 2018-03-11 RX ORDER — METHYLPREDNISOLONE SODIUM SUCCINATE 40 MG/ML
40 INJECTION, POWDER, LYOPHILIZED, FOR SOLUTION INTRAMUSCULAR; INTRAVENOUS EVERY 6 HOURS
Status: DISCONTINUED | OUTPATIENT
Start: 2018-03-11 | End: 2018-03-11

## 2018-03-11 RX ORDER — MYCOPHENOLATE MOFETIL 250 MG/1
500 CAPSULE ORAL 2 TIMES DAILY
Status: DISCONTINUED | OUTPATIENT
Start: 2018-03-11 | End: 2018-03-13 | Stop reason: HOSPADM

## 2018-03-11 RX ORDER — POTASSIUM CHLORIDE 7.45 MG/ML
10 INJECTION INTRAVENOUS PRN
Status: DISCONTINUED | OUTPATIENT
Start: 2018-03-11 | End: 2018-03-13 | Stop reason: HOSPADM

## 2018-03-11 RX ADMIN — MYCOPHENOLATE MOFETIL 500 MG: 250 CAPSULE ORAL at 20:43

## 2018-03-11 RX ADMIN — PYRIDOSTIGMINE BROMIDE 30 MG: 60 TABLET ORAL at 14:54

## 2018-03-11 RX ADMIN — Medication 10 ML: at 20:51

## 2018-03-11 RX ADMIN — ENOXAPARIN SODIUM 40 MG: 40 INJECTION SUBCUTANEOUS at 14:04

## 2018-03-11 RX ADMIN — PYRIDOSTIGMINE BROMIDE 30 MG: 60 TABLET ORAL at 20:43

## 2018-03-11 ASSESSMENT — ENCOUNTER SYMPTOMS
VOMITING: 0
SHORTNESS OF BREATH: 0
ABDOMINAL PAIN: 0
COUGH: 0
NAUSEA: 0
DIARRHEA: 0
EYE DISCHARGE: 0
TROUBLE SWALLOWING: 1

## 2018-03-11 ASSESSMENT — PAIN SCALES - GENERAL: PAINLEVEL_OUTOF10: 0

## 2018-03-11 NOTE — CONSULTS
NEUROLOGY INPATIENT CONSULT NOTE    3/11/2018         Parvez Reyes is a  36 y.o. female admitted on 3/11/2018 with  H/O fatigue [Z87.898]  Chronic fatigue [R53.82]      History is obtained mostly from the patient and the medical record and from the caregivers. Chart is reviewed and patient is examined. Briefly, this is a  36 y.o. female admitted on 3/11/2018 with PMH Myasthenia gravis s/p thymectomy and type 2 diabetes presenting with worsening dysphagia to solids, facial heaviness and difficulty chewing in addition to blurred vision for last 24 hours. However when seen by neurology consultant when she was in the [facility]'s ED on 3/6 with exactly the same complaints, she had no myasthenic weakness. During the examination, she was given some coconut pineapple flavored drinking a bottle that she had her purse and she drink a large amount of it with no difficulty whatsoever. It was recommended that she not be hospitalized, she not only was not having a myasthenia gravis exacerbation but she did not even have any myasthenic weakness. Although the initial plan was for her to be admitted at least for observation, it appears that she was discharged to home from the ED. She returned however with the same complaints 3/11 and was admitted. Attending physician despite diagnosis even from the H&P of no MG exacerbation started IV steroids. Last exacerbation of her Charlene Ray was in January she was managed with IgG qd x 5 days, mycophenylate 500 mg po bid , prednisone 20 mg po qd, mestinon 30-30-60 and keflex for right maxillary sinusitis. Testing MRI of Head was normal during that admission. Patient was discharged and continued on mycophenylate 500 mg po bid , prednisone 20 mg po qd, mestinon 30-30-60 but developed diarrhea prompting her PCP to decrease her Mestinon by half. Patient also admits that her symptoms typically get worse 1 week before onset of her periods.   She is currently on her needed for Congestion 1 Bottle 2    vitamin C (VITAMIN C) 500 MG tablet Take 1 tablet by mouth daily 30 tablet 3    predniSONE (DELTASONE) 20 MG tablet Take 1 po qd 30 tablet 5    mycophenolate (CELLCEPT) 500 MG tablet Take 1 po bid 60 tablet 5    ibuprofen (ADVIL;MOTRIN) 800 MG tablet Take 1 tablet by mouth every 8 hours as needed for Pain Pt to start 1-2 days before her menses and stay on it until the end of menses 60 tablet 3     Allergies: Rob Metcalf is allergic to flexeril [cyclobenzaprine]; norflex [orphenadrine citrate]; nsaids; penicillins; and vancomycin. Past Medical History:   Diagnosis Date    Anemia     Headache     Hypertension     Myasthenia gravis ([address]     Pre-diabetes        Past Surgical History:   Procedure Laterality Date    CARDIAC SURGERY       Social History: Rob Metcalf  reports that she has never smoked. She has never used smokeless tobacco. She reports that she does not drink alcohol or use drugs. History reviewed. No pertinent family history.     Current Medications:     sodium chloride flush  10 mL Intravenous 2 times per day    ascorbic acid  500 mg Oral Daily    calcium carbonate-vitamin D  2 tablet Oral Daily    ferrous sulfate  325 mg Oral TID WC    pyridostigmine  30 mg Oral Daily    venlafaxine  75 mg Oral Daily    mycophenolate  500 mg Oral BID    sodium chloride flush  10 mL Intravenous 2 times per day    docusate sodium  100 mg Oral BID    enoxaparin  40 mg Subcutaneous Daily    insulin lispro  0-12 Units Subcutaneous TID WC    insulin lispro  0-6 Units Subcutaneous Nightly    predniSONE  20 mg Oral Daily     PRN Meds include: sodium chloride flush, sodium chloride, sodium chloride flush, potassium chloride **OR** potassium chloride **OR** potassium chloride, acetaminophen, magnesium hydroxide, ondansetron, glucose, dextrose, glucagon (rDNA), dextrose    ROS:   Constitutional Negative for fever and chills   HEENT Negative myasthenia gravis is much worse now. Consider increasing patient's venlafaxine dose. Examiner reviewed orders and made sure that the current orders are consistent with the regimen just described above. We'll also have respiratory therapy to shift test FVC and NIF. Discussed with patient and Nurse. Will follow with you. Thank you for consultation.

## 2018-03-11 NOTE — PROGRESS NOTES
Pt arrived to floor via wheelchair from ED and was transfered to bed. Vitals taken. Assessment complete. No distress noted. See doc flowsheet and admission navigator for details. POC and education initiated and reviewed with patient. Call light within reach, and pt educated on its use. Bed in lowest position, and locked. Side rails up x 2. Denied further questions or needs at this time. Will continue to monitor. .. Donna Hendrix RN

## 2018-03-11 NOTE — ED PROVIDER NOTES
500 mg    calcium carbonate-vitamin D (CALTRATE) 600-400 MG-UNIT per tab 2 tablet    ferrous sulfate EC tablet 325 mg    pyridostigmine (MESTINON) tablet 30 mg    venlafaxine (EFFEXOR XR) extended release capsule 75 mg    mycophenolate (CELLCEPT) capsule 500 mg    sodium chloride flush 0.9 % injection 10 mL    sodium chloride flush 0.9 % injection 10 mL    OR Linked Order Group     potassium chloride (KLOR-CON M) extended release tablet 40 mEq     potassium chloride 20 MEQ/15ML (10%) oral solution 40 mEq     potassium chloride 10 mEq/100 mL IVPB (Peripheral Line)    acetaminophen (TYLENOL) tablet 650 mg    magnesium hydroxide (MILK OF MAGNESIA) 400 MG/5ML suspension 30 mL    docusate sodium (COLACE) capsule 100 mg    ondansetron (ZOFRAN) injection 4 mg    enoxaparin (LOVENOX) injection 40 mg    methylPREDNISolone sodium (SOLU-MEDROL) injection 40 mg         DIAGNOSTIC RESULTS / EMERGENCY DEPARTMENT COURSE / MDM     LABS:  Results for orders placed or performed during the hospital encounter of 03/11/18   CBC WITH AUTO DIFFERENTIAL   Result Value Ref Range    WBC 11.1 3.5 - 11.3 k/uL    RBC 5.07 3.95 - 5.11 m/uL    Hemoglobin 14.3 11.9 - 15.1 g/dL    Hematocrit 44.2 36.3 - 47.1 %    MCV 87.2 82.6 - 102.9 fL    MCH 28.2 25.2 - 33.5 pg    MCHC 32.4 28.4 - 34.8 g/dL    RDW 24.5 (H) 11.8 - 14.4 %    Platelets See Reflexed IPF Result 138 - 453 k/uL    MPV NOT REPORTED 8.1 - 13.5 fL    NRBC Automated 0.0 0.0 per 100 WBC    Differential Type NOT REPORTED     WBC Morphology NOT REPORTED     RBC Morphology NOT REPORTED     Platelet Estimate NOT REPORTED     Immature Granulocytes 1 (H) 0 %    Seg Neutrophils 79 (H) 36 - 66 %    Lymphocytes 14 (L) 24 - 44 %    Monocytes 5 1 - 7 %    Eosinophils % 1 1 - 4 %    Basophils 0 0 - 2 %    Absolute Immature Granulocyte 0.11 0.00 - 0.30 k/uL    Segs Absolute 8.77 (H) 1.8 - 7.7 k/uL    Absolute Lymph # 1.55 1.0 - 4.8 k/uL    Absolute Mono # 0.56 0.1 - 0.8 k/uL    Absolute Eos # 0.11 0.0 - 0.4 k/uL    Basophils # 0.00 0.0 - 0.2 k/uL    Morphology ANISOCYTOSIS PRESENT    Comprehensive Metabolic Panel   Result Value Ref Range    Glucose 156 (H) 70 - 99 mg/dL    BUN 7 6 - 20 mg/dL    CREATININE 0.50 0.50 - 0.90 mg/dL    Bun/Cre Ratio NOT REPORTED 9 - 20    Calcium 8.7 8.6 - 10.4 mg/dL    Sodium 139 135 - 144 mmol/L    Potassium 4.2 3.7 - 5.3 mmol/L    Chloride 102 98 - 107 mmol/L    CO2 24 20 - 31 mmol/L    Anion Gap 13 9 - 17 mmol/L    Alkaline Phosphatase 56 35 - 104 U/L    ALT 71 (H) 5 - 33 U/L    AST 59 (H) <32 U/L    Total Bilirubin 0.29 (L) 0.3 - 1.2 mg/dL    Total Protein 6.8 6.4 - 8.3 g/dL    Alb 3.8 3.5 - 5.2 g/dL    Albumin/Globulin Ratio 1.3 1.0 - 2.5    GFR Non-African American >60 >60 mL/min    GFR African American >60 >60 mL/min    GFR Comment          GFR Staging NOT REPORTED    UA W/REFLEX CULTURE   Result Value Ref Range    Color, UA YELLOW YEL    Turbidity UA CLEAR CLEAR    Glucose, Ur NEGATIVE NEG    Bilirubin Urine NEGATIVE NEG    Ketones, Urine NEGATIVE NEG    Specific [place], UA 1.005 1.005 - 1.030    Urine Hgb MODERATE (A) NEG    pH, UA 6.0 5.0 - 8.0    Protein, UA NEGATIVE NEG    Urobilinogen, Urine Normal NORM    Nitrite, Urine NEGATIVE NEG    Leukocyte Esterase, Urine TRACE (A) NEG    Urinalysis Comments NOT REPORTED    Immature Platelet Fraction   Result Value Ref Range    Platelet, Immature Fraction NOT REPORTED 1.1 - 10.3 %    Platelet, Fluorescence Platelet clumps present, count appears adequate.  138 - 453 k/uL   Microscopic Urinalysis   Result Value Ref Range    -          WBC, UA 2 TO 5 0 - 5 /HPF    RBC, UA None 0 - 4 /HPF    Casts UA 0 TO 2 HYALINE 0 - 8 /LPF    Crystals UA NOT REPORTED NONE /HPF    Epithelial Cells UA 2 TO 5 0 - 5 /HPF    Renal Epithelial, Urine NOT REPORTED 0 /HPF    Bacteria, UA MANY (A) NONE    Mucus, UA NOT REPORTED NONE    Trichomonas, UA NOT REPORTED NONE    Amorphous, UA NOT REPORTED NONE    Other Observations UA NOT REPORTED

## 2018-03-11 NOTE — H&P
INTERNAL MEDICINE HISTORY AND PHYSICAL EXAMINATION    Patient's name:  Richard Lipscomb Record Number: [id number]  Patient's account/billing number: [de-identified]  Patient's YOB: 1975  Age: 36 y.o. Date of Admission: 3/11/2018 10:21 AM  Date of History and Physical Examination: 3/11/2018  Primary Care Physician: Cj Moreira MD  Attending Physician: Hailey Talib     Code Status: Full Code    Chief Complaint:      Generalized weakness    HPI:      History was obtained from chart review and the patient. Adi Stallings is a 36 y.o. with PMH of   -Myasthenia  status post thymectomy in [facility] that is usually gets worse with menstrual cycle  -Type 2 diabetes mellitus noncompliant with her medication  -    Presented to ER with generalized weakness  For 1 week . Symptoms started in January when she was admitted for exacerbation and she was given hemoglobin NG for 5 days. Since discharge she was never back to her normal baseline strength. In the last week she has been feeling weaker after she got the cold. She also started having her periods 4 days ago. She did not have any falls. She didn't drop anything from her hands. She continued to eat and drink solid food and water with no choking or aspiration. No fever. No chills. No double vision. No nausea no vomiting no diarrhea    Upon admission, pt was A&O, hypertensive, and afebrile. Labs showed no leukocytosis, no AGMA, normal electrolites (  ), normal kidney function tests, . Patient was breathing comfortably on room air with no labored breathing. She was able to hold the phone during my encounter. She was also giving good facial expressions with no evidence of weakness on facial muscles.   She was able to perform the physical examination as mentioned below      Encounter was performed using the blue phone with [address]     Past Medical History the last 72 hours. Cardiac enzymes:No results for input(s): CKTOTAL, CKMB, CKMBINDEX, TROPONINI in the last 72 hours. BNP:No results for input(s): BNP in the last 72 hours. Lipid profile: No results for input(s): CHOL, TRIG, HDL, LDLCALC in the last 72 hours. Invalid input(s): LDL  Blood Gases: No results found for: PH, PCO2, PO2, HCO3, O2SAT  Thyroid functions:   Lab Results   Component Value Date    TSH 3.13 11/08/2017      Urinalysis: Clarity, UA   Date Value Ref Range Status   02/05/2018 Clear  Final     Color, UA   Date Value Ref Range Status   03/11/2018 YELLOW YEL Final     pH, UA   Date Value Ref Range Status   03/11/2018 6.0 5.0 - 8.0 Final     Specific Gravity, UA   Date Value Ref Range Status   03/11/2018 1.005 1.005 - 1.030 Final     Protein, UA   Date Value Ref Range Status   03/11/2018 NEGATIVE NEG Final     RBC, UA   Date Value Ref Range Status   03/11/2018 None 0 - 4 /HPF Final     Comment:     Reference range defined for non-centrifuged specimen. Blood, UA POC   Date Value Ref Range Status   02/05/2018 Negative  Final     Bacteria, UA   Date Value Ref Range Status   03/11/2018 MANY (A) NONE Final     Comment:     [address] [phone]     Nitrite, Urine   Date Value Ref Range Status   03/11/2018 NEGATIVE NEG Final     WBC, UA   Date Value Ref Range Status   03/11/2018 2 TO 5 0 - 5 /HPF Final     Leukocyte Esterase, Urine   Date Value Ref Range Status   03/11/2018 TRACE (A) NEG Final     Comment:     [facility] [id number] [place] [address], [address] [place] [address] [phone]     Yeast, UA   Date Value Ref Range Status   03/11/2018 NOT REPORTED NONE Final     Glucose, Ur   Date Value Ref Range Status   03/11/2018 NEGATIVE NEG Final     Bilirubin, UA   Date Value Ref Range Status   02/05/2018 Negative  Final     Bilirubin Urine   Date Value Ref Range Status   03/11/2018 NEGATIVE NEG Final       Imaging       No results found.     Assessment and Plan

## 2018-03-11 NOTE — PROGRESS NOTES
Short Call Note      This is a 36 y.o. female admitted 3/11/2018 for H/O fatigue [Z87.898]  Chronic fatigue [R53.82]. See H&P of admitting resident for more details. Patient with hx of myasthenia gravis initially presented to ER with fatigue and facial weakness  Seen in the ER for the same 5 days prior, no concern for exacerbation at that time, seen by neurology in ER and recommended outpatient follow up, d/c home at that time  Presented this admission with similar complaints, admitted by ER for neurology evaluation  Started on IV steroids, seen by neurology on floor, recommended change to PO steroids        Assessment    Principal Problem:    H/O fatigue  Active Problems:    Pre-diabetes    Heavy period/Dysmenorrhea    History of myasthenia gravis    Chronic fatigue  Resolved Problems:    * No resolved hospital problems.  *        Plan     - Neurology evaluation - symptoms likely anxiety/depression  - Resume home medications  - Monitor respiratory status overnight  - Plan for D/C in AM      Jordon Alvarado MD            Department of [facility]         3/11/2018, 5:23 PM

## 2018-03-11 NOTE — PLAN OF CARE
Problem: Falls - Risk of:  Goal: Will remain free from falls  Will remain free from falls  Outcome: Ongoing  Patient assessed for fall risk and fall precautions initiated as needed. Patient instructed about safety devices and allowed to make decisions related to safey. Environment kept free of clutter and adequate lighting provided. Bed in lowest position with brakes locked. Call light in reach. Patient ID band correct and in place. Patient transferred with appropriate methods. Patient free of falls during shift. Will continue to monitor. Erlin Esquivel RN

## 2018-03-12 PROBLEM — R53.83 FATIGUE: Status: ACTIVE | Noted: 2018-03-12

## 2018-03-12 LAB
ABSOLUTE EOS #: 0.34 K/UL (ref 0–0.44)
ABSOLUTE IMMATURE GRANULOCYTE: 0.11 K/UL (ref 0–0.3)
ABSOLUTE LYMPH #: 3.53 K/UL (ref 1.1–3.7)
ABSOLUTE MONO #: 1.03 K/UL (ref 0.1–1.2)
ANION GAP SERPL CALCULATED.3IONS-SCNC: 15 MMOL/L (ref 9–17)
BASOPHILS # BLD: 1 % (ref 0–2)
BASOPHILS ABSOLUTE: 0.11 K/UL (ref 0–0.2)
BUN BLDV-MCNC: 10 MG/DL (ref 6–20)
BUN/CREAT BLD: NORMAL (ref 9–20)
CALCIUM SERPL-MCNC: 9 MG/DL (ref 8.6–10.4)
CHLORIDE BLD-SCNC: 101 MMOL/L (ref 98–107)
CO2: 25 MMOL/L (ref 20–31)
CREAT SERPL-MCNC: 0.64 MG/DL (ref 0.5–0.9)
CULTURE: ABNORMAL
DIFFERENTIAL TYPE: ABNORMAL
EOSINOPHILS RELATIVE PERCENT: 3 % (ref 1–4)
GFR AFRICAN AMERICAN: >60 ML/MIN
GFR NON-AFRICAN AMERICAN: >60 ML/MIN
GFR SERPL CREATININE-BSD FRML MDRD: NORMAL ML/MIN/{1.73_M2}
GFR SERPL CREATININE-BSD FRML MDRD: NORMAL ML/MIN/{1.73_M2}
GLUCOSE BLD-MCNC: 159 MG/DL (ref 65–105)
GLUCOSE BLD-MCNC: 163 MG/DL (ref 65–105)
GLUCOSE BLD-MCNC: 85 MG/DL (ref 70–99)
GLUCOSE BLD-MCNC: 93 MG/DL (ref 65–105)
HCT VFR BLD CALC: 42.4 % (ref 36.3–47.1)
HEMOGLOBIN: 13.5 G/DL (ref 11.9–15.1)
IMMATURE GRANULOCYTES: 1 %
LYMPHOCYTES # BLD: 31 % (ref 24–43)
Lab: ABNORMAL
MCH RBC QN AUTO: 28.8 PG (ref 25.2–33.5)
MCHC RBC AUTO-ENTMCNC: 31.8 G/DL (ref 28.4–34.8)
MCV RBC AUTO: 90.4 FL (ref 82.6–102.9)
MONOCYTES # BLD: 9 % (ref 3–12)
MORPHOLOGY: ABNORMAL
NRBC AUTOMATED: 0 PER 100 WBC
ORGANISM: ABNORMAL
PDW BLD-RTO: 24.2 % (ref 11.8–14.4)
PLATELET # BLD: ABNORMAL K/UL (ref 138–453)
PLATELET ESTIMATE: ABNORMAL
PLATELET, FLUORESCENCE: 325 K/UL (ref 138–453)
PLATELET, IMMATURE FRACTION: 4.1 % (ref 1.1–10.3)
PMV BLD AUTO: ABNORMAL FL (ref 8.1–13.5)
POTASSIUM SERPL-SCNC: 3.8 MMOL/L (ref 3.7–5.3)
RBC # BLD: 4.69 M/UL (ref 3.95–5.11)
RBC # BLD: ABNORMAL 10*6/UL
SEG NEUTROPHILS: 55 % (ref 36–65)
SEGMENTED NEUTROPHILS ABSOLUTE COUNT: 6.28 K/UL (ref 1.5–8.1)
SODIUM BLD-SCNC: 141 MMOL/L (ref 135–144)
SPECIMEN DESCRIPTION: ABNORMAL
STATUS: ABNORMAL
WBC # BLD: 11.4 K/UL (ref 3.5–11.3)
WBC # BLD: ABNORMAL 10*3/UL

## 2018-03-12 PROCEDURE — 94799 UNLISTED PULMONARY SVC/PX: CPT

## 2018-03-12 PROCEDURE — 36415 COLL VENOUS BLD VENIPUNCTURE: CPT

## 2018-03-12 PROCEDURE — 2580000003 HC RX 258: Performed by: STUDENT IN AN ORGANIZED HEALTH CARE EDUCATION/TRAINING PROGRAM

## 2018-03-12 PROCEDURE — 99225 PR SBSQ OBSERVATION CARE/DAY 25 MINUTES: CPT | Performed by: NURSE PRACTITIONER

## 2018-03-12 PROCEDURE — 6370000000 HC RX 637 (ALT 250 FOR IP): Performed by: STUDENT IN AN ORGANIZED HEALTH CARE EDUCATION/TRAINING PROGRAM

## 2018-03-12 PROCEDURE — 85025 COMPLETE CBC W/AUTO DIFF WBC: CPT

## 2018-03-12 PROCEDURE — 99219 PR INITIAL OBSERVATION CARE/DAY 50 MINUTES: CPT | Performed by: INTERNAL MEDICINE

## 2018-03-12 PROCEDURE — 6370000000 HC RX 637 (ALT 250 FOR IP): Performed by: PSYCHIATRY & NEUROLOGY

## 2018-03-12 PROCEDURE — 82947 ASSAY GLUCOSE BLOOD QUANT: CPT

## 2018-03-12 PROCEDURE — 6360000002 HC RX W HCPCS: Performed by: STUDENT IN AN ORGANIZED HEALTH CARE EDUCATION/TRAINING PROGRAM

## 2018-03-12 PROCEDURE — 94762 N-INVAS EAR/PLS OXIMTRY CONT: CPT

## 2018-03-12 PROCEDURE — 80048 BASIC METABOLIC PNL TOTAL CA: CPT

## 2018-03-12 PROCEDURE — G0378 HOSPITAL OBSERVATION PER HR: HCPCS

## 2018-03-12 PROCEDURE — 96372 THER/PROPH/DIAG INJ SC/IM: CPT

## 2018-03-12 PROCEDURE — 85055 RETICULATED PLATELET ASSAY: CPT

## 2018-03-12 RX ORDER — CEPHALEXIN 500 MG/1
500 CAPSULE ORAL EVERY 12 HOURS SCHEDULED
Status: DISCONTINUED | OUTPATIENT
Start: 2018-03-12 | End: 2018-03-13 | Stop reason: HOSPADM

## 2018-03-12 RX ADMIN — Medication 2 TABLET: at 09:19

## 2018-03-12 RX ADMIN — CEPHALEXIN 500 MG: 500 CAPSULE ORAL at 21:31

## 2018-03-12 RX ADMIN — PYRIDOSTIGMINE BROMIDE 30 MG: 60 TABLET ORAL at 15:00

## 2018-03-12 RX ADMIN — VENLAFAXINE HYDROCHLORIDE 75 MG: 75 CAPSULE, EXTENDED RELEASE ORAL at 09:21

## 2018-03-12 RX ADMIN — Medication 500 MG: at 09:22

## 2018-03-12 RX ADMIN — FERROUS SULFATE TAB EC 325 MG (65 MG FE EQUIVALENT) 325 MG: 325 (65 FE) TABLET DELAYED RESPONSE at 13:26

## 2018-03-12 RX ADMIN — Medication 10 ML: at 21:31

## 2018-03-12 RX ADMIN — CEPHALEXIN 500 MG: 500 CAPSULE ORAL at 10:47

## 2018-03-12 RX ADMIN — PYRIDOSTIGMINE BROMIDE 30 MG: 60 TABLET ORAL at 09:21

## 2018-03-12 RX ADMIN — MYCOPHENOLATE MOFETIL 500 MG: 250 CAPSULE ORAL at 21:31

## 2018-03-12 RX ADMIN — Medication 10 ML: at 09:22

## 2018-03-12 RX ADMIN — ENOXAPARIN SODIUM 40 MG: 40 INJECTION SUBCUTANEOUS at 09:20

## 2018-03-12 RX ADMIN — PREDNISONE 20 MG: 20 TABLET ORAL at 09:21

## 2018-03-12 RX ADMIN — PYRIDOSTIGMINE BROMIDE 30 MG: 60 TABLET ORAL at 21:31

## 2018-03-12 RX ADMIN — MYCOPHENOLATE MOFETIL 500 MG: 250 CAPSULE ORAL at 09:20

## 2018-03-12 RX ADMIN — FERROUS SULFATE TAB EC 325 MG (65 MG FE EQUIVALENT) 325 MG: 325 (65 FE) TABLET DELAYED RESPONSE at 10:23

## 2018-03-12 ASSESSMENT — PAIN SCALES - GENERAL: PAINLEVEL_OUTOF10: 0

## 2018-03-12 NOTE — PROGRESS NOTES
on file prior to encounter. Current Outpatient Prescriptions on File Prior to Encounter   Medication Sig Dispense Refill    venlafaxine (EFFEXOR XR) 75 MG extended release capsule Take 1 capsule by mouth daily 30 capsule 3    pyridostigmine (MESTINON) 60 MG tablet Take 0.5 tablets by mouth daily 60 tablet 3    Alcohol Swabs PADS       metFORMIN (GLUCOPHAGE) 500 MG tablet Take 1 tablet by mouth 2 times daily (with meals) 60 tablet 3    Calcium Carbonate-Vitamin D (OYSTER SHELL CALCIUM/D) 500-200 MG-UNIT TABS Take 2 tablets by mouth daily 60 tablet 2    ferrous sulfate 325 (65 Fe) MG EC tablet Take 1 tablet by mouth 3 times daily (with meals) 90 tablet 5    ACCU-CHEK SOFTCLIX LANCETS MISC Daily 100 each 3    Glucose Blood (BLOOD GLUCOSE TEST STRIPS) STRP Test 4 times daily Diagnosis 100 strip 11    glucose monitoring kit (FREESTYLE) monitoring kit Daily check 1 kit 0    sodium chloride (OCEAN) 0.65 % nasal spray 1 spray by Nasal route as needed for Congestion 1 Bottle 2    vitamin C (VITAMIN C) 500 MG tablet Take 1 tablet by mouth daily 30 tablet 3    predniSONE (DELTASONE) 20 MG tablet Take 1 po qd 30 tablet 5    mycophenolate (CELLCEPT) 500 MG tablet Take 1 po bid 60 tablet 5    ibuprofen (ADVIL;MOTRIN) 800 MG tablet Take 1 tablet by mouth every 8 hours as needed for Pain Pt to start 1-2 days before her menses and stay on it until the end of menses 60 tablet 3       Allergies: Pascual Sepulveda is allergic to flexeril [cyclobenzaprine]; norflex [orphenadrine citrate]; nsaids; penicillins; and vancomycin. Past Medical History:   Diagnosis Date    Anemia     Headache     Hypertension     Myasthenia gravis ([facility] [address]     Pre-diabetes        Past Surgical History:   Procedure Laterality Date    CARDIAC SURGERY         Social History: Pascual Sepulveda  reports that she has never smoked.  She has never used smokeless tobacco. She reports that she does not drink alcohol or use movements, no tremor   SENSORY FUNCTION:  Normal touch, normal pin   CEREBELLAR FUNCTION:  Intact fine motor control over upper limbs   REFLEX FUNCTION:  Symmetric, no perverted reflex, no Babinski sign   STATION and GAIT  Not tested       Data:    Lab Results:   CBC:   Recent Labs      03/11/18   1057  03/12/18   0603   WBC  11.1  11.4*   HGB  14.3  13.5   PLT  See Reflexed IPF Result  See Reflexed IPF Result     BMP:    Recent Labs      03/11/18   1057  03/12/18   0603   NA  139  141   K  4.2  3.8   CL  102  101   CO2  24  25   BUN  7  10   CREATININE  0.50  0.64   GLUCOSE  156*  85         Lab Results   Component Value Date    CHOL 115 05/04/2017    LDLCHOLESTEROL 48 05/04/2017    HDL 49 05/04/2017    TRIG 88 05/04/2017    ALT 71 (H) 03/11/2018    AST 59 (H) 03/11/2018    TSH 3.13 11/08/2017    INR 1.0 01/11/2018    LABA1C 7.0 (H) 11/08/2017    LABMICR 13 06/02/2017           Diagnostic data reviewed:      BRAIN MRI (5/4/18) -  No acute intracranial abnormality.       Moderate to severe right maxillary sinuses.  Correlate for acute on chronic   sinusitis. Impression and Plan: Ms. Stanley Witt is a 36 y.o. female with history of generalized myasthenia gravis, treated in January 2018 with 5 day course of IVIG. No myasthenic weakness observed at this time. UTI newly diagnosed that may be attributing to patient's sense of weakness as well as she is on her last day of menses which in the past has correlated with increased weakness as well. Continue Mycophenolate 500mg bid, Prednisone 20mg qd, Mestinon 30-30-30-30     Most recent FVC 3.2 and NIF greater than -40; continue testing qshift    UTI; on Keflex    Continue PT/OT    Will follow with you.

## 2018-03-12 NOTE — PLAN OF CARE
Problem: Falls - Risk of:  Goal: Will remain free from falls  Will remain free from falls   Outcome: Met This Shift                              Pt assessed as a fall risk this shift. Remains free from falls and accidental injury at this time. Fall precautions in place, including falling star sign. Floor free from obstacles, and bed is locked and in lowest position. Adequate lighting provided. Pt encouraged to call before getting Out Of Bed for any need. Will continue to monitor needs during hourly rounding, and reinforce education on use of call light.

## 2018-03-12 NOTE — PROGRESS NOTES
hours. Lactic Acid: No results for input(s): LACTA in the last 72 hours. CARDIAC ENZYMES:No results for input(s): CKTOTAL, CKMB, CKMBINDEX, TROPONINI in the last 72 hours. BNP: No results for input(s): BNP in the last 72 hours. Lipids: No results for input(s): CHOL, TRIG, HDL, LDLCALC in the last 72 hours. Invalid input(s): LDL  ABGs: No results found for: PH, PCO2, PO2, HCO3, O2SAT  Thyroid:   Lab Results   Component Value Date    TSH 3.13 11/08/2017      Urinalysis:   Clarity, UA   Date Value Ref Range Status   02/05/2018 Clear  Final     Color, UA   Date Value Ref Range Status   03/11/2018 YELLOW YEL Final     pH, UA   Date Value Ref Range Status   03/11/2018 6.0 5.0 - 8.0 Final     Specific Gravity, UA   Date Value Ref Range Status   03/11/2018 1.005 1.005 - 1.030 Final     Protein, UA   Date Value Ref Range Status   03/11/2018 NEGATIVE NEG Final     RBC, UA   Date Value Ref Range Status   03/11/2018 None 0 - 4 /HPF Final     Comment:     Reference range defined for non-centrifuged specimen.      Blood, UA POC   Date Value Ref Range Status   02/05/2018 Negative  Final     Bacteria, UA   Date Value Ref Range Status   03/11/2018 MANY (A) NONE Final     Comment:     [facility] [address] [facility], [address] [place] [address] [phone]     Nitrite, Urine   Date Value Ref Range Status   03/11/2018 NEGATIVE NEG Final     WBC, UA   Date Value Ref Range Status   03/11/2018 2 TO 5 0 - 5 /HPF Final     Leukocyte Esterase, Urine   Date Value Ref Range Status   03/11/2018 TRACE (A) NEG Final     Comment:     [facility] [address] [facility], [address] [place] [address] [phone]     Yeast, UA   Date Value Ref Range Status   03/11/2018 NOT REPORTED NONE Final     Glucose, Ur   Date Value Ref Range Status   03/11/2018 NEGATIVE NEG Final     Bilirubin, UA   Date Value Ref Range Status   02/05/2018 Negative  Final     Bilirubin Urine   Date Value Ref Range Status   03/11/2018 NEGATIVE NEG Final           Assessment:  Principal Problem:    H/O fatigue  Active Problems:    Pre-diabetes    Heavy period/Dysmenorrhea    History of myasthenia gravis    Chronic fatigue  Resolved Problems:    * No resolved hospital problems. *      Plan:    1.  Myasthenia gravis - On Mestinon 30 - 30 - 30. Prednisone 20 MG, mycophenolate 500 MG twice a day. Urology on board. Appreciate recommendations for further management. Monitor respiratory status. 2. Diabetes mellitus - sliding scale. Monitor blood sugars. 3. UTI - start Keflex 500 MG twice a day. 4. DVT prophylaxis - Lovenox  5. PT/OT  6. Plan discharge today if cleared by neurology. Adrian Zelaya MD      Department of Internal Medicine  [address]         3/12/2018, 11:54 AM     Attending Physician Statement  I have discussed the care of Randy Manzano, including pertinent history and exam findings with the resident. I have reviewed the key elements of all parts of the encounter with the resident. I have seen and examined the patient with the resident and the key elements of all parts of the encounter have been performed by me.      36year old lady with h/o myasthenia admitted with weakness and fatigue  PMH, vitals, labs, medications reviewed  No myasthenic crisis per neuro  She continues to complain of fatigue  Will keep in the hospital for 1 more day  Continue Mycophenolate, Prednisone, Mestinon  Ok keflex for UTI

## 2018-03-12 NOTE — PROGRESS NOTES
Received a page that patient not meeting inpatient criteria. Hemodynamically stable. Labs wnl. Admission changed to observation.        Virginia Alicia MD  Internal Medicine Resident  [facility]

## 2018-03-13 VITALS
DIASTOLIC BLOOD PRESSURE: 86 MMHG | SYSTOLIC BLOOD PRESSURE: 128 MMHG | TEMPERATURE: 98 F | HEIGHT: 65 IN | WEIGHT: 171 LBS | HEART RATE: 78 BPM | OXYGEN SATURATION: 100 % | RESPIRATION RATE: 16 BRPM | BODY MASS INDEX: 28.49 KG/M2

## 2018-03-13 LAB
ABSOLUTE EOS #: 0.29 K/UL (ref 0–0.4)
ABSOLUTE IMMATURE GRANULOCYTE: 0.19 K/UL (ref 0–0.3)
ABSOLUTE LYMPH #: 2.88 K/UL (ref 1–4.8)
ABSOLUTE MONO #: 0.86 K/UL (ref 0.1–0.8)
BASOPHILS # BLD: 1 % (ref 0–2)
BASOPHILS ABSOLUTE: 0.1 K/UL (ref 0–0.2)
DIFFERENTIAL TYPE: ABNORMAL
EOSINOPHILS RELATIVE PERCENT: 3 % (ref 1–4)
GLUCOSE BLD-MCNC: 154 MG/DL (ref 65–105)
HCT VFR BLD CALC: 44.6 % (ref 36.3–47.1)
HEMOGLOBIN: 13.8 G/DL (ref 11.9–15.1)
IMMATURE GRANULOCYTES: 2 %
LYMPHOCYTES # BLD: 30 % (ref 24–44)
MCH RBC QN AUTO: 28.2 PG (ref 25.2–33.5)
MCHC RBC AUTO-ENTMCNC: 30.9 G/DL (ref 28.4–34.8)
MCV RBC AUTO: 91.2 FL (ref 82.6–102.9)
MONOCYTES # BLD: 9 % (ref 1–7)
MORPHOLOGY: ABNORMAL
NRBC AUTOMATED: 0 PER 100 WBC
PDW BLD-RTO: 23.8 % (ref 11.8–14.4)
PLATELET # BLD: 370 K/UL (ref 138–453)
PLATELET ESTIMATE: ABNORMAL
PMV BLD AUTO: 11 FL (ref 8.1–13.5)
RBC # BLD: 4.89 M/UL (ref 3.95–5.11)
RBC # BLD: ABNORMAL 10*6/UL
SEG NEUTROPHILS: 55 % (ref 36–66)
SEGMENTED NEUTROPHILS ABSOLUTE COUNT: 5.28 K/UL (ref 1.8–7.7)
WBC # BLD: 9.6 K/UL (ref 3.5–11.3)
WBC # BLD: ABNORMAL 10*3/UL

## 2018-03-13 PROCEDURE — G8988 SELF CARE GOAL STATUS: HCPCS

## 2018-03-13 PROCEDURE — 97166 OT EVAL MOD COMPLEX 45 MIN: CPT

## 2018-03-13 PROCEDURE — 99232 SBSQ HOSP IP/OBS MODERATE 35: CPT | Performed by: NURSE PRACTITIONER

## 2018-03-13 PROCEDURE — 85025 COMPLETE CBC W/AUTO DIFF WBC: CPT

## 2018-03-13 PROCEDURE — 36415 COLL VENOUS BLD VENIPUNCTURE: CPT

## 2018-03-13 PROCEDURE — 6370000000 HC RX 637 (ALT 250 FOR IP): Performed by: STUDENT IN AN ORGANIZED HEALTH CARE EDUCATION/TRAINING PROGRAM

## 2018-03-13 PROCEDURE — 99217 PR OBSERVATION CARE DISCHARGE MANAGEMENT: CPT | Performed by: INTERNAL MEDICINE

## 2018-03-13 PROCEDURE — 82947 ASSAY GLUCOSE BLOOD QUANT: CPT

## 2018-03-13 PROCEDURE — 94799 UNLISTED PULMONARY SVC/PX: CPT

## 2018-03-13 PROCEDURE — G0378 HOSPITAL OBSERVATION PER HR: HCPCS

## 2018-03-13 PROCEDURE — G8987 SELF CARE CURRENT STATUS: HCPCS

## 2018-03-13 PROCEDURE — 2580000003 HC RX 258: Performed by: STUDENT IN AN ORGANIZED HEALTH CARE EDUCATION/TRAINING PROGRAM

## 2018-03-13 PROCEDURE — 6360000002 HC RX W HCPCS: Performed by: STUDENT IN AN ORGANIZED HEALTH CARE EDUCATION/TRAINING PROGRAM

## 2018-03-13 PROCEDURE — 6370000000 HC RX 637 (ALT 250 FOR IP): Performed by: PSYCHIATRY & NEUROLOGY

## 2018-03-13 RX ORDER — CEPHALEXIN 500 MG/1
500 CAPSULE ORAL EVERY 12 HOURS SCHEDULED
Qty: 10 CAPSULE | Refills: 0 | Status: SHIPPED | OUTPATIENT
Start: 2018-03-13 | End: 2018-03-18

## 2018-03-13 RX ADMIN — Medication 10 ML: at 09:35

## 2018-03-13 RX ADMIN — FERROUS SULFATE TAB EC 325 MG (65 MG FE EQUIVALENT) 325 MG: 325 (65 FE) TABLET DELAYED RESPONSE at 18:17

## 2018-03-13 RX ADMIN — Medication 2 TABLET: at 09:32

## 2018-03-13 RX ADMIN — PYRIDOSTIGMINE BROMIDE 30 MG: 60 TABLET ORAL at 18:18

## 2018-03-13 RX ADMIN — FERROUS SULFATE TAB EC 325 MG (65 MG FE EQUIVALENT) 325 MG: 325 (65 FE) TABLET DELAYED RESPONSE at 13:46

## 2018-03-13 RX ADMIN — PREDNISONE 20 MG: 20 TABLET ORAL at 09:32

## 2018-03-13 RX ADMIN — PYRIDOSTIGMINE BROMIDE 30 MG: 60 TABLET ORAL at 05:54

## 2018-03-13 RX ADMIN — PYRIDOSTIGMINE BROMIDE 30 MG: 60 TABLET ORAL at 13:45

## 2018-03-13 RX ADMIN — PYRIDOSTIGMINE BROMIDE 30 MG: 60 TABLET ORAL at 09:33

## 2018-03-13 RX ADMIN — FERROUS SULFATE TAB EC 325 MG (65 MG FE EQUIVALENT) 325 MG: 325 (65 FE) TABLET DELAYED RESPONSE at 10:19

## 2018-03-13 RX ADMIN — MYCOPHENOLATE MOFETIL 500 MG: 250 CAPSULE ORAL at 09:31

## 2018-03-13 RX ADMIN — Medication 500 MG: at 09:32

## 2018-03-13 RX ADMIN — CEPHALEXIN 500 MG: 500 CAPSULE ORAL at 09:32

## 2018-03-13 ASSESSMENT — PAIN DESCRIPTION - FREQUENCY
FREQUENCY: INTERMITTENT

## 2018-03-13 ASSESSMENT — PAIN SCALES - GENERAL
PAINLEVEL_OUTOF10: 4
PAINLEVEL_OUTOF10: 3
PAINLEVEL_OUTOF10: 4

## 2018-03-13 ASSESSMENT — PAIN DESCRIPTION - DIRECTION: RADIATING_TOWARDS: BACK

## 2018-03-13 ASSESSMENT — PAIN DESCRIPTION - LOCATION
LOCATION: BACK

## 2018-03-13 ASSESSMENT — PAIN DESCRIPTION - PROGRESSION
CLINICAL_PROGRESSION: NOT CHANGED
CLINICAL_PROGRESSION: NOT CHANGED
CLINICAL_PROGRESSION: GRADUALLY IMPROVING
CLINICAL_PROGRESSION: GRADUALLY IMPROVING
CLINICAL_PROGRESSION: NOT CHANGED

## 2018-03-13 ASSESSMENT — PAIN DESCRIPTION - ONSET
ONSET: GRADUAL

## 2018-03-13 ASSESSMENT — PAIN DESCRIPTION - ORIENTATION
ORIENTATION: RIGHT;LEFT

## 2018-03-13 ASSESSMENT — PAIN DESCRIPTION - PAIN TYPE
TYPE: CHRONIC PAIN
TYPE: ACUTE PAIN

## 2018-03-13 ASSESSMENT — PAIN DESCRIPTION - DESCRIPTORS
DESCRIPTORS: ACHING

## 2018-03-13 NOTE — CARE COORDINATION
Discharge [address] [facility] Case Management Department  Written by: Antonio Henry RN    Patient Name: Ramandeep Singer  Attending Provider: Jamila Olivier MD  Admit Date: 3/11/2018 10:21 AM  MRN: [id number]  Account: [de-identified]                     : 1975  Discharge Date: 3/13/2018      Disposition: home independently    Antonio Henry RN

## 2018-03-13 NOTE — PROGRESS NOTES
Physical Therapy  DATE: 3/13/2018    NAME: Karlos Julian  MRN: [id number]   : 1975    Patient not seen this date for Physical Therapy due to:  [] Blood transfusion in progress  [] Hemodialysis  []  Patient Declined  [] Spine Precautions   [] Strict Bedrest  [] Surgery/ Procedure  [] Testing      [x] Other Pt states she is independent and walking fine. She does not want PT. (Used .)       [x] PT being discontinued at this time. Patient independent. No further needs. [] PT being discontinued at this time as the patient has been transferred to palliative care. No further needs.     Sai Duty, PT

## 2018-03-13 NOTE — PLAN OF CARE
Problem: Falls - Risk of:  Goal: Will remain free from falls  Will remain free from falls   Outcome: Ongoing  Fall precautions in place. Bed in lowest position, wheels locked, bed alarm in place and activated. Non-skid socks on patient. Fall risk ID on patient, call light within reach. Environment free of clutter and adequate lighting provided. Patient is encouraged to call out before getting out of bed and for any other needs. Will continue to monitor. Chema Shay RN        Problem: Pain:  Goal: Pain level will decrease  Pain level will decrease   Outcome: Ongoing  Pain assessment completed. Patient educated on pain scale and to call out with any new onset of pain or unrelieved pain. PRN pain medication given per patient request. Will continue to monitor. Chema Shay RN

## 2018-03-15 ENCOUNTER — OFFICE VISIT (OUTPATIENT)
Dept: NEUROLOGY | Age: 43
End: 2018-03-15
Payer: MEDICARE

## 2018-03-15 VITALS
HEIGHT: 63 IN | HEART RATE: 94 BPM | SYSTOLIC BLOOD PRESSURE: 130 MMHG | BODY MASS INDEX: 30.3 KG/M2 | WEIGHT: 171 LBS | DIASTOLIC BLOOD PRESSURE: 78 MMHG

## 2018-03-15 DIAGNOSIS — G70.00 MYASTHENIA GRAVIS (HCC): Primary | ICD-10-CM

## 2018-03-15 PROCEDURE — G8427 DOCREV CUR MEDS BY ELIG CLIN: HCPCS | Performed by: PSYCHIATRY & NEUROLOGY

## 2018-03-15 PROCEDURE — 99214 OFFICE O/P EST MOD 30 MIN: CPT | Performed by: PSYCHIATRY & NEUROLOGY

## 2018-03-15 PROCEDURE — G8484 FLU IMMUNIZE NO ADMIN: HCPCS | Performed by: PSYCHIATRY & NEUROLOGY

## 2018-03-15 PROCEDURE — 1111F DSCHRG MED/CURRENT MED MERGE: CPT | Performed by: PSYCHIATRY & NEUROLOGY

## 2018-03-15 PROCEDURE — 1036F TOBACCO NON-USER: CPT | Performed by: PSYCHIATRY & NEUROLOGY

## 2018-03-15 PROCEDURE — G8417 CALC BMI ABV UP PARAM F/U: HCPCS | Performed by: PSYCHIATRY & NEUROLOGY

## 2018-03-15 RX ORDER — DICYCLOMINE HYDROCHLORIDE 10 MG/1
CAPSULE ORAL
Qty: 90 CAPSULE | Refills: 3 | Status: SHIPPED | OUTPATIENT
Start: 2018-03-15 | End: 2018-04-16 | Stop reason: ALTCHOICE

## 2018-03-15 RX ORDER — PYRIDOSTIGMINE BROMIDE 60 MG/1
TABLET ORAL
Qty: 90 TABLET | Refills: 3 | Status: SHIPPED | OUTPATIENT
Start: 2018-03-15 | End: 2018-07-12 | Stop reason: ALTCHOICE

## 2018-03-15 ASSESSMENT — ENCOUNTER SYMPTOMS
RESPIRATORY NEGATIVE: 1
GASTROINTESTINAL NEGATIVE: 1
EYES NEGATIVE: 1

## 2018-03-15 NOTE — PROGRESS NOTES
Subjective:      Patient ID: Shiva Barron is a 36 y.o. female. HPI     Active problem she was seen on consultation at [facility] in January for myasthenia gravis exacerbation with sinusitis undergoing IgG . She has prior thymectomy on cellcept , prednisone and mestinon  . The condition is she reports she has been to the ER two times within the past two weeks having facial weakness , fatigue and dry mouth found to have UTI being in the hospital for 3 days still on keflex . Her mestinon dose is 30 mg po qid taking higher dosage of mestinon 60 mg previously developing diarrhea unable to tolerate . There is no dyspnea  . Significant medications mycophenylate 500 mg po bid , prednisone 20 mg po qd, mestinon 30 mg po qid . She has been on imuran in the past  . Testing MRI of Head with normal brain with right maxillary sinusitis . CRP 1 , TSH normal, ESR 10 ,cholesterol 115 , LDL 48 , TG 88 . Hga1c 6.4 , MONET negative , Anti SSA and anti SSB are negative, ACE 31 (8-52) . CT chest subtle increased density within the anterior mediastinum possibly representing residual thickening or scarring.  No discrete anterior mediastinal mass identified . Acetylcholine binding Ab 478 (0--0.4), blocking Ab 63 (0-26 %) , striated muscle Ab <1:40 . VC 1.86 l , NF -40        Past Medical History:   Diagnosis Date    Anemia     Headache     Hypertension     Myasthenia gravis ([address]     Pre-diabetes        Past Surgical History:   Procedure Laterality Date    CARDIAC SURGERY         History reviewed. No pertinent family history.     Social History     Social History    Marital status: Single     Spouse name: N/A    Number of children: N/A    Years of education: N/A     Social History Main Topics    Smoking status: Never Smoker    Smokeless tobacco: Never Used    Alcohol use No    Drug use: No    Sexual activity: No     Other Topics Concern    None     Social History Narrative    None       Current Outpatient Prescriptions   Medication Sig Dispense Refill    pyridostigmine (MESTINON) 60 MG tablet Take 1 po tid 7 AM , 12 AM , 5 PM 90 tablet 3    dicyclomine (BENTYL) 10 MG capsule Take 1 po tid 7AM , 12 AM and 5PM 90 capsule 3    venlafaxine (EFFEXOR XR) 75 MG extended release capsule Take 1 capsule by mouth daily 30 capsule 3    pyridostigmine (MESTINON) 60 MG tablet Take 0.5 tablets by mouth daily 60 tablet 3    Alcohol Swabs PADS       metFORMIN (GLUCOPHAGE) 500 MG tablet Take 1 tablet by mouth 2 times daily (with meals) 60 tablet 3    Calcium Carbonate-Vitamin D (OYSTER SHELL CALCIUM/D) 500-200 MG-UNIT TABS Take 2 tablets by mouth daily 60 tablet 2    ferrous sulfate 325 (65 Fe) MG EC tablet Take 1 tablet by mouth 3 times daily (with meals) 90 tablet 5    ACCU-CHEK SOFTCLIX LANCETS MISC Daily 100 each 3    Glucose Blood (BLOOD GLUCOSE TEST STRIPS) STRP Test 4 times daily Diagnosis 100 strip 11    glucose monitoring kit (FREESTYLE) monitoring kit Daily check 1 kit 0    sodium chloride (OCEAN) 0.65 % nasal spray 1 spray by Nasal route as needed for Congestion 1 Bottle 2    vitamin C (VITAMIN C) 500 MG tablet Take 1 tablet by mouth daily 30 tablet 3    predniSONE (DELTASONE) 20 MG tablet Take 1 po qd 30 tablet 5    mycophenolate (CELLCEPT) 500 MG tablet Take 1 po bid 60 tablet 5    ibuprofen (ADVIL;MOTRIN) 800 MG tablet Take 1 tablet by mouth every 8 hours as needed for Pain Pt to start 1-2 days before her menses and stay on it until the end of menses 60 tablet 3     No current facility-administered medications for this visit. Allergies   Allergen Reactions    Flexeril [Cyclobenzaprine]     Norflex [Orphenadrine Citrate]     Nsaids     Penicillins Rash    Vancomycin Rash       Review of Systems   Constitutional: Positive for activity change, appetite change and fatigue. HENT: Negative. Eyes: Negative. Respiratory: Negative. Cardiovascular: Negative. Gastrointestinal: Negative.

## 2018-03-19 NOTE — COMMUNICATION BODY
Prescriptions   Medication Sig Dispense Refill    pyridostigmine (MESTINON) 60 MG tablet Take 1 po tid 7 AM , 12 AM , 5 PM 90 tablet 3    dicyclomine (BENTYL) 10 MG capsule Take 1 po tid 7AM , 12 AM and 5PM 90 capsule 3    venlafaxine (EFFEXOR XR) 75 MG extended release capsule Take 1 capsule by mouth daily 30 capsule 3    pyridostigmine (MESTINON) 60 MG tablet Take 0.5 tablets by mouth daily 60 tablet 3    Alcohol Swabs PADS       metFORMIN (GLUCOPHAGE) 500 MG tablet Take 1 tablet by mouth 2 times daily (with meals) 60 tablet 3    Calcium Carbonate-Vitamin D (OYSTER SHELL CALCIUM/D) 500-200 MG-UNIT TABS Take 2 tablets by mouth daily 60 tablet 2    ferrous sulfate 325 (65 Fe) MG EC tablet Take 1 tablet by mouth 3 times daily (with meals) 90 tablet 5    ACCU-CHEK SOFTCLIX LANCETS MISC Daily 100 each 3    Glucose Blood (BLOOD GLUCOSE TEST STRIPS) STRP Test 4 times daily Diagnosis 100 strip 11    glucose monitoring kit (FREESTYLE) monitoring kit Daily check 1 kit 0    sodium chloride (OCEAN) 0.65 % nasal spray 1 spray by Nasal route as needed for Congestion 1 Bottle 2    vitamin C (VITAMIN C) 500 MG tablet Take 1 tablet by mouth daily 30 tablet 3    predniSONE (DELTASONE) 20 MG tablet Take 1 po qd 30 tablet 5    mycophenolate (CELLCEPT) 500 MG tablet Take 1 po bid 60 tablet 5    ibuprofen (ADVIL;MOTRIN) 800 MG tablet Take 1 tablet by mouth every 8 hours as needed for Pain Pt to start 1-2 days before her menses and stay on it until the end of menses 60 tablet 3     No current facility-administered medications for this visit. Allergies   Allergen Reactions    Flexeril [Cyclobenzaprine]     Norflex [Orphenadrine Citrate]     Nsaids     Penicillins Rash    Vancomycin Rash       Review of Systems   Constitutional: Positive for activity change, appetite change and fatigue. HENT: Negative. Eyes: Negative. Respiratory: Negative. Cardiovascular: Negative. Gastrointestinal: Negative.

## 2018-04-11 PROBLEM — J32.9 SINUSITIS: Status: RESOLVED | Noted: 2018-01-12 | Resolved: 2018-04-11

## 2018-04-16 ENCOUNTER — OFFICE VISIT (OUTPATIENT)
Dept: INTERNAL MEDICINE | Age: 43
End: 2018-04-16
Payer: MEDICARE

## 2018-04-16 VITALS
HEART RATE: 96 BPM | WEIGHT: 170 LBS | SYSTOLIC BLOOD PRESSURE: 118 MMHG | BODY MASS INDEX: 30.11 KG/M2 | DIASTOLIC BLOOD PRESSURE: 86 MMHG

## 2018-04-16 DIAGNOSIS — J02.9 THROAT SORENESS: ICD-10-CM

## 2018-04-16 DIAGNOSIS — Z23 NEED FOR VACCINATION FOR PNEUMOCOCCUS: ICD-10-CM

## 2018-04-16 DIAGNOSIS — G70.00 MYASTHENIA GRAVIS (HCC): Primary | ICD-10-CM

## 2018-04-16 DIAGNOSIS — E11.8 TYPE 2 DIABETES MELLITUS WITH COMPLICATION, UNSPECIFIED LONG TERM INSULIN USE STATUS: ICD-10-CM

## 2018-04-16 PROBLEM — J32.0 RIGHT MAXILLARY SINUSITIS, CHRONIC: Status: RESOLVED | Noted: 2017-08-05 | Resolved: 2018-04-16

## 2018-04-16 PROBLEM — R53.82 CHRONIC FATIGUE: Status: RESOLVED | Noted: 2018-03-11 | Resolved: 2018-04-16

## 2018-04-16 PROBLEM — G70.01 MYASTHENIA GRAVIS WITH EXACERBATION (HCC): Status: RESOLVED | Noted: 2018-01-12 | Resolved: 2018-04-16

## 2018-04-16 PROBLEM — N92.0 HEAVY PERIOD: Status: RESOLVED | Noted: 2018-02-13 | Resolved: 2018-04-16

## 2018-04-16 PROBLEM — G70.01: Status: RESOLVED | Noted: 2018-01-12 | Resolved: 2018-04-16

## 2018-04-16 LAB — HBA1C MFR BLD: 6.2 %

## 2018-04-16 PROCEDURE — 1036F TOBACCO NON-USER: CPT | Performed by: STUDENT IN AN ORGANIZED HEALTH CARE EDUCATION/TRAINING PROGRAM

## 2018-04-16 PROCEDURE — 3044F HG A1C LEVEL LT 7.0%: CPT | Performed by: STUDENT IN AN ORGANIZED HEALTH CARE EDUCATION/TRAINING PROGRAM

## 2018-04-16 PROCEDURE — G8417 CALC BMI ABV UP PARAM F/U: HCPCS | Performed by: STUDENT IN AN ORGANIZED HEALTH CARE EDUCATION/TRAINING PROGRAM

## 2018-04-16 PROCEDURE — G8427 DOCREV CUR MEDS BY ELIG CLIN: HCPCS | Performed by: STUDENT IN AN ORGANIZED HEALTH CARE EDUCATION/TRAINING PROGRAM

## 2018-04-16 PROCEDURE — 99213 OFFICE O/P EST LOW 20 MIN: CPT

## 2018-04-16 PROCEDURE — 99213 OFFICE O/P EST LOW 20 MIN: CPT | Performed by: STUDENT IN AN ORGANIZED HEALTH CARE EDUCATION/TRAINING PROGRAM

## 2018-04-16 PROCEDURE — 2022F DILAT RTA XM EVC RTNOPTHY: CPT | Performed by: STUDENT IN AN ORGANIZED HEALTH CARE EDUCATION/TRAINING PROGRAM

## 2018-04-16 PROCEDURE — 83036 HEMOGLOBIN GLYCOSYLATED A1C: CPT | Performed by: STUDENT IN AN ORGANIZED HEALTH CARE EDUCATION/TRAINING PROGRAM

## 2018-04-16 RX ORDER — LANCETS
EACH MISCELLANEOUS
Qty: 100 EACH | Refills: 3 | Status: SHIPPED | OUTPATIENT
Start: 2018-04-16

## 2018-04-28 DIAGNOSIS — R73.03 PRE-DIABETES: Primary | ICD-10-CM

## 2018-04-30 RX ORDER — BLOOD PRESSURE TEST KIT
KIT MISCELLANEOUS
Qty: 100 EACH | Refills: 3 | Status: SHIPPED | OUTPATIENT
Start: 2018-04-30 | End: 2018-10-23 | Stop reason: SDUPTHER

## 2018-05-02 ENCOUNTER — OFFICE VISIT (OUTPATIENT)
Dept: NEUROLOGY | Age: 43
End: 2018-05-02
Payer: MEDICARE

## 2018-05-02 VITALS
HEIGHT: 60 IN | WEIGHT: 170 LBS | SYSTOLIC BLOOD PRESSURE: 116 MMHG | HEART RATE: 79 BPM | BODY MASS INDEX: 33.38 KG/M2 | DIASTOLIC BLOOD PRESSURE: 82 MMHG

## 2018-05-02 DIAGNOSIS — G70.00 MYASTHENIA GRAVIS (HCC): Primary | ICD-10-CM

## 2018-05-02 PROCEDURE — G8427 DOCREV CUR MEDS BY ELIG CLIN: HCPCS | Performed by: PSYCHIATRY & NEUROLOGY

## 2018-05-02 PROCEDURE — G8417 CALC BMI ABV UP PARAM F/U: HCPCS | Performed by: PSYCHIATRY & NEUROLOGY

## 2018-05-02 PROCEDURE — 99214 OFFICE O/P EST MOD 30 MIN: CPT | Performed by: PSYCHIATRY & NEUROLOGY

## 2018-05-02 PROCEDURE — 1036F TOBACCO NON-USER: CPT | Performed by: PSYCHIATRY & NEUROLOGY

## 2018-05-02 ASSESSMENT — ENCOUNTER SYMPTOMS
GASTROINTESTINAL NEGATIVE: 1
RESPIRATORY NEGATIVE: 1
ALLERGIC/IMMUNOLOGIC NEGATIVE: 1
EYES NEGATIVE: 1

## 2018-05-26 DIAGNOSIS — E11.8 TYPE 2 DIABETES MELLITUS WITH COMPLICATION, UNSPECIFIED LONG TERM INSULIN USE STATUS: ICD-10-CM

## 2018-06-01 RX ORDER — ASCORBIC ACID 500 MG
500 TABLET ORAL DAILY
Qty: 30 TABLET | Refills: 3 | Status: SHIPPED | OUTPATIENT
Start: 2018-06-01 | End: 2018-10-29 | Stop reason: SDUPTHER

## 2018-07-12 ENCOUNTER — OFFICE VISIT (OUTPATIENT)
Dept: NEUROLOGY | Age: 43
End: 2018-07-12
Payer: MEDICARE

## 2018-07-12 VITALS
HEART RATE: 94 BPM | HEIGHT: 60 IN | DIASTOLIC BLOOD PRESSURE: 86 MMHG | BODY MASS INDEX: 33.06 KG/M2 | SYSTOLIC BLOOD PRESSURE: 117 MMHG | WEIGHT: 168.4 LBS

## 2018-07-12 DIAGNOSIS — G70.00 MYASTHENIA GRAVIS (HCC): Primary | ICD-10-CM

## 2018-07-12 PROCEDURE — G8417 CALC BMI ABV UP PARAM F/U: HCPCS | Performed by: PSYCHIATRY & NEUROLOGY

## 2018-07-12 PROCEDURE — 99214 OFFICE O/P EST MOD 30 MIN: CPT | Performed by: PSYCHIATRY & NEUROLOGY

## 2018-07-12 PROCEDURE — G8427 DOCREV CUR MEDS BY ELIG CLIN: HCPCS | Performed by: PSYCHIATRY & NEUROLOGY

## 2018-07-12 PROCEDURE — 1036F TOBACCO NON-USER: CPT | Performed by: PSYCHIATRY & NEUROLOGY

## 2018-07-12 RX ORDER — PREDNISONE 20 MG/1
TABLET ORAL
Qty: 30 TABLET | Refills: 5 | Status: SHIPPED | OUTPATIENT
Start: 2018-07-12 | End: 2018-11-29 | Stop reason: SDUPTHER

## 2018-07-12 RX ORDER — MYCOPHENOLATE MOFETIL 250 MG/1
500 CAPSULE ORAL 2 TIMES DAILY
Qty: 120 CAPSULE | Refills: 5 | Status: SHIPPED | OUTPATIENT
Start: 2018-07-12 | End: 2018-10-15 | Stop reason: CLARIF

## 2018-07-12 RX ORDER — PYRIDOSTIGMINE BROMIDE 60 MG/1
TABLET ORAL
Qty: 90 TABLET | Refills: 5 | Status: SHIPPED | OUTPATIENT
Start: 2018-07-12 | End: 2018-11-14 | Stop reason: SDUPTHER

## 2018-07-12 ASSESSMENT — ENCOUNTER SYMPTOMS
EYES NEGATIVE: 1
GASTROINTESTINAL NEGATIVE: 1
ALLERGIC/IMMUNOLOGIC NEGATIVE: 1
RESPIRATORY NEGATIVE: 1

## 2018-07-12 NOTE — PROGRESS NOTES
Subjective:      Patient ID: Hannah Tristan is a 39 y.o. female. HPI     Active problem myasthenia gravis . She has had prior thymectomy on cellcept , prednisone and mestinon  . The condition is she reports that during menstrual period she will have mild hand tremor . She denies any weakness , numbness , diplopia complaint . There is no slurring or trouble swallowing. There is no diarrhea  . She has not been to ER  since last seen. Over the last 2 years she has had 2 courses of plasmapheresis and 3 of IV IgG. The last was IgG was in in January . There has been overall marked improvement since mestinon addition in March. Significant medications mycophenylate 500 mg po bid , prednisone 20 mg po qd, mestinon 60 mg po tid . She has been on imuran in the past  . Testing MRI of Head with normal brain with right maxillary sinusitis . CRP 1 , TSH normal, ESR 10 ,cholesterol 115 , LDL 48 , TG 88 . Hga1c 6.4 , MONET negative , Anti SSA and anti SSB are negative, ACE 31 (8-52) . CT chest subtle increased density within the anterior mediastinum possibly representing residual thickening or scarring.  No discrete anterior mediastinal mass identified . Acetylcholine binding Ab 478 (0--0.4), blocking Ab 63 (0-26 %) , striated muscle Ab <1:40 .        Past Medical History:   Diagnosis Date    Anemia     Headache     Hypertension     Myasthenia gravis ([address]     Pre-diabetes        Past Surgical History:   Procedure Laterality Date    CARDIAC SURGERY         History reviewed. No pertinent family history.     Social History     Social History    Marital status: Single     Spouse name: N/A    Number of children: N/A    Years of education: N/A     Social History Main Topics    Smoking status: Never Smoker    Smokeless tobacco: Never Used    Alcohol use No    Drug use: No    Sexual activity: No     Other Topics Concern    None     Social History Narrative    None       Current Outpatient Prescriptions

## 2018-07-12 NOTE — COMMUNICATION BODY
Medication Sig Dispense Refill    mycophenolate (CELLCEPT) 250 MG capsule Take 2 capsules by mouth 2 times daily 120 capsule 5    predniSONE (DELTASONE) 20 MG tablet Take 1 po qd 30 tablet 5    pyridostigmine (MESTINON) 60 MG tablet Take 1 po tid 7 AM , 12 AM , 5 PM 90 tablet 5    vitamin C (ASCORBIC ACID) 500 MG tablet TAKE 1 TABLET BY MOUTH DAILY 30 tablet 3    metFORMIN (GLUCOPHAGE) 500 MG tablet TAKE ONE TABLET BY MOUTH TWO TIMES A DAY (WITH MEALS) 60 tablet 3    Alcohol Swabs PADS USE AS DIRECTED WHEN CHECKING BLOOD SUGAR DAILY. 100 each 3    ACCU-CHEK SOFTCLIX LANCETS MISC Daily 100 each 3    venlafaxine (EFFEXOR XR) 75 MG extended release capsule Take 1 capsule by mouth daily 30 capsule 3    pyridostigmine (MESTINON) 60 MG tablet Take 0.5 tablets by mouth daily 60 tablet 3    Calcium Carbonate-Vitamin D (OYSTER SHELL CALCIUM/D) 500-200 MG-UNIT TABS Take 2 tablets by mouth daily 60 tablet 2    ferrous sulfate 325 (65 Fe) MG EC tablet Take 1 tablet by mouth 3 times daily (with meals) 90 tablet 5    Glucose Blood (BLOOD GLUCOSE TEST STRIPS) STRP Test 4 times daily Diagnosis 100 strip 11    glucose monitoring kit (FREESTYLE) monitoring kit Daily check 1 kit 0    mycophenolate (CELLCEPT) 500 MG tablet Take 1 po bid (Patient taking differently: Take 500 mg by mouth Take 2 po bid) 60 tablet 5     No current facility-administered medications for this visit. Allergies   Allergen Reactions    Morphine Shortness Of Breath    Flexeril [Cyclobenzaprine]     Norflex [Orphenadrine Citrate]     Nsaids     Penicillins Rash    Vancomycin Rash       Review of Systems   Constitutional: Positive for fatigue. HENT: Negative. Eyes: Negative. Respiratory: Negative. Cardiovascular: Negative. Gastrointestinal: Negative. Endocrine: Negative. Genitourinary: Negative. Musculoskeletal: Negative. Skin: Negative. Allergic/Immunologic: Negative. Hematological: Negative.

## 2018-07-12 NOTE — LETTER
Allergen Reactions    Morphine Shortness Of Breath    Flexeril [Cyclobenzaprine]     Norflex [Orphenadrine Citrate]     Nsaids     Penicillins Rash    Vancomycin Rash       Review of Systems   Constitutional: Positive for fatigue. HENT: Negative. Eyes: Negative. Respiratory: Negative. Cardiovascular: Negative. Gastrointestinal: Negative. Endocrine: Negative. Genitourinary: Negative. Musculoskeletal: Negative. Skin: Negative. Allergic/Immunologic: Negative. Hematological: Negative. Psychiatric/Behavioral: Positive for dysphoric mood. Objective:   Physical Exam    Vitals:    07/12/18 1308   BP: 117/86   Pulse: 94     weight: 168 lb 6.4 oz (76.4 kg)  Neurological Examination  Ears /Nose/Throat: external ear . Normal exam  Neck and thyroid . Normal size  Cardiovascular: Auscultation of heart with regular rate and rhythm   Musculoskeletal. Muscle bulk and tone normal   Muscle strength 5/5 throughout   No dysmetria or dysdiadokinesis  No tremor   Normal fine motor  Orientation Alert and oriented x 3   Short term memory normal   Attention and concentration normal   Language process and speech normal . No aphasia   Cranial nerve 2 normal acuety and visual fields  Cranial nerve 3, 4 and 6 . Extraocular muscles are intact . Pupils are equal and reactive . Cranial nerve 5 . Intact corneal reflex. Normal facial sensation  Cranial nerve 7 bilateral lower extremity weakness with trouble puffing   Cranial nerve 8. Grossly intact hearing   Cranial nerve 9 and 10. Symmetric palate elevation   Cranial nerve 11 , 5 out of 5 strength   Cranial Nerve 12 midline tongue . No atrophy  Sensation . Normal pinprick and light touch   Deep Tendon Reflexes normal  Plantar response flexor bilaterally    Assessment:      1.  Myasthenia gravis ([address]    She is to continue current medication regimen         Plan:      Orders Placed This Encounter   Procedures    ALT     Standing Status:   Future Standing Expiration Date:   7/12/2019    AST     Standing Status:   Future     Standing Expiration Date:   7/12/2019    CBC     Standing Status:   Future     Standing Expiration Date:   7/12/2019              If you have questions, please do not hesitate to call me. I look forward to following Elisabeth Medina along with you.     Sincerely,        Rip Mott MD

## 2018-07-16 ENCOUNTER — HOSPITAL ENCOUNTER (OUTPATIENT)
Age: 43
Setting detail: SPECIMEN
Discharge: HOME | End: 2018-07-16
Payer: MEDICARE

## 2018-07-16 DIAGNOSIS — G70.00 MYASTHENIA GRAVIS (HCC): ICD-10-CM

## 2018-07-16 LAB
ALT SERPL-CCNC: 55 U/L (ref 5–33)
AST SERPL-CCNC: 49 U/L
HCT VFR BLD CALC: 47.1 % (ref 36.3–47.1)
HEMOGLOBIN: 15 G/DL (ref 11.9–15.1)
MCH RBC QN AUTO: 31.6 PG (ref 25.2–33.5)
MCHC RBC AUTO-ENTMCNC: 31.8 G/DL (ref 28.4–34.8)
MCV RBC AUTO: 99.4 FL (ref 82.6–102.9)
NRBC AUTOMATED: 0 PER 100 WBC
PDW BLD-RTO: 13.9 % (ref 11.8–14.4)
PLATELET # BLD: 386 K/UL (ref 138–453)
PMV BLD AUTO: 11.2 FL (ref 8.1–13.5)
RBC # BLD: 4.74 M/UL (ref 3.95–5.11)
WBC # BLD: 17.1 K/UL (ref 3.5–11.3)

## 2018-07-16 PROCEDURE — 84450 TRANSFERASE (AST) (SGOT): CPT

## 2018-07-16 PROCEDURE — 84460 ALANINE AMINO (ALT) (SGPT): CPT

## 2018-07-16 PROCEDURE — 85027 COMPLETE CBC AUTOMATED: CPT

## 2018-07-16 PROCEDURE — 36415 COLL VENOUS BLD VENIPUNCTURE: CPT

## 2018-07-18 ENCOUNTER — TELEPHONE (OUTPATIENT)
Dept: NEUROLOGY | Age: 43
End: 2018-07-18

## 2018-07-18 DIAGNOSIS — Z79.899 HIGH RISK MEDICATION USE: ICD-10-CM

## 2018-07-18 DIAGNOSIS — D72.828 OTHER ELEVATED WHITE BLOOD CELL (WBC) COUNT: Primary | ICD-10-CM

## 2018-07-18 DIAGNOSIS — G70.00 MYASTHENIA GRAVIS (HCC): ICD-10-CM

## 2018-07-18 NOTE — TELEPHONE ENCOUNTER
Lourdes Carmona with Dr. Sarthak Díaz recommendation to get UA and see PCP. Sending order to [facility] lab and Carla Espinoza will contact them about an appt. Sent copy of the labs to Dr. Gerri Porter.

## 2018-07-19 ENCOUNTER — HOSPITAL ENCOUNTER (OUTPATIENT)
Age: 43
Discharge: HOME | End: 2018-07-19
Payer: MEDICARE

## 2018-07-19 DIAGNOSIS — Z79.899 HIGH RISK MEDICATION USE: ICD-10-CM

## 2018-07-19 DIAGNOSIS — G70.00 MYASTHENIA GRAVIS (HCC): ICD-10-CM

## 2018-07-19 DIAGNOSIS — D72.828 OTHER ELEVATED WHITE BLOOD CELL (WBC) COUNT: ICD-10-CM

## 2018-07-19 LAB
BILIRUBIN URINE: NEGATIVE
COLOR: YELLOW
COMMENT UA: NORMAL
GLUCOSE URINE: NEGATIVE
KETONES, URINE: NEGATIVE
LEUKOCYTE ESTERASE, URINE: NEGATIVE
NITRITE, URINE: NEGATIVE
PH UA: 5 (ref 5–8)
PROTEIN UA: NEGATIVE
SPECIFIC GRAVITY UA: 1.01 (ref 1–1.03)
TURBIDITY: CLEAR
URINE HGB: NEGATIVE
UROBILINOGEN, URINE: NORMAL

## 2018-07-19 PROCEDURE — 81003 URINALYSIS AUTO W/O SCOPE: CPT

## 2018-07-20 ENCOUNTER — OFFICE VISIT (OUTPATIENT)
Dept: INTERNAL MEDICINE | Age: 43
End: 2018-07-20
Payer: MEDICARE

## 2018-07-20 VITALS
BODY MASS INDEX: 33.77 KG/M2 | DIASTOLIC BLOOD PRESSURE: 85 MMHG | SYSTOLIC BLOOD PRESSURE: 114 MMHG | HEART RATE: 105 BPM | WEIGHT: 172 LBS | HEIGHT: 60 IN

## 2018-07-20 DIAGNOSIS — Z13.220 LIPID SCREENING: ICD-10-CM

## 2018-07-20 DIAGNOSIS — E11.8 TYPE 2 DIABETES MELLITUS WITH COMPLICATION, UNSPECIFIED LONG TERM INSULIN USE STATUS: ICD-10-CM

## 2018-07-20 DIAGNOSIS — M85.80 OSTEOPENIA, UNSPECIFIED LOCATION: ICD-10-CM

## 2018-07-20 DIAGNOSIS — D72.829 LEUKOCYTOSIS, UNSPECIFIED TYPE: Primary | ICD-10-CM

## 2018-07-20 DIAGNOSIS — L98.9 SKIN LESION: ICD-10-CM

## 2018-07-20 PROBLEM — R53.83 FATIGUE: Status: RESOLVED | Noted: 2018-03-12 | Resolved: 2018-07-20

## 2018-07-20 PROBLEM — Z86.69 HISTORY OF MYASTHENIA GRAVIS: Status: RESOLVED | Noted: 2018-03-11 | Resolved: 2018-07-20

## 2018-07-20 PROBLEM — Z87.898 H/O FATIGUE: Status: RESOLVED | Noted: 2018-03-11 | Resolved: 2018-07-20

## 2018-07-20 PROBLEM — J02.9 THROAT SORENESS: Status: RESOLVED | Noted: 2018-04-16 | Resolved: 2018-07-20

## 2018-07-20 PROCEDURE — 3044F HG A1C LEVEL LT 7.0%: CPT | Performed by: INTERNAL MEDICINE

## 2018-07-20 PROCEDURE — 1036F TOBACCO NON-USER: CPT | Performed by: INTERNAL MEDICINE

## 2018-07-20 PROCEDURE — G8427 DOCREV CUR MEDS BY ELIG CLIN: HCPCS | Performed by: INTERNAL MEDICINE

## 2018-07-20 PROCEDURE — 99214 OFFICE O/P EST MOD 30 MIN: CPT | Performed by: INTERNAL MEDICINE

## 2018-07-20 PROCEDURE — 99213 OFFICE O/P EST LOW 20 MIN: CPT | Performed by: INTERNAL MEDICINE

## 2018-07-20 PROCEDURE — 2022F DILAT RTA XM EVC RTNOPTHY: CPT | Performed by: INTERNAL MEDICINE

## 2018-07-20 PROCEDURE — G8417 CALC BMI ABV UP PARAM F/U: HCPCS | Performed by: INTERNAL MEDICINE

## 2018-07-20 RX ORDER — B-COMPLEX WITH VITAMIN C
2 TABLET ORAL DAILY
Qty: 60 TABLET | Refills: 2 | Status: SHIPPED | OUTPATIENT
Start: 2018-07-20 | End: 2019-01-29

## 2018-07-20 RX ORDER — PREDNISONE 20 MG/1
TABLET ORAL
Qty: 30 TABLET | Refills: 5 | Status: CANCELLED | OUTPATIENT
Start: 2018-07-20

## 2018-07-20 ASSESSMENT — ENCOUNTER SYMPTOMS
HEARTBURN: 0
ABDOMINAL PAIN: 0
HEMOPTYSIS: 0
BLURRED VISION: 0
COUGH: 0
NAUSEA: 0
DOUBLE VISION: 0

## 2018-07-20 ASSESSMENT — PATIENT HEALTH QUESTIONNAIRE - PHQ9
SUM OF ALL RESPONSES TO PHQ9 QUESTIONS 1 & 2: 0
1. LITTLE INTEREST OR PLEASURE IN DOING THINGS: 0
SUM OF ALL RESPONSES TO PHQ QUESTIONS 1-9: 0
2. FEELING DOWN, DEPRESSED OR HOPELESS: 0

## 2018-07-20 NOTE — PROGRESS NOTES
[facility]  [facility] [address] [facility]  [address] [facility] [address] [facility] [address]  Dept: [phone]  Dept Fax: [phone]    Office Progress/Follow Up Note  Date of patient's visit: 7/20/2018  Patient's Name:  Martha Maurer YOB: 1975            Patient Care Team:  Eliane Chaudhari MD as PCP - General (Internal Medicine)  Maty Hankins MD as PCP - [facility] Attributed Provider  ================================================================    REASON FOR VISIT/CHIEF COMPLAINT:  Abnormal Test Results    HISTORY OF PRESENTING ILLNESS:  History was obtained from: patient. Martha Maurer is a 39 y.o. is here for a evaluation of recent abnormal labs. Review of his lab shows leukocytosis with WBC of 17. If patient did had a urinalysis to rule out any infection. She doesn't have any symptoms related to UTI. Urinalysis is also normal.  She has history of myasthenia gravis for which she was started on prednisone recently. Her only complaint today is diffuse body rash especially in the arms. She has tried over-the-counter medicine without any improvement. She would like to see a dermatologist for further evaluation. She has prediabetes which she takes metformin. She is due for medication refills today.   Problem list, medications and blood work reviewed       Patient Active Problem List   Diagnosis    Iron deficiency anemia    Pre-diabetes    Myasthenia gravis ([facility] [address]    Osteopenia       Health Maintenance Due   Topic Date Due    Diabetic foot exam  05/27/1984    Diabetic retinal exam  05/27/1984    DTaP/Tdap/Td vaccine (1 - Tdap) 05/27/1993    Lipid screen  05/04/2018    Diabetic microalbuminuria test  06/02/2018       Allergies   Allergen Reactions    Morphine Shortness Of Breath    Flexeril [Cyclobenzaprine]     Norflex [Orphenadrine Citrate]     Nsaids     Penicillins Rash    Vancomycin Rash         Current Outpatient Prescriptions Genitourinary: Negative for dysuria and urgency. Musculoskeletal: Negative for myalgias and neck pain. Neurological: Negative for dizziness and headaches. Endo/Heme/Allergies: Does not bruise/bleed easily. Psychiatric/Behavioral: Negative for depression and suicidal ideas. PHYSICAL EXAM:  Vitals:    07/20/18 1426   BP: 114/85   Site: Right Arm   Position: Sitting   Cuff Size: Medium Adult   Pulse: 105   Weight: 172 lb (78 kg)   Height: 5' (1.524 m)     BP Readings from Last 3 Encounters:   07/20/18 114/85   07/12/18 117/86   05/02/18 116/82        Physical Exam   Constitutional: She is oriented to person, place, and time and well-developed, well-nourished, and in no distress. No distress. HENT:   Mouth/Throat: No oropharyngeal exudate. Neck: Normal range of motion. Neck supple. No thyromegaly present. Cardiovascular: Normal rate, regular rhythm, normal heart sounds and intact distal pulses. Pulmonary/Chest: Effort normal and breath sounds normal. No respiratory distress. She has no wheezes. Abdominal: Soft. Bowel sounds are normal. She exhibits no distension and no mass. There is no tenderness. Musculoskeletal: Normal range of motion. She exhibits no edema or tenderness. Neurological: She is alert and oriented to person, place, and time. No cranial nerve deficit. Skin: Skin is warm. Rash (diffuse in the arms) noted. She is not diaphoretic. No erythema.    Psychiatric: Mood, memory, affect and judgment normal.         DIAGNOSTIC FINDINGS:  CBC:  Lab Results   Component Value Date    WBC 17.1 07/16/2018    HGB 15.0 07/16/2018     07/16/2018       BMP:    Lab Results   Component Value Date     03/12/2018    K 3.8 03/12/2018     03/12/2018    CO2 25 03/12/2018    BUN 10 03/12/2018    CREATININE 0.64 03/12/2018    GLUCOSE 85 03/12/2018       HEMOGLOBIN A1C:   Lab Results   Component Value Date    LABA1C 6.2 04/16/2018       FASTING LIPID PANEL:  Lab Results   Component

## 2018-07-20 NOTE — PATIENT INSTRUCTIONS
Your doctor has ordered blood or urine testing. You can get this testing done at the Lab located on the first floor of the [facility], or at any other [facility]. Please stop at Main Registration, before going to the lab, as you must be registered first.     Please get this lab done before your next appointment          Dermatology appt scheduled at Dr. Rene Feeling office at [address] [place] [address], [address] [place] [address], [address] [facility]    [phone]     On Tues Sept 4 arrive at 2:15 pm   is being arranged by Dermatology office    Return appointment card and Summary of Care was reviewed and copy was given to the patient.   NETTA

## 2018-07-27 ENCOUNTER — HOSPITAL ENCOUNTER (OUTPATIENT)
Age: 43
Discharge: HOME | End: 2018-07-27
Payer: MEDICARE

## 2018-07-27 DIAGNOSIS — E11.8 TYPE 2 DIABETES MELLITUS WITH COMPLICATION, UNSPECIFIED LONG TERM INSULIN USE STATUS: ICD-10-CM

## 2018-07-27 DIAGNOSIS — Z13.220 LIPID SCREENING: ICD-10-CM

## 2018-07-27 LAB
CHOLESTEROL/HDL RATIO: 2.6
CHOLESTEROL: 150 MG/DL
CREATININE URINE: 85 MG/DL (ref 28–217)
HDLC SERPL-MCNC: 58 MG/DL
LDL CHOLESTEROL: 78 MG/DL (ref 0–130)
MICROALBUMIN/CREAT 24H UR: <12 MG/L
MICROALBUMIN/CREAT UR-RTO: NORMAL MCG/MG CREAT
TRIGL SERPL-MCNC: 72 MG/DL
VLDLC SERPL CALC-MCNC: NORMAL MG/DL (ref 1–30)

## 2018-07-27 PROCEDURE — 82043 UR ALBUMIN QUANTITATIVE: CPT

## 2018-07-27 PROCEDURE — 82570 ASSAY OF URINE CREATININE: CPT

## 2018-07-27 PROCEDURE — 36415 COLL VENOUS BLD VENIPUNCTURE: CPT

## 2018-07-27 PROCEDURE — 80061 LIPID PANEL: CPT

## 2018-09-04 ENCOUNTER — OFFICE VISIT (OUTPATIENT)
Dept: DERMATOLOGY | Age: 43
End: 2018-09-04
Payer: MEDICARE

## 2018-09-04 VITALS
SYSTOLIC BLOOD PRESSURE: 124 MMHG | WEIGHT: 170 LBS | HEART RATE: 96 BPM | DIASTOLIC BLOOD PRESSURE: 86 MMHG | BODY MASS INDEX: 33.38 KG/M2 | HEIGHT: 60 IN

## 2018-09-04 DIAGNOSIS — B35.3 TINEA PEDIS OF BOTH FEET: Primary | ICD-10-CM

## 2018-09-04 DIAGNOSIS — B35.1 ONYCHOMYCOSIS: ICD-10-CM

## 2018-09-04 DIAGNOSIS — L82.1 DERMATOSIS PAPULOSA NIGRA: ICD-10-CM

## 2018-09-04 PROCEDURE — 99202 OFFICE O/P NEW SF 15 MIN: CPT | Performed by: DERMATOLOGY

## 2018-09-04 PROCEDURE — 1036F TOBACCO NON-USER: CPT | Performed by: DERMATOLOGY

## 2018-09-04 PROCEDURE — G8427 DOCREV CUR MEDS BY ELIG CLIN: HCPCS | Performed by: DERMATOLOGY

## 2018-09-04 PROCEDURE — G8417 CALC BMI ABV UP PARAM F/U: HCPCS | Performed by: DERMATOLOGY

## 2018-09-04 NOTE — LETTER
1. Apply anti-fungal cream to feet      Referral for cosmetics:  -Dr. Michelle Grant   [phone]  [facility]   [phone]  -[facility]   [phone]        If you have questions, please do not hesitate to call me. I look forward to following Monae Turner along with you.     Sincerely,        Arleen Nelson MD

## 2018-09-04 NOTE — PROGRESS NOTES
Skin:            Medical Necessity of Exam Performed:   Distribution of patient concerns    Additional Diagnostic Testing performed during exam: RONDA ,  Positive    ASSESSMENT:   Diagnosis Orders   1. Tinea pedis of both feet  ciclopirox (LOPROX) 0.77 % cream   2. Onychomycosis  ciclopirox (LOPROX) 0.77 % cream   3. Dermatosis papulosa nigra         Plan of Action is as Follows:  Assessment    1. Tinea pedis of both feet  2. Onychomycosis  - counseled that onychomycosis unlikely to clear with topicals. She has previously been told she cannot take antifungal pills because she takes prednisone and mycophenolate. She is not interested in pursuing oral terbinafine today. Counseled that CO2 laser has been shown effective if she would like to pursue out of pocket cosmetic treatment elsewhere. - ciclopirox (LOPROX) 0.77 % cream; Apply topically 2 times daily. Dispense: 60 g; Refill: 11    3. Multiple facial and eyelid SKs  - classic appearance of SK clinically and on dermoscopy, though unusually numerous. Eyelid involvement reminiscent of DPNs, though not the right ethnic group/skin type. No indication on clinical or dermatoscopic exam that these represent verruca. Appearance not consistent with trichilemmoma, and no other family members with facial papules nor FH of malignancies. - Eruptive SKs may be an indication of internal malignancy, specifically GI and breast. Patient states they have been there for many years and sometimes get larger/more irritated and itchy when exposed to sun. Patient states she is up-to-date on health screenings and has not had any unexpected weight loss. I counseled her to continue to f/u with PCP, get regular health screenings, and report any unusual symptoms. - counseled benign nature and that removal would be cosmetic. Patient given information on cosmetic dermatologists.           Patient Instructions   dermatosis papulosa nigra--is a condition of many small, benign skin lesions on the

## 2018-10-15 ENCOUNTER — OFFICE VISIT (OUTPATIENT)
Dept: NEUROLOGY | Age: 43
End: 2018-10-15
Payer: MEDICARE

## 2018-10-15 VITALS
WEIGHT: 174.6 LBS | HEIGHT: 60 IN | HEART RATE: 104 BPM | BODY MASS INDEX: 34.28 KG/M2 | SYSTOLIC BLOOD PRESSURE: 129 MMHG | DIASTOLIC BLOOD PRESSURE: 86 MMHG

## 2018-10-15 DIAGNOSIS — G70.00 MYASTHENIA GRAVIS (HCC): Primary | ICD-10-CM

## 2018-10-15 PROCEDURE — G8484 FLU IMMUNIZE NO ADMIN: HCPCS | Performed by: PSYCHIATRY & NEUROLOGY

## 2018-10-15 PROCEDURE — G8417 CALC BMI ABV UP PARAM F/U: HCPCS | Performed by: PSYCHIATRY & NEUROLOGY

## 2018-10-15 PROCEDURE — 1036F TOBACCO NON-USER: CPT | Performed by: PSYCHIATRY & NEUROLOGY

## 2018-10-15 PROCEDURE — G8427 DOCREV CUR MEDS BY ELIG CLIN: HCPCS | Performed by: PSYCHIATRY & NEUROLOGY

## 2018-10-15 PROCEDURE — 99214 OFFICE O/P EST MOD 30 MIN: CPT | Performed by: PSYCHIATRY & NEUROLOGY

## 2018-10-15 ASSESSMENT — ENCOUNTER SYMPTOMS
RESPIRATORY NEGATIVE: 1
GASTROINTESTINAL NEGATIVE: 1
ALLERGIC/IMMUNOLOGIC NEGATIVE: 1

## 2018-10-15 NOTE — PROGRESS NOTES
Subjective:      Patient ID: Argenis De Souza is a 39 y.o. female. HPI     Active problem myasthenia gravis . She has had prior thymectomy on cellcept , prednisone and mestinon  . The condition is she reports that for one week before menstrual period in the morning and in evening she will feel to have fatigue in bilateral arms along with some general fatigue. There will also be some decrease  strength dropping things . There are no bulbar complaint with no trouble swallowing,  chewing with no ocular complaints . She remains on mycophenylate 500 mg po bid , prednisone 20 mg po qd, mestinon 60 mg po tid. There is no diarrhea or abdominal complaints  . She is applying for social disability . She has attempted to work more than four hours unable because of fatigue . She has not been to ER since last seen. Over the last 2 years she has had 2 courses of plasmapheresis and 3 of IV IgG. The last was IgG was in in January . There has been overall marked improvement since mestinon addition in March. Significant medications mycophenylate 500 mg po bid , prednisone 20 mg po qd, mestinon 60 mg po tid . She has been on imuran in the past  . Testing MRI of Head with normal brain with right maxillary sinusitis . CRP 1 , TSH normal, ESR 10 ,cholesterol 115 , LDL 48 , TG 88 . Hga1c 6.4 , MONET negative , Anti SSA and anti SSB are negative, ACE 31 (8-52) . CT chest subtle increased density within the anterior mediastinum possibly representing residual thickening or scarring.  No discrete anterior mediastinal mass identified . Acetylcholine binding Ab 478 (0--0.4), blocking Ab 63 (0-26 %) , striated muscle Ab <1:40 .        Past Medical History:   Diagnosis Date    Anemia     Headache     Hypertension     Myasthenia gravis ([facility] [address]     Pre-diabetes        Past Surgical History:   Procedure Laterality Date    CARDIAC SURGERY         History reviewed. No pertinent family history.     Social History     Social History

## 2018-10-15 NOTE — LETTER
[facility]  [place] [address] [address] [address]  Phone: [phone]  Fax: [phone]    Francisco J Hussein MD        October 15, 2018     Aurora Roblero MD  [address] [place] [address]    Patient: Pio Corley  MR Number: [id number]  YOB: 1975  Date of Visit: 10/15/2018    Dear Dr. Simon Case:    Thank you for the request for consultation for Pio Corley. Below are the relevant portions of my assessment and plan of care. Subjective:      Patient ID: Pio Corley is a 39 y.o. female. HPI     Active problem myasthenia gravis . She has had prior thymectomy on cellcept , prednisone and mestinon  . The condition is she reports that for one week before menstrual period in the morning and in evening she will feel to have fatigue in bilateral arms along with some general fatigue. There will also be some decrease  strength dropping things . There are no bulbar complaint with no trouble swallowing,  chewing with no ocular complaints . She remains on mycophenylate 500 mg po bid , prednisone 20 mg po qd, mestinon 60 mg po tid. There is no diarrhea or abdominal complaints  . She is applying for social disability . She has attempted to work more than four hours unable because of fatigue . She has not been to ER since last seen. Over the last 2 years she has had 2 courses of plasmapheresis and 3 of IV IgG. The last was IgG was in in January . There has been overall marked improvement since mestinon addition in March. Significant medications mycophenylate 500 mg po bid , prednisone 20 mg po qd, mestinon 60 mg po tid . She has been on imuran in the past  . Testing MRI of Head with normal brain with right maxillary sinusitis . CRP 1 , TSH normal, ESR 10 ,cholesterol 115 , LDL 48 , TG 88 .   Hga1c 6.4 , MONET negative , Anti SSA and anti SSB are negative, ACE 31 Cranial nerve 11 , 5 out of 5 strength   Cranial Nerve 12 midline tongue . No atrophy  Sensation . Normal pinprick and light touch   Deep Tendon Reflexes normal  Plantar response flexor bilaterally    Assessment:      1. Myasthenia gravis ([address]    Her Myasthenia Gravis is felt to be stable to continue current medication regimen         Plan:      Orders Placed This Encounter   Procedures    CBC With Auto Differential     Standing Status:   Future     Standing Expiration Date:   10/15/2019    ALT     Standing Status:   Future     Standing Expiration Date:   10/15/2019    AST     Standing Status:   Future     Standing Expiration Date:   10/15/2019              If you have questions, please do not hesitate to call me. I look forward to following Severa Courier along with you.     Sincerely,        Venessa Burden MD

## 2018-10-15 NOTE — COMMUNICATION BODY
Subjective:      Patient ID: Agustina Raya is a 39 y.o. female. HPI     Active problem myasthenia gravis . She has had prior thymectomy on cellcept , prednisone and mestinon  . The condition is she reports that for one week before menstrual period in the morning and in evening she will feel to have fatigue in bilateral arms along with some general fatigue. There will also be some decrease  strength dropping things . There are no bulbar complaint with no trouble swallowing,  chewing with no ocular complaints . She remains on mycophenylate 500 mg po bid , prednisone 20 mg po qd, mestinon 60 mg po tid. There is no diarrhea or abdominal complaints  . She is applying for social disability . She has attempted to work more than four hours unable because of fatigue . She has not been to ER since last seen. Over the last 2 years she has had 2 courses of plasmapheresis and 3 of IV IgG. The last was IgG was in in January . There has been overall marked improvement since mestinon addition in March. Significant medications mycophenylate 500 mg po bid , prednisone 20 mg po qd, mestinon 60 mg po tid . She has been on imuran in the past  . Testing MRI of Head with normal brain with right maxillary sinusitis . CRP 1 , TSH normal, ESR 10 ,cholesterol 115 , LDL 48 , TG 88 . Hga1c 6.4 , MONET negative , Anti SSA and anti SSB are negative, ACE 31 (8-52) . CT chest subtle increased density within the anterior mediastinum possibly representing residual thickening or scarring.  No discrete anterior mediastinal mass identified . Acetylcholine binding Ab 478 (0--0.4), blocking Ab 63 (0-26 %) , striated muscle Ab <1:40 .        Past Medical History:   Diagnosis Date    Anemia     Headache     Hypertension     Myasthenia gravis ([facility] [address]     Pre-diabetes        Past Surgical History:   Procedure Laterality Date    CARDIAC SURGERY         History reviewed. No pertinent family history.     Social History     Social History  Marital status: Single     Spouse name: N/A    Number of children: N/A    Years of education: N/A     Social History Main Topics    Smoking status: Never Smoker    Smokeless tobacco: Never Used    Alcohol use No    Drug use: No    Sexual activity: No     Other Topics Concern    None     Social History Narrative    None       Current Outpatient Prescriptions   Medication Sig Dispense Refill    ciclopirox (LOPROX) 0.77 % cream Apply topically 2 times daily. 60 g 11    metFORMIN (GLUCOPHAGE) 500 MG tablet Take 1 tablet by mouth 2 times daily (with meals) 60 tablet 2    Calcium Carbonate-Vitamin D (OYSTER SHELL CALCIUM/D) 500-200 MG-UNIT TABS Take 2 tablets by mouth daily 60 tablet 2    predniSONE (DELTASONE) 20 MG tablet Take 1 po qd 30 tablet 5    pyridostigmine (MESTINON) 60 MG tablet Take 1 po tid 7 AM , 12 AM , 5 PM 90 tablet 5    vitamin C (ASCORBIC ACID) 500 MG tablet TAKE 1 TABLET BY MOUTH DAILY 30 tablet 3    Alcohol Swabs PADS USE AS DIRECTED WHEN CHECKING BLOOD SUGAR DAILY. 100 each 3    ACCU-CHEK SOFTCLIX LANCETS MISC Daily 100 each 3    venlafaxine (EFFEXOR XR) 75 MG extended release capsule Take 1 capsule by mouth daily 30 capsule 3    ferrous sulfate 325 (65 Fe) MG EC tablet Take 1 tablet by mouth 3 times daily (with meals) 90 tablet 5    Glucose Blood (BLOOD GLUCOSE TEST STRIPS) STRP Test 4 times daily Diagnosis 100 strip 11    glucose monitoring kit (FREESTYLE) monitoring kit Daily check 1 kit 0    mycophenolate (CELLCEPT) 500 MG tablet Take 1 po bid (Patient taking differently: Take 500 mg by mouth Take 2 po bid) 60 tablet 5     No current facility-administered medications for this visit. Allergies   Allergen Reactions    Morphine Shortness Of Breath    Flexeril [Cyclobenzaprine]     Norflex [Orphenadrine Citrate]     Nsaids     Penicillins Rash    Vancomycin Rash       Review of Systems   Constitutional: Positive for fatigue. HENT: Negative.     Eyes: Positive Standing Expiration Date:   10/15/2019

## 2018-10-23 ENCOUNTER — OFFICE VISIT (OUTPATIENT)
Dept: INTERNAL MEDICINE | Age: 43
End: 2018-10-23
Payer: MEDICARE

## 2018-10-23 VITALS
DIASTOLIC BLOOD PRESSURE: 83 MMHG | WEIGHT: 173.8 LBS | HEART RATE: 83 BPM | SYSTOLIC BLOOD PRESSURE: 121 MMHG | BODY MASS INDEX: 33.94 KG/M2

## 2018-10-23 DIAGNOSIS — R23.4 SKIN THINNING: ICD-10-CM

## 2018-10-23 DIAGNOSIS — E11.8 TYPE 2 DIABETES MELLITUS WITH COMPLICATION, WITHOUT LONG-TERM CURRENT USE OF INSULIN (HCC): Primary | ICD-10-CM

## 2018-10-23 DIAGNOSIS — G70.00 MYASTHENIA GRAVIS (HCC): ICD-10-CM

## 2018-10-23 PROBLEM — D50.9 IRON DEFICIENCY ANEMIA: Status: RESOLVED | Noted: 2017-05-04 | Resolved: 2018-10-23

## 2018-10-23 LAB — HBA1C MFR BLD: 6.4 %

## 2018-10-23 PROCEDURE — 1036F TOBACCO NON-USER: CPT | Performed by: STUDENT IN AN ORGANIZED HEALTH CARE EDUCATION/TRAINING PROGRAM

## 2018-10-23 PROCEDURE — G8427 DOCREV CUR MEDS BY ELIG CLIN: HCPCS | Performed by: STUDENT IN AN ORGANIZED HEALTH CARE EDUCATION/TRAINING PROGRAM

## 2018-10-23 PROCEDURE — G8417 CALC BMI ABV UP PARAM F/U: HCPCS | Performed by: STUDENT IN AN ORGANIZED HEALTH CARE EDUCATION/TRAINING PROGRAM

## 2018-10-23 PROCEDURE — 99211 OFF/OP EST MAY X REQ PHY/QHP: CPT | Performed by: INTERNAL MEDICINE

## 2018-10-23 PROCEDURE — 83036 HEMOGLOBIN GLYCOSYLATED A1C: CPT | Performed by: STUDENT IN AN ORGANIZED HEALTH CARE EDUCATION/TRAINING PROGRAM

## 2018-10-23 PROCEDURE — 99213 OFFICE O/P EST LOW 20 MIN: CPT | Performed by: STUDENT IN AN ORGANIZED HEALTH CARE EDUCATION/TRAINING PROGRAM

## 2018-10-23 PROCEDURE — G8484 FLU IMMUNIZE NO ADMIN: HCPCS | Performed by: STUDENT IN AN ORGANIZED HEALTH CARE EDUCATION/TRAINING PROGRAM

## 2018-10-23 PROCEDURE — 3044F HG A1C LEVEL LT 7.0%: CPT | Performed by: STUDENT IN AN ORGANIZED HEALTH CARE EDUCATION/TRAINING PROGRAM

## 2018-10-23 PROCEDURE — 2022F DILAT RTA XM EVC RTNOPTHY: CPT | Performed by: STUDENT IN AN ORGANIZED HEALTH CARE EDUCATION/TRAINING PROGRAM

## 2018-10-23 RX ORDER — BLOOD PRESSURE TEST KIT
KIT MISCELLANEOUS
Qty: 100 EACH | Refills: 3 | Status: SHIPPED | OUTPATIENT
Start: 2018-10-23

## 2018-10-23 RX ORDER — GLUCOSAMINE HCL/CHONDROITIN SU 500-400 MG
CAPSULE ORAL
Qty: 100 STRIP | Refills: 2 | Status: SHIPPED | OUTPATIENT
Start: 2018-10-23

## 2018-10-23 NOTE — PROGRESS NOTES
Visit Information    Have you changed or started any medications since your last visit including any over-the-counter medicines, vitamins, or herbal medicines? no   Have you stopped taking any of your medications? Is so, why? -  no  Are you having any side effects from any of your medications? - no    Have you seen any other physician or provider since your last visit? yes - Dermatologist and Neurologist   Have you had any other diagnostic tests since your last visit? yes - labs   Have you been seen in the emergency room and/or had an admission in a hospital since we last saw you?  no   Have you had your routine dental cleaning in the past 6 months?  no     Do you have an active MyChart account? If no, what is the barrier?   No:     Patient Care Team:  Yaneth Mello MD as PCP - General (Internal Medicine)  Tamra Andino MD as PCP - S Attributed Provider    Medical History Review  Past Medical, Family, and Social History reviewed and does not contribute to the patient presenting condition    Health Maintenance   Topic Date Due    Diabetic foot exam  05/27/1984    Diabetic retinal exam  05/27/1984    DTaP/Tdap/Td vaccine (1 - Tdap) 05/27/1993    Flu vaccine (1) 09/01/2018    Pneumococcal med risk (1 of 1 - PPSV23) 04/16/2019 (Originally 5/27/1993)    A1C test (Diabetic or Prediabetic)  04/16/2019    Diabetic microalbuminuria test  07/27/2019    Lipid screen  07/27/2019    Cervical cancer screen  11/08/2022    HIV screen  Completed

## 2018-10-23 NOTE — PROGRESS NOTES
[facility]    Progress Note    Date of patient's visit: 10/23/2018  YOB: 1975  Patient's Name:  Kimberly Whitfield    Patient Care Team:  Rosa Maria Hurt MD as PCP - General (Internal Medicine)  Ariana Javier MD as PCP - [facility] Attributed Provider    REASON FOR VISIT: Routine outpatient follow     HISTORY OF PRESENT ILLNESS:    History was obtained from the patient. Kimberly Whitfield is a 39 y.o. is here for a  Follow up. MG is reasonably controlled with current meds but intermittentkly gets weakness. Diabetes well controlled. Has think skin on face due to steroids, wants to see dermatologist for that. Wants a letter for disability too. Has intermiitent diplopia due to myasthenia gravis. Patient Active Problem List   Diagnosis    Pre-diabetes    Myasthenia gravis ([address]    Osteopenia       ALLERGIES      Allergies   Allergen Reactions    Morphine Shortness Of Breath    Flexeril [Cyclobenzaprine]     Norflex [Orphenadrine Citrate]     Nsaids     Penicillins Rash    Vancomycin Rash         MEDICATIONS:      Current Outpatient Prescriptions on File Prior to Visit   Medication Sig Dispense Refill    ciclopirox (LOPROX) 0.77 % cream Apply topically 2 times daily. 60 g 11    metFORMIN (GLUCOPHAGE) 500 MG tablet Take 1 tablet by mouth 2 times daily (with meals) 60 tablet 2    Calcium Carbonate-Vitamin D (OYSTER SHELL CALCIUM/D) 500-200 MG-UNIT TABS Take 2 tablets by mouth daily 60 tablet 2    predniSONE (DELTASONE) 20 MG tablet Take 1 po qd 30 tablet 5    pyridostigmine (MESTINON) 60 MG tablet Take 1 po tid 7 AM , 12 AM , 5 PM 90 tablet 5    vitamin C (ASCORBIC ACID) 500 MG tablet TAKE 1 TABLET BY MOUTH DAILY 30 tablet 3    Alcohol Swabs PADS USE AS DIRECTED WHEN CHECKING BLOOD SUGAR DAILY.  100 each 3    ACCU-CHEK SOFTCLIX LANCETS MISC Daily 100 each 3    venlafaxine (EFFEXOR XR) 75 MG extended release capsule Take 1 capsule by mouth daily 30 capsule 3    ferrous sulfate 325 (65 Fe) MG EC tablet Take 1 tablet by mouth 3 times daily (with meals) 90 tablet 5    Glucose Blood (BLOOD GLUCOSE TEST STRIPS) STRP Test 4 times daily Diagnosis 100 strip 11    glucose monitoring kit (FREESTYLE) monitoring kit Daily check 1 kit 0    mycophenolate (CELLCEPT) 500 MG tablet Take 1 po bid (Patient taking differently: Take 500 mg by mouth Take 2 po bid) 60 tablet 5     No current facility-administered medications on file prior to visit. SOCIAL HISTORY    Reviewed and no change from previous record. Gregorio Savage  reports that she has never smoked. She has never used smokeless tobacco.    FAMILY HISTORY:    Reviewed and No change from previous visit    REVIEW OF SYSTEMS:    ENT: negative  Respiratory: no cough, shortness of breath, or wheezing  Cardiovascular: no chest pain or dyspnea on exertion  Gastrointestinal: no abdominal pain, black or bloody stools  Neurological: no TIA or stroke symptoms  Endocrine: negative  Genito-Urinary: no dysuria, trouble voiding, or hematuria  Musculoskeletal: negative  Dermatological: negative    PHYSICAL EXAM:      Vitals:    10/23/18 0923   BP: 121/83   Pulse: 83     General appearance - alert, well appearing, and in no distress  Chest - clear to auscultation, no wheezes, rales or rhonchi, symmetric air entry  Heart - normal rate, regular rhythm, normal S1, S2, no murmurs, rubs, clicks or gallops  Abdomen - soft, nontender, nondistended, no masses or organomegaly  Neurological - alert, oriented, normal speech, no focal findings or movement disorder noted  Foot exam : has dry skin onfeet, nails are dry. Sensations are intact.      LABORATORY FINDINGS:    CBC:  Lab Results   Component Value Date    WBC 17.1 07/16/2018    HGB 15.0 07/16/2018     07/16/2018     BMP:    Lab Results   Component Value Date     03/12/2018    K 3.8 03/12/2018     03/12/2018    CO2 25 03/12/2018    BUN 10 03/12/2018

## 2018-10-29 ENCOUNTER — HOSPITAL ENCOUNTER (OUTPATIENT)
Age: 43
Discharge: HOME | End: 2018-10-29
Payer: MEDICARE

## 2018-10-29 DIAGNOSIS — G70.00 MYASTHENIA GRAVIS (HCC): ICD-10-CM

## 2018-10-29 LAB
ABSOLUTE EOS #: 0.17 K/UL (ref 0–0.44)
ABSOLUTE IMMATURE GRANULOCYTE: 0.41 K/UL (ref 0–0.3)
ABSOLUTE LYMPH #: 3.08 K/UL (ref 1.1–3.7)
ABSOLUTE MONO #: 1.25 K/UL (ref 0.1–1.2)
ALT SERPL-CCNC: 51 U/L (ref 5–33)
AST SERPL-CCNC: 48 U/L
BASOPHILS # BLD: 1 % (ref 0–2)
BASOPHILS ABSOLUTE: 0.12 K/UL (ref 0–0.2)
DIFFERENTIAL TYPE: ABNORMAL
EOSINOPHILS RELATIVE PERCENT: 1 % (ref 1–4)
HCT VFR BLD CALC: 42.2 % (ref 36.3–47.1)
HEMOGLOBIN: 13.6 G/DL (ref 11.9–15.1)
IMMATURE GRANULOCYTES: 3 %
LYMPHOCYTES # BLD: 21 % (ref 24–43)
MCH RBC QN AUTO: 30.6 PG (ref 25.2–33.5)
MCHC RBC AUTO-ENTMCNC: 32.2 G/DL (ref 28.4–34.8)
MCV RBC AUTO: 95 FL (ref 82.6–102.9)
MONOCYTES # BLD: 9 % (ref 3–12)
NRBC AUTOMATED: 0 PER 100 WBC
PDW BLD-RTO: 13.2 % (ref 11.8–14.4)
PLATELET # BLD: 464 K/UL (ref 138–453)
PLATELET ESTIMATE: ABNORMAL
PMV BLD AUTO: 10.7 FL (ref 8.1–13.5)
RBC # BLD: 4.44 M/UL (ref 3.95–5.11)
RBC # BLD: ABNORMAL 10*6/UL
SEG NEUTROPHILS: 65 % (ref 36–65)
SEGMENTED NEUTROPHILS ABSOLUTE COUNT: 9.63 K/UL (ref 1.5–8.1)
WBC # BLD: 14.7 K/UL (ref 3.5–11.3)
WBC # BLD: ABNORMAL 10*3/UL

## 2018-10-29 PROCEDURE — 84460 ALANINE AMINO (ALT) (SGPT): CPT

## 2018-10-29 PROCEDURE — 36415 COLL VENOUS BLD VENIPUNCTURE: CPT

## 2018-10-29 PROCEDURE — 85025 COMPLETE CBC W/AUTO DIFF WBC: CPT

## 2018-10-29 PROCEDURE — 84450 TRANSFERASE (AST) (SGOT): CPT

## 2018-10-29 RX ORDER — ASCORBIC ACID 500 MG
TABLET ORAL
Qty: 30 TABLET | Refills: 3 | Status: SHIPPED | OUTPATIENT
Start: 2018-10-29 | End: 2019-03-27 | Stop reason: SDUPTHER

## 2018-10-29 RX ORDER — FERROUS SULFATE 325(65) MG
TABLET ORAL
Qty: 90 TABLET | Refills: 9 | Status: SHIPPED | OUTPATIENT
Start: 2018-10-29

## 2018-11-13 ENCOUNTER — TELEPHONE (OUTPATIENT)
Dept: NEUROLOGY | Age: 43
End: 2018-11-13

## 2018-11-13 DIAGNOSIS — G70.00 MYASTHENIA GRAVIS (HCC): Primary | ICD-10-CM

## 2018-11-13 NOTE — TELEPHONE ENCOUNTER
Notes recorded by Kandace Gusman MD on 11/1/2018 at 3:07 PM EDT  Aaron Iyer let pt know mild elevation of LFT's . Please repeat in one month. I Called and lm on voice mail but will mail her a note.

## 2018-11-13 NOTE — LETTER
[facility]  [place] [address]  Phone: [phone]  Fax: [phone]    Andressa Allen MD        November 13, 2018    Claudia Vasquez  [address] [place] [address]  [address] [facility] [address]      Dear Jeremy Franco:    Dr Rei Mclaughlin reviewed your recent blood tests and your liver enzyme is slightly elevated so he wants to repeat the blood test in one month. Please take this order to the laboratory and have your blood drawn at the end of November. If you have any questions or concerns, please don't hesitate to call [phone]. Damian Lang     Sincerely,      Radha Pierce, ANTHONY for   Andressa Allen MD

## 2018-11-13 NOTE — TELEPHONE ENCOUNTER
----- Message from Gildardo David RN sent at 11/6/2018 11:00 AM EST -----  Call pt with doctors message

## 2018-11-14 ENCOUNTER — OFFICE VISIT (OUTPATIENT)
Dept: NEUROLOGY | Age: 43
End: 2018-11-14
Payer: MEDICARE

## 2018-11-14 VITALS
HEIGHT: 60 IN | DIASTOLIC BLOOD PRESSURE: 86 MMHG | SYSTOLIC BLOOD PRESSURE: 133 MMHG | HEART RATE: 92 BPM | BODY MASS INDEX: 34.12 KG/M2 | WEIGHT: 173.8 LBS

## 2018-11-14 DIAGNOSIS — G70.00 MYASTHENIA GRAVIS (HCC): Primary | ICD-10-CM

## 2018-11-14 PROCEDURE — G8417 CALC BMI ABV UP PARAM F/U: HCPCS | Performed by: PSYCHIATRY & NEUROLOGY

## 2018-11-14 PROCEDURE — G8427 DOCREV CUR MEDS BY ELIG CLIN: HCPCS | Performed by: PSYCHIATRY & NEUROLOGY

## 2018-11-14 PROCEDURE — 1036F TOBACCO NON-USER: CPT | Performed by: PSYCHIATRY & NEUROLOGY

## 2018-11-14 PROCEDURE — G8484 FLU IMMUNIZE NO ADMIN: HCPCS | Performed by: PSYCHIATRY & NEUROLOGY

## 2018-11-14 PROCEDURE — 99214 OFFICE O/P EST MOD 30 MIN: CPT | Performed by: PSYCHIATRY & NEUROLOGY

## 2018-11-14 RX ORDER — PYRIDOSTIGMINE BROMIDE 60 MG/1
TABLET ORAL
Qty: 120 TABLET | Refills: 5 | Status: ON HOLD | OUTPATIENT
Start: 2018-11-14 | End: 2019-04-04 | Stop reason: HOSPADM

## 2018-11-14 ASSESSMENT — ENCOUNTER SYMPTOMS
ALLERGIC/IMMUNOLOGIC NEGATIVE: 1
RESPIRATORY NEGATIVE: 1
GASTROINTESTINAL NEGATIVE: 1

## 2018-11-14 NOTE — LETTER
 Morphine Shortness Of Breath    Flexeril [Cyclobenzaprine]     Norflex [Orphenadrine Citrate]     Nsaids     Penicillins Rash    Vancomycin Rash       Review of Systems   Constitutional: Positive for fatigue. HENT: Negative. Eyes: Positive for visual disturbance. Respiratory: Negative. Cardiovascular: Negative. Gastrointestinal: Negative. Endocrine: Negative. Genitourinary: Negative. Musculoskeletal: Negative. Skin: Negative. Allergic/Immunologic: Negative. Neurological: Negative. Hematological: Negative. Psychiatric/Behavioral: Negative. Objective:   Physical Exam    Vitals:    11/14/18 1123   BP: 133/86   Pulse: 92     weight: 173 lb 12.8 oz (78.8 kg)  Neurological Examination  Ears /Nose/Throat: external ear . Normal exam  Neck and thyroid . Normal size  Cardiovascular: Auscultation of heart with regular rate and rhythm   Musculoskeletal. Muscle bulk and tone normal   Muscle strength giveway all limbs    No dysmetria or dysdiadokinesis  No tremor   Normal fine motor  Orientation Alert and oriented x 3   Short term memory normal   Attention and concentration normal   Language process and speech normal . No aphasia   Cranial nerve 2 normal acuety and visual fields  Cranial nerve 3, 4 and 6 . Extraocular muscles are intact . Pupils are equal and reactive . Cranial nerve 5 . Intact corneal reflex. Normal facial sensation  Cranial nerve 7 bilateral lower extremity weakness with trouble puffing   Cranial nerve 8. Grossly intact hearing   Cranial nerve 9 and 10. Symmetric palate elevation   Cranial nerve 11 , 5 out of 5 strength   Cranial Nerve 12 midline tongue . No atrophy  Sensation . Normal pinprick and light touch   Deep Tendon Reflexes normal  Plantar response flexor bilaterally    Assessment:      1.  Myasthenia gravis ([facility] [address]    Will have her readjust mestinon to 60 mg po qid 7AM , 11AM , 3PM and 7PM staying on current prednisone and cellcept dosage       Plan: Orders Placed This Encounter   Procedures    ALT     Standing Status:   Future     Standing Expiration Date:   11/14/2019    AST     Standing Status:   Future     Standing Expiration Date:   11/14/2019    TSH without Reflex     Standing Status:   Future     Standing Expiration Date:   11/14/2019              If you have questions, please do not hesitate to call me. I look forward to following Jair Vega along with you.     Sincerely,        Rodney Lazo MD    CC providers:  Andreas Hummel [address]

## 2018-11-26 ENCOUNTER — HOSPITAL ENCOUNTER (OUTPATIENT)
Age: 43
Discharge: HOME | End: 2018-11-26
Payer: MEDICARE

## 2018-11-26 DIAGNOSIS — G70.00 MYASTHENIA GRAVIS (HCC): ICD-10-CM

## 2018-11-26 LAB
ALT SERPL-CCNC: 54 U/L (ref 5–33)
AST SERPL-CCNC: 52 U/L
TSH SERPL DL<=0.05 MIU/L-ACNC: 2.03 MIU/L (ref 0.3–5)

## 2018-11-26 PROCEDURE — 84460 ALANINE AMINO (ALT) (SGPT): CPT

## 2018-11-26 PROCEDURE — 36415 COLL VENOUS BLD VENIPUNCTURE: CPT

## 2018-11-26 PROCEDURE — 84443 ASSAY THYROID STIM HORMONE: CPT

## 2018-11-26 PROCEDURE — 84450 TRANSFERASE (AST) (SGOT): CPT

## 2018-11-29 ENCOUNTER — OFFICE VISIT (OUTPATIENT)
Dept: NEUROLOGY | Age: 43
End: 2018-11-29
Payer: MEDICARE

## 2018-11-29 VITALS
DIASTOLIC BLOOD PRESSURE: 82 MMHG | SYSTOLIC BLOOD PRESSURE: 113 MMHG | HEART RATE: 79 BPM | BODY MASS INDEX: 33.63 KG/M2 | WEIGHT: 172.2 LBS

## 2018-11-29 DIAGNOSIS — R74.8 ELEVATED LIVER ENZYMES: ICD-10-CM

## 2018-11-29 DIAGNOSIS — G70.00 MYASTHENIA GRAVIS (HCC): Primary | ICD-10-CM

## 2018-11-29 PROCEDURE — G8417 CALC BMI ABV UP PARAM F/U: HCPCS | Performed by: PSYCHIATRY & NEUROLOGY

## 2018-11-29 PROCEDURE — G8484 FLU IMMUNIZE NO ADMIN: HCPCS | Performed by: PSYCHIATRY & NEUROLOGY

## 2018-11-29 PROCEDURE — G8427 DOCREV CUR MEDS BY ELIG CLIN: HCPCS | Performed by: PSYCHIATRY & NEUROLOGY

## 2018-11-29 PROCEDURE — 1036F TOBACCO NON-USER: CPT | Performed by: PSYCHIATRY & NEUROLOGY

## 2018-11-29 PROCEDURE — 99214 OFFICE O/P EST MOD 30 MIN: CPT | Performed by: PSYCHIATRY & NEUROLOGY

## 2018-11-29 RX ORDER — PREDNISONE 20 MG/1
TABLET ORAL
Qty: 30 TABLET | Refills: 5 | Status: ON HOLD | OUTPATIENT
Start: 2018-11-29 | End: 2019-03-31 | Stop reason: HOSPADM

## 2018-11-29 ASSESSMENT — ENCOUNTER SYMPTOMS
EYES NEGATIVE: 1
ALLERGIC/IMMUNOLOGIC NEGATIVE: 1
RESPIRATORY NEGATIVE: 1
GASTROINTESTINAL NEGATIVE: 1

## 2018-11-29 NOTE — PROGRESS NOTES
Negative. Genitourinary: Negative. Musculoskeletal: Negative. Skin: Negative. Allergic/Immunologic: Negative. Neurological: Negative. Hematological: Negative. Psychiatric/Behavioral: Positive for dysphoric mood. Objective:   Physical Exam    Vitals:    11/29/18 0830   BP: 113/82   Pulse: 79     weight: 172 lb 3.2 oz (78.1 kg)  Neurological Examination  Ears /Nose/Throat: external ear . Normal exam  Neck and thyroid . Normal size  Cardiovascular: Auscultation of heart with regular rate and rhythm   Musculoskeletal. Muscle bulk and tone normal   Muscle strength mild giveway all limbs    No dysmetria or dysdiadokinesis  No tremor   Normal fine motor  Orientation Alert and oriented x 3   Short term memory normal   Attention and concentration normal   Language process and speech normal . No aphasia   Cranial nerve 2 normal acuety and visual fields  Cranial nerve 3, 4 and 6 . Extraocular muscles are intact . Pupils are equal and reactive . Cranial nerve 5 . Intact corneal reflex. Normal facial sensation  Cranial nerve 7 bilateral lower extremity weakness with trouble puffing   Cranial nerve 8. Grossly intact hearing   Cranial nerve 9 and 10. Symmetric palate elevation   Cranial nerve 11 , 5 out of 5 strength   Cranial Nerve 12 midline tongue . No atrophy  Sensation . Normal pinprick and light touch   Deep Tendon Reflexes normal  Plantar response flexor bilaterally    Assessment:      1. Myasthenia gravis ([address]    2. Elevated liver enzymes    Will have her remain on current regimen .  For elevated enzymes will make referral to gastroenterology       Plan:      Orders Placed This Encounter   Procedures   Tiffany Uribe MD, Gastroenterology [place]     Referral Priority:   Routine     Referral Type:   Consult for Advice and Opinion     Referral Reason:   Specialty Services Required     Referred to Provider:   Manasa Miguel MD     Requested Specialty:   Gastroenterology     Number of Visits

## 2018-12-18 ENCOUNTER — HOSPITAL ENCOUNTER (OUTPATIENT)
Dept: PHYSICAL THERAPY | Age: 43
Setting detail: THERAPIES SERIES
Discharge: HOME | End: 2018-12-18
Payer: MEDICARE

## 2018-12-18 PROCEDURE — 97750 PHYSICAL PERFORMANCE TEST: CPT

## 2019-01-16 ENCOUNTER — OFFICE VISIT (OUTPATIENT)
Dept: NEUROLOGY | Age: 44
End: 2019-01-16
Payer: MEDICARE

## 2019-01-16 VITALS
DIASTOLIC BLOOD PRESSURE: 89 MMHG | HEIGHT: 60 IN | SYSTOLIC BLOOD PRESSURE: 133 MMHG | BODY MASS INDEX: 34.67 KG/M2 | WEIGHT: 176.6 LBS | HEART RATE: 87 BPM

## 2019-01-16 DIAGNOSIS — G70.00 MYASTHENIA GRAVIS (HCC): Primary | ICD-10-CM

## 2019-01-16 PROCEDURE — G8484 FLU IMMUNIZE NO ADMIN: HCPCS | Performed by: PSYCHIATRY & NEUROLOGY

## 2019-01-16 PROCEDURE — G8417 CALC BMI ABV UP PARAM F/U: HCPCS | Performed by: PSYCHIATRY & NEUROLOGY

## 2019-01-16 PROCEDURE — 1036F TOBACCO NON-USER: CPT | Performed by: PSYCHIATRY & NEUROLOGY

## 2019-01-16 PROCEDURE — G8427 DOCREV CUR MEDS BY ELIG CLIN: HCPCS | Performed by: PSYCHIATRY & NEUROLOGY

## 2019-01-16 PROCEDURE — 99214 OFFICE O/P EST MOD 30 MIN: CPT | Performed by: PSYCHIATRY & NEUROLOGY

## 2019-01-16 RX ORDER — MYCOPHENOLATE MOFETIL 250 MG/1
500 CAPSULE ORAL 2 TIMES DAILY
Qty: 120 CAPSULE | Refills: 5 | Status: ON HOLD | OUTPATIENT
Start: 2019-01-16 | End: 2019-03-31 | Stop reason: HOSPADM

## 2019-01-16 ASSESSMENT — ENCOUNTER SYMPTOMS
ALLERGIC/IMMUNOLOGIC NEGATIVE: 1
EYES NEGATIVE: 1
RESPIRATORY NEGATIVE: 1
GASTROINTESTINAL NEGATIVE: 1

## 2019-01-29 ENCOUNTER — OFFICE VISIT (OUTPATIENT)
Dept: INTERNAL MEDICINE | Age: 44
End: 2019-01-29
Payer: MEDICARE

## 2019-01-29 VITALS
WEIGHT: 181 LBS | HEART RATE: 93 BPM | SYSTOLIC BLOOD PRESSURE: 131 MMHG | DIASTOLIC BLOOD PRESSURE: 96 MMHG | BODY MASS INDEX: 35.53 KG/M2 | HEIGHT: 60 IN

## 2019-01-29 DIAGNOSIS — M85.80 OSTEOPENIA, UNSPECIFIED LOCATION: ICD-10-CM

## 2019-01-29 DIAGNOSIS — R73.03 PRE-DIABETES: ICD-10-CM

## 2019-01-29 DIAGNOSIS — D22.9 BENIGN SKIN MOLE: ICD-10-CM

## 2019-01-29 DIAGNOSIS — R09.81 NASAL CONGESTION: ICD-10-CM

## 2019-01-29 DIAGNOSIS — J06.9 VIRAL URI WITH COUGH: Primary | ICD-10-CM

## 2019-01-29 LAB
INFLUENZA A ANTIBODY: NEGATIVE
INFLUENZA B ANTIBODY: NEGATIVE

## 2019-01-29 PROCEDURE — G8427 DOCREV CUR MEDS BY ELIG CLIN: HCPCS | Performed by: STUDENT IN AN ORGANIZED HEALTH CARE EDUCATION/TRAINING PROGRAM

## 2019-01-29 PROCEDURE — G8484 FLU IMMUNIZE NO ADMIN: HCPCS | Performed by: STUDENT IN AN ORGANIZED HEALTH CARE EDUCATION/TRAINING PROGRAM

## 2019-01-29 PROCEDURE — 1036F TOBACCO NON-USER: CPT | Performed by: STUDENT IN AN ORGANIZED HEALTH CARE EDUCATION/TRAINING PROGRAM

## 2019-01-29 PROCEDURE — 99211 OFF/OP EST MAY X REQ PHY/QHP: CPT | Performed by: INTERNAL MEDICINE

## 2019-01-29 PROCEDURE — 99213 OFFICE O/P EST LOW 20 MIN: CPT | Performed by: STUDENT IN AN ORGANIZED HEALTH CARE EDUCATION/TRAINING PROGRAM

## 2019-01-29 PROCEDURE — G8417 CALC BMI ABV UP PARAM F/U: HCPCS | Performed by: STUDENT IN AN ORGANIZED HEALTH CARE EDUCATION/TRAINING PROGRAM

## 2019-01-29 PROCEDURE — 87804 INFLUENZA ASSAY W/OPTIC: CPT | Performed by: STUDENT IN AN ORGANIZED HEALTH CARE EDUCATION/TRAINING PROGRAM

## 2019-01-29 RX ORDER — LORATADINE AND PSEUDOEPHEDRINE 10; 240 MG/1; MG/1
1 TABLET, EXTENDED RELEASE ORAL DAILY
Qty: 10 TABLET | Refills: 0 | Status: CANCELLED | OUTPATIENT
Start: 2019-01-29

## 2019-01-29 RX ORDER — GUAIFENESIN 100 MG/5ML
5 SYRUP ORAL EVERY 4 HOURS PRN
Qty: 1 BOTTLE | Refills: 0 | Status: ON HOLD | OUTPATIENT
Start: 2019-01-29 | End: 2019-03-31 | Stop reason: HOSPADM

## 2019-01-29 RX ORDER — MELATONIN
1000 DAILY
Qty: 30 TABLET | Refills: 0 | Status: SHIPPED | OUTPATIENT
Start: 2019-01-29 | End: 2019-02-27 | Stop reason: SDUPTHER

## 2019-01-29 RX ORDER — FLUTICASONE PROPIONATE 50 MCG
1 SPRAY, SUSPENSION (ML) NASAL DAILY
Qty: 2 BOTTLE | Refills: 1 | Status: SHIPPED | OUTPATIENT
Start: 2019-01-29 | End: 2019-09-24

## 2019-02-18 ENCOUNTER — OFFICE VISIT (OUTPATIENT)
Dept: GASTROENTEROLOGY | Age: 44
End: 2019-02-18
Payer: MEDICARE

## 2019-02-18 VITALS
BODY MASS INDEX: 35.71 KG/M2 | DIASTOLIC BLOOD PRESSURE: 89 MMHG | HEART RATE: 112 BPM | WEIGHT: 181.9 LBS | HEIGHT: 60 IN | SYSTOLIC BLOOD PRESSURE: 133 MMHG

## 2019-02-18 DIAGNOSIS — G70.00 MYASTHENIA GRAVIS (HCC): ICD-10-CM

## 2019-02-18 DIAGNOSIS — R79.89 ELEVATED LFTS: Primary | ICD-10-CM

## 2019-02-18 PROCEDURE — G8484 FLU IMMUNIZE NO ADMIN: HCPCS | Performed by: INTERNAL MEDICINE

## 2019-02-18 PROCEDURE — G8427 DOCREV CUR MEDS BY ELIG CLIN: HCPCS | Performed by: INTERNAL MEDICINE

## 2019-02-18 PROCEDURE — G8417 CALC BMI ABV UP PARAM F/U: HCPCS | Performed by: INTERNAL MEDICINE

## 2019-02-18 PROCEDURE — 99244 OFF/OP CNSLTJ NEW/EST MOD 40: CPT | Performed by: INTERNAL MEDICINE

## 2019-02-18 ASSESSMENT — ENCOUNTER SYMPTOMS
CHEST TIGHTNESS: 0
SINUS PAIN: 0
EYE PAIN: 0
COLOR CHANGE: 0
EYE REDNESS: 0
CONSTIPATION: 0
RECTAL PAIN: 0
ANAL BLEEDING: 0
SINUS PRESSURE: 1
ABDOMINAL DISTENTION: 0
BACK PAIN: 0
WHEEZING: 0
ABDOMINAL PAIN: 0
NAUSEA: 0
VOMITING: 0
SHORTNESS OF BREATH: 0
DIARRHEA: 0
TROUBLE SWALLOWING: 0
BLOOD IN STOOL: 0

## 2019-02-25 ENCOUNTER — HOSPITAL ENCOUNTER (OUTPATIENT)
Age: 44
Discharge: HOME | End: 2019-02-25
Payer: MEDICARE

## 2019-02-25 DIAGNOSIS — R79.89 ELEVATED LFTS: ICD-10-CM

## 2019-02-25 LAB
ANGIOTENSIN-CONVERTING ENZYME: 31 U/L (ref 8–52)
HAV AB SERPL IA-ACNC: REACTIVE
HBV SURFACE AB TITR SER: <3.5 MIU/ML
HEPATITIS B CORE TOTAL ANTIBODY: NONREACTIVE
HEPATITIS B SURFACE ANTIGEN: NONREACTIVE
HEPATITIS C ANTIBODY: REACTIVE
IRON SATURATION: 7 % (ref 20–55)
IRON: 30 UG/DL (ref 37–145)
TOTAL IRON BINDING CAPACITY: 445 UG/DL (ref 250–450)
TSH SERPL DL<=0.05 MIU/L-ACNC: 1.85 MIU/L (ref 0.3–5)
UNSATURATED IRON BINDING CAPACITY: 415 UG/DL (ref 112–347)

## 2019-02-25 PROCEDURE — 82164 ANGIOTENSIN I ENZYME TEST: CPT

## 2019-02-25 PROCEDURE — 86803 HEPATITIS C AB TEST: CPT

## 2019-02-25 PROCEDURE — 86038 ANTINUCLEAR ANTIBODIES: CPT

## 2019-02-25 PROCEDURE — 86704 HEP B CORE ANTIBODY TOTAL: CPT

## 2019-02-25 PROCEDURE — 83550 IRON BINDING TEST: CPT

## 2019-02-25 PROCEDURE — 83516 IMMUNOASSAY NONANTIBODY: CPT

## 2019-02-25 PROCEDURE — 36415 COLL VENOUS BLD VENIPUNCTURE: CPT

## 2019-02-25 PROCEDURE — 83540 ASSAY OF IRON: CPT

## 2019-02-25 PROCEDURE — 86317 IMMUNOASSAY INFECTIOUS AGENT: CPT

## 2019-02-25 PROCEDURE — 82103 ALPHA-1-ANTITRYPSIN TOTAL: CPT

## 2019-02-25 PROCEDURE — 86708 HEPATITIS A ANTIBODY: CPT

## 2019-02-25 PROCEDURE — 84443 ASSAY THYROID STIM HORMONE: CPT

## 2019-02-25 PROCEDURE — 82104 ALPHA-1-ANTITRYPSIN PHENO: CPT

## 2019-02-25 PROCEDURE — 87340 HEPATITIS B SURFACE AG IA: CPT

## 2019-02-26 LAB — ANTI-NUCLEAR ANTIBODY (ANA): NEGATIVE

## 2019-02-27 DIAGNOSIS — M85.80 OSTEOPENIA, UNSPECIFIED LOCATION: ICD-10-CM

## 2019-02-27 LAB
MITOCHONDRIAL ANTIBODY: 10.6 UNITS (ref 0–20)
SMOOTH MUSCLE ANTIBODY: 8 UNITS (ref 0–19)

## 2019-02-28 LAB
ALPHA-1 ANTITRYPSIN PHENOTYPE: NORMAL
ALPHA-1 ANTITRYPSIN: 144 MG/DL (ref 90–200)

## 2019-03-01 DIAGNOSIS — M85.80 OSTEOPENIA, UNSPECIFIED LOCATION: ICD-10-CM

## 2019-03-07 ENCOUNTER — HOSPITAL ENCOUNTER (OUTPATIENT)
Dept: MRI IMAGING | Age: 44
Discharge: HOME | End: 2019-03-09
Payer: MEDICARE

## 2019-03-07 DIAGNOSIS — R79.89 ELEVATED LFTS: ICD-10-CM

## 2019-03-07 LAB
BUN BLDV-MCNC: 7 MG/DL (ref 6–20)
CREAT SERPL-MCNC: 0.71 MG/DL (ref 0.5–0.9)
GFR AFRICAN AMERICAN: >60 ML/MIN
GFR NON-AFRICAN AMERICAN: >60 ML/MIN
GFR SERPL CREATININE-BSD FRML MDRD: NORMAL ML/MIN/{1.73_M2}
GFR SERPL CREATININE-BSD FRML MDRD: NORMAL ML/MIN/{1.73_M2}

## 2019-03-07 PROCEDURE — 2580000003 HC RX 258: Performed by: INTERNAL MEDICINE

## 2019-03-07 PROCEDURE — 76377 3D RENDER W/INTRP POSTPROCES: CPT

## 2019-03-07 PROCEDURE — 36415 COLL VENOUS BLD VENIPUNCTURE: CPT

## 2019-03-07 PROCEDURE — 84520 ASSAY OF UREA NITROGEN: CPT

## 2019-03-07 PROCEDURE — 82565 ASSAY OF CREATININE: CPT

## 2019-03-07 PROCEDURE — A9579 GAD-BASE MR CONTRAST NOS,1ML: HCPCS | Performed by: INTERNAL MEDICINE

## 2019-03-07 PROCEDURE — 6360000004 HC RX CONTRAST MEDICATION: Performed by: INTERNAL MEDICINE

## 2019-03-07 RX ORDER — SODIUM CHLORIDE 0.9 % (FLUSH) 0.9 %
10 SYRINGE (ML) INJECTION PRN
Status: DISCONTINUED | OUTPATIENT
Start: 2019-03-07 | End: 2019-03-10 | Stop reason: HOSPADM

## 2019-03-07 RX ORDER — 0.9 % SODIUM CHLORIDE 0.9 %
50 INTRAVENOUS SOLUTION INTRAVENOUS ONCE
Status: COMPLETED | OUTPATIENT
Start: 2019-03-07 | End: 2019-03-07

## 2019-03-07 RX ADMIN — SODIUM CHLORIDE 50 ML: 9 INJECTION, SOLUTION INTRAVENOUS at 11:13

## 2019-03-07 RX ADMIN — Medication 10 ML: at 11:13

## 2019-03-07 RX ADMIN — GADOTERIDOL 20 ML: 279.3 INJECTION, SOLUTION INTRAVENOUS at 11:13

## 2019-03-15 ENCOUNTER — HOSPITAL ENCOUNTER (OUTPATIENT)
Age: 44
Discharge: HOME | End: 2019-03-15
Payer: MEDICARE

## 2019-03-15 DIAGNOSIS — G70.00 MYASTHENIA GRAVIS (HCC): ICD-10-CM

## 2019-03-15 LAB
ABSOLUTE EOS #: 0.12 K/UL (ref 0–0.44)
ABSOLUTE IMMATURE GRANULOCYTE: 0.23 K/UL (ref 0–0.3)
ABSOLUTE LYMPH #: 2.01 K/UL (ref 1.1–3.7)
ABSOLUTE MONO #: 0.71 K/UL (ref 0.1–1.2)
ALT SERPL-CCNC: 46 U/L (ref 5–33)
AST SERPL-CCNC: 44 U/L
BASOPHILS # BLD: 1 % (ref 0–2)
BASOPHILS ABSOLUTE: 0.07 K/UL (ref 0–0.2)
DIFFERENTIAL TYPE: ABNORMAL
EOSINOPHILS RELATIVE PERCENT: 1 % (ref 1–4)
HCT VFR BLD CALC: 37.5 % (ref 36.3–47.1)
HEMOGLOBIN: 11.5 G/DL (ref 11.9–15.1)
IMMATURE GRANULOCYTES: 2 %
LYMPHOCYTES # BLD: 16 % (ref 24–43)
MCH RBC QN AUTO: 25.9 PG (ref 25.2–33.5)
MCHC RBC AUTO-ENTMCNC: 30.7 G/DL (ref 28.4–34.8)
MCV RBC AUTO: 84.5 FL (ref 82.6–102.9)
MONOCYTES # BLD: 6 % (ref 3–12)
NRBC AUTOMATED: 0 PER 100 WBC
PDW BLD-RTO: 16.8 % (ref 11.8–14.4)
PLATELET # BLD: 442 K/UL (ref 138–453)
PLATELET ESTIMATE: ABNORMAL
PMV BLD AUTO: 10.7 FL (ref 8.1–13.5)
RBC # BLD: 4.44 M/UL (ref 3.95–5.11)
RBC # BLD: ABNORMAL 10*6/UL
SEG NEUTROPHILS: 74 % (ref 36–65)
SEGMENTED NEUTROPHILS ABSOLUTE COUNT: 9.54 K/UL (ref 1.5–8.1)
WBC # BLD: 12.7 K/UL (ref 3.5–11.3)
WBC # BLD: ABNORMAL 10*3/UL

## 2019-03-15 PROCEDURE — 84450 TRANSFERASE (AST) (SGOT): CPT

## 2019-03-15 PROCEDURE — 84460 ALANINE AMINO (ALT) (SGPT): CPT

## 2019-03-15 PROCEDURE — 36415 COLL VENOUS BLD VENIPUNCTURE: CPT

## 2019-03-15 PROCEDURE — 85025 COMPLETE CBC W/AUTO DIFF WBC: CPT

## 2019-03-19 ENCOUNTER — OFFICE VISIT (OUTPATIENT)
Dept: NEUROLOGY | Age: 44
End: 2019-03-19
Payer: MEDICARE

## 2019-03-19 VITALS
SYSTOLIC BLOOD PRESSURE: 134 MMHG | BODY MASS INDEX: 34.44 KG/M2 | HEIGHT: 60 IN | DIASTOLIC BLOOD PRESSURE: 89 MMHG | WEIGHT: 175.4 LBS | HEART RATE: 110 BPM

## 2019-03-19 DIAGNOSIS — G56.00 CARPAL TUNNEL SYNDROME, UNSPECIFIED LATERALITY: ICD-10-CM

## 2019-03-19 DIAGNOSIS — G70.00 MYASTHENIA GRAVIS (HCC): Primary | ICD-10-CM

## 2019-03-19 PROCEDURE — 99214 OFFICE O/P EST MOD 30 MIN: CPT | Performed by: PSYCHIATRY & NEUROLOGY

## 2019-03-19 PROCEDURE — G8417 CALC BMI ABV UP PARAM F/U: HCPCS | Performed by: PSYCHIATRY & NEUROLOGY

## 2019-03-19 PROCEDURE — 1036F TOBACCO NON-USER: CPT | Performed by: PSYCHIATRY & NEUROLOGY

## 2019-03-19 PROCEDURE — G8427 DOCREV CUR MEDS BY ELIG CLIN: HCPCS | Performed by: PSYCHIATRY & NEUROLOGY

## 2019-03-19 PROCEDURE — G8484 FLU IMMUNIZE NO ADMIN: HCPCS | Performed by: PSYCHIATRY & NEUROLOGY

## 2019-03-19 ASSESSMENT — ENCOUNTER SYMPTOMS
RESPIRATORY NEGATIVE: 1
ALLERGIC/IMMUNOLOGIC NEGATIVE: 1
GASTROINTESTINAL NEGATIVE: 1

## 2019-03-21 ENCOUNTER — HOSPITAL ENCOUNTER (OUTPATIENT)
Age: 44
Discharge: HOME | End: 2019-03-21
Payer: MEDICARE

## 2019-03-21 ENCOUNTER — OFFICE VISIT (OUTPATIENT)
Dept: GASTROENTEROLOGY | Age: 44
End: 2019-03-21
Payer: MEDICARE

## 2019-03-21 VITALS
DIASTOLIC BLOOD PRESSURE: 105 MMHG | WEIGHT: 170 LBS | BODY MASS INDEX: 33.38 KG/M2 | HEART RATE: 121 BPM | HEIGHT: 60 IN | SYSTOLIC BLOOD PRESSURE: 160 MMHG

## 2019-03-21 DIAGNOSIS — D18.03 LIVER HEMANGIOMA: ICD-10-CM

## 2019-03-21 DIAGNOSIS — R79.89 ELEVATED LFTS: ICD-10-CM

## 2019-03-21 DIAGNOSIS — E61.1 IRON DEFICIENCY: ICD-10-CM

## 2019-03-21 DIAGNOSIS — B18.2 CHRONIC HEPATITIS C WITHOUT HEPATIC COMA (HCC): ICD-10-CM

## 2019-03-21 DIAGNOSIS — B18.2 CHRONIC HEPATITIS C WITHOUT HEPATIC COMA (HCC): Primary | ICD-10-CM

## 2019-03-21 LAB — AFP: 7.2 UG/L

## 2019-03-21 PROCEDURE — 36415 COLL VENOUS BLD VENIPUNCTURE: CPT

## 2019-03-21 PROCEDURE — 99214 OFFICE O/P EST MOD 30 MIN: CPT | Performed by: INTERNAL MEDICINE

## 2019-03-21 PROCEDURE — 1036F TOBACCO NON-USER: CPT | Performed by: INTERNAL MEDICINE

## 2019-03-21 PROCEDURE — G8427 DOCREV CUR MEDS BY ELIG CLIN: HCPCS | Performed by: INTERNAL MEDICINE

## 2019-03-21 PROCEDURE — G8484 FLU IMMUNIZE NO ADMIN: HCPCS | Performed by: INTERNAL MEDICINE

## 2019-03-21 PROCEDURE — G8417 CALC BMI ABV UP PARAM F/U: HCPCS | Performed by: INTERNAL MEDICINE

## 2019-03-21 PROCEDURE — 87522 HEPATITIS C REVRS TRNSCRPJ: CPT

## 2019-03-21 PROCEDURE — 87902 NFCT AGT GNTYP ALYS HEP C: CPT

## 2019-03-21 PROCEDURE — 82105 ALPHA-FETOPROTEIN SERUM: CPT

## 2019-03-21 ASSESSMENT — ENCOUNTER SYMPTOMS
RESPIRATORY NEGATIVE: 1
ALLERGIC/IMMUNOLOGIC NEGATIVE: 1
GASTROINTESTINAL NEGATIVE: 1

## 2019-03-22 PROBLEM — B18.2 CHRONIC HEPATITIS C WITHOUT HEPATIC COMA (HCC): Status: ACTIVE | Noted: 2019-03-22

## 2019-03-22 LAB
DIRECT EXAM: ABNORMAL
DIRECT EXAM: ABNORMAL
Lab: ABNORMAL
SPECIMEN DESCRIPTION: ABNORMAL

## 2019-03-26 LAB
HCV QUANTITATIVE: NORMAL
HEPATITIS C GENOTYPE: NORMAL

## 2019-03-27 DIAGNOSIS — M85.80 OSTEOPENIA, UNSPECIFIED LOCATION: ICD-10-CM

## 2019-03-27 RX ORDER — ASCORBIC ACID 500 MG
TABLET ORAL
Qty: 30 TABLET | Refills: 3 | Status: SHIPPED | OUTPATIENT
Start: 2019-03-27

## 2019-03-29 ENCOUNTER — HOSPITAL ENCOUNTER (EMERGENCY)
Age: 44
Discharge: HOME | End: 2019-03-29
Attending: EMERGENCY MEDICINE
Payer: MEDICARE

## 2019-03-29 VITALS
TEMPERATURE: 99.3 F | WEIGHT: 170 LBS | RESPIRATION RATE: 16 BRPM | OXYGEN SATURATION: 100 % | DIASTOLIC BLOOD PRESSURE: 95 MMHG | SYSTOLIC BLOOD PRESSURE: 136 MMHG | HEIGHT: 60 IN | HEART RATE: 114 BPM | BODY MASS INDEX: 33.38 KG/M2

## 2019-03-29 DIAGNOSIS — J10.1 INFLUENZA A: Primary | ICD-10-CM

## 2019-03-29 LAB
ABSOLUTE EOS #: 0.03 K/UL (ref 0–0.44)
ABSOLUTE IMMATURE GRANULOCYTE: 0.17 K/UL (ref 0–0.3)
ABSOLUTE LYMPH #: 0.86 K/UL (ref 1.1–3.7)
ABSOLUTE MONO #: 1.36 K/UL (ref 0.1–1.2)
ANION GAP SERPL CALCULATED.3IONS-SCNC: 11 MMOL/L (ref 9–17)
BASOPHILS # BLD: 1 % (ref 0–2)
BASOPHILS ABSOLUTE: 0.06 K/UL (ref 0–0.2)
BILIRUBIN URINE: NEGATIVE
BUN BLDV-MCNC: 5 MG/DL (ref 6–20)
BUN/CREAT BLD: ABNORMAL (ref 9–20)
CALCIUM SERPL-MCNC: 8.4 MG/DL (ref 8.6–10.4)
CHLORIDE BLD-SCNC: 99 MMOL/L (ref 98–107)
CO2: 23 MMOL/L (ref 20–31)
COLOR: YELLOW
COMMENT UA: ABNORMAL
CREAT SERPL-MCNC: 0.64 MG/DL (ref 0.5–0.9)
DIFFERENTIAL TYPE: ABNORMAL
DIRECT EXAM: ABNORMAL
DIRECT EXAM: ABNORMAL
EOSINOPHILS RELATIVE PERCENT: 0 % (ref 1–4)
GFR AFRICAN AMERICAN: >60 ML/MIN
GFR NON-AFRICAN AMERICAN: >60 ML/MIN
GFR SERPL CREATININE-BSD FRML MDRD: ABNORMAL ML/MIN/{1.73_M2}
GFR SERPL CREATININE-BSD FRML MDRD: ABNORMAL ML/MIN/{1.73_M2}
GLUCOSE BLD-MCNC: 147 MG/DL (ref 70–99)
GLUCOSE URINE: NEGATIVE
HCG QUALITATIVE: NEGATIVE
HCT VFR BLD CALC: 36.1 % (ref 36.3–47.1)
HEMOGLOBIN: 11.1 G/DL (ref 11.9–15.1)
IMMATURE GRANULOCYTES: 1 %
KETONES, URINE: NEGATIVE
LEUKOCYTE ESTERASE, URINE: NEGATIVE
LIPASE: 47 U/L (ref 13–60)
LYMPHOCYTES # BLD: 7 % (ref 24–43)
Lab: ABNORMAL
MCH RBC QN AUTO: 24.7 PG (ref 25.2–33.5)
MCHC RBC AUTO-ENTMCNC: 30.7 G/DL (ref 28.4–34.8)
MCV RBC AUTO: 80.2 FL (ref 82.6–102.9)
MONOCYTES # BLD: 11 % (ref 3–12)
NITRITE, URINE: NEGATIVE
NRBC AUTOMATED: 0 PER 100 WBC
PDW BLD-RTO: 17.2 % (ref 11.8–14.4)
PH UA: 6.5 (ref 5–8)
PLATELET # BLD: 450 K/UL (ref 138–453)
PLATELET ESTIMATE: ABNORMAL
PMV BLD AUTO: 11.3 FL (ref 8.1–13.5)
POTASSIUM SERPL-SCNC: 3.9 MMOL/L (ref 3.7–5.3)
PROTEIN UA: NEGATIVE
RBC # BLD: 4.5 M/UL (ref 3.95–5.11)
RBC # BLD: ABNORMAL 10*6/UL
SEG NEUTROPHILS: 80 % (ref 36–65)
SEGMENTED NEUTROPHILS ABSOLUTE COUNT: 9.56 K/UL (ref 1.5–8.1)
SODIUM BLD-SCNC: 133 MMOL/L (ref 135–144)
SPECIFIC GRAVITY UA: 1 (ref 1–1.03)
SPECIMEN DESCRIPTION: ABNORMAL
TURBIDITY: CLEAR
URINE HGB: NEGATIVE
UROBILINOGEN, URINE: NORMAL
WBC # BLD: 12 K/UL (ref 3.5–11.3)
WBC # BLD: ABNORMAL 10*3/UL

## 2019-03-29 PROCEDURE — 2580000003 HC RX 258: Performed by: EMERGENCY MEDICINE

## 2019-03-29 PROCEDURE — 87804 INFLUENZA ASSAY W/OPTIC: CPT

## 2019-03-29 PROCEDURE — 84703 CHORIONIC GONADOTROPIN ASSAY: CPT

## 2019-03-29 PROCEDURE — 81003 URINALYSIS AUTO W/O SCOPE: CPT

## 2019-03-29 PROCEDURE — 80048 BASIC METABOLIC PNL TOTAL CA: CPT

## 2019-03-29 PROCEDURE — 6370000000 HC RX 637 (ALT 250 FOR IP): Performed by: EMERGENCY MEDICINE

## 2019-03-29 PROCEDURE — 83690 ASSAY OF LIPASE: CPT

## 2019-03-29 PROCEDURE — 99283 EMERGENCY DEPT VISIT LOW MDM: CPT

## 2019-03-29 PROCEDURE — 85025 COMPLETE CBC W/AUTO DIFF WBC: CPT

## 2019-03-29 RX ORDER — 0.9 % SODIUM CHLORIDE 0.9 %
1000 INTRAVENOUS SOLUTION INTRAVENOUS ONCE
Status: COMPLETED | OUTPATIENT
Start: 2019-03-29 | End: 2019-03-29

## 2019-03-29 RX ORDER — ACETAMINOPHEN 325 MG/1
650 TABLET ORAL EVERY 6 HOURS PRN
Qty: 30 TABLET | Refills: 0 | Status: SHIPPED | OUTPATIENT
Start: 2019-03-29 | End: 2019-09-24

## 2019-03-29 RX ORDER — DIPHENHYDRAMINE HYDROCHLORIDE 50 MG/ML
25 INJECTION INTRAMUSCULAR; INTRAVENOUS ONCE
Status: DISCONTINUED | OUTPATIENT
Start: 2019-03-29 | End: 2019-03-29

## 2019-03-29 RX ORDER — ACETAMINOPHEN 500 MG
1000 TABLET ORAL ONCE
Status: COMPLETED | OUTPATIENT
Start: 2019-03-29 | End: 2019-03-29

## 2019-03-29 RX ORDER — BENZONATATE 100 MG/1
100 CAPSULE ORAL 3 TIMES DAILY PRN
Qty: 30 CAPSULE | Refills: 0 | Status: ON HOLD | OUTPATIENT
Start: 2019-03-29 | End: 2019-03-31 | Stop reason: HOSPADM

## 2019-03-29 RX ORDER — METHYLPREDNISOLONE SODIUM SUCCINATE 125 MG/2ML
125 INJECTION, POWDER, LYOPHILIZED, FOR SOLUTION INTRAMUSCULAR; INTRAVENOUS ONCE
Status: DISCONTINUED | OUTPATIENT
Start: 2019-03-29 | End: 2019-03-29

## 2019-03-29 RX ADMIN — ACETAMINOPHEN 1000 MG: 500 TABLET ORAL at 08:29

## 2019-03-29 RX ADMIN — SODIUM CHLORIDE 1000 ML: 9 INJECTION, SOLUTION INTRAVENOUS at 08:25

## 2019-03-29 ASSESSMENT — PAIN DESCRIPTION - LOCATION: LOCATION: GENERALIZED

## 2019-03-29 ASSESSMENT — ENCOUNTER SYMPTOMS
NAUSEA: 0
VOMITING: 0
RHINORRHEA: 0
DIARRHEA: 0
ABDOMINAL PAIN: 0
SHORTNESS OF BREATH: 0

## 2019-03-29 ASSESSMENT — PAIN DESCRIPTION - ONSET: ONSET: PROGRESSIVE

## 2019-03-29 ASSESSMENT — PAIN DESCRIPTION - FREQUENCY: FREQUENCY: CONTINUOUS

## 2019-03-29 ASSESSMENT — PAIN DESCRIPTION - PROGRESSION: CLINICAL_PROGRESSION: GRADUALLY WORSENING

## 2019-03-29 ASSESSMENT — PAIN DESCRIPTION - DESCRIPTORS: DESCRIPTORS: ACHING

## 2019-03-29 ASSESSMENT — PAIN DESCRIPTION - PAIN TYPE: TYPE: ACUTE PAIN

## 2019-03-29 ASSESSMENT — PAIN SCALES - GENERAL: PAINLEVEL_OUTOF10: 7

## 2019-03-29 NOTE — ED NOTES
Labeled blood specimen and flu swab collected and sent to lab via tube system.      Alla Claire RN  03/29/19 5572

## 2019-03-29 NOTE — ED NOTES
Pt resting on cart with call light within reach. NAD noted, RR even and NL.  Will continue to monitor     Genie Paul RN  03/29/19 3950

## 2019-03-29 NOTE — ED PROVIDER NOTES
 Alcohol use: No    Drug use: No    Sexual activity: Never   Lifestyle    Physical activity:     Days per week: Not on file     Minutes per session: Not on file    Stress: Not on file   Relationships    Social connections:     Talks on phone: Not on file     Gets together: Not on file     Attends Orthodox service: Not on file     Active member of club or organization: Not on file     Attends meetings of clubs or organizations: Not on file     Relationship status: Not on file    Intimate partner violence:     Fear of current or ex partner: Not on file     Emotionally abused: Not on file     Physically abused: Not on file     Forced sexual activity: Not on file   Other Topics Concern    Not on file   Social History Narrative    Not on file       History reviewed. No pertinent family history. Allergies:  Morphine; Flexeril [cyclobenzaprine]; Norflex [orphenadrine citrate]; Nsaids; Penicillins; and Vancomycin    Home Medications:  Prior to Admission medications    Medication Sig Start Date End Date Taking?  Authorizing Provider   benzonatate (TESSALON PERLES) 100 MG capsule Take 1 capsule by mouth 3 times daily as needed for Cough 3/29/19 4/5/19 Yes Jacki Hernandez MD   acetaminophen (TYLENOL) 325 MG tablet Take 2 tablets by mouth every 6 hours as needed for Pain 3/29/19  Yes Jacki Hernandez MD   vitamin D (CHOLECALCIFEROL) 1000 UNIT TABS tablet TAKE 1 TABLET BY MOUTH ONE TIME A DAY 3/27/19   Zahraa Workman MD   vitamin C (ASCORBIC ACID) 500 MG tablet TAKE 1 TABLET BY MOUTH ONE TIME A DAY 3/27/19   Zahraa Workman MD   vitamin D (CHOLECALCIFEROL) 1000 UNIT TABS tablet TAKE 1 TABLET BY MOUTH ONE TIME A DAY 3/1/19   Jered Peguero MD   fluticasone (FLONASE) 50 MCG/ACT nasal spray 1 spray by Each Nare route daily 1 Spray in each nostril 1/29/19   Zahraa Workman MD   guaiFENesin (ALTARUSSIN) 100 MG/5ML syrup Take 5 mLs by mouth every 4 hours as needed for Cough 1/29/19   Zahraa Workman MD mycophenolate (CELLCEPT) 250 MG capsule Take 2 capsules by mouth 2 times daily 1/16/19   Dontae Preston MD   predniSONE (DELTASONE) 20 MG tablet Take 1 po qd 11/29/18   Dontae Preston MD   pyridostigmine (MESTINON) 60 MG tablet Take 1 po qid 7 AM , q 11 AM ,q 3 PM, 7 PM 11/14/18   Dontae Preston MD   ferrous sulfate 325 (65 Fe) MG tablet TAKE 1 TABLET BY MOUTH 2 TIMES DAILY (WITH MEALS) 10/29/18   Osiel Carr MD   blood glucose monitor strips Test 4 times daily Diagnosis 10/23/18   Remedios Smith MD   Alcohol Swabs PADS USE AS DIRECTED WHEN CHECKING BLOOD SUGAR DAILY. 10/23/18   Remedios Smith MD   metFORMIN (GLUCOPHAGE) 500 MG tablet Take 1 tablet by mouth 2 times daily (with meals) 10/23/18   Remedios Smith MD   ACCU-CHEK WACLARI [facility] LANCETS MISC Daily 4/16/18   Peri Chatman MD   glucose monitoring kit (FREESTYLE) monitoring kit Daily check 1/22/18   Neo Smith MD       REVIEW OF SYSTEMS    (2-9 systems for level 4, 10 or more for level 5)      Review of Systems   Constitutional: Positive for chills, fatigue and fever. HENT: Negative for congestion and rhinorrhea. Eyes: Negative for visual disturbance. Respiratory: Negative for shortness of breath. Cardiovascular: Negative for chest pain. Gastrointestinal: Negative for abdominal pain, diarrhea, nausea and vomiting. Genitourinary: Negative for dysuria and frequency. Musculoskeletal: Negative for arthralgias. Skin: Negative for wound. Neurological: Negative for dizziness and light-headedness. PHYSICAL EXAM   (up to 7 for level 4, 8 or more for level 5)      INITIAL VITALS:   BP (!) 136/95   Pulse 114   Temp 99.3 °F (37.4 °C) (Oral)   Resp 16   Ht 5' (1.524 m)   Wt 170 lb (77.1 kg)   SpO2 100%   BMI 33.20 kg/m²     Physical Exam   Constitutional: She is oriented to person, place, and time. No distress. HENT:   Head: Normocephalic and atraumatic.    Eyes: Right eye exhibits no discharge. Left eye exhibits no discharge. Cardiovascular: Normal rate, regular rhythm and normal heart sounds. Exam reveals no friction rub. No murmur heard. Pulmonary/Chest: Effort normal and breath sounds normal. No stridor. No respiratory distress. She has no wheezes. She has no rales. She exhibits no tenderness. Abdominal: Soft. She exhibits no distension. There is no tenderness. There is no guarding. Neurological: She is alert and oriented to person, place, and time. She has normal strength. No cranial nerve deficit or sensory deficit. GCS eye subscore is 4. GCS verbal subscore is 5. GCS motor subscore is 6. Skin: Skin is warm and dry. She is not diaphoretic. Nursing note and vitals reviewed. DIAGNOSTICS     PLAN (LABS / IMAGING / EKG):  Orders Placed This Encounter   Procedures    Rapid influenza A/B antigens    CBC Auto Differential    Basic Metabolic Panel    Urinalysis Reflex to Culture    HCG Qualitative, Serum    Lipase       MEDICATIONS ORDERED:  Orders Placed This Encounter   Medications    0.9 % sodium chloride bolus    acetaminophen (TYLENOL) tablet 1,000 mg    DISCONTD: prochlorperazine (COMPAZINE) injection 10 mg    DISCONTD: diphenhydrAMINE (BENADRYL) injection 25 mg    DISCONTD: methylPREDNISolone sodium (SOLU-MEDROL) injection 125 mg    benzonatate (TESSALON PERLES) 100 MG capsule     Sig: Take 1 capsule by mouth 3 times daily as needed for Cough     Dispense:  30 capsule     Refill:  0    acetaminophen (TYLENOL) 325 MG tablet     Sig: Take 2 tablets by mouth every 6 hours as needed for Pain     Dispense:  30 tablet     Refill:  0       DIAGNOSTIC RESULTS / EMERGENCYDEPARTMENT COURSE / MDM     LABS:  Results for orders placed or performed during the hospital encounter of 03/29/19   Rapid influenza A/B antigens   Result Value Ref Range    Specimen Description . NARES     Special Requests NOT REPORTED     Direct Exam POSITIVE for Influenza A Antigen (A)     Direct Exam NEGATIVE for Influenza B Antigen    CBC Auto Differential   Result Value Ref Range    WBC 12.0 (H) 3.5 - 11.3 k/uL    RBC 4.50 3.95 - 5.11 m/uL    Hemoglobin 11.1 (L) 11.9 - 15.1 g/dL    Hematocrit 36.1 (L) 36.3 - 47.1 %    MCV 80.2 (L) 82.6 - 102.9 fL    MCH 24.7 (L) 25.2 - 33.5 pg    MCHC 30.7 28.4 - 34.8 g/dL    RDW 17.2 (H) 11.8 - 14.4 %    Platelets 891 819 - 925 k/uL    MPV 11.3 8.1 - 13.5 fL    NRBC Automated 0.0 0.0 per 100 WBC    Differential Type NOT REPORTED     Seg Neutrophils 80 (H) 36 - 65 %    Lymphocytes 7 (L) 24 - 43 %    Monocytes 11 3 - 12 %    Eosinophils % 0 (L) 1 - 4 %    Basophils 1 0 - 2 %    Immature Granulocytes 1 (H) 0 %    Segs Absolute 9.56 (H) 1.50 - 8.10 k/uL    Absolute Lymph # 0.86 (L) 1.10 - 3.70 k/uL    Absolute Mono # 1.36 (H) 0.10 - 1.20 k/uL    Absolute Eos # 0.03 0.00 - 0.44 k/uL    Basophils # 0.06 0.00 - 0.20 k/uL    Absolute Immature Granulocyte 0.17 0.00 - 0.30 k/uL    WBC Morphology NOT REPORTED     RBC Morphology ANISOCYTOSIS PRESENT     Platelet Estimate NOT REPORTED    Basic Metabolic Panel   Result Value Ref Range    Glucose 147 (H) 70 - 99 mg/dL    BUN 5 (L) 6 - 20 mg/dL    CREATININE 0.64 0.50 - 0.90 mg/dL    Bun/Cre Ratio NOT REPORTED 9 - 20    Calcium 8.4 (L) 8.6 - 10.4 mg/dL    Sodium 133 (L) 135 - 144 mmol/L    Potassium 3.9 3.7 - 5.3 mmol/L    Chloride 99 98 - 107 mmol/L    CO2 23 20 - 31 mmol/L    Anion Gap 11 9 - 17 mmol/L    GFR Non-African American >60 >60 mL/min    GFR African American >60 >60 mL/min    GFR Comment          GFR Staging NOT REPORTED    Urinalysis Reflex to Culture   Result Value Ref Range    Color, UA YELLOW YELLOW    Turbidity UA CLEAR CLEAR    Glucose, Ur NEGATIVE NEGATIVE    Bilirubin Urine NEGATIVE NEGATIVE    Ketones, Urine NEGATIVE NEGATIVE    Specific Gravity, UA 1.002 (L) 1.005 - 1.030    Urine Hgb NEGATIVE NEGATIVE    pH, UA 6.5 5.0 - 8.0    Protein, UA NEGATIVE NEGATIVE    Urobilinogen, Urine Normal Normal    Nitrite, Urine NEGATIVE NEGATIVE    Leukocyte Esterase, Urine NEGATIVE NEGATIVE    Urinalysis Comments       Microscopic exam not performed based on chemical results unless requested in original order. HCG Qualitative, Serum   Result Value Ref Range    hCG Qual NEGATIVE NEGATIVE   Lipase   Result Value Ref Range    Lipase 47 13 - 60 U/L     EMERGENCY DEPARTMENT COURSE:    MDM: Patient found to be positive for influenza A with symptom and clinical presentation that presents similar to influenza A. We'll discharge with Tessalon Perles for cough. Patient is out of the window currently for treatment with Tamiflu with some studies showing that Tamiflu may also precipitate myasthenia gravis crisis. Was not prescribed this medication for patient at this time for those reasons. This discharge plan of following up with primary care physician within a week and return if symptoms worsen was discussed with patient via [employer] [place] speaking . All questions and concerns were answered for the patient. Did advise return to the emergency department if symptoms significantly worsen. Patient is agreeable with discharge plan. PROCEDURES:  None    CONSULTS:  None    FINAL IMPRESSION      1.  Influenza A          DISPOSITION / PLAN     DISPOSITION Decision To Discharge 03/29/2019 09:42:53 AM      PATIENT REFERRED TO:  [facility]  [address] [address]  [phone]    If symptoms worsen    Haley Mayer MD  [address] [place], [place] [id number] [phone]    Schedule an appointment as soon as possible for a visit in 1 day        DISCHARGE MEDICATIONS:  Discharge Medication List as of 3/29/2019  9:49 AM      START taking these medications    Details   benzonatate (TESSALON PERLES) 100 MG capsule Take 1 capsule by mouth 3 times daily as needed for Cough, Disp-30 capsule, R-0Print      acetaminophen (TYLENOL) 325 MG tablet Take 2 tablets by mouth every 6 hours as needed for Pain, Disp-30 tablet, R-0Print             Awa Kim MD  Emergency Medicine Resident  [address] [facility]    (Please note that portions of this note were completed with a voice recognition program.  Efforts were made to edit thedictations but occasionally words are mis-transcribed.)       Awa Kim MD  Resident  03/29/19 0140

## 2019-03-29 NOTE — ED PROVIDER NOTES
[facility] ED  eMERGENCY dEPARTMENT eNCOUnter   Attending Attestation     Pt Name: Cari Vazquez  MRN: [id number]  [place] 1975  Date of evaluation: 3/29/19       Cari Vazquez is a 39 y.o. female who presents with Fatigue and Cough      History: pt presents with influenza symptoms. Fatigue body aches and cough. Exam: HRRR, lungs CTABL, abdomen soft and non tender. Pt appears fatigued but otherwise well. Plan for influenza testing and discharge with symptomatic treatment. I performed a history and physical examination of the patient and discussed management with the resident. I reviewed the residents note and agree with the documented findings and plan of care. Any areas of disagreement are noted on the chart. I was personally present for the key portions of any procedures. I have documented in the chart those procedures where I was not present during the key portions. I have personally reviewed all images and agree with the resident's interpretation. I have reviewed the emergency nurses triage note. I agree with the chief complaint, past medical history, past surgical history, allergies, medications, social and family history as documented unless otherwise noted below. Documentation of the HPI, Physical Exam and Medical Decision Making performed by medical students or scribes is based on my personal performance of the HPI, PE and MDM. For Phys Assistant/ Nurse Practitioner cases/documentation I have had a face to face evaluation of this patient and have completed at least one if not all key elements of the E/M (history, physical exam, and MDM). Additional findings are as noted. For APC cases I have personally evaluated and examined the patient in conjunction with the APC and agree with the treatment plan and disposition of the patient as recorded by the APC.     Sona Mcqueen MD  Attending Emergency  Physician        Nidia Bob MD  03/29/19 5504

## 2019-03-29 NOTE — ED NOTES
Pt reporting she does not want to take any meds besides tylenol for her headache     Emperatriz Medeiros RN  03/29/19 5280

## 2019-03-30 ENCOUNTER — HOSPITAL ENCOUNTER (INPATIENT)
Age: 44
LOS: 1 days | Discharge: HOME | DRG: 113 | End: 2019-03-31
Attending: EMERGENCY MEDICINE | Admitting: INTERNAL MEDICINE
Payer: MEDICARE

## 2019-03-30 ENCOUNTER — APPOINTMENT (OUTPATIENT)
Dept: GENERAL RADIOLOGY | Age: 44
DRG: 113 | End: 2019-03-30
Payer: MEDICARE

## 2019-03-30 DIAGNOSIS — G70.01 MYASTHENIA GRAVIS WITH EXACERBATION (HCC): ICD-10-CM

## 2019-03-30 DIAGNOSIS — J10.1 INFLUENZA A: Primary | ICD-10-CM

## 2019-03-30 LAB
-: ABNORMAL
-: NORMAL
ABSOLUTE EOS #: 0 K/UL (ref 0–0.44)
ABSOLUTE IMMATURE GRANULOCYTE: 0.11 K/UL (ref 0–0.3)
ABSOLUTE LYMPH #: 0.67 K/UL (ref 1.1–3.7)
ABSOLUTE MONO #: 0.78 K/UL (ref 0.1–1.2)
ALBUMIN SERPL-MCNC: 3.8 G/DL (ref 3.5–5.2)
ALBUMIN/GLOBULIN RATIO: 1.1 (ref 1–2.5)
ALP BLD-CCNC: 70 U/L (ref 35–104)
ALT SERPL-CCNC: 36 U/L (ref 5–33)
AMORPHOUS: ABNORMAL
ANION GAP SERPL CALCULATED.3IONS-SCNC: 11 MMOL/L (ref 9–17)
AST SERPL-CCNC: 48 U/L
BACTERIA: ABNORMAL
BASOPHILS # BLD: 0 % (ref 0–2)
BASOPHILS ABSOLUTE: 0 K/UL (ref 0–0.2)
BILIRUB SERPL-MCNC: 0.2 MG/DL (ref 0.3–1.2)
BILIRUBIN URINE: NEGATIVE
BUN BLDV-MCNC: 3 MG/DL (ref 6–20)
BUN/CREAT BLD: ABNORMAL (ref 9–20)
CALCIUM SERPL-MCNC: 8.4 MG/DL (ref 8.6–10.4)
CASTS UA: ABNORMAL /LPF (ref 0–8)
CHLORIDE BLD-SCNC: 102 MMOL/L (ref 98–107)
CO2: 23 MMOL/L (ref 20–31)
COLOR: YELLOW
CREAT SERPL-MCNC: 0.51 MG/DL (ref 0.5–0.9)
CRYSTALS, UA: ABNORMAL /HPF
CULTURE: NORMAL
DIFFERENTIAL TYPE: ABNORMAL
EOSINOPHILS RELATIVE PERCENT: 0 % (ref 1–4)
EPITHELIAL CELLS UA: ABNORMAL /HPF (ref 0–5)
GFR AFRICAN AMERICAN: >60 ML/MIN
GFR NON-AFRICAN AMERICAN: >60 ML/MIN
GFR SERPL CREATININE-BSD FRML MDRD: ABNORMAL ML/MIN/{1.73_M2}
GFR SERPL CREATININE-BSD FRML MDRD: ABNORMAL ML/MIN/{1.73_M2}
GLUCOSE BLD-MCNC: 130 MG/DL (ref 65–105)
GLUCOSE BLD-MCNC: 131 MG/DL (ref 65–105)
GLUCOSE BLD-MCNC: 155 MG/DL (ref 70–99)
GLUCOSE URINE: NEGATIVE
HCT VFR BLD CALC: 37.3 % (ref 36.3–47.1)
HEMOGLOBIN: 11.6 G/DL (ref 11.9–15.1)
IMMATURE GRANULOCYTES: 1 %
INR BLD: 0.9
KETONES, URINE: ABNORMAL
LACTIC ACID, SEPSIS WHOLE BLOOD: 1.4 MMOL/L (ref 0.5–1.9)
LACTIC ACID, SEPSIS: NORMAL MMOL/L (ref 0.5–1.9)
LEUKOCYTE ESTERASE, URINE: NEGATIVE
LYMPHOCYTES # BLD: 6 % (ref 24–43)
Lab: NORMAL
MCH RBC QN AUTO: 25.1 PG (ref 25.2–33.5)
MCHC RBC AUTO-ENTMCNC: 31.1 G/DL (ref 28.4–34.8)
MCV RBC AUTO: 80.6 FL (ref 82.6–102.9)
MONOCYTES # BLD: 7 % (ref 3–12)
MORPHOLOGY: ABNORMAL
MORPHOLOGY: ABNORMAL
MUCUS: ABNORMAL
NITRITE, URINE: NEGATIVE
NRBC AUTOMATED: 0 PER 100 WBC
OTHER OBSERVATIONS UA: ABNORMAL
PARTIAL THROMBOPLASTIN TIME: 21.4 SEC (ref 20.5–30.5)
PDW BLD-RTO: 17 % (ref 11.8–14.4)
PH UA: 8.5 (ref 5–8)
PLATELET # BLD: 459 K/UL (ref 138–453)
PLATELET ESTIMATE: ABNORMAL
PMV BLD AUTO: 11.1 FL (ref 8.1–13.5)
POTASSIUM SERPL-SCNC: 3.7 MMOL/L (ref 3.7–5.3)
PROTEIN UA: NEGATIVE
PROTHROMBIN TIME: 9.8 SEC (ref 9–12)
RBC # BLD: 4.63 M/UL (ref 3.95–5.11)
RBC # BLD: ABNORMAL 10*6/UL
RBC UA: ABNORMAL /HPF (ref 0–4)
REASON FOR REJECTION: NORMAL
RENAL EPITHELIAL, UA: ABNORMAL /HPF
SEG NEUTROPHILS: 86 % (ref 36–65)
SEGMENTED NEUTROPHILS ABSOLUTE COUNT: 9.64 K/UL (ref 1.5–8.1)
SODIUM BLD-SCNC: 136 MMOL/L (ref 135–144)
SPECIFIC GRAVITY UA: 1.01 (ref 1–1.03)
SPECIMEN DESCRIPTION: NORMAL
TOTAL PROTEIN: 7.4 G/DL (ref 6.4–8.3)
TRICHOMONAS: ABNORMAL
TURBIDITY: CLEAR
URINE HGB: NEGATIVE
UROBILINOGEN, URINE: NORMAL
WBC # BLD: 11.2 K/UL (ref 3.5–11.3)
WBC # BLD: ABNORMAL 10*3/UL
WBC UA: ABNORMAL /HPF (ref 0–5)
YEAST: ABNORMAL
ZZ NTE CLEAN UP: ORDERED TEST: NORMAL
ZZ NTE WITH NAME CLEAN UP: SPECIMEN SOURCE: NORMAL

## 2019-03-30 PROCEDURE — 6370000000 HC RX 637 (ALT 250 FOR IP): Performed by: STUDENT IN AN ORGANIZED HEALTH CARE EDUCATION/TRAINING PROGRAM

## 2019-03-30 PROCEDURE — 85730 THROMBOPLASTIN TIME PARTIAL: CPT

## 2019-03-30 PROCEDURE — 81001 URINALYSIS AUTO W/SCOPE: CPT

## 2019-03-30 PROCEDURE — 94799 UNLISTED PULMONARY SVC/PX: CPT

## 2019-03-30 PROCEDURE — 99223 1ST HOSP IP/OBS HIGH 75: CPT | Performed by: PSYCHIATRY & NEUROLOGY

## 2019-03-30 PROCEDURE — 80053 COMPREHEN METABOLIC PANEL: CPT

## 2019-03-30 PROCEDURE — 83605 ASSAY OF LACTIC ACID: CPT

## 2019-03-30 PROCEDURE — 71046 X-RAY EXAM CHEST 2 VIEWS: CPT

## 2019-03-30 PROCEDURE — 2580000003 HC RX 258: Performed by: STUDENT IN AN ORGANIZED HEALTH CARE EDUCATION/TRAINING PROGRAM

## 2019-03-30 PROCEDURE — 87040 BLOOD CULTURE FOR BACTERIA: CPT

## 2019-03-30 PROCEDURE — 85610 PROTHROMBIN TIME: CPT

## 2019-03-30 PROCEDURE — 85025 COMPLETE CBC W/AUTO DIFF WBC: CPT

## 2019-03-30 PROCEDURE — 99284 EMERGENCY DEPT VISIT MOD MDM: CPT

## 2019-03-30 PROCEDURE — 6360000002 HC RX W HCPCS: Performed by: STUDENT IN AN ORGANIZED HEALTH CARE EDUCATION/TRAINING PROGRAM

## 2019-03-30 PROCEDURE — 82947 ASSAY GLUCOSE BLOOD QUANT: CPT

## 2019-03-30 PROCEDURE — 96372 THER/PROPH/DIAG INJ SC/IM: CPT

## 2019-03-30 PROCEDURE — 1200000000 HC SEMI PRIVATE

## 2019-03-30 PROCEDURE — G0378 HOSPITAL OBSERVATION PER HR: HCPCS

## 2019-03-30 PROCEDURE — 99223 1ST HOSP IP/OBS HIGH 75: CPT | Performed by: INTERNAL MEDICINE

## 2019-03-30 RX ORDER — PREDNISONE 20 MG/1
40 TABLET ORAL ONCE
Status: COMPLETED | OUTPATIENT
Start: 2019-03-30 | End: 2019-03-30

## 2019-03-30 RX ORDER — POTASSIUM CHLORIDE 20 MEQ/1
40 TABLET, EXTENDED RELEASE ORAL PRN
Status: DISCONTINUED | OUTPATIENT
Start: 2019-03-30 | End: 2019-03-31 | Stop reason: HOSPADM

## 2019-03-30 RX ORDER — ASCORBIC ACID 500 MG
500 TABLET ORAL DAILY
Status: DISCONTINUED | OUTPATIENT
Start: 2019-03-30 | End: 2019-03-31 | Stop reason: HOSPADM

## 2019-03-30 RX ORDER — SODIUM CHLORIDE 0.9 % (FLUSH) 0.9 %
10 SYRINGE (ML) INJECTION EVERY 12 HOURS SCHEDULED
Status: DISCONTINUED | OUTPATIENT
Start: 2019-03-30 | End: 2019-03-31 | Stop reason: HOSPADM

## 2019-03-30 RX ORDER — MYCOPHENOLATE MOFETIL 250 MG/1
500 CAPSULE ORAL 2 TIMES DAILY
Status: DISCONTINUED | OUTPATIENT
Start: 2019-03-30 | End: 2019-03-31 | Stop reason: HOSPADM

## 2019-03-30 RX ORDER — DEXTROSE MONOHYDRATE 25 G/50ML
12.5 INJECTION, SOLUTION INTRAVENOUS PRN
Status: DISCONTINUED | OUTPATIENT
Start: 2019-03-30 | End: 2019-03-31 | Stop reason: HOSPADM

## 2019-03-30 RX ORDER — ONDANSETRON 2 MG/ML
4 INJECTION INTRAMUSCULAR; INTRAVENOUS EVERY 6 HOURS PRN
Status: DISCONTINUED | OUTPATIENT
Start: 2019-03-30 | End: 2019-03-31 | Stop reason: HOSPADM

## 2019-03-30 RX ORDER — ACETAMINOPHEN 325 MG/1
650 TABLET ORAL EVERY 6 HOURS PRN
Status: DISCONTINUED | OUTPATIENT
Start: 2019-03-30 | End: 2019-03-31 | Stop reason: HOSPADM

## 2019-03-30 RX ORDER — FLUTICASONE PROPIONATE 50 MCG
2 SPRAY, SUSPENSION (ML) NASAL DAILY
Status: DISCONTINUED | OUTPATIENT
Start: 2019-03-30 | End: 2019-03-31 | Stop reason: HOSPADM

## 2019-03-30 RX ORDER — NICOTINE POLACRILEX 4 MG
15 LOZENGE BUCCAL PRN
Status: DISCONTINUED | OUTPATIENT
Start: 2019-03-30 | End: 2019-03-31 | Stop reason: HOSPADM

## 2019-03-30 RX ORDER — ACETAMINOPHEN 325 MG/1
650 TABLET ORAL EVERY 4 HOURS PRN
Status: DISCONTINUED | OUTPATIENT
Start: 2019-03-30 | End: 2019-03-31 | Stop reason: HOSPADM

## 2019-03-30 RX ORDER — OSELTAMIVIR PHOSPHATE 75 MG/1
75 CAPSULE ORAL ONCE
Status: COMPLETED | OUTPATIENT
Start: 2019-03-30 | End: 2019-03-30

## 2019-03-30 RX ORDER — PYRIDOSTIGMINE BROMIDE 60 MG/1
60 TABLET ORAL EVERY 8 HOURS SCHEDULED
Status: DISCONTINUED | OUTPATIENT
Start: 2019-03-30 | End: 2019-03-31 | Stop reason: HOSPADM

## 2019-03-30 RX ORDER — SODIUM CHLORIDE 0.9 % (FLUSH) 0.9 %
10 SYRINGE (ML) INJECTION PRN
Status: DISCONTINUED | OUTPATIENT
Start: 2019-03-30 | End: 2019-03-31 | Stop reason: HOSPADM

## 2019-03-30 RX ORDER — LANOLIN ALCOHOL/MO/W.PET/CERES
325 CREAM (GRAM) TOPICAL
Status: DISCONTINUED | OUTPATIENT
Start: 2019-03-31 | End: 2019-03-31 | Stop reason: HOSPADM

## 2019-03-30 RX ORDER — ACETAMINOPHEN 325 MG/1
650 TABLET ORAL ONCE
Status: COMPLETED | OUTPATIENT
Start: 2019-03-30 | End: 2019-03-30

## 2019-03-30 RX ORDER — MAGNESIUM SULFATE 1 G/100ML
1 INJECTION INTRAVENOUS PRN
Status: DISCONTINUED | OUTPATIENT
Start: 2019-03-30 | End: 2019-03-31 | Stop reason: HOSPADM

## 2019-03-30 RX ORDER — FAMOTIDINE 20 MG/1
20 TABLET, FILM COATED ORAL 2 TIMES DAILY
Status: DISCONTINUED | OUTPATIENT
Start: 2019-03-30 | End: 2019-03-31 | Stop reason: HOSPADM

## 2019-03-30 RX ORDER — POTASSIUM CHLORIDE 7.45 MG/ML
10 INJECTION INTRAVENOUS PRN
Status: DISCONTINUED | OUTPATIENT
Start: 2019-03-30 | End: 2019-03-31 | Stop reason: HOSPADM

## 2019-03-30 RX ORDER — GUAIFENESIN 100 MG/5ML
5 SOLUTION ORAL EVERY 4 HOURS PRN
Status: DISCONTINUED | OUTPATIENT
Start: 2019-03-30 | End: 2019-03-31 | Stop reason: HOSPADM

## 2019-03-30 RX ORDER — DEXTROSE MONOHYDRATE 50 MG/ML
100 INJECTION, SOLUTION INTRAVENOUS PRN
Status: DISCONTINUED | OUTPATIENT
Start: 2019-03-30 | End: 2019-03-31 | Stop reason: HOSPADM

## 2019-03-30 RX ADMIN — FLUTICASONE PROPIONATE 2 SPRAY: 50 SPRAY, METERED NASAL at 20:52

## 2019-03-30 RX ADMIN — VITAMIN D, TAB 1000IU (100/BT) 1000 UNITS: 25 TAB at 17:57

## 2019-03-30 RX ADMIN — Medication 500 MG: at 17:57

## 2019-03-30 RX ADMIN — OSELTAMIVIR PHOSPHATE 75 MG: 75 CAPSULE ORAL at 09:59

## 2019-03-30 RX ADMIN — Medication 10 ML: at 20:54

## 2019-03-30 RX ADMIN — ACETAMINOPHEN 650 MG: 325 TABLET ORAL at 13:36

## 2019-03-30 RX ADMIN — ACETAMINOPHEN 650 MG: 325 TABLET ORAL at 20:49

## 2019-03-30 RX ADMIN — FAMOTIDINE 20 MG: 20 TABLET, FILM COATED ORAL at 20:49

## 2019-03-30 RX ADMIN — ENOXAPARIN SODIUM 40 MG: 40 INJECTION SUBCUTANEOUS at 16:27

## 2019-03-30 RX ADMIN — PYRIDOSTIGMINE BROMIDE 60 MG: 60 TABLET ORAL at 20:48

## 2019-03-30 RX ADMIN — PREDNISONE 40 MG: 20 TABLET ORAL at 13:12

## 2019-03-30 RX ADMIN — MYCOPHENOLATE MOFETIL 500 MG: 250 CAPSULE ORAL at 20:48

## 2019-03-30 ASSESSMENT — PAIN SCALES - GENERAL
PAINLEVEL_OUTOF10: 3
PAINLEVEL_OUTOF10: 8
PAINLEVEL_OUTOF10: 8
PAINLEVEL_OUTOF10: 5

## 2019-03-30 ASSESSMENT — ENCOUNTER SYMPTOMS
COLOR CHANGE: 0
VOMITING: 0
ABDOMINAL PAIN: 0
COUGH: 1
SHORTNESS OF BREATH: 1
RHINORRHEA: 1
SORE THROAT: 0
NAUSEA: 0

## 2019-03-30 NOTE — PROGRESS NOTES
Note      This is a 39 y.o. female admitted 3/30/2019 for Influenza A [J10.1]. See H&P of admitting resident for more details. Patient has been having fever, chills, shortness of breath for 5 days, found to have influenza A in ED last visit. Patient coming in for worsening symptoms, cough with clear/whit phelgm with 1 episode streaked sputum. Patient is followed by neurology as oputpatient. Takes prednisone 20, mestinon 60, cellcept 500. Assessment    Principal Problem:    Influenza A  Active Problems:    Pre-diabetes    Myasthenia gravis with exacerbation ([facility] [address]    Myasthenia gravis ([facility] [address]  Resolved Problems:    * No resolved hospital problems. *        Plan   Influenza A. 5 days since symptoms begam, will give symptomatic treatment with tylenol and robitussin. preDiabetes on oral hypoglycemics, metformin at home, POCT glucose ISS, diabetic diet, prednisone was increased from home dose. Myasthenia history. Increased prednisone to 60, Cellcept 500 mg BID, Mestinon 60 mg TID, NIF and Vital capacity q 6 hours, neurology recs appreciated. Vitamin and Iron deficiency. Continue home doses.          Gayle Litten, MD            Department of Internal Medicine  [facility]         3/30/2019, 4:53 PM

## 2019-03-30 NOTE — PROGRESS NOTES
Mechanics performed:    R 12,   FVC 1.76 L,= 1760 mL     NIF - 40     VC/kg = 1760 ml / 45.5 kg = 38.7

## 2019-03-30 NOTE — H&P
[facility]     Department of Internal Medicine - Staff Internal Medicine Service          ADMISSION NOTE/HISTORY AND PHYSICAL EXAMINATION       CHIEF COMPLAINT:    Chief Complaint   Patient presents with    Cough    Nasal Congestion    Fatigue       HISTORY OBTAIN FROM:  Patient     HISTORY OF PRESENT ILLNESS:      The patient is a pleasant 39 y.o. female with significant past medical history of myasthenia gravis, hepatitis C, prediabetes presented with fatigue, myalgias, congestion, and cough. Patient reports a week prior she had been in contact with a sick person at a party who had a fever. For the next 5d patient reports productive cough with clear sputum and one episode of blood streaks during this time. Patient seen in ED ytd and was found to be Influenza A positive and sent home, but reports symptoms worsened overnight. Patient has additional pleuritic chest pain which worsened when coughing and is relieved with Tylenol. Additionally patient reports symptoms similar to previous myasthenia gravis exacerbations which prompted the visit today. In the ED patient received single dose of Tamiflu. Blood and urine cultures drawn. Chest xray showed shallow inflation compatible with subsegmental atelectasis. Prednisone given. Patient admitted for influenza with potential myasthenic crises.      PAST MEDICAL HISTORY:        Diagnosis Date    Anemia     Chronic hepatitis C without hepatic coma ([facility] [address] 3/22/2019    Headache     Hypertension     Myasthenia gravis ([facility] [address]     Pre-diabetes        PAST SURGICAL HISTORY:        Procedure Laterality Date    CARDIAC SURGERY         MEDICATION PRIOR TO ADMISSION:  Medications Prior to Admission: benzonatate (TESSALON PERLES) 100 MG capsule, Take 1 capsule by mouth 3 times daily as needed for Cough  fluticasone (FLONASE) 50 MCG/ACT nasal spray, 1 spray by Each Nare route daily 1 Spray in each nostril  guaiFENesin (ALTARUSSIN) 100 MG/5ML syrup, Take dysphagia/appetite loss, hematemesis, blood in stools. · Genitourinary:Negative for change in bladder habits, dysuria, trouble voiding, hematuria. · Musculoskeletal: Negative for gait disturbance, weakness, joint complaints. · Integumentary: Negative for rash, pruritis. · Neurological: Negative for headache, dizziness, change in muscle strength, numbness/tingling, change in gait, balance, coordination,   · Psychiatric: negative for change in mood, affect, memory, mentation, behavior. · Endocrine: negative for temperature intolerance, excessive thirst, fluid intake, or urination, tremor. · Hematologic/Lymphatic: negative for abnormal bruising or bleeding, blood clots, swollen lymph nodes. · Allergic/Immunologic: negative for nasal congestion, pruritis, hives.     PHYSICAL EXAM:  BP (!) 136/92   Pulse 114   Temp 99.3 °F (37.4 °C) (Oral)   Resp 25   Ht 5' (1.524 m)   Wt 180 lb (81.6 kg)   LMP 03/09/2019   SpO2 97%   BMI 35.15 kg/m²   General appearance - alert, well appearing, and in no distress  Mental status - alert, oriented to person, place, and time  Head- normocephalic, no lesions, without obvious abnormality  Eyes - pupils equal and reactive, extraocular eye movements intact  Ears - bilateral TM's and external ear canals normal  Nose - normal and patent, no erythema, discharge or polyps  Mouth - mucous membranes moist, pharynx normal without lesions  Neck - supple, no significant adenopathy  Chest - clear to auscultation, no wheezes, rales or rhonchi, symmetric air entry  Heart - normal rate, regular rhythm, normal S1, S2, no murmurs, rubs, clicks or gallops  Abdomen - soft, nontender, nondistended, no masses or organomegaly  Neurological - alert, oriented, normal speech, no focal findings or movement disorder noted  Extremities - peripheral pulses normal, no pedal edema, no clubbing or cyanosis  Skin - normal coloration and turgor, no rashes, no suspicious skin lesions noted       LABORATORY TESTING:    CBC:   Recent Labs     03/30/19  1002   WBC 11.2   HGB 11.6*   *     BMP:    Recent Labs     03/29/19  0828 03/30/19  1002   * 136   K 3.9 3.7   CL 99 102   CO2 23 23   BUN 5* 3*   CREATININE 0.64 0.51   GLUCOSE 147* 155*     S. Calcium:  Recent Labs     03/30/19  1002   CALCIUM 8.4*     S. Ionized Calcium:No results for input(s): IONCA in the last 72 hours. S. Magnesium:No results for input(s): MG in the last 72 hours. S. Phosphorus:No results for input(s): PHOS in the last 72 hours. S. Glucose:No results for input(s): POCGLU in the last 72 hours. Glycosylated hemoglobin A1C: No results for input(s): LABA1C in the last 72 hours. INR:   Recent Labs     03/30/19  1002   INR 0.9     Hepatic functions:   Recent Labs     03/30/19  1002   ALKPHOS 70   ALT 36*   AST 48*   PROT 7.4   BILITOT 0.20*   LABALBU 3.8     Pancreatic functions:No results for input(s): LACTA, AMYLASE in the last 72 hours. S. Lactic Acid: No results for input(s): LACTA in the last 72 hours. Cardiac enzymes:No results for input(s): CKTOTAL, CKMB, CKMBINDEX, TROPONINI in the last 72 hours. BNP:No results for input(s): BNP in the last 72 hours. Lipid profile: No results for input(s): CHOL, TRIG, HDL, LDLCALC in the last 72 hours. Invalid input(s): LDL  Blood Gases: No results found for: PH, PCO2, PO2, HCO3, O2SAT  Thyroid functions:   Lab Results   Component Value Date    TSH 1.85 02/25/2019          ASSESSMENT    Active Problems:    Pre-diabetes    Myasthenia gravis with exacerbation ([facility] [address]    Myasthenia gravis ([facility] [address]    Influenza A  Resolved Problems:    * No resolved hospital problems. *      PLAN:    1. Influenza A: rapid flu positive, received single dose of tamiflu, supportive care, do not give Tessalon Perles due to adverse reaction, on Robitussin   2.  Myasthenia gravis exacerbation: PFTs every 6 hours, elective intubation if VC below 15-20mL/kg of body weight or decline in serial measurement of MIP less

## 2019-03-30 NOTE — FLOWSHEET NOTE
Patient speaks only Cymra[place] so we used her phone to translate. Chaplains will remain available to offer spiritual and emotional support as needed.      03/30/19 1830   Encounter Summary   Services provided to: Patient   Referral/Consult From: [facility]   Support System Friends/neighbors   Continue Visiting   (3/30/19)   Complexity of Encounter Low   Length of Encounter 15 minutes   Spiritual Assessment Completed Yes   Routine   Type Initial   Assessment Approachable;Calm   Intervention Prayer   Outcome Expressed gratitude

## 2019-03-30 NOTE — ED PROVIDER NOTES
[facility]     Emergency Department     Faculty Attestation    I performed a history and physical examination of the patient and discussed management with the resident. I reviewed the residents note and agree with the documented findings and plan of care. Any areas of disagreement are noted on the chart. I was personally present for the key portions of any procedures. I have documented in the chart those procedures where I was not present during the key portions. I have reviewed the emergency nurses triage note. I agree with the chief complaint, past medical history, past surgical history, allergies, medications, social and family history as documented unless otherwise noted below. Documentation of the HPI, Physical Exam and Medical Decision Making performed by medical students or scribes is based on my personal performance of the HPI, PE and MDM. For Physician Assistant/ Nurse Practitioner cases/documentation I have personally evaluated this patient and have completed at least one if not all key elements of the E/M (history, physical exam, and MDM). Additional findings are as noted. Vital signs:   Vitals:    03/30/19 0857   BP: 119/84   Pulse: 114   Resp: 18   Temp: 98.8 °F (37.1 °C)   SpO2: 80      18-year-old female seen here yesterday and diagnosed with the influenza A. Discharged home. Did not receive Tamiflu as she was outside the treatment window. Also has known history of myasthenia gravis, and feels she is having an exacerbation of her disease. Patient is Kinyarw[place] speaking only. History obtained via iPad . On exam, she is alert, afebrile, and appears ill. Breath sounds are clear and equal. Cardiac exam with a tachycardic rate, regular rhythm. Abdomen soft. Extremities with no edema, no calf tenderness.     Plan: repeat labs, IV fluids, Tamiflu, Neurology consult, admission for observation and rehydration       Tj Capone M.D,  Attending Emergency  Physician Janie Cancino MD  03/30/19 8865

## 2019-03-30 NOTE — ED PROVIDER NOTES
[facility]. [address]  Emergency Department Encounter  EmergencyMedicine Resident     Pt Anisha Amaya  MRN: [id number]  Getachewgfhortencia 1975  Date of evaluation: 3/30/19  PCP:  Zahraa Workman MD    [address] [place] [address]       Chief Complaint   Patient presents with    Cough    Nasal Congestion    Fatigue       HISTORY OF PRESENT ILLNESS  (Location/Symptom, Timing/Onset, Context/Setting, Quality, Duration, Modifying Factors, Severity.)      Galo Lantigua is a 39 y.o. female who presents with generalized fatigue, myalgias, nasal congestion, and cough. Patient has a history of hepatitis C, myasthenia gravis, and prediabetes. She states that she has been taking Tylenol with minimal improvement in her symptoms. She says the symptoms of an ongoing for 5 days. Patient was seen in the emergency department yesterday and diagnosed with influenza, however she states that today she feels worse. She states that the symptoms feel similar to her prior history of mild neck crisis. Patient is taking mycophenolate, prednisone, and pyridostigmine for myasthenia gravis. She states that she is able tolerate oral intake and is having normal bowel and bladder habits. PAST MEDICAL / SURGICAL / SOCIAL / FAMILY HISTORY      has a past medical history of Anemia, Chronic hepatitis C without hepatic coma ([facility] [address] Headache, Hypertension, Myasthenia gravis ([facility] [address] and Pre-diabetes. has a past surgical history that includes Cardiac surgery.     Social History     Socioeconomic History    Marital status: Single     Spouse name: Not on file    Number of children: Not on file    Years of education: Not on file    Highest education level: Not on file   Occupational History    Not on file   Social Needs    Financial resource strain: Not on file    Food insecurity:     Worry: Not on file     Inability: Not on file    Transportation needs:     Medical: Not on file     Non-medical: Not on file   Tobacco Use    Smoking status: Never Smoker    Smokeless tobacco: Never Used   Substance and Sexual Activity    Alcohol use: No    Drug use: No    Sexual activity: Never   Lifestyle    Physical activity:     Days per week: Not on file     Minutes per session: Not on file    Stress: Not on file   Relationships    Social connections:     Talks on phone: Not on file     Gets together: Not on file     Attends Congregation service: Not on file     Active member of club or organization: Not on file     Attends meetings of clubs or organizations: Not on file     Relationship status: Not on file    Intimate partner violence:     Fear of current or ex partner: Not on file     Emotionally abused: Not on file     Physically abused: Not on file     Forced sexual activity: Not on file   Other Topics Concern    Not on file   Social History Narrative    Not on file       History reviewed. No pertinent family history. Allergies:  Morphine; Flexeril [cyclobenzaprine]; Norflex [orphenadrine citrate]; Nsaids; Penicillins; and Vancomycin    Home Medications:  Prior to Admission medications    Medication Sig Start Date End Date Taking?  Authorizing Provider   benzonatate (TESSALON PERLES) 100 MG capsule Take 1 capsule by mouth 3 times daily as needed for Cough 3/29/19 4/5/19 Yes Blanco Bonner MD   fluticasone [facility]) 50 MCG/ACT nasal spray 1 spray by Each Nare route daily 1 Spray in each nostril 1/29/19  Yes Anh Etienne MD   guaiFENesin (ALTARUSSIN) 100 MG/5ML syrup Take 5 mLs by mouth every 4 hours as needed for Cough 1/29/19  Yes Anh Etienne MD   mycophenolate (CELLCEPT) 250 MG capsule Take 2 capsules by mouth 2 times daily 1/16/19  Yes Raymundo Kraus MD   predniSONE (DELTASONE) 20 MG tablet Take 1 po qd 11/29/18  Yes Raymundo Kraus MD   pyridostigmine (MESTINON) 60 MG tablet Take 1 po qid 7 AM , q 11 AM ,q 3 PM, 7 PM 11/14/18  Yes Raymundo Kraus MD   metFORMIN (GLUCOPHAGE) 500 MG tablet Take 1 tablet by mouth 2 times daily (with meals) 10/23/18  Yes Haley Mayer MD   acetaminophen (TYLENOL) 325 MG tablet Take 2 tablets by mouth every 6 hours as needed for Pain 3/29/19   Charles Hernández MD   vitamin D (CHOLECALCIFEROL) 1000 UNIT TABS tablet TAKE 1 TABLET BY MOUTH ONE TIME A DAY 3/27/19   Haley Mayer MD   vitamin C (ASCORBIC ACID) 500 MG tablet TAKE 1 TABLET BY MOUTH ONE TIME A DAY 3/27/19   Haley Mayer MD   vitamin D (CHOLECALCIFEROL) 1000 UNIT TABS tablet TAKE 1 TABLET BY MOUTH ONE TIME A DAY 3/1/19   Morales Story MD   ferrous sulfate 325 (65 Fe) MG tablet TAKE 1 TABLET BY MOUTH 2 TIMES DAILY (WITH MEALS) 10/29/18   Morales Story MD   blood glucose monitor strips Test 4 times daily Diagnosis 10/23/18   Haley Mayer MD   Alcohol Swabs PADS USE AS DIRECTED WHEN CHECKING BLOOD SUGAR DAILY. 10/23/18   Haley Mayer MD   [facility]ANC[facility] M[facility] Daily 4/16/18   Minesh Gottlieb MD   glucose monitoring kit (FREESTYLE) monitoring kit Daily check 1/22/18   Des López MD       REVIEW OF SYSTEMS    (2-9 systems for level 4, 10 or more for level 5)      Review of Systems   Constitutional: Positive for activity change, chills, fatigue and fever. HENT: Positive for congestion and rhinorrhea. Negative for sore throat. Eyes: Negative for visual disturbance. Respiratory: Positive for cough and shortness of breath. Cardiovascular: Negative for chest pain. Gastrointestinal: Negative for abdominal pain, nausea and vomiting. Genitourinary: Negative for dysuria, frequency and hematuria. Musculoskeletal: Positive for myalgias. Skin: Negative for color change and rash. Neurological: Negative for weakness, numbness and headaches.        PHYSICAL EXAM   (up to 7 for level 4, 8 or more for level 5)      INITIAL VITALS:   BP (!) 136/92   Pulse 104   Temp 99.3 °F (37.4 °C) (Oral)   Resp 16   Ht 5' (1.524 m)   Wt 180 lb (81.6 kg)   LMP 03/09/2019   SpO2 97% BMI 35.15 kg/m²     Physical Exam   Constitutional: She is oriented to person, place, and time. She appears well-developed and well-nourished. No distress. HENT:   Head: Normocephalic and atraumatic. Mouth/Throat: Oropharynx is clear and moist.   Eyes: Pupils are equal, round, and reactive to light. EOM are normal.   Neck: Normal range of motion. Neck supple. Cardiovascular: Regular rhythm and normal heart sounds. Exam reveals no friction rub. No murmur heard. Mildly tachycardic   Pulmonary/Chest: Effort normal and breath sounds normal. No stridor. No respiratory distress. She has no wheezes. Abdominal: Soft. Bowel sounds are normal. She exhibits no distension and no mass. There is no tenderness. There is no guarding. Musculoskeletal: She exhibits no edema. Neurological: She is alert and oriented to person, place, and time. Skin: Skin is warm. Capillary refill takes less than 2 seconds.        DIFFERENTIAL  DIAGNOSIS     PLAN (LABS / IMAGING / EKG):  Orders Placed This Encounter   Procedures    Culture Blood #1    Culture Blood #1    Urine Culture    XR CHEST STANDARD (2 VW)    CBC Auto Differential    Comprehensive Metabolic Panel    Protime-INR    APTT    SPECIMEN REJECTION    Urinalysis with Microscopic    Basic Metabolic Panel w/ Reflex to MG    CBC auto differential    DIET CARB CONTROL;    Notify physician    Vital signs per unit routine    Telemetry monitoring    Notify physician    Up as tolerated    Up with assistance    Daily weights    Intake and output    Neurovascular checks    HYPOGLYCEMIA TREATMENT: blood glucose less than 50 mg/dL and patient  ALERT and TOLERATING PO    HYPOGLYCEMIA TREATMENT: blood glucose less than 70 mg/dL and patient ALERT and TOLERATING PO    HYPOGLYCEMIA TREATMENT: blood glucose less than 70 mg/dL and patient NOT ALERT or NPO    Full Code    Inpatient consult to Neurology    Inpatient consult to Internal Medicine    Inpatient consult to Social Work    OT eval and treat    PT evaluation and treat    Initiate Oxygen Therapy Protocol    RT Communication Order    POCT glucose    POCT Glucose    POC Glucose Fingerstick    PATIENT STATUS (FROM ED OR OR/PROCEDURAL) Inpatient       MEDICATIONS ORDERED:  Orders Placed This Encounter   Medications    oseltamivir (TAMIFLU) capsule 75 mg    predniSONE (DELTASONE) tablet 40 mg    acetaminophen (TYLENOL) tablet 650 mg    sodium chloride flush 0.9 % injection 10 mL    sodium chloride flush 0.9 % injection 10 mL    acetaminophen (TYLENOL) tablet 650 mg    enoxaparin (LOVENOX) injection 40 mg    acetaminophen (TYLENOL) tablet 650 mg    ferrous sulfate EC tablet 325 mg    DISCONTD: metFORMIN (GLUCOPHAGE) tablet 500 mg    guaiFENesin (ROBITUSSIN) 100 MG/5ML oral solution 100 mg    vitamin C (ASCORBIC ACID) tablet 500 mg    vitamin D (CHOLECALCIFEROL) tablet 1,000 Units    sodium chloride flush 0.9 % injection 10 mL    sodium chloride flush 0.9 % injection 10 mL    magnesium hydroxide (MILK OF MAGNESIA) 400 MG/5ML suspension 30 mL    ondansetron (ZOFRAN) injection 4 mg    OR Linked Order Group     potassium chloride (KLOR-CON M) extended release tablet 40 mEq     potassium bicarb-citric acid (EFFER-K) effervescent tablet 40 mEq     potassium chloride 10 mEq/100 mL IVPB (Peripheral Line)    magnesium sulfate 1 g in dextrose 5% 100 mL IVPB    famotidine (PEPCID) tablet 20 mg    predniSONE (DELTASONE) tablet 60 mg    pyridostigmine (MESTINON) tablet 60 mg    mycophenolate (CELLCEPT) capsule 500 mg    insulin lispro (HUMALOG) injection vial 0-12 Units    insulin lispro (HUMALOG) injection vial 0-6 Units    glucose (GLUTOSE) 40 % oral gel 15 g    dextrose 50 % solution 12.5 g    glucagon (rDNA) injection 1 mg    dextrose 5 % solution    fluticasone (FLONASE) 50 MCG/ACT nasal spray 2 spray       DDX: Myasthenia gravis, viral upper respiratory infection, pneumonia, influenza    DIAGNOSTIC RESULTS / EMERGENCY DEPARTMENT COURSE / MDM     LABS:  Results for orders placed or performed during the hospital encounter of 03/30/19   Culture Blood #1   Result Value Ref Range    Specimen Description . BLOOD     Special Requests R AC 11 ML     Culture NO GROWTH 4 HOURS    Culture Blood #1   Result Value Ref Range    Specimen Description . BLOOD     Special Requests LEFT FOREARM 10 ML     Culture NO GROWTH 2 HOURS    Urine Culture   Result Value Ref Range    Specimen Description . URINE     Special Requests NOT REPORTED     Culture       Unable to perform testing: Specimen received unlabeled.    CBC Auto Differential   Result Value Ref Range    WBC 11.2 3.5 - 11.3 k/uL    RBC 4.63 3.95 - 5.11 m/uL    Hemoglobin 11.6 (L) 11.9 - 15.1 g/dL    Hematocrit 37.3 36.3 - 47.1 %    MCV 80.6 (L) 82.6 - 102.9 fL    MCH 25.1 (L) 25.2 - 33.5 pg    MCHC 31.1 28.4 - 34.8 g/dL    RDW 17.0 (H) 11.8 - 14.4 %    Platelets 168 (H) 821 - 453 k/uL    MPV 11.1 8.1 - 13.5 fL    NRBC Automated 0.0 0.0 per 100 WBC    Differential Type NOT REPORTED     WBC Morphology NOT REPORTED     RBC Morphology NOT REPORTED     Platelet Estimate NOT REPORTED     Immature Granulocytes 1 (H) 0 %    Seg Neutrophils 86 (H) 36 - 65 %    Lymphocytes 6 (L) 24 - 43 %    Monocytes 7 3 - 12 %    Eosinophils % 0 (L) 1 - 4 %    Basophils 0 0 - 2 %    Absolute Immature Granulocyte 0.11 0.00 - 0.30 k/uL    Segs Absolute 9.64 (H) 1.50 - 8.10 k/uL    Absolute Lymph # 0.67 (L) 1.10 - 3.70 k/uL    Absolute Mono # 0.78 0.10 - 1.20 k/uL    Absolute Eos # 0.00 0.00 - 0.44 k/uL    Basophils # 0.00 0.00 - 0.20 k/uL    Morphology ANISOCYTOSIS PRESENT     Morphology MICROCYTOSIS PRESENT    Comprehensive Metabolic Panel   Result Value Ref Range    Glucose 155 (H) 70 - 99 mg/dL    BUN 3 (L) 6 - 20 mg/dL    CREATININE 0.51 0.50 - 0.90 mg/dL    Bun/Cre Ratio NOT REPORTED 9 - 20    Calcium 8.4 (L) 8.6 - 10.4 mg/dL    Sodium 136 135 - 144 mmol/L    Potassium 3.7 3.7 - 5.3 mmol/L    Chloride 102 98 - 107 mmol/L    CO2 23 20 - 31 mmol/L    Anion Gap 11 9 - 17 mmol/L    Alkaline Phosphatase 70 35 - 104 U/L    ALT 36 (H) 5 - 33 U/L    AST 48 (H) <32 U/L    Total Bilirubin 0.20 (L) 0.3 - 1.2 mg/dL    Total Protein 7.4 6.4 - 8.3 g/dL    Alb 3.8 3.5 - 5.2 g/dL    Albumin/Globulin Ratio 1.1 1.0 - 2.5    GFR Non-African American >60 >60 mL/min    GFR African American >60 >60 mL/min    GFR Comment          GFR Staging NOT REPORTED    Lactate, Sepsis   Result Value Ref Range    Lactic Acid, Sepsis NOT REPORTED 0.5 - 1.9 mmol/L    Lactic Acid, Sepsis, Whole Blood 1.4 0.5 - 1.9 mmol/L   Protime-INR   Result Value Ref Range    Protime 9.8 9.0 - 12.0 sec    INR 0.9    APTT   Result Value Ref Range    PTT 21.4 20.5 - 30.5 sec   SPECIMEN REJECTION   Result Value Ref Range    Specimen Source . URINE     Ordered Test Bahmanlos Jono     Reason for Rejection RUNLBL     - NOT REPORTED    Urinalysis with Microscopic   Result Value Ref Range    Color, UA YELLOW YELLOW    Turbidity UA CLEAR CLEAR    Glucose, Ur NEGATIVE NEGATIVE    Bilirubin Urine NEGATIVE NEGATIVE    Ketones, Urine SMALL (A) NEGATIVE    Specific Gravity, UA 1.007 1.005 - 1.030    Urine Hgb NEGATIVE NEGATIVE    pH, UA 8.5 (H) 5.0 - 8.0    Protein, UA NEGATIVE NEGATIVE    Urobilinogen, Urine Normal Normal    Nitrite, Urine NEGATIVE NEGATIVE    Leukocyte Esterase, Urine NEGATIVE NEGATIVE    -          WBC, UA None 0 - 5 /HPF    RBC, UA None 0 - 4 /HPF    Casts UA  0 - 8 /LPF     0 TO 2 HYALINE Reference range defined for non-centrifuged specimen. Crystals UA NOT REPORTED None /HPF    Epithelial Cells UA 0 TO 2 0 - 5 /HPF    Renal Epithelial, Urine NOT REPORTED 0 /HPF    Bacteria, UA NOT REPORTED None    Mucus, UA NOT REPORTED None    Trichomonas, UA NOT REPORTED None    Amorphous, UA NOT REPORTED None    Other Observations UA NOT REPORTED NOT REQ.     Yeast, UA NOT REPORTED None   POC Glucose Fingerstick   Result Value Ref Range a subcapsular 6 mm and more central 7 mm T2 moderately hyperintense lesion which is hypointense on T1. These do not demonstrate washout and appear to show progressive enhancement although more difficult to visualize due to the small size. No appreciable restriction of diffusion. These are most probably flash filling hemangiomas. Along the posterior inferior margin of the uncinate process of the pancreas is an 8 mm ovoid T2 hyperintense nonenhancing cystic lesion. Probably benign. 1 year follow-up x5 years recommended. The spleen, kidneys, adrenal glands show no significant abnormalities. Gallbladder: Cholecystectomy. Bile Ducts: No intra or extrahepatic biliary ductal dilatation. No choledocholithiasis. Pancreatic Duct: No pancreatic ductal dilatation. No pancreas divisum. Other:  No ascites. No significant lymphadenopathy. Visualized GI tract show no significant abnormalities. Bone marrow signal normal.     1. Mild hepatic steatosis. 2. A 2.9 cm left lobe of liver and 9 mm posterior inferior subcapsular right lobe of liver hemangioma demonstrated. 2 additional subcentimeter lesions in the left lobe in segment 2 are probably flash filling hemangiomas. 3. Benign-appearing 8 mm uncinate process cystic mass. 1 year follow-up for total of 5 years recommended. No pancreatic ductal dilatation. EKG  None    All EKG's are interpreted by the Emergency Department Physician who either signs or Co-signs this chart in the absence of a cardiologist.    EMERGENCY DEPARTMENT COURSE:  Patient is alert and oriented, no acute distress. Vital signs within normal limits with the exception of mild tachycardia. Septic workup was ordered due to positive screen for influenza yesterday. She does not meet SIRS criteria. Neurology was consulted due to history of myasthenia gravis and reports that patient thinks she may be in a myasthenic crisis.   Neurology does not believe the patient is a myasthenic crisis right now, but is an acute exacerbation of her myasthenia, will increase steroid dosage. Patient will be admitted to internal medicine for further workup and monitoring. She remained stable throughout emergency department stay. Is grossly within normal limits, blood counts within normal limits, no leukocytosis, left lites, kidney function, and hepatic function within normal limits. Chest x-ray does not demonstrate any acute infiltrate. PROCEDURES:  None    CONSULTS:  IP CONSULT TO NEUROLOGY  IP CONSULT TO INTERNAL MEDICINE  IP CONSULT TO SOCIAL WORK    CRITICAL CARE:  None    FINAL IMPRESSION      1. Influenza A    2. Myasthenia gravis with exacerbation ([facility] [address]          DISPOSITION / PLAN     DISPOSITION Admitted 03/30/2019 01:59:43 PM      PATIENT REFERRED TO:  Etelvina Mercado MD  [address]  [phone]            DISCHARGE MEDICATIONS:  Current Discharge Medication List          Leela Bello D.O.   Emergency Medicine Resident    (Please note that portions ofthis note were completed with a voice recognition program.  Efforts were made to edit the dictations but occasionally words are mis-transcribed.)     Leela Bello MD  03/30/19 [id number]

## 2019-03-30 NOTE — PLAN OF CARE
Problem: Falls - Risk of:  Goal: Will remain free from falls  Description  Will remain free from falls  Outcome: Ongoing  Goal: Absence of physical injury  Description  Absence of physical injury  Outcome: Ongoing     Problem: Respiratory:  Goal: Respiratory status will improve  Description  Respiratory status will improve  Outcome: Ongoing

## 2019-03-30 NOTE — CONSULTS
Neurology Consult Note      Reason for Consult:  Hx of Myasthenia Gravis, Influenza A  Requesting Physician:  Candido Martel MD    CHIEF COMPLAINT:  \" Since 5 days I've been having cough, generalized weakness/malaise, myalgias, arthralgias and some of my Myasthenia symptoms of facial weakness, proximal muscle weakness more in upper extremities\"    History Obtained From:  patient, electronic medical record       HISTORY OF PRESENT ILLNESS:              The patient is a 39 y.o. female with below [address] who came to ED due to generalized malaise/weakness, cough with greenish sputum, myalgias, arthralgias. Patient was diagnosed yesterday with Influenza A positive and was put on Oseltamivir. Patient has a history of Myasthenia Gravis since age 12 with Thymectomy at that same time. Currently on Prednisone 20mg D, Pyridostigmine 60mg TID (not more due to diarrhea and cholinergic reaction), Mycophenolate Mofetil 500mg BID. Patient refers she feels as if her typical symptoms of myasthenia, except dysphagia, are exacerbated due to infection. Patient refers her episodes are exacerbated every time she is having any sinusitis, UTI, Influenza (multiple episodes), menstrual period. Typical Myasthenia symptoms for patient:  Face muscle weakness, eye lid weakness, trouble chewing, dysphagia, proximal muscle weakness, more on upper extremities than lower extremities    As per neurologist office notes her base line is having numbness in both hand, left more than right which is present every morning and on days with menstruation. Due to not tolerating 4 doses a day of  Pyridostigmine her symptoms are almost always never resolved during the day. Refers if it goes higher on Pyridostigmine patient will go to cholinergic crisis. Testing MRI of Head with normal brain with right maxillary sinusitis. CT chest subtle increased density within the anterior mediastinum possibly representing residual thickening or scarring .  No discrete anterior mediastinal mass identified Latest blood work: Hga1c 6.4 , MONET negative , Anti SSA and anti SSB are negative, ACE 31 (8-52). Acetylcholine binding Ab 478 (0--0.4), blocking Ab 63 (0-26 %) , striated muscle Ab <1:40. CRP 1 , TSH normal, ESR 10 ,cholesterol 115 , LDL 48 , TG 88    Diagnosed recently by Gastroenterologist with Hepatitis C. Negative for hepatitis B surface antigen, and negative for hepatitis B core antibody, she is not immune  For hepatitis B, but she's being immune to hepatitis A. The rest of the testing include AMA, MONET, and ASMA,ACE,TSH,DENNISE and iron were all negative . Alpha-fetoprotein is being negative. Continue on work up with GI physician. Past Medical History:        Diagnosis Date    Anemia     Chronic hepatitis C without hepatic coma ([facility] [address] 3/22/2019    Headache     Hypertension     Myasthenia gravis ([facility] [address]     Pre-diabetes      Past Surgical History:        Procedure Laterality Date    CARDIAC SURGERY       Current Medications:   No current facility-administered medications for this encounter. Allergies:  Morphine; Flexeril [cyclobenzaprine]; Norflex [orphenadrine citrate]; Nsaids; Penicillins; and Vancomycin    Social History:  TOBACCO:   reports that she has never smoked. She has never used smokeless tobacco.  ETOH:   reports that she does not drink alcohol. DRUGS:   reports that she does not use drugs. ACTIVITIES OF DAILY LIVING:  Patient is able to perform all activities of daily living. INSTRUMENTAL ACTIVITIES OF DAILY LIVING:  Patient is able to perform all instrumental activities of daily living. Family History:   History reviewed. No pertinent family history.     REVIEW OF SYSTEMS:  CONSTITUTIONAL:  positive for  malaise  RESPIRATORY:  positive for  cough with sputum and dyspnea  CARDIOVASCULAR:  negative  GASTROINTESTINAL:  negative  MUSCULOSKELETAL:  positive for  myalgias, arthralgias and muscle weakness  NEUROLOGICAL:  positive for weakness    PHYSICAL EXAM:    Vitals:  /84   Pulse 114   Temp 98.8 °F (37.1 °C) (Oral)   Resp 18   Wt 180 lb (81.6 kg)   LMP 03/09/2019   SpO2 96%   BMI 35.15 kg/m²      General Appearance:  Acutely ill due to Influenza    MENTAL STATUS:  Alert, oriented, intact memory, no confusion, normal speech, normal language, no hallucination or delusion   CRANIAL NERVES: II     -      Visual fields intact to confrontation  III,IV,VI -  PERR, Able to have good range of motion but tired easily. patietn will have fatigue on bilateral eyelid upon maintaining upward gaze.   V     -     Normal facial sensation   VII    -     Almost horizontal smile, almost mask face  VIII   -     Intact hearing   IX,X -     Symmetrical palate  XI    -     Symmetrical shoulder shrug  XII   -     Midline tongue, no atrophy   MOTOR FUNCTION: RUE: Significant for good strength of grade 4-/5 in proximal and distal muscle groups   LUE: Significant for good strength of grade 4-/5 in proximal and distal muscle groups   RLE: Significant for good strength of grade 4+/5 in proximal and distal muscle groups   LLE: Significant for good strength of grade 4+/5 in proximal and distal muscle groups      Normal bulk, normal tone and no involuntary movements, no tremor   SENSORY FUNCTION:  Normal touch, normal pin, normal vibration, normal proprioception   CEREBELLAR FUNCTION:  Intact fine motor control over upper limbs and lower limbs   REFLEX FUNCTION:  Symmetric in upper and lower extremities, no Babinski sign   STATION and GAIT  Deferred              Additional Examination Elements and Findings:     CONSTITUTIONAL:  awake, alert, cooperative, no apparent distress, and appears stated age  LUNGS:  No increased work of breathing, good air exchange, clear to auscultation bilaterally, no crackles or wheezing  CARDIOVASCULAR:  Normal apical impulse, regular rate and rhythm, normal S1 and S2, no S3 or S4, and no murmur noted  ABDOMEN:  No scars, normal bowel sounds, soft, non-distended, non-tender, no masses palpated, no hepatosplenomegally    DATA  Recent Results (from the past 24 hour(s))   CBC Auto Differential    Collection Time: 03/30/19 10:02 AM   Result Value Ref Range    WBC 11.2 3.5 - 11.3 k/uL    RBC 4.63 3.95 - 5.11 m/uL    Hemoglobin 11.6 (L) 11.9 - 15.1 g/dL    Hematocrit 37.3 36.3 - 47.1 %    MCV 80.6 (L) 82.6 - 102.9 fL    MCH 25.1 (L) 25.2 - 33.5 pg    MCHC 31.1 28.4 - 34.8 g/dL    RDW 17.0 (H) 11.8 - 14.4 %    Platelets 977 (H) 072 - 453 k/uL    MPV 11.1 8.1 - 13.5 fL    NRBC Automated 0.0 0.0 per 100 WBC    Differential Type NOT REPORTED     Seg Neutrophils PENDING %    Lymphocytes PENDING %    Monocytes PENDING %    Eosinophils % PENDING %    Basophils PENDING %    Immature Granulocytes PENDING 0 %    Segs Absolute PENDING k/uL    Absolute Lymph # PENDING k/uL    Absolute Mono # PENDING k/uL    Absolute Eos # PENDING k/uL    Basophils # PENDING 0.0 - 0.2 k/uL    Absolute Immature Granulocyte PENDING 0.00 - 0.30 k/uL    WBC Morphology NOT REPORTED     RBC Morphology NOT REPORTED     Platelet Estimate NOT REPORTED      IMAGING    1. CXR  Impression   Shallow inflation with findings compatible with subsegmental atelectasis.         IMPRESSION     Case of a 40 y/o female patient consulted due to Influenza symptoms but possible Myasthenia exacerbation due to infection    // Myasthenia exacerbation secondary to Influenza A //   Patient with Influenza A currently in supportive care. Patient with no hypoxia, no dysphagia. No symptoms of Myasthenia Crisis. Will get respiratory to do NIF and VC. RECOMMENDATIONS:   1. Prednisone 40mg now then Prednisone 60mg PO D  2. Continue Mestinon 60mg PO TID  3. Continue Mycophenolate 500mg PO BID  4. Supportive care, IVF, Tylenol  5. NIF and VC per Respiratory Every 6 Hours    Thank you for the consultation. Will follow.  Case consulted with Dr. Morales Langley MD  Neurology Resident PGY-2  3/30/2019 at 10:17 AM

## 2019-03-30 NOTE — ED NOTES
Used intrepreter service to update pt on plan, pt reports that she is needing something for pain at this time.      Tariq Kim RN  03/30/19 [id number]

## 2019-03-30 NOTE — CARE COORDINATION
Case Management Initial Discharge Plan  Misael Meraz             Met with:patient through  to discuss discharge plans. Information verified: address, contacts, phone number, , insurance Yes  PCP: Pedro Isaacs MD  Date of last visit: January    Insurance Provider: [employer]    Discharge Planning    Living Arrangements:  Spouse/Significant Other   Support Systems:  Family Members, Spouse/Significant Other    Home has 1 stories   stairs to climb to get into front door,   Location of bedroom/bathroom in home main    Patient able to perform ADL's:Independent    Current Services (outpatient & in home) none  DME equipment: none  DME provider: none    Pharmacy: [address] Medications:  No  Does patient want to participate in local refill/ meds to beds program?  Yes    Potential Assistance Needed:  N/A    Patient agreeable to home care: No  [place] of choice provided:  no    Prior SNF/Rehab Placement and Facility: no  Agreeable to SNF/Rehab: No  [place] of choice provided: no   Evaluation: no    Expected Discharge date:  19  Patient expects to be discharged to:  home  Follow Up Appointment: Best Day/ Time: Monday AM    Transportation provider: needs cab  Transportation arrangements needed for discharge: Yes    Readmission Risk              Risk of Unplanned Readmission:        14             Does patient have a readmission risk score greater than 14?: Yes  If yes, follow-up appointment must be made within 7 days of discharge.      Discharge Plan: home with spouse, needs medical cab          Electronically signed by Konstantin Beth RN on 3/30/19 at 6:10 PM

## 2019-03-31 VITALS
TEMPERATURE: 98.6 F | OXYGEN SATURATION: 98 % | HEIGHT: 60 IN | RESPIRATION RATE: 18 BRPM | BODY MASS INDEX: 33.31 KG/M2 | HEART RATE: 122 BPM | DIASTOLIC BLOOD PRESSURE: 99 MMHG | SYSTOLIC BLOOD PRESSURE: 121 MMHG | WEIGHT: 169.7 LBS

## 2019-03-31 LAB
ABSOLUTE EOS #: 0.05 K/UL (ref 0–0.44)
ABSOLUTE IMMATURE GRANULOCYTE: 0.08 K/UL (ref 0–0.3)
ABSOLUTE LYMPH #: 1.82 K/UL (ref 1.1–3.7)
ABSOLUTE MONO #: 1.35 K/UL (ref 0.1–1.2)
ANION GAP SERPL CALCULATED.3IONS-SCNC: 10 MMOL/L (ref 9–17)
BASOPHILS # BLD: 0 % (ref 0–2)
BASOPHILS ABSOLUTE: 0.03 K/UL (ref 0–0.2)
BUN BLDV-MCNC: 4 MG/DL (ref 6–20)
BUN/CREAT BLD: ABNORMAL (ref 9–20)
CALCIUM SERPL-MCNC: 8.5 MG/DL (ref 8.6–10.4)
CHLORIDE BLD-SCNC: 101 MMOL/L (ref 98–107)
CO2: 25 MMOL/L (ref 20–31)
CREAT SERPL-MCNC: 0.55 MG/DL (ref 0.5–0.9)
DIFFERENTIAL TYPE: ABNORMAL
EOSINOPHILS RELATIVE PERCENT: 1 % (ref 1–4)
GFR AFRICAN AMERICAN: >60 ML/MIN
GFR NON-AFRICAN AMERICAN: >60 ML/MIN
GFR SERPL CREATININE-BSD FRML MDRD: ABNORMAL ML/MIN/{1.73_M2}
GFR SERPL CREATININE-BSD FRML MDRD: ABNORMAL ML/MIN/{1.73_M2}
GLUCOSE BLD-MCNC: 106 MG/DL (ref 70–99)
GLUCOSE BLD-MCNC: 139 MG/DL (ref 65–105)
GLUCOSE BLD-MCNC: 160 MG/DL (ref 65–105)
HCT VFR BLD CALC: 36.6 % (ref 36.3–47.1)
HEMOGLOBIN: 11.4 G/DL (ref 11.9–15.1)
IMMATURE GRANULOCYTES: 1 %
LYMPHOCYTES # BLD: 23 % (ref 24–43)
MCH RBC QN AUTO: 25.2 PG (ref 25.2–33.5)
MCHC RBC AUTO-ENTMCNC: 31.1 G/DL (ref 28.4–34.8)
MCV RBC AUTO: 81 FL (ref 82.6–102.9)
MONOCYTES # BLD: 17 % (ref 3–12)
NRBC AUTOMATED: 0 PER 100 WBC
PDW BLD-RTO: 17.1 % (ref 11.8–14.4)
PLATELET # BLD: 415 K/UL (ref 138–453)
PLATELET ESTIMATE: ABNORMAL
PMV BLD AUTO: 11 FL (ref 8.1–13.5)
POTASSIUM SERPL-SCNC: 3.7 MMOL/L (ref 3.7–5.3)
RBC # BLD: 4.52 M/UL (ref 3.95–5.11)
RBC # BLD: ABNORMAL 10*6/UL
SEG NEUTROPHILS: 58 % (ref 36–65)
SEGMENTED NEUTROPHILS ABSOLUTE COUNT: 4.68 K/UL (ref 1.5–8.1)
SODIUM BLD-SCNC: 136 MMOL/L (ref 135–144)
WBC # BLD: 8 K/UL (ref 3.5–11.3)
WBC # BLD: ABNORMAL 10*3/UL

## 2019-03-31 PROCEDURE — 6360000002 HC RX W HCPCS: Performed by: STUDENT IN AN ORGANIZED HEALTH CARE EDUCATION/TRAINING PROGRAM

## 2019-03-31 PROCEDURE — 82947 ASSAY GLUCOSE BLOOD QUANT: CPT

## 2019-03-31 PROCEDURE — 99232 SBSQ HOSP IP/OBS MODERATE 35: CPT | Performed by: PSYCHIATRY & NEUROLOGY

## 2019-03-31 PROCEDURE — 94799 UNLISTED PULMONARY SVC/PX: CPT

## 2019-03-31 PROCEDURE — 99239 HOSP IP/OBS DSCHRG MGMT >30: CPT | Performed by: INTERNAL MEDICINE

## 2019-03-31 PROCEDURE — 94760 N-INVAS EAR/PLS OXIMETRY 1: CPT

## 2019-03-31 PROCEDURE — 36415 COLL VENOUS BLD VENIPUNCTURE: CPT

## 2019-03-31 PROCEDURE — 6370000000 HC RX 637 (ALT 250 FOR IP): Performed by: STUDENT IN AN ORGANIZED HEALTH CARE EDUCATION/TRAINING PROGRAM

## 2019-03-31 PROCEDURE — 96372 THER/PROPH/DIAG INJ SC/IM: CPT

## 2019-03-31 PROCEDURE — 2580000003 HC RX 258: Performed by: STUDENT IN AN ORGANIZED HEALTH CARE EDUCATION/TRAINING PROGRAM

## 2019-03-31 PROCEDURE — 80048 BASIC METABOLIC PNL TOTAL CA: CPT

## 2019-03-31 PROCEDURE — 85025 COMPLETE CBC W/AUTO DIFF WBC: CPT

## 2019-03-31 PROCEDURE — G0378 HOSPITAL OBSERVATION PER HR: HCPCS

## 2019-03-31 RX ORDER — PREDNISONE 20 MG/1
60 TABLET ORAL DAILY
Qty: 30 TABLET | Refills: 0 | Status: ON HOLD | OUTPATIENT
Start: 2019-04-01 | End: 2019-04-04 | Stop reason: HOSPADM

## 2019-03-31 RX ORDER — MYCOPHENOLATE MOFETIL 250 MG/1
500 CAPSULE ORAL 2 TIMES DAILY
Qty: 60 CAPSULE | Refills: 3 | Status: SHIPPED | OUTPATIENT
Start: 2019-03-31

## 2019-03-31 RX ORDER — GUAIFENESIN 100 MG/5ML
5 SOLUTION ORAL EVERY 4 HOURS PRN
Qty: 1200 ML | Refills: 0 | Status: SHIPPED | OUTPATIENT
Start: 2019-03-31 | End: 2019-03-31 | Stop reason: HOSPADM

## 2019-03-31 RX ORDER — GUAIFENESIN/DEXTROMETHORPHAN 100-10MG/5
5 SYRUP ORAL 3 TIMES DAILY PRN
Qty: 120 ML | Refills: 0 | Status: SHIPPED | OUTPATIENT
Start: 2019-03-31 | End: 2019-04-10

## 2019-03-31 RX ADMIN — Medication 500 MG: at 09:10

## 2019-03-31 RX ADMIN — PYRIDOSTIGMINE BROMIDE 60 MG: 60 TABLET ORAL at 06:35

## 2019-03-31 RX ADMIN — FERROUS SULFATE TAB EC 325 MG (65 MG FE EQUIVALENT) 325 MG: 325 (65 FE) TABLET DELAYED RESPONSE at 09:10

## 2019-03-31 RX ADMIN — MYCOPHENOLATE MOFETIL 500 MG: 250 CAPSULE ORAL at 09:10

## 2019-03-31 RX ADMIN — FLUTICASONE PROPIONATE 2 SPRAY: 50 SPRAY, METERED NASAL at 09:12

## 2019-03-31 RX ADMIN — VITAMIN D, TAB 1000IU (100/BT) 1000 UNITS: 25 TAB at 09:12

## 2019-03-31 RX ADMIN — PREDNISONE 60 MG: 50 TABLET ORAL at 09:10

## 2019-03-31 RX ADMIN — Medication 10 ML: at 11:47

## 2019-03-31 RX ADMIN — FAMOTIDINE 20 MG: 20 TABLET, FILM COATED ORAL at 09:10

## 2019-03-31 RX ADMIN — GUAIFENESIN 100 MG: 200 SOLUTION ORAL at 09:50

## 2019-03-31 RX ADMIN — ENOXAPARIN SODIUM 40 MG: 40 INJECTION SUBCUTANEOUS at 09:09

## 2019-03-31 NOTE — PROGRESS NOTES
Department of Neurology                                         Resident Progress Note    Subjective:  Shunt seen and examined, no issues overnight. Feels much better today. Denies any respiratory problems, dysphagia, weakness, difficulty walking, no ptosis. HISTORY OF PRESENT ILLNESS:       The patient is a 39 y.o. female who present did on 3/30/19 for myasthenia gravis exacerbation secondary to influenza A. And stated her symptoms are worsening for last few days since experiencing the flu. She has had flulike symptoms for 6 days and her myasthenia symptoms worsening for last 4-5 days. She admitted to facial muscle weakness, ptosis, generalized malaise, fatigue and proximal upper extremity muscle weakness. She does follow up with Dr. Octavio Cohn chronic hand on Mestinon 60 mg 3 times a day, prednisone 20 mg daily, CellCept 500 mg twice a day. She had been admitted in the past and received IVIG for myasthenia gravis in remission. She denied any shortness of breath during this admission, her pulse ox was 97% on room air. She admitted to having mild dysphagia but did not have any trouble eating or drinking. NIF -40 and vital capacity 1.46L.        PAST MEDICAL HISTORY :       Past Medical History:        Diagnosis Date    Anemia     Chronic hepatitis C without hepatic coma ([facility] [address] 3/22/2019    Headache     Hypertension     Myasthenia gravis ([facility] [address]     Pre-diabetes        Past Surgical History:        Procedure Laterality Date    CARDIAC SURGERY         Social History:   Social History     Socioeconomic History    Marital status: Single     Spouse name: Not on file    Number of children: Not on file    Years of education: Not on file    Highest education level: Not on file   Occupational History    Not on file   Social Needs    Financial resource strain: Not on file    Food insecurity:     Worry: Not on file     Inability: Not on file   Spotsetter needs: Medical: Not on file     Non-medical: Not on file   Tobacco Use    Smoking status: Never Smoker    Smokeless tobacco: Never Used   Substance and Sexual Activity    Alcohol use: No    Drug use: No    Sexual activity: Never   Lifestyle    Physical activity:     Days per week: Not on file     Minutes per session: Not on file    Stress: Not on file   Relationships    Social connections:     Talks on phone: Not on file     Gets together: Not on file     Attends Sikhism service: Not on file     Active member of club or organization: Not on file     Attends meetings of clubs or organizations: Not on file     Relationship status: Not on file    Intimate partner violence:     Fear of current or ex partner: Not on file     Emotionally abused: Not on file     Physically abused: Not on file     Forced sexual activity: Not on file   Other Topics Concern    Not on file   Social History Narrative    Not on file       Family History:   History reviewed. No pertinent family history. Allergies:  Morphine; Flexeril [cyclobenzaprine]; Norflex [orphenadrine citrate]; Nsaids; Penicillins; and Vancomycin    Home Medications:  Prior to Admission medications    Medication Sig Start Date End Date Taking?  Authorizing Provider   mycophenolate (CELLCEPT) 250 MG capsule Take 2 capsules by mouth 2 times daily 3/31/19  Yes Elvie Rosa MD   predniSONE (DELTASONE) 20 MG tablet Take 3 tablets by mouth daily for 10 days 4/1/19 4/11/19 Yes Elvie Rosa MD   guaiFENesin-dextromethorphan [facility] DM) 100-10 MG/5ML syrup Take 5 mLs by mouth 3 times daily as needed for Cough 3/31/19 4/10/19 Yes Elvie Rosa MD   fluticasone (FLONASE) 50 MCG/ACT nasal spray 1 spray by Each Nare route daily 1 Spray in each nostril 1/29/19  Yes Linda Almazna MD   pyridostigmine (MESTINON) 60 MG tablet Take 1 po qid 7 AM , q 11 AM ,q 3 PM, 7 PM 11/14/18  Yes Yola Smith MD   metFORMIN (GLUCOPHAGE) 500 MG tablet Take 1 tablet by mouth 2 times daily (with meals) 10/23/18  Yes Alex Brush MD   acetaminophen (TYLENOL) 325 MG tablet Take 2 tablets by mouth every 6 hours as needed for Pain 3/29/19   Kanwal Dorsey MD   vitamin D (CHOLECALCIFEROL) 1000 UNIT TABS tablet TAKE 1 TABLET BY MOUTH ONE TIME A DAY 3/27/19   Alex Brush MD   vitamin C (ASCORBIC ACID) 500 MG tablet TAKE 1 TABLET BY MOUTH ONE TIME A DAY 3/27/19   Alex Brush MD   vitamin D (CHOLECALCIFEROL) 1000 UNIT TABS tablet TAKE 1 TABLET BY MOUTH ONE TIME A DAY 3/1/19   Maria Elena Abreu MD   ferrous sulfate 325 (65 Fe) MG tablet TAKE 1 TABLET BY MOUTH 2 TIMES DAILY (WITH MEALS) 10/29/18   Maria Elena Abreu MD   blood glucose monitor strips Test 4 times daily Diagnosis 10/23/18   Alex Brush MD   Alcohol Swabs PADS USE AS DIRECTED WHEN CHECKING BLOOD SUGAR DAILY.  10/23/18   Alex Brush MD   ACCU-CHEK SOFTCLIX LANCETS MISC Daily 4/16/18   Mitch Swanson MD   glucose monitoring kit (FREESTYLE) monitoring kit Daily check 1/22/18   Perez Mccormack MD       Current Medications:   Current Facility-Administered Medications: sodium chloride flush 0.9 % injection 10 mL, 10 mL, Intravenous, 2 times per day  sodium chloride flush 0.9 % injection 10 mL, 10 mL, Intravenous, PRN  acetaminophen (TYLENOL) tablet 650 mg, 650 mg, Oral, Q4H PRN  enoxaparin (LOVENOX) injection 40 mg, 40 mg, Subcutaneous, Daily  acetaminophen (TYLENOL) tablet 650 mg, 650 mg, Oral, Q6H PRN  ferrous sulfate EC tablet 325 mg, 325 mg, Oral, Daily with breakfast  guaiFENesin (ROBITUSSIN) 100 MG/5ML oral solution 100 mg, 5 mL, Oral, Q4H PRN  vitamin C (ASCORBIC ACID) tablet 500 mg, 500 mg, Oral, Daily  vitamin D (CHOLECALCIFEROL) tablet 1,000 Units, 1,000 Units, Oral, Daily  sodium chloride flush 0.9 % injection 10 mL, 10 mL, Intravenous, 2 times per day  sodium chloride flush 0.9 % injection 10 mL, 10 mL, Intravenous, PRN  magnesium hydroxide (MILK OF MAGNESIA) 400 MG/5ML suspension 30 mL, 30 in no acute distress. GCS 15, nontoxic. No dysarthria, no aphasia. EOMI.     HEAD:  normocephalic, atraumatic    EYES:  PERRLA, EOMI.   ENT:  moist mucous membranes   NECK:  supple, symmetric, no midline tenderness to palpation    BACK:  without midline tenderness, step-offs or deformities    LUNGS:  Equal air entry bilaterally   CARDIOVASCULAR:  normal s1 / s2   ABDOMEN:  Soft, no rigidity   NEUROLOGIC:    Mental status   Alert and oriented; intact memory with no confusion, speech or language problems; no hallucinations or delusions     Cranial nerves   II - visual fields intact to confrontation                                                III, IV, VI - extra-ocular muscles full: no pupillary defect; no STEFANI, no nystagmus, no ptosis                                                                      V - normal facial sensation                                                               VII - normal facial symmetry                                                             VIII - intact hearing                                                                             IX, X - symmetrical palate                                                                  XI - symmetrical shoulder shrug                                                       XII - midline tongue without atrophy or fasciculation     Motor function  Normal muscle bulk and tone; normal power 5/5     Sensory function Intact to touch,proprioception     Cerebellar  No involuntary movements or tremors     Reflex function Intact 2+ DTR and symmetric with no pathologic reflex or Babinski sign     Gait                  Normal station and gait              SKIN:  no rash      LABS AND IMAGING:     CBC with Differential:    Lab Results   Component Value Date    WBC 8.0 03/31/2019    RBC 4.52 03/31/2019    HGB 11.4 03/31/2019    HCT 36.6 03/31/2019     03/31/2019    MCV 81.0 03/31/2019    MCH 25.2 03/31/2019    MCHC 31.1 03/31/2019    RDW 17.1 03/31/2019    NRBC 1 05/08/2017    METASPCT 1 09/22/2017    LYMPHOPCT 23 03/31/2019    MONOPCT 17 03/31/2019    MYELOPCT 1 09/22/2017    BASOPCT 0 03/31/2019    MONOSABS 1.35 03/31/2019    LYMPHSABS 1.82 03/31/2019    EOSABS 0.05 03/31/2019    BASOSABS 0.03 03/31/2019    DIFFTYPE NOT REPORTED 03/31/2019     BMP:    Lab Results   Component Value Date     03/31/2019    K 3.7 03/31/2019     03/31/2019    CO2 25 03/31/2019    BUN 4 03/31/2019    LABALBU 3.8 03/30/2019    CREATININE 0.55 03/31/2019    CALCIUM 8.5 03/31/2019    GFRAA >60 03/31/2019    LABGLOM >60 03/31/2019    GLUCOSE 106 03/31/2019       Radiology Review:  AS ABOVE      ASSESSMENT AND PLAN:       Patient Active Problem List   Diagnosis    Pre-diabetes    Myasthenia gravis with exacerbation ([place] [address]    [facility]henia [facility]is ([facility] [address]    Osteopenia    Chronic hepatitis C without hepatic coma (H[facility])    Influenza A       39 y.o. female who presents with myasthenia exacerbation secondary to influenza A. Status post prednisone 60 mg ×1, prednisone 40 mg ×1. Tamiflu 75 mg. Continued Mestinon 60 mg 3 times a day, CellCept 500 mg twice a day  NIF   -30--> -40 --> -40,  VC 1.35L -->1.30-->1. 46L  ok to discharge home on home prednisone dose, since patient's symptoms have improved. Denies any shortness of breath, no weakness, no dysphagia, no ptosis. Patient to follow up with Dr. Cynthia Figueroa in next 2-3 weeks. Please contact neurology with any changes in patients neurologic status. Thank you for your consult.        Cezar Bolanos MD   3/31/2019  3:29 PM

## 2019-03-31 NOTE — PLAN OF CARE
Problem: Falls - Risk of:  Goal: Will remain free from falls  Description  Will remain free from falls  3/31/2019 0418 by Rocky Jaquez RN  Outcome: Ongoing  3/30/2019 1509 by Tra Tam RN  Outcome: Ongoing  Goal: Absence of physical injury  Description  Absence of physical injury  3/31/2019 0418 by Rocky Jaquez RN  Outcome: Ongoing  3/30/2019 1509 by Tra Tam RN  Outcome: Ongoing     Problem: Respiratory:  Goal: Respiratory status will improve  Description  Respiratory status will improve  3/31/2019 0418 by Rocky Jaquez RN  Outcome: Ongoing  3/30/2019 1509 by Tra Tam RN  Outcome: Ongoing     Problem: Pain:  Goal: Pain level will decrease  Description  Pain level will decrease  Outcome: Ongoing  Goal: Control of acute pain  Description  Control of acute pain  Outcome: Ongoing  Goal: Control of chronic pain  Description  Control of chronic pain  Outcome: Ongoing

## 2019-03-31 NOTE — PROGRESS NOTES
Martín Mag  Internal Medicine Residency Program  Inpatient Daily Progress Note  ______________________________________________________________________________    Patient: Mari Tyler  YOB: 1975   MRN: [id number]    Acct: [de-identified]     Admit date: 3/30/2019  Today's date: 03/31/19  Number of days in the hospital: 1  Expected Discharge Date: 03/31/19    Admitting Diagnosis: Influenza A    Subjective:   Pt seen and Chart reviewed. No acute events overnight  Denies SOB, CP  Reports still has cough  Pain has returned to baseline    Review of Systems - Negative except as mentioned above.      Objective:   Vital Sign:  BP (!) 121/99   Pulse 122   Temp 98.6 °F (37 °C) (Oral)   Resp 18   Ht 5' (1.524 m)   Wt 169 lb 11.2 oz (77 kg)   LMP 03/09/2019   SpO2 98%   BMI 33.14 kg/m²       Physical Exam:  General appearance - alert, well appearing, and in no distress  Mental status - alert, oriented to person, place, and time  Head- normocephalic, no lesions, without obvious abnormality  Eyes - pupils equal and reactive, extraocular eye movements intact  Ears - bilateral TM's and external ear canals normal  Nose - normal and patent, no erythema, discharge or polyps  Mouth - mucous membranes moist, pharynx normal without lesions  Neck - supple, no significant adenopathy  Chest - clear to auscultation, no wheezes, rales or rhonchi, symmetric air entry  Heart - normal rate, regular rhythm, normal S1, S2, no murmurs, rubs, clicks or gallops  Abdomen - soft, nontender, nondistended, no masses or organomegaly  Neurological - alert, oriented, normal speech, no focal findings or movement disorder noted  Extremities - peripheral pulses normal, no pedal edema, no clubbing or cyanosis  Skin - normal coloration and turgor, no rashes, no suspicious skin lesions noted         Medications:  Scheduled Medications   sodium chloride flush  10 mL Intravenous 2 times per day    enoxaparin  40 mg Subcutaneous Daily    ferrous sulfate  325 mg Oral Daily with breakfast    vitamin C  500 mg Oral Daily    vitamin D  1,000 Units Oral Daily    sodium chloride flush  10 mL Intravenous 2 times per day    famotidine  20 mg Oral BID    predniSONE  60 mg Oral Daily    pyridostigmine  60 mg Oral 3 times per day    mycophenolate  500 mg Oral BID    insulin lispro  0-12 Units Subcutaneous TID WC    insulin lispro  0-6 Units Subcutaneous Nightly    fluticasone  2 spray Each Nare Daily       PRN Medications  sodium chloride flush 10 mL PRN   acetaminophen 650 mg Q4H PRN   acetaminophen 650 mg Q6H PRN   guaiFENesin 5 mL Q4H PRN   sodium chloride flush 10 mL PRN   magnesium hydroxide 30 mL Daily PRN   ondansetron 4 mg Q6H PRN   potassium chloride 40 mEq PRN   Or     potassium alternative oral replacement 40 mEq PRN   Or     potassium chloride 10 mEq PRN   magnesium sulfate 1 g PRN   glucose 15 g PRN   dextrose 12.5 g PRN   glucagon (rDNA) 1 mg PRN   dextrose 100 mL/hr PRN       Diagnostic Labs and Imaging:  CBC:  Recent Labs     03/29/19  0828 03/30/19  1002 03/31/19  0518   WBC 12.0* 11.2 8.0   HGB 11.1* 11.6* 11.4*    459* 415     BMP: Recent Labs     03/29/19  0828 03/30/19  1002 03/31/19  0518   * 136 136   K 3.9 3.7 3.7   CL 99 102 101   CO2 23 23 25   BUN 5* 3* 4*   CREATININE 0.64 0.51 0.55   GLUCOSE 147* 155* 106*     Hepatic: Recent Labs     03/30/19  1002   AST 48*   ALT 36*   BILITOT 0.20*   ALKPHOS 70       Assessment and Plan:     1. Influenza A: rapid flu positive, received single dose of tamiflu, supportive care, do not give Tessalon Perles due to adverse reaction, on Robitussin   2. Myasthenia gravis exacerbation: PFTs every 6 hours, elective intubation if VC below 15-20mL/kg of body weight or decline in serial measurement of MIP less negative than -25 to -30 cmH2O, prednisone 60mg daily, pyridostigmine 60mg every 8hrs, mycophenolate 500mg BID  3.  Prediabetes: on metformin at home, medium ISS   4. Anemia: iron deficiency, on supplement  5. DVT prophylaxis: lovenox 40mg daily     PFTs negative for MG exacerbation overnight, unlikely, will discharge pending neuro rec. Tevin Jason MD      Department of Internal Medicine  [facility]         3/31/2019, 12:27 PM      IM Attending     Pt seen and examined before discharge   Discharge plan and medications were reviewed and agreed as documented by resident.   Time spent for discharge planning more than 30 minutes     Electronically signed by Joann Roberson MD on 3/31/2019 at 6:08 PM

## 2019-03-31 NOTE — PROGRESS NOTES
CLINICAL PHARMACY NOTE: MEDS TO [address] Select Patient?: No  Total # of Prescriptions Filled: 1   The following medications were delivered to the patient:  · Tussin DM cough + chest  Total # of Interventions Completed: 0  Time Spent (min): 30    Additional Documentation:  Prednisone and mycophenylate too soon to fill, prescriptions put on profile.

## 2019-04-01 ENCOUNTER — TELEPHONE (OUTPATIENT)
Dept: INTERNAL MEDICINE | Age: 44
End: 2019-04-01

## 2019-04-01 NOTE — TELEPHONE ENCOUNTER
[facility] Transitions Initial Follow Up Call    Call within 2 business days of discharge: Yes     Patient: Anatoliy Bauman Patient : 1975 MRN: [id number]    [unfilled]    RARS: Readmission Risk Score: 14       Spoke with: PC to patient, patient speaks little english. States flu improving.         Discharge department/facility: [facility]    Non-face-to-face services provided:  Obtained and reviewed discharge summary and/or continuity of care documents    Follow Up  Future Appointments   Date Time Provider Richard Pinto   2019 11:00 AM Ryan Brandon MD Neuro [facility]   2019  8:55 AM [facility]   2019 11:20 AM Cornelia Nissen, MD Neuro Spec [facility]   2019 10:40 AM Cornelia Nissen, MD Neuro Spec [facility]   2019  3:30 PM Hiren Jones MD [facility] Heath Beverly RN

## 2019-04-02 ENCOUNTER — HOSPITAL ENCOUNTER (INPATIENT)
Age: 44
LOS: 2 days | Discharge: HOME | DRG: 042 | End: 2019-04-04
Attending: EMERGENCY MEDICINE | Admitting: PSYCHIATRY & NEUROLOGY
Payer: MEDICARE

## 2019-04-02 ENCOUNTER — APPOINTMENT (OUTPATIENT)
Dept: GENERAL RADIOLOGY | Age: 44
DRG: 042 | End: 2019-04-02
Payer: MEDICARE

## 2019-04-02 DIAGNOSIS — J11.1 INFLUENZA WITH RESPIRATORY MANIFESTATION OTHER THAN PNEUMONIA: Primary | ICD-10-CM

## 2019-04-02 DIAGNOSIS — G70.01 MYASTHENIA GRAVIS IN CRISIS (HCC): ICD-10-CM

## 2019-04-02 PROBLEM — Z86.69 H/O MYASTHENIA GRAVIS: Status: ACTIVE | Noted: 2019-04-02

## 2019-04-02 LAB
ABSOLUTE EOS #: 0.05 K/UL (ref 0–0.44)
ABSOLUTE IMMATURE GRANULOCYTE: 0.07 K/UL (ref 0–0.3)
ABSOLUTE LYMPH #: 1.91 K/UL (ref 1.1–3.7)
ABSOLUTE MONO #: 0.81 K/UL (ref 0.1–1.2)
ANION GAP SERPL CALCULATED.3IONS-SCNC: 14 MMOL/L (ref 9–17)
BASOPHILS # BLD: 0 % (ref 0–2)
BASOPHILS ABSOLUTE: <0.03 K/UL (ref 0–0.2)
BNP INTERPRETATION: NORMAL
BUN BLDV-MCNC: 4 MG/DL (ref 6–20)
BUN/CREAT BLD: ABNORMAL (ref 9–20)
CALCIUM SERPL-MCNC: 8.6 MG/DL (ref 8.6–10.4)
CHLORIDE BLD-SCNC: 99 MMOL/L (ref 98–107)
CO2: 22 MMOL/L (ref 20–31)
CREAT SERPL-MCNC: 0.51 MG/DL (ref 0.5–0.9)
DIFFERENTIAL TYPE: ABNORMAL
EOSINOPHILS RELATIVE PERCENT: 1 % (ref 1–4)
GFR AFRICAN AMERICAN: >60 ML/MIN
GFR NON-AFRICAN AMERICAN: >60 ML/MIN
GFR SERPL CREATININE-BSD FRML MDRD: ABNORMAL ML/MIN/{1.73_M2}
GFR SERPL CREATININE-BSD FRML MDRD: ABNORMAL ML/MIN/{1.73_M2}
GLUCOSE BLD-MCNC: 136 MG/DL (ref 65–105)
GLUCOSE BLD-MCNC: 141 MG/DL (ref 70–99)
HCG QUALITATIVE: NEGATIVE
HCT VFR BLD CALC: 37.6 % (ref 36.3–47.1)
HEMOGLOBIN: 11.3 G/DL (ref 11.9–15.1)
IMMATURE GRANULOCYTES: 1 %
LYMPHOCYTES # BLD: 22 % (ref 24–43)
MCH RBC QN AUTO: 24.8 PG (ref 25.2–33.5)
MCHC RBC AUTO-ENTMCNC: 30.1 G/DL (ref 28.4–34.8)
MCV RBC AUTO: 82.6 FL (ref 82.6–102.9)
MONOCYTES # BLD: 9 % (ref 3–12)
NRBC AUTOMATED: 0 PER 100 WBC
PDW BLD-RTO: 16.8 % (ref 11.8–14.4)
PLATELET # BLD: 480 K/UL (ref 138–453)
PLATELET ESTIMATE: ABNORMAL
PMV BLD AUTO: 10.9 FL (ref 8.1–13.5)
POTASSIUM SERPL-SCNC: 3.3 MMOL/L (ref 3.7–5.3)
PRO-BNP: <20 PG/ML
RBC # BLD: 4.55 M/UL (ref 3.95–5.11)
RBC # BLD: ABNORMAL 10*6/UL
SEG NEUTROPHILS: 67 % (ref 36–65)
SEGMENTED NEUTROPHILS ABSOLUTE COUNT: 5.92 K/UL (ref 1.5–8.1)
SODIUM BLD-SCNC: 135 MMOL/L (ref 135–144)
TROPONIN INTERP: NORMAL
TROPONIN INTERP: NORMAL
TROPONIN T: NORMAL NG/ML
TROPONIN T: NORMAL NG/ML
TROPONIN, HIGH SENSITIVITY: <6 NG/L (ref 0–14)
TROPONIN, HIGH SENSITIVITY: <6 NG/L (ref 0–14)
WBC # BLD: 8.8 K/UL (ref 3.5–11.3)
WBC # BLD: ABNORMAL 10*3/UL

## 2019-04-02 PROCEDURE — 2580000003 HC RX 258: Performed by: EMERGENCY MEDICINE

## 2019-04-02 PROCEDURE — 6370000000 HC RX 637 (ALT 250 FOR IP): Performed by: STUDENT IN AN ORGANIZED HEALTH CARE EDUCATION/TRAINING PROGRAM

## 2019-04-02 PROCEDURE — 84703 CHORIONIC GONADOTROPIN ASSAY: CPT

## 2019-04-02 PROCEDURE — 81001 URINALYSIS AUTO W/SCOPE: CPT

## 2019-04-02 PROCEDURE — 71046 X-RAY EXAM CHEST 2 VIEWS: CPT

## 2019-04-02 PROCEDURE — 99284 EMERGENCY DEPT VISIT MOD MDM: CPT

## 2019-04-02 PROCEDURE — G0378 HOSPITAL OBSERVATION PER HR: HCPCS

## 2019-04-02 PROCEDURE — 87086 URINE CULTURE/COLONY COUNT: CPT

## 2019-04-02 PROCEDURE — 93005 ELECTROCARDIOGRAM TRACING: CPT

## 2019-04-02 PROCEDURE — 96372 THER/PROPH/DIAG INJ SC/IM: CPT

## 2019-04-02 PROCEDURE — 85025 COMPLETE CBC W/AUTO DIFF WBC: CPT

## 2019-04-02 PROCEDURE — 2000000003 HC NEURO ICU R&B

## 2019-04-02 PROCEDURE — 6360000002 HC RX W HCPCS: Performed by: STUDENT IN AN ORGANIZED HEALTH CARE EDUCATION/TRAINING PROGRAM

## 2019-04-02 PROCEDURE — 80048 BASIC METABOLIC PNL TOTAL CA: CPT

## 2019-04-02 PROCEDURE — 86403 PARTICLE AGGLUT ANTBDY SCRN: CPT

## 2019-04-02 PROCEDURE — 2580000003 HC RX 258: Performed by: STUDENT IN AN ORGANIZED HEALTH CARE EDUCATION/TRAINING PROGRAM

## 2019-04-02 PROCEDURE — 83880 ASSAY OF NATRIURETIC PEPTIDE: CPT

## 2019-04-02 PROCEDURE — 84484 ASSAY OF TROPONIN QUANT: CPT

## 2019-04-02 PROCEDURE — 99223 1ST HOSP IP/OBS HIGH 75: CPT | Performed by: PSYCHIATRY & NEUROLOGY

## 2019-04-02 PROCEDURE — 82947 ASSAY GLUCOSE BLOOD QUANT: CPT

## 2019-04-02 RX ORDER — PYRIDOSTIGMINE BROMIDE 60 MG/1
90 TABLET ORAL EVERY 8 HOURS SCHEDULED
Status: DISCONTINUED | OUTPATIENT
Start: 2019-04-02 | End: 2019-04-02

## 2019-04-02 RX ORDER — PREDNISONE 20 MG/1
40 TABLET ORAL DAILY
Status: DISCONTINUED | OUTPATIENT
Start: 2019-04-02 | End: 2019-04-04 | Stop reason: HOSPADM

## 2019-04-02 RX ORDER — PREDNISONE 20 MG/1
40 TABLET ORAL DAILY
Status: DISCONTINUED | OUTPATIENT
Start: 2019-04-02 | End: 2019-04-02 | Stop reason: SDUPTHER

## 2019-04-02 RX ORDER — SODIUM CHLORIDE 0.9 % (FLUSH) 0.9 %
10 SYRINGE (ML) INJECTION EVERY 12 HOURS SCHEDULED
Status: DISCONTINUED | OUTPATIENT
Start: 2019-04-02 | End: 2019-04-04 | Stop reason: HOSPADM

## 2019-04-02 RX ORDER — PYRIDOSTIGMINE BROMIDE 60 MG/1
90 TABLET ORAL EVERY 8 HOURS SCHEDULED
Status: DISCONTINUED | OUTPATIENT
Start: 2019-04-02 | End: 2019-04-04 | Stop reason: HOSPADM

## 2019-04-02 RX ORDER — SODIUM CHLORIDE 0.9 % (FLUSH) 0.9 %
10 SYRINGE (ML) INJECTION PRN
Status: DISCONTINUED | OUTPATIENT
Start: 2019-04-02 | End: 2019-04-04 | Stop reason: HOSPADM

## 2019-04-02 RX ORDER — 0.9 % SODIUM CHLORIDE 0.9 %
1000 INTRAVENOUS SOLUTION INTRAVENOUS ONCE
Status: COMPLETED | OUTPATIENT
Start: 2019-04-02 | End: 2019-04-02

## 2019-04-02 RX ORDER — PYRIDOSTIGMINE BROMIDE 60 MG/1
90 TABLET ORAL EVERY 8 HOURS SCHEDULED
Status: DISCONTINUED | OUTPATIENT
Start: 2019-04-02 | End: 2019-04-02 | Stop reason: SDUPTHER

## 2019-04-02 RX ORDER — DEXTROSE MONOHYDRATE 25 G/50ML
INJECTION, SOLUTION INTRAVENOUS
Status: DISCONTINUED
Start: 2019-04-02 | End: 2019-04-03

## 2019-04-02 RX ORDER — ACETAMINOPHEN 325 MG/1
650 TABLET ORAL EVERY 4 HOURS PRN
Status: DISCONTINUED | OUTPATIENT
Start: 2019-04-02 | End: 2019-04-04 | Stop reason: HOSPADM

## 2019-04-02 RX ORDER — MYCOPHENOLATE MOFETIL 250 MG/1
500 CAPSULE ORAL 2 TIMES DAILY
Status: DISCONTINUED | OUTPATIENT
Start: 2019-04-02 | End: 2019-04-04 | Stop reason: HOSPADM

## 2019-04-02 RX ORDER — ONDANSETRON 2 MG/ML
4 INJECTION INTRAMUSCULAR; INTRAVENOUS EVERY 6 HOURS PRN
Status: DISCONTINUED | OUTPATIENT
Start: 2019-04-02 | End: 2019-04-04 | Stop reason: HOSPADM

## 2019-04-02 RX ORDER — ACETAMINOPHEN 325 MG/1
650 TABLET ORAL EVERY 6 HOURS PRN
Status: DISCONTINUED | OUTPATIENT
Start: 2019-04-02 | End: 2019-04-02

## 2019-04-02 RX ADMIN — Medication 10 ML: at 20:53

## 2019-04-02 RX ADMIN — ENOXAPARIN SODIUM 40 MG: 40 INJECTION SUBCUTANEOUS at 15:14

## 2019-04-02 RX ADMIN — PYRIDOSTIGMINE BROMIDE 90 MG: 60 TABLET ORAL at 20:52

## 2019-04-02 RX ADMIN — SODIUM CHLORIDE 1000 ML: 9 INJECTION, SOLUTION INTRAVENOUS at 07:28

## 2019-04-02 RX ADMIN — PREDNISONE 40 MG: 20 TABLET ORAL at 12:52

## 2019-04-02 RX ADMIN — PYRIDOSTIGMINE BROMIDE 90 MG: 60 TABLET ORAL at 13:56

## 2019-04-02 RX ADMIN — MYCOPHENOLATE MOFETIL 500 MG: 250 CAPSULE ORAL at 20:52

## 2019-04-02 ASSESSMENT — ENCOUNTER SYMPTOMS
SHORTNESS OF BREATH: 0
SINUS PRESSURE: 0
EYE PAIN: 0
RHINORRHEA: 0
CHOKING: 1
SINUS PAIN: 0
STRIDOR: 0
COLOR CHANGE: 0
COUGH: 1
VOMITING: 0
CHEST TIGHTNESS: 0
WHEEZING: 0
NAUSEA: 0
ABDOMINAL DISTENTION: 0
VOICE CHANGE: 0
DIARRHEA: 0
SORE THROAT: 1
FACIAL SWELLING: 0
CONSTIPATION: 0
TROUBLE SWALLOWING: 1
EYE REDNESS: 0
ABDOMINAL PAIN: 0

## 2019-04-02 ASSESSMENT — PAIN SCALES - GENERAL: PAINLEVEL_OUTOF10: 0

## 2019-04-02 NOTE — ED NOTES
Meal tray faxed; pt.  Family at bedside, no questions or concerns at this time, state they are up to date      Mayo Granados, [address] [facility]  04/02/19 8570

## 2019-04-02 NOTE — ED NOTES
Pt. Resting on stretcher, NAD, breathing unlabored, monitor on,  at bedside, no needs expressed at this time, will continue to monitor     José Miguel Ray RN  04/02/19 [id number]

## 2019-04-02 NOTE — CARE COORDINATION
Case Management Initial Discharge Plan  Daniella Davis,             Met with:spouse/SO to discuss discharge plans. Information verified: address, contacts, phone number, , insurance Yes  PCP: Kaiser Parker MD  Date of last visit: 19    Insurance Provider: [place] [employer]    Discharge Planning    Living Arrangements:      Support Systems:       Home has 1 stories   stairs to climb to get into front door, stairs to climb to reach second floor  Location of bedroom/bathroom in home     Patient able to perform ADL's:Independent    Current Services (outpatient & in home) n/a  DME equipment:   DME provider:     [facility]   Potential Assistance Purchasing Medications:     Does patient want to participate in local refill/ meds to beds program?       Potential Assistance Needed:       Patient agreeable to home care: n/a  Freedom of choice provided:  n/a    Prior SNF/Rehab Placement and Facility: n/a  Agreeable to SNF/Rehab: n/a  [place] of choice provided: n/a   Evaluation: no    Expected Discharge date:     Patient expects to be discharged to: Follow Up Appointment: Best Day/ Time:      Transportation provider: medical cab may be needed  Transportation arrangements needed for discharge: Yes    Readmission Risk              Risk of Unplanned Readmission:        14             Does patient have a readmission risk score greater than 14?: No  If yes, follow-up appointment must be made within 7 days of discharge. Discharge Plan: Home independently w/SO's support. Two admissions in March. Monitor for discharge needs.           Electronically signed by Gaye Hernadez RN on 19 at 5:48 PM

## 2019-04-02 NOTE — ED PROVIDER NOTES
[facility]     Emergency Department     Faculty Note/ Attestation      Pt Name: Rigo Chery                                       MRN: [id number]  Getachewgfhortencia 1975  Date of evaluation: 4/2/2019  Patients PCP:    Olga Fernandez MD    Attestation  I performed a history and physical examination of the patient and discussed management with the resident. I reviewed the residents note and agree with the documented findings and plan of care. Any areas of disagreement are noted on the chart. I was personally present for the key portions of any procedures. I have documented in the chart those procedures where I was not present during the key portions. I have reviewed the emergency nurses triage note. I agree with the chief complaint, past medical history, past surgical history, allergies, medications, social and family history as documented unless otherwise noted below. For Physician Assistant/ Nurse Practitioner cases/documentation I have personally evaluated this patient and have completed at least one if not all key elements of the E/M (history, physical exam, and MDM). Additional findings are as noted. Initial Screens:             Vitals:    Vitals:    04/02/19 0716 04/02/19 0800 04/02/19 1001 04/02/19 1101   BP: (!) 133/96 (!) 135/93 (!) 134/97 (!) 137/95   Pulse:       Resp:       Temp:       TempSrc:       SpO2: 97% 96% 97% 96%       Chief Complaint      Chief Complaint   Patient presents with    Cough    Fatigue     hx myasthenia gravis          oral temperature is 97.8 °F (36.6 °C). Her blood pressure is 137/95 (abnormal) and her pulse is 107. Her respiration is 16 and oxygen saturation is 96%. DIAGNOSTIC RESULTS       RADIOLOGY:   XR CHEST STANDARD (2 VW)   Final Result   Cardiomegaly. Atelectatic changes and loss of volume in the right base. No   definitive pneumonia.                LABS:  Labs Reviewed   CBC WITH AUTO DIFFERENTIAL - Abnormal; Notable for the following components:       Result Value    Hemoglobin 11.3 (*)     MCH 24.8 (*)     RDW 16.8 (*)     Platelets 313 (*)     Seg Neutrophils 67 (*)     Lymphocytes 22 (*)     Immature Granulocytes 1 (*)     All other components within normal limits   BASIC METABOLIC PANEL - Abnormal; Notable for the following components:    Glucose 141 (*)     BUN 4 (*)     Potassium 3.3 (*)     All other components within normal limits   URINE CULTURE CLEAN CATCH   TROPONIN   TROPONIN   BRAIN NATRIURETIC PEPTIDE   HCG, SERUM, QUALITATIVE   URINALYSIS WITH MICROSCOPIC         EMERGENCY DEPARTMENT COURSE:     -------------------------  BP: (!) 137/95, Temp: 97.8 °F (36.6 °C), Pulse: 107, Resp: 16      Comments            Judd Skiff,, MD, F.A.C.E.P.   Attending Emergency Physician         Judd Skiff, MD  04/02/19 6766

## 2019-04-02 NOTE — CONSULTS
@[facility]@      Neurology Consult Note          CHIEF COMPLAINT:  Dysphagia, weakness     History Obtained From:  patient, electronic medical record       HISTORY OF PRESENT ILLNESS:              The patient is a 39 y.o. female with significant past medical history of myasthenia gravis with recent exacerbation due to viral flu. She was discharged home 2 days ago and  then experienced worsening of her symptoms including generalized weakness, inability to swallow, inability to expectorate. Denies shortness of breath but has cough with sputum than she can't expectorate. Denies fever, abdominal pain, nausea vomiting, the area, dysuria. Has been started on prednisone 60 tapered down to 40 and then was discharged on a 20mg dose. She is also on Mestinon 60 mg 3 times a day. CellCept 500 mg twice a day. She was recently discharged home after being managed for acute exacerbation of myasthenia gravis. At that time she presented with facial muscle weakness, ptosis, generalized malaise. Past Medical History:        Diagnosis Date    Anemia     Chronic hepatitis C without hepatic coma ([facility] [address] 3/22/2019    Headache     Hypertension     Myasthenia gravis ([facility] [address]     Pre-diabetes      Past Surgical History:        Procedure Laterality Date    CARDIAC SURGERY       Current Medications:   No current facility-administered medications for this encounter. Allergies:  Morphine; Flexeril [cyclobenzaprine]; Norflex [orphenadrine citrate]; Nsaids; Penicillins; and Vancomycin    Social History:  TOBACCO:   reports that she has never smoked. She has never used smokeless tobacco.  ETOH:   reports that she does not drink alcohol. DRUGS:   reports that she does not use drugs. ACTIVITIES OF DAILY LIVING:    INSTRUMENTAL ACTIVITIES OF DAILY LIVING:  Patient is able to perform all instrumental activities of daily living. Family History:   No family history on file.     REVIEW OF SYSTEMS:  Review of Systems gallop and no friction rub. No murmur heard. Pulmonary/Chest: Effort normal and breath sounds normal. No stridor. No respiratory distress. She has no wheezes. She has no rales. She exhibits no tenderness. Abdominal: Soft. Bowel sounds are normal. She exhibits no distension and no mass. There is no tenderness. There is no rebound and no guarding. No hernia. Musculoskeletal: Normal range of motion. She exhibits no edema, tenderness or deformity. Neurological: She is alert and oriented to person, place, and time. She has normal reflexes. She displays normal reflexes. No cranial nerve deficit or sensory deficit. She exhibits normal muscle tone. Coordination normal. GCS eye subscore is 4. GCS verbal subscore is 5. GCS motor subscore is 6. She displays no Babinski's sign on the right side. She displays no Babinski's sign on the left side. Reflex Scores:       Tricep reflexes are 2+ on the right side and 2+ on the left side. Bicep reflexes are 2+ on the right side and 2+ on the left side. Brachioradialis reflexes are 2+ on the right side and 2+ on the left side. Patellar reflexes are 2+ on the right side and 2+ on the left side. Achilles reflexes are 2+ on the right side and 2+ on the left side. Marked ptosis  Ophthalmoplegia more pronounced on vertical gaze   Skin: Skin is warm and dry. She is not diaphoretic.         DATA  Recent Results (from the past 24 hour(s))   CBC WITH AUTO DIFFERENTIAL    Collection Time: 04/02/19  7:27 AM   Result Value Ref Range    WBC 8.8 3.5 - 11.3 k/uL    RBC 4.55 3.95 - 5.11 m/uL    Hemoglobin 11.3 (L) 11.9 - 15.1 g/dL    Hematocrit 37.6 36.3 - 47.1 %    MCV 82.6 82.6 - 102.9 fL    MCH 24.8 (L) 25.2 - 33.5 pg    MCHC 30.1 28.4 - 34.8 g/dL    RDW 16.8 (H) 11.8 - 14.4 %    Platelets 842 (H) 681 - 453 k/uL    MPV 10.9 8.1 - 13.5 fL    NRBC Automated 0.0 0.0 per 100 WBC    Differential Type NOT REPORTED     Seg Neutrophils 67 (H) 36 - 65 %    Lymphocytes 22 (L)

## 2019-04-02 NOTE — H&P
History:        Procedure Laterality Date    CARDIAC SURGERY         Social History:   Social History     Socioeconomic History    Marital status: Single     Spouse name: Not on file    Number of children: Not on file    Years of education: Not on file    Highest education level: Not on file   Occupational History    Not on file   Social Needs    Financial resource strain: Not on file    Food insecurity:     Worry: Not on file     Inability: Not on file    Transportation needs:     Medical: Not on file     Non-medical: Not on file   Tobacco Use    Smoking status: Never Smoker    Smokeless tobacco: Never Used   Substance and Sexual Activity    Alcohol use: No    Drug use: No    Sexual activity: Never   Lifestyle    Physical activity:     Days per week: Not on file     Minutes per session: Not on file    Stress: Not on file   Relationships    Social connections:     Talks on phone: Not on file     Gets together: Not on file     Attends Shinto service: Not on file     Active member of club or organization: Not on file     Attends meetings of clubs or organizations: Not on file     Relationship status: Not on file    Intimate partner violence:     Fear of current or ex partner: Not on file     Emotionally abused: Not on file     Physically abused: Not on file     Forced sexual activity: Not on file   Other Topics Concern    Not on file   Social History Narrative    Not on file       Family History:   No family history on file. Allergies:    Morphine; Flexeril [cyclobenzaprine]; Norflex [orphenadrine citrate]; Nsaids; Penicillins; and Vancomycin    Medications Prior to Admission:    Not in a hospital admission.     Current Medications:  Current Facility-Administered Medications: predniSONE (DELTASONE) tablet 40 mg, 40 mg, Oral, Daily  pyridostigmine (MESTINON) tablet 90 mg, 90 mg, Oral, 3 times per day    REVIEW OF SYSTEMS     CONSTITUTIONAL: positive for fatigue and malaise   EYES: negative for double vision and photophobia    HEENT: negative for tinnitus and sore throat   RESPIRATORY: positve for cough,   CARDIOVASCULAR: negative for chest pain, palpitations, or syncope   GASTROINTESTINAL: negative for abdominal pain, nausea, vomiting, diarrhea, or constipation    GENITOURINARY: negative for incontinence or retention    MUSCULOSKELETAL: negative for neck or back pain, negative for extremity pain   NEUROLOGICAL: Positive for weakness   PSYCHIATRIC: negative for agitation, hallucination, SI/HI   SKIN Negative for spontaneous contusions, rashes, or lesions      PHYSICAL EXAM:     BP (!) 137/95   Pulse 107   Temp 97.8 °F (36.6 °C) (Oral)   Resp 16   LMP 03/09/2019   SpO2 96%     Estimated body mass index is 33.14 kg/m² as calculated from the following:    Height as of 3/30/19: 5' (1.524 m). Weight as of 3/31/19: 169 lb 11.2 oz (77 kg).  []<16 Severe malnutrition  []16-16.99 Moderate malnutrition  []17-18.49 Mild malnutrition  [x]18.5-24.9 Normal  []25-29.9 Overweight (not obese)  []30-34.9 Obese class 1 (Low Risk)  []35-39.9 Obese class 2 (Moderate Risk)  []?40 Obese class 3 (High Risk)    PHYSICAL EXAM:  CONSTITUTIONAL:  Well developed, well nourished, alert and oriented x 3, in no acute distress. GCS 15. Nontoxic. No dysarthria.  No aphasia   HEAD:  normocephalic, atraumatic    EYES:  PERRLA, EOMI.   ENT:  moist mucous membranes   LUNGS:  Equal air entry bilaterally   CARDIOVASCULAR:  normal s1 / s2   ABDOMEN:  Soft, no rigidity   NECK supple, symmetric, no midline tenderness to palpation    BACK without midline tenderness, step-offs or deformities    EXTREMITIES Normal ROM with no deformities   NEUROLOGIC:  Mental Status:  A & O x3,awake             Cranial Nerves:    cranial nerves II-XII are grossly intact, b/l ptosis    Motor Exam:    Drift:  absent  Tone:  normal    Motor exam is symmetrical 5 out of 5 all extremities bilaterally    Sensory:    Touch:    Right Upper Extremity:  normal  Left Upper Extremity:  normal  Right Lower Extremity:  normal  Left Lower Extremity:  normal    Deep Tendon Reflexes:    Right Bicep:  2+  Left Bicep:  2+  Right Knee:  2+  Left Knee:  2+     SKIN No obvious ecchymosis, rashes, or lesions      LABS AND IMAGING:     RECENT LABS:  CBC with Differential:    Lab Results   Component Value Date    WBC 8.8 04/02/2019    RBC 4.55 04/02/2019    HGB 11.3 04/02/2019    HCT 37.6 04/02/2019     04/02/2019    MCV 82.6 04/02/2019    MCH 24.8 04/02/2019    MCHC 30.1 04/02/2019    RDW 16.8 04/02/2019    NRBC 1 05/08/2017    METASPCT 1 09/22/2017    LYMPHOPCT 22 04/02/2019    MONOPCT 9 04/02/2019    MYELOPCT 1 09/22/2017    BASOPCT 0 04/02/2019    MONOSABS 0.81 04/02/2019    LYMPHSABS 1.91 04/02/2019    EOSABS 0.05 04/02/2019    BASOSABS <0.03 04/02/2019    DIFFTYPE NOT REPORTED 04/02/2019     BMP:    Lab Results   Component Value Date     04/02/2019    K 3.3 04/02/2019    CL 99 04/02/2019    CO2 22 04/02/2019    BUN 4 04/02/2019    LABALBU 3.8 03/30/2019    CREATININE 0.51 04/02/2019    CALCIUM 8.6 04/02/2019    GFRAA >60 04/02/2019    LABGLOM >60 04/02/2019    GLUCOSE 141 04/02/2019       RADIOLOGY:   Xr Chest Standard (2 Vw)    Result Date: 4/2/2019  EXAMINATION: TWO VIEWS OF THE CHEST 4/2/2019 8:00 am COMPARISON: March 30, 2019 HISTORY: ORDERING SYSTEM PROVIDED HISTORY: cough, recent influenza TECHNOLOGIST PROVIDED HISTORY: cough, recent influenza FINDINGS: Redemonstration of cardiomegaly. Some loss of volume can be seen in the right base with mild atelectasis. No definitive pneumonia. No evidence of pulmonary edema or pleural effusion. Cardiomegaly. Atelectatic changes and loss of volume in the right base. No definitive pneumonia. Labs and Images reviewed with:    [] Aris Luo MD    [] Nic Guallpa MD  [x] Sp Dyer MD  --[] there are no new interval images to review. ASSESSMENT AND PLAN:         ASSESSMENT:     This is a 39 y. o. female with  pmhx of MG presented with cough and fatigue    Patient care will be discussed with attending, will reevaluate patient along with attending.      PLAN/MEDICAL DECISION MAKING:        NEUROLOGIC:  - will increase predisone to 40 mg daily, mestinon tom90 mg tid and CellCept 500 mg bid  -VC and NIF q6  - Neuro checks per protocol    CARDIOVASCULAR:  - Goal SBP <160  - Continue telemetry    PULMONARY:  - monitor for VC and NIF q6  - monitor saturation    RENAL/FLUID/ELECTROLYTE:  - monitor kidney fucntion  - Replace electrolytes PRN  - Daily BMP    GI/NUTRITION:  NUTRITION:  No diet orders on file  - Bowel regimen: MOM  - GI prophylaxis: Pepcid 20 mg    ID:  - monitor for fevers  - Daily CBC    HEME:   - H&H 11.3/37.6  - Platelets 093  - Daily CBC    ENDOCRINE:  - Continue to monitor blood glucose, goal <180  -  OTHER:  - PT/OT/ST   - Code Status: full    PROPHYLAXIS:  Stress ulcer: H2 blocker    DVT PROPHYLAXIS:  - SCD sleeves - Thigh High   - MARIANNE stockings - Thigh High      DISPOSITION: full      David Fisher MD  Neuro Critical Care Service   Pager [phone]  4/2/2019     12:46 PM

## 2019-04-02 NOTE — ED NOTES
Pt. To bedside commode with assistance from [facility], Rebecca 113, RN  04/02/19 5711

## 2019-04-02 NOTE — ED PROVIDER NOTES
[facility]  Emergency Department  Emergency Medicine Resident Sign-out     Care of Daniella Davis was assumed from Dr. Crissy Patel and is being seen for Cough and Fatigue (hx myasthenia gravis)  . The patient's initial evaluation and plan have been discussed with the prior provider who initially evaluated the patient.      EMERGENCY DEPARTMENT COURSE / MEDICAL DECISION MAKING:       MEDICATIONS GIVEN:  Orders Placed This Encounter   Medications    0.9 % sodium chloride bolus    sodium chloride flush 0.9 % injection 10 mL    sodium chloride flush 0.9 % injection 10 mL    acetaminophen (TYLENOL) tablet 650 mg    DISCONTD: acetaminophen (TYLENOL) tablet 650 mg    metFORMIN (GLUCOPHAGE) tablet 500 mg    mycophenolate (CELLCEPT) capsule 500 mg    DISCONTD: predniSONE (DELTASONE) tablet 40 mg    DISCONTD: pyridostigmine (MESTINON) tablet 90 mg    sodium chloride flush 0.9 % injection 10 mL    sodium chloride flush 0.9 % injection 10 mL    magnesium hydroxide (MILK OF MAGNESIA) 400 MG/5ML suspension 30 mL    ondansetron (ZOFRAN) injection 4 mg    enoxaparin (LOVENOX) injection 40 mg    predniSONE (DELTASONE) tablet 40 mg    DISCONTD: pyridostigmine (MESTINON) tablet 90 mg    pyridostigmine (MESTINON) tablet 90 mg    insulin lispro (HUMALOG) injection vial 0-12 Units    insulin lispro (HUMALOG) injection vial 0-6 Units       LABS / RADIOLOGY:     Labs Reviewed   CBC WITH AUTO DIFFERENTIAL - Abnormal; Notable for the following components:       Result Value    Hemoglobin 11.3 (*)     MCH 24.8 (*)     RDW 16.8 (*)     Platelets 397 (*)     Seg Neutrophils 67 (*)     Lymphocytes 22 (*)     Immature Granulocytes 1 (*)     All other components within normal limits   BASIC METABOLIC PANEL - Abnormal; Notable for the following components:    Glucose 141 (*)     BUN 4 (*)     Potassium 3.3 (*)     All other components within normal limits   URINE CULTURE CLEAN CATCH   TROPONIN   TROPONIN   BRAIN Medical/Surgical History: Pt does not speak English well, an interprerter (#[id number]) was used. Welsh voice was used on MRI for patient. FINDINGS: There is mild patchy hepatic steatosis. Liver size normal.  In a posterior inferior tip right lobe of liver there is a 9 mm T1 hypointense and T2 hyperintense circumscribed lesion with a 2.9 cm similar lesion in inferior aspect segment 3 left lobe of liver. These demonstrate typical enhancement current 6 6 of a hemangioma. Also in the left lobe of liver in segment 2 anterior portion is a subcapsular 6 mm and more central 7 mm T2 moderately hyperintense lesion which is hypointense on T1. These do not demonstrate washout and appear to show progressive enhancement although more difficult to visualize due to the small size. No appreciable restriction of diffusion. These are most probably flash filling hemangiomas. Along the posterior inferior margin of the uncinate process of the pancreas is an 8 mm ovoid T2 hyperintense nonenhancing cystic lesion. Probably benign. 1 year follow-up x5 years recommended. The spleen, kidneys, adrenal glands show no significant abnormalities. Gallbladder: Cholecystectomy. Bile Ducts: No intra or extrahepatic biliary ductal dilatation. No choledocholithiasis. Pancreatic Duct: No pancreatic ductal dilatation. No pancreas divisum. Other:  No ascites. No significant lymphadenopathy. Visualized GI tract show no significant abnormalities. Bone marrow signal normal.     1. Mild hepatic steatosis. 2. A 2.9 cm left lobe of liver and 9 mm posterior inferior subcapsular right lobe of liver hemangioma demonstrated. 2 additional subcentimeter lesions in the left lobe in segment 2 are probably flash filling hemangiomas. 3. Benign-appearing 8 mm uncinate process cystic mass. 1 year follow-up for total of 5 years recommended. No pancreatic ductal dilatation.        RECENT VITALS:     Temp: 97.8 °F (36.6 °C),  Pulse: 102, Resp: 26, BP: 109/82, SpO2: 98 %    This patient is a 39 y.o. Female with myasthenia gravis crisis. Received prednisone and pyridostigmine. + influenza. Admitted to neuro ICU. Pending bed placement. OUTSTANDING TASKS / RECOMMENDATIONS:    1. Pending bed placement. FINAL IMPRESSION:     1. Influenza with respiratory manifestation other than pneumonia    2.  Myasthenia gravis in crisis [facility])        DISPOSITION:         DISPOSITION:  []  Discharge   []  Transfer -    [x]  Admission -  Neuro ICU   []  Against Medical Advice   []  Eloped   FOLLOW-UP: Andreas Valle [facility] 27 [address] [facility] [address] [phone]           DISCHARGE MEDICATIONS: Current Discharge Medication List              Jyothi Ferro MD  Emergency Medicine Resident  Hector Elias MD  04/02/19 3933

## 2019-04-02 NOTE — ED NOTES
Pt. Resting on stretcher, NAD, breathing unlabored,  at bedside, b/p and pulse ox on monitor, no needs expressed at this time, will continue to monitor     Zane Mendez RN  04/02/19 3731

## 2019-04-03 ENCOUNTER — APPOINTMENT (OUTPATIENT)
Dept: GENERAL RADIOLOGY | Age: 44
DRG: 042 | End: 2019-04-03
Payer: MEDICARE

## 2019-04-03 LAB
-: ABNORMAL
ABSOLUTE EOS #: 0.03 K/UL (ref 0–0.44)
ABSOLUTE IMMATURE GRANULOCYTE: 0.15 K/UL (ref 0–0.3)
ABSOLUTE LYMPH #: 3.24 K/UL (ref 1.1–3.7)
ABSOLUTE MONO #: 1.21 K/UL (ref 0.1–1.2)
ALBUMIN SERPL-MCNC: 3.7 G/DL (ref 3.5–5.2)
ALBUMIN/GLOBULIN RATIO: 1.2 (ref 1–2.5)
ALP BLD-CCNC: 61 U/L (ref 35–104)
ALT SERPL-CCNC: 41 U/L (ref 5–33)
AMORPHOUS: ABNORMAL
ANION GAP SERPL CALCULATED.3IONS-SCNC: 12 MMOL/L (ref 9–17)
AST SERPL-CCNC: 53 U/L
BACTERIA: ABNORMAL
BASOPHILS # BLD: 0 % (ref 0–2)
BASOPHILS ABSOLUTE: 0.04 K/UL (ref 0–0.2)
BILIRUB SERPL-MCNC: 0.25 MG/DL (ref 0.3–1.2)
BILIRUBIN URINE: NEGATIVE
BUN BLDV-MCNC: 7 MG/DL (ref 6–20)
BUN/CREAT BLD: ABNORMAL (ref 9–20)
CALCIUM SERPL-MCNC: 8.4 MG/DL (ref 8.6–10.4)
CASTS UA: ABNORMAL /LPF (ref 0–8)
CHLORIDE BLD-SCNC: 101 MMOL/L (ref 98–107)
CO2: 24 MMOL/L (ref 20–31)
COLOR: ABNORMAL
CREAT SERPL-MCNC: 0.54 MG/DL (ref 0.5–0.9)
CRYSTALS, UA: ABNORMAL /HPF
CULTURE: ABNORMAL
CULTURE: ABNORMAL
DIFFERENTIAL TYPE: ABNORMAL
EKG ATRIAL RATE: 102 BPM
EKG P AXIS: 47 DEGREES
EKG P-R INTERVAL: 128 MS
EKG Q-T INTERVAL: 350 MS
EKG QRS DURATION: 80 MS
EKG QTC CALCULATION (BAZETT): 456 MS
EKG R AXIS: 7 DEGREES
EKG T AXIS: 38 DEGREES
EKG VENTRICULAR RATE: 102 BPM
EOSINOPHILS RELATIVE PERCENT: 0 % (ref 1–4)
EPITHELIAL CELLS UA: ABNORMAL /HPF (ref 0–5)
GFR AFRICAN AMERICAN: >60 ML/MIN
GFR NON-AFRICAN AMERICAN: >60 ML/MIN
GFR SERPL CREATININE-BSD FRML MDRD: ABNORMAL ML/MIN/{1.73_M2}
GFR SERPL CREATININE-BSD FRML MDRD: ABNORMAL ML/MIN/{1.73_M2}
GLUCOSE BLD-MCNC: 171 MG/DL (ref 65–105)
GLUCOSE BLD-MCNC: 201 MG/DL (ref 65–105)
GLUCOSE BLD-MCNC: 212 MG/DL (ref 65–105)
GLUCOSE BLD-MCNC: 90 MG/DL (ref 65–105)
GLUCOSE BLD-MCNC: 93 MG/DL (ref 70–99)
GLUCOSE URINE: NEGATIVE
HCT VFR BLD CALC: 35.1 % (ref 36.3–47.1)
HEMOGLOBIN: 10.4 G/DL (ref 11.9–15.1)
IMMATURE GRANULOCYTES: 2 %
KETONES, URINE: NEGATIVE
LEUKOCYTE ESTERASE, URINE: ABNORMAL
LYMPHOCYTES # BLD: 33 % (ref 24–43)
Lab: ABNORMAL
MCH RBC QN AUTO: 25.1 PG (ref 25.2–33.5)
MCHC RBC AUTO-ENTMCNC: 29.6 G/DL (ref 28.4–34.8)
MCV RBC AUTO: 84.6 FL (ref 82.6–102.9)
MONOCYTES # BLD: 12 % (ref 3–12)
MUCUS: ABNORMAL
NITRITE, URINE: NEGATIVE
NRBC AUTOMATED: 0 PER 100 WBC
OTHER OBSERVATIONS UA: ABNORMAL
PDW BLD-RTO: 17 % (ref 11.8–14.4)
PH UA: 7.5 (ref 5–8)
PLATELET # BLD: 471 K/UL (ref 138–453)
PLATELET ESTIMATE: ABNORMAL
PMV BLD AUTO: 11.2 FL (ref 8.1–13.5)
POTASSIUM SERPL-SCNC: 3.8 MMOL/L (ref 3.7–5.3)
PROTEIN UA: ABNORMAL
RBC # BLD: 4.15 M/UL (ref 3.95–5.11)
RBC # BLD: ABNORMAL 10*6/UL
RBC UA: ABNORMAL /HPF (ref 0–4)
RENAL EPITHELIAL, UA: ABNORMAL /HPF
SEG NEUTROPHILS: 53 % (ref 36–65)
SEGMENTED NEUTROPHILS ABSOLUTE COUNT: 5.13 K/UL (ref 1.5–8.1)
SODIUM BLD-SCNC: 137 MMOL/L (ref 135–144)
SPECIFIC GRAVITY UA: 1 (ref 1–1.03)
SPECIMEN DESCRIPTION: ABNORMAL
TOTAL PROTEIN: 6.8 G/DL (ref 6.4–8.3)
TRICHOMONAS: ABNORMAL
TURBIDITY: ABNORMAL
URINE HGB: ABNORMAL
UROBILINOGEN, URINE: NORMAL
WBC # BLD: 9.8 K/UL (ref 3.5–11.3)
WBC # BLD: ABNORMAL 10*3/UL
WBC UA: ABNORMAL /HPF (ref 0–5)
YEAST: ABNORMAL

## 2019-04-03 PROCEDURE — G0378 HOSPITAL OBSERVATION PER HR: HCPCS

## 2019-04-03 PROCEDURE — 2580000003 HC RX 258: Performed by: STUDENT IN AN ORGANIZED HEALTH CARE EDUCATION/TRAINING PROGRAM

## 2019-04-03 PROCEDURE — 2060000000 HC ICU INTERMEDIATE R&B

## 2019-04-03 PROCEDURE — 99233 SBSQ HOSP IP/OBS HIGH 50: CPT | Performed by: PSYCHIATRY & NEUROLOGY

## 2019-04-03 PROCEDURE — 82947 ASSAY GLUCOSE BLOOD QUANT: CPT

## 2019-04-03 PROCEDURE — 97161 PT EVAL LOW COMPLEX 20 MIN: CPT

## 2019-04-03 PROCEDURE — 85025 COMPLETE CBC W/AUTO DIFF WBC: CPT

## 2019-04-03 PROCEDURE — 6370000000 HC RX 637 (ALT 250 FOR IP): Performed by: STUDENT IN AN ORGANIZED HEALTH CARE EDUCATION/TRAINING PROGRAM

## 2019-04-03 PROCEDURE — 6360000002 HC RX W HCPCS: Performed by: STUDENT IN AN ORGANIZED HEALTH CARE EDUCATION/TRAINING PROGRAM

## 2019-04-03 PROCEDURE — 80053 COMPREHEN METABOLIC PANEL: CPT

## 2019-04-03 PROCEDURE — 36415 COLL VENOUS BLD VENIPUNCTURE: CPT

## 2019-04-03 PROCEDURE — 97110 THERAPEUTIC EXERCISES: CPT

## 2019-04-03 PROCEDURE — 96372 THER/PROPH/DIAG INJ SC/IM: CPT

## 2019-04-03 PROCEDURE — 71045 X-RAY EXAM CHEST 1 VIEW: CPT

## 2019-04-03 RX ORDER — DEXTROSE MONOHYDRATE 25 G/50ML
12.5 INJECTION, SOLUTION INTRAVENOUS PRN
Status: DISCONTINUED | OUTPATIENT
Start: 2019-04-03 | End: 2019-04-04 | Stop reason: HOSPADM

## 2019-04-03 RX ORDER — NICOTINE POLACRILEX 4 MG
15 LOZENGE BUCCAL PRN
Status: DISCONTINUED | OUTPATIENT
Start: 2019-04-03 | End: 2019-04-04 | Stop reason: HOSPADM

## 2019-04-03 RX ORDER — DEXTROSE MONOHYDRATE 50 MG/ML
100 INJECTION, SOLUTION INTRAVENOUS PRN
Status: DISCONTINUED | OUTPATIENT
Start: 2019-04-03 | End: 2019-04-04 | Stop reason: HOSPADM

## 2019-04-03 RX ADMIN — METFORMIN HYDROCHLORIDE 500 MG: 500 TABLET ORAL at 17:25

## 2019-04-03 RX ADMIN — MYCOPHENOLATE MOFETIL 500 MG: 250 CAPSULE ORAL at 08:53

## 2019-04-03 RX ADMIN — Medication 10 ML: at 21:02

## 2019-04-03 RX ADMIN — ENOXAPARIN SODIUM 40 MG: 40 INJECTION SUBCUTANEOUS at 08:53

## 2019-04-03 RX ADMIN — METFORMIN HYDROCHLORIDE 500 MG: 500 TABLET ORAL at 08:53

## 2019-04-03 RX ADMIN — PYRIDOSTIGMINE BROMIDE 90 MG: 60 TABLET ORAL at 07:39

## 2019-04-03 RX ADMIN — PREDNISONE 40 MG: 20 TABLET ORAL at 08:53

## 2019-04-03 RX ADMIN — MYCOPHENOLATE MOFETIL 500 MG: 250 CAPSULE ORAL at 20:53

## 2019-04-03 RX ADMIN — PYRIDOSTIGMINE BROMIDE 90 MG: 60 TABLET ORAL at 13:33

## 2019-04-03 RX ADMIN — Medication 10 ML: at 08:57

## 2019-04-03 RX ADMIN — PYRIDOSTIGMINE BROMIDE 90 MG: 60 TABLET ORAL at 20:53

## 2019-04-03 ASSESSMENT — PAIN SCALES - GENERAL
PAINLEVEL_OUTOF10: 0
PAINLEVEL_OUTOF10: 0

## 2019-04-03 NOTE — PROGRESS NOTES
Daily Progress Note  Neuro Critical Care    Patient Name: Misael Meraz  Patient : 1975  Room[place]: [facility] [place] 2[facility]0521-01  Allergies: Allergies   Allergen Reactions    Morphine Shortness Of Breath    Flexeril [Cyclobenzaprine]     Norflex [Orphenadrine Citrate]     Nsaids     Penicillins Rash    Vancomycin Rash     Problem List:   Patient Active Problem List   Diagnosis    Pre-diabetes    Myast[facility]ia gravis with exacerbation ([facility]    Myasthenia gravis (HCC)    Osteopenia    Chronic hepatitis C without hepatic coma (HCC)    Influenza with respiratory manifestation other than pneumonia    Myasthenia gravis in crisis ([facility]    H/O myasthenia gravis       INTERVAL HISTORY        Hospital Course: The patient is a 39 y.o. female who was discharged on 3/30 due to MG exacerbation after being positive for Influenza A. She had pmhx of MG and had flu 7 days ago and was admitted here due to facial muscle weakness, ptosis of eyes and  overall fatigue like symptoms. She is a patient of  and is on   Mestinon 60 mg 3 times a day, prednisone 20 mg daily, CellCept 500 mg twice a day, her NIF on last admission was -40 and VC 1.46L, she received Tamiflue 75 mg dose and prednisone 60 mg tapered to 40 mg then 20 mg  and was discharged after feeling well, however today morning she was coughing and feeling fatigue and decided to come to ER and will be admitted in NICU incase she develops MG exacerbation.     After evaluating the patient in ED with Regine Mccallum it  doesn't seem like she is MG exacerbation at the moment and therefore doesn't need IVIG or plasmapheresis at this time, however plan is to increase predisone to 40 mg and Mestinon to 90 mg tid and monitor her in NICU. Last 24 hours:  No acute events overnight.   Patient vitals were stable , she states she feels better , no muscle weakness, ptosis or bulbar signs seen,cough imporved VC -28L, NIF >-40 cm h20, will continue to monitor patient for another day and then discharge  on current medication regime with Op f/u with dr. Win Gilmore  in one week time if stable overnight. MEDICATIONS:     CURRENT MEDICATIONS:  SCHEDULED MEDICATIONS:   sodium chloride flush  10 mL Intravenous 2 times per day    metFORMIN  500 mg Oral BID WC    mycophenolate  500 mg Oral BID    sodium chloride flush  10 mL Intravenous 2 times per day    enoxaparin  40 mg Subcutaneous Daily    predniSONE  40 mg Oral Daily    pyridostigmine  90 mg Oral 3 times per day    insulin lispro  0-12 Units Subcutaneous TID WC    insulin lispro  0-6 Units Subcutaneous Nightly     CONTINUOUS INFUSIONS:    PRN MEDICATIONS:   sodium chloride flush, acetaminophen, sodium chloride flush, magnesium hydroxide, ondansetron    VITALS 24 Hours     Temperature Range: Temp: 98.9 °F (37.2 °C) Temp  Av.6 °F (37 °C)  Min: 97.8 °F (36.6 °C)  Max: 98.9 °F (37.2 °C)  BP Range: Systolic (66EFD), DMH:788 , Min:109 , WHO:375     Diastolic (02WBA), SDT:68, Min:67, Max:107    Pulse Range: Pulse  Av.3  Min: 67  Max: 108  Respiration Range: Resp  Av  Min: 16  Max: 26  Current Pulse Ox: SpO2: 98 %  24HR Pulse Ox Range: SpO2  Av %  Min: 94 %  Max: 99 %  Patient Vitals for the past 12 hrs:   BP Temp Temp src Pulse Resp SpO2   19 0604 121/80 -- -- 91 23 98 %   19 0504 110/67 -- -- 73 17 95 %   19 0404 119/77 98.9 °F (37.2 °C) Oral 72 19 97 %   19 0304 121/88 -- -- 67 18 98 %   19 0204 (!) 131/95 -- -- 67 18 98 %   19 0104 (!) 134/107 -- -- 79 22 94 %   19 0009 (!) 134/97 98.8 °F (37.1 °C) Oral 86 19 99 %   19 2203 (!) 133/90 -- -- 96 18 97 %   19 2103 (!) 152/100 -- -- 106 19 98 %   19 (!) 144/106 -- -- 108 16 97 %   19 -- 98.7 °F (37.1 °C) Oral -- -- --     Estimated body mass index is 33.14 kg/m² as calculated from the following:    Height as of 3/30/19: 5' (1.524 m).     Weight as of 3/31/19: 169 lb 11.2 oz (77 kg).  []<16 Troponin Interp NOT REPORTED    POC Glucose Fingerstick    Collection Time: 04/02/19  8:48 PM   Result Value Ref Range    POC Glucose 136 (H) 65 - 105 mg/dL   Urinalysis with Microscopic    Collection Time: 04/02/19 11:52 PM   Result Value Ref Range    Color, UA RED (A) YELLOW    Turbidity UA TURBID (A) CLEAR    Glucose, Ur NEGATIVE NEGATIVE    Bilirubin Urine NEGATIVE NEGATIVE    Ketones, Urine NEGATIVE NEGATIVE    Specific Gravity, UA 1.002 (L) 1.005 - 1.030    Urine Hgb LARGE (A) NEGATIVE    pH, UA 7.5 5.0 - 8.0    Protein, UA 1+ (A) NEGATIVE    Urobilinogen, Urine Normal Normal    Nitrite, Urine NEGATIVE NEGATIVE    Leukocyte Esterase, Urine SMALL (A) NEGATIVE    -          WBC, UA 5 TO 10 0 - 5 /HPF    RBC, UA TOO NUMEROUS TO COUNT 0 - 4 /HPF    Casts UA  0 - 8 /LPF     2 TO 5 HYALINE Reference range defined for non-centrifuged specimen. Crystals UA NOT REPORTED None /HPF    Epithelial Cells UA 2 TO 5 0 - 5 /HPF    Renal Epithelial, Urine NOT REPORTED 0 /HPF    Bacteria, UA NOT REPORTED None    Mucus, UA NOT REPORTED None    Trichomonas, UA NOT REPORTED None    Amorphous, UA NOT REPORTED None    Other Observations UA NOT REPORTED NOT REQ.     Yeast, UA NOT REPORTED None   Comprehensive Metabolic Panel w/ Reflex to MG    Collection Time: 04/03/19  4:57 AM   Result Value Ref Range    Glucose 93 70 - 99 mg/dL    BUN 7 6 - 20 mg/dL    CREATININE 0.54 0.50 - 0.90 mg/dL    Bun/Cre Ratio NOT REPORTED 9 - 20    Calcium 8.4 (L) 8.6 - 10.4 mg/dL    Sodium 137 135 - 144 mmol/L    Potassium 3.8 3.7 - 5.3 mmol/L    Chloride 101 98 - 107 mmol/L    CO2 24 20 - 31 mmol/L    Anion Gap 12 9 - 17 mmol/L    Alkaline Phosphatase 61 35 - 104 U/L    ALT 41 (H) 5 - 33 U/L    AST 53 (H) <32 U/L    Total Bilirubin 0.25 (L) 0.3 - 1.2 mg/dL    Total Protein 6.8 6.4 - 8.3 g/dL    Alb 3.7 3.5 - 5.2 g/dL    Albumin/Globulin Ratio 1.2 1.0 - 2.5    GFR Non-African American >60 >60 mL/min    GFR African American >60 >60 mL/min    GFR Comment GFR Staging NOT REPORTED    CBC auto differential    Collection Time: 04/03/19  4:57 AM   Result Value Ref Range    WBC 9.8 3.5 - 11.3 k/uL    RBC 4.15 3.95 - 5.11 m/uL    Hemoglobin 10.4 (L) 11.9 - 15.1 g/dL    Hematocrit 35.1 (L) 36.3 - 47.1 %    MCV 84.6 82.6 - 102.9 fL    MCH 25.1 (L) 25.2 - 33.5 pg    MCHC 29.6 28.4 - 34.8 g/dL    RDW 17.0 (H) 11.8 - 14.4 %    Platelets 727 (H) 054 - 453 k/uL    MPV 11.2 8.1 - 13.5 fL    NRBC Automated 0.0 0.0 per 100 WBC    Differential Type NOT REPORTED     Seg Neutrophils 53 36 - 65 %    Lymphocytes 33 24 - 43 %    Monocytes 12 3 - 12 %    Eosinophils % 0 (L) 1 - 4 %    Basophils 0 0 - 2 %    Immature Granulocytes 2 (H) 0 %    Segs Absolute 5.13 1.50 - 8.10 k/uL    Absolute Lymph # 3.24 1.10 - 3.70 k/uL    Absolute Mono # 1.21 (H) 0.10 - 1.20 k/uL    Absolute Eos # 0.03 0.00 - 0.44 k/uL    Basophils # 0.04 0.00 - 0.20 k/uL    Absolute Immature Granulocyte 0.15 0.00 - 0.30 k/uL    WBC Morphology NOT REPORTED     RBC Morphology ANISOCYTOSIS PRESENT     Platelet Estimate NOT REPORTED          RADIOLOGY   Xr Chest Standard (2 Vw)    Result Date: 4/2/2019  EXAMINATION: TWO VIEWS OF THE CHEST 4/2/2019 8:00 am COMPARISON: March 30, 2019 HISTORY: ORDERING SYSTEM PROVIDED HISTORY: cough, recent influenza TECHNOLOGIST PROVIDED HISTORY: cough, recent influenza FINDINGS: Redemonstration of cardiomegaly. Some loss of volume can be seen in the right base with mild atelectasis. No definitive pneumonia. No evidence of pulmonary edema or pleural effusion. Cardiomegaly. Atelectatic changes and loss of volume in the right base. No definitive pneumonia. Labs and Images reviewed with:  [] Dr. Ines Lockett. Nicole     [] Dr. Matthew Crawley  [x] Dr. Kae Limon  [] there are no new interval images to review.      ASSESSMENT AND PLAN:     ASSESSMENT:     This is a 39 y.o. female with  pmhx of MG presented with cough and fatigue                  PLAN/MEDICAL DECISION

## 2019-04-03 NOTE — PROGRESS NOTES
Physical Therapy    Facility/Department: [address]  Initial Assessment    NAME: Chris Miles  : 1975  MRN: [id number]     Chief Complaint   Patient presents with    Cough    Fatigue     hx myasthenia gravis     The patient is a 39 y.o. female with significant past medical history of myasthenia gravis with recent exacerbation due to viral flu. She was discharged home 2 days ago and  then experienced worsening of her symptoms including generalized weakness, inability to swallow, inability to expectorate. Denies shortness of breath but has cough with sputum than she can't expectorate. Denies fever, abdominal pain, nausea vomiting, the area, dysuria. Has been started on prednisone 60 tapered down to 40 and then was discharged on a 20mg dose. She is also on Mestinon 60 mg 3 times a day. CellCept 500 mg twice a day. She was recently discharged home after being managed for acute exacerbation of myasthenia gravis. At that time she presented with facial muscle weakness, ptosis, generalized malaise. Date of Service: 4/3/2019    Discharge Recommendations:  No further therapy required at discharge. Assessment   Assessment: Pt independent with bed mobility, transfers, and ambulation. Prognosis: Good  Decision Making: Low Complexity  REQUIRES PT FOLLOW UP: No  Activity Tolerance  Activity Tolerance: Patient Tolerated treatment well       Patient Diagnosis(es): The primary encounter diagnosis was Influenza with respiratory manifestation other than pneumonia. A diagnosis of Myasthenia gravis in crisis [facility]) was also pertinent to this visit. has a past medical history of Anemia, Chronic hepatitis C without hepatic coma ([facility] Headache, Hypertension, Myasthenia gravis ([facility] and Pre-diabetes. has a past surgical history that includes Cardiac surgery.     Restrictions  Restrictions/Precautions  Restrictions/Precautions: Up as Tolerated  Required Braces or Orthoses?: No  Vision/Hearing  Vision: Within Functional Limits  Hearing: Within functional limits     Subjective  General  Chart Reviewed: Yes  Patient assessed for rehabilitation services?: Yes  Response To Previous Treatment: Not applicable  Family / Caregiver Present: No  Follows Commands: Impaired(Pt speaks minimal english, able to communicate via  sasha on her cell phone. )  Subjective  Subjective: Pt and RN agreeable to PT. Pain Screening  Patient Currently in Pain: Denies  Vital Signs  Patient Currently in Pain: Denies       Orientation  Orientation  Overall Orientation Status: Within Functional Limits  Social/Functional History  Social/Functional History  Lives With: Spouse  Type of Home: House  Home Layout: One level  Home Access: Stairs to enter with rails  ADL Assistance: Independent  Homemaking Assistance: Independent  Ambulation Assistance: Independent  Transfer Assistance: Independent  Additional Comments: Social/functional history obtained from care coordinator notes per pt's limited ability to speak [address] Objective    AROM RLE (degrees)  RLE AROM: WFL  AROM LLE (degrees)  LLE AROM : WFL  AROM RUE (degrees)  RUE AROM : WFL  AROM LUE (degrees)  LUE AROM : WFL  Strength RLE  Strength RLE: WNL  Strength LLE  Strength LLE: WNL  Strength RUE  Strength RUE: WNL  Strength LUE  Strength LUE: WNL  Strength Other  Other: Grossly 5/5 UE/LE     Sensation  Overall Sensation Status: WFL(Pt denies n/t)  Bed mobility  Supine to Sit: Independent  Sit to Supine: Independent  Scooting: Independent  Transfers  Sit to Stand: Independent  Stand to sit: Independent  Ambulation  Ambulation?: Yes  Ambulation 1  Surface: level tile  Device: No Device  Assistance: Independent  Distance: 5'  Comments: Pt did not want to walk out of her room per fear of getting sick from other pt's or getting other pt's sick.    Stairs/Curb  Stairs?: No   Exercises:  Pt did not wish to ambulate outside of room, however she completed exercises and insisted that she was independent. Standing exercise program: Heel/toe raises, hip flexion, mini squats, and hamstring curls. Reps: 1x10  Pt requires visual demonstration for understanding of exercises. Able to complete without any difficulty.         Balance  Sitting - Static: Good  Sitting - Dynamic: Good  Standing - Static: Good  Standing - Dynamic: Good        Plan   Safety Devices  Type of devices: Nurse notified, Left in bed      AM-PAC Score     AM-PAC Inpatient Mobility without Stair Climbing Raw Score : 20  AM-PAC Inpatient without Stair Climbing T-Scale Score : 60.57  Mobility Inpatient CMS 0-100% Score: 0  Mobility Inpatient without Stair CMS G-Code Modifier : [address]       Therapy Time   Individual Concurrent Group Co-treatment   Time In 0138         Time Out 0150         Minutes 12         Timed Code Treatment Minutes: 8 Minutes       VIPUL Glez  The above documentation was reviewed and accepted by Gauri Franco Physical Therapist.

## 2019-04-03 NOTE — PROGRESS NOTES
Occupational Therapy    Occupational Therapy Not Seen Note    DATE: 4/3/2019  Name: Lo Hay  : 1975  MRN: [id number]    Patient not available for Occupational Therapy due to:    Pt independent with functional mobility and functional tasks.  Pt with no OT acute care needs at this time, will defer OT eval.    Next Scheduled Treatment: N/A    Electronically signed by Kvng Carrington OT on 4/3/2019 at 2:56 PM

## 2019-04-03 NOTE — PROGRESS NOTES
Smoking Cessation - topics covered   []  Health Risks  []  Benefits of Quitting   []  Smoking Cessation  [x]  Patient has no history of tobacco use  []  Patient is former smoker. [x]  No need for tobacco cessation education. []  Booklet given  []  Patient verbalizes understanding. []  Patient denies need for tobacco cessation education. []  Unable to meet with patient today. Will follow up as able.   Simon Coffey  9:33 AM

## 2019-04-04 VITALS
DIASTOLIC BLOOD PRESSURE: 57 MMHG | SYSTOLIC BLOOD PRESSURE: 107 MMHG | HEIGHT: 60 IN | BODY MASS INDEX: 33.33 KG/M2 | TEMPERATURE: 98.6 F | WEIGHT: 169.75 LBS | HEART RATE: 75 BPM | RESPIRATION RATE: 22 BRPM | OXYGEN SATURATION: 97 %

## 2019-04-04 LAB
GLUCOSE BLD-MCNC: 149 MG/DL (ref 65–105)
GLUCOSE BLD-MCNC: 81 MG/DL (ref 65–105)

## 2019-04-04 PROCEDURE — 6370000000 HC RX 637 (ALT 250 FOR IP): Performed by: STUDENT IN AN ORGANIZED HEALTH CARE EDUCATION/TRAINING PROGRAM

## 2019-04-04 PROCEDURE — 96372 THER/PROPH/DIAG INJ SC/IM: CPT

## 2019-04-04 PROCEDURE — 6360000002 HC RX W HCPCS: Performed by: STUDENT IN AN ORGANIZED HEALTH CARE EDUCATION/TRAINING PROGRAM

## 2019-04-04 PROCEDURE — 99239 HOSP IP/OBS DSCHRG MGMT >30: CPT | Performed by: PSYCHIATRY & NEUROLOGY

## 2019-04-04 PROCEDURE — 2580000003 HC RX 258: Performed by: STUDENT IN AN ORGANIZED HEALTH CARE EDUCATION/TRAINING PROGRAM

## 2019-04-04 PROCEDURE — 82947 ASSAY GLUCOSE BLOOD QUANT: CPT

## 2019-04-04 PROCEDURE — G0378 HOSPITAL OBSERVATION PER HR: HCPCS

## 2019-04-04 RX ORDER — PYRIDOSTIGMINE BROMIDE 60 MG/1
TABLET ORAL
Qty: 60 TABLET | Refills: 0 | Status: SHIPPED | OUTPATIENT
Start: 2019-04-04 | End: 2020-07-20

## 2019-04-04 RX ORDER — PREDNISONE 20 MG/1
40 TABLET ORAL DAILY
Qty: 20 TABLET | Refills: 0 | Status: SHIPPED | OUTPATIENT
Start: 2019-04-04 | End: 2019-04-14

## 2019-04-04 RX ADMIN — PYRIDOSTIGMINE BROMIDE 90 MG: 60 TABLET ORAL at 13:36

## 2019-04-04 RX ADMIN — PYRIDOSTIGMINE BROMIDE 90 MG: 60 TABLET ORAL at 05:06

## 2019-04-04 RX ADMIN — METFORMIN HYDROCHLORIDE 500 MG: 500 TABLET ORAL at 09:17

## 2019-04-04 RX ADMIN — Medication 10 ML: at 09:18

## 2019-04-04 RX ADMIN — ENOXAPARIN SODIUM 40 MG: 40 INJECTION SUBCUTANEOUS at 09:22

## 2019-04-04 RX ADMIN — MYCOPHENOLATE MOFETIL 500 MG: 250 CAPSULE ORAL at 09:21

## 2019-04-04 RX ADMIN — PREDNISONE 40 MG: 20 TABLET ORAL at 09:17

## 2019-04-04 NOTE — PLAN OF CARE
Problem: Mobility - Impaired:  Goal: Able to ambulate independently  Description  Able to ambulate independently  Outcome: Met This Shift     Problem: Mobility - Impaired:  Goal: Able to ambulate with minimal assistance  Description  Able to ambulate with minimal assistance  Outcome: Met This Shift     Problem: Mobility - Impaired:  Goal: Ability to appropriately use an adaptive device for ambulation will improve  Description  Ability to appropriately use an adaptive device for ambulation will improve  Outcome: Met This Shift     Problem: Mobility - Impaired:  Goal: Able to verbalize acceptance of life and situations over which he or she has no control  Description  Absence of contracture deformity  Outcome: Met This Shift

## 2019-04-04 NOTE — DISCHARGE SUMMARY
Neurology Discharge Summary     Patient Identification:  Radha Garcia is a 39 y.o. female. :  1975  Admit Date:  2019  Discharge date and time: 19  Attending Provider: Selvin Ye MD     Account Number: [de-identified]                                   Admission Diagnoses:   Myasthenia gravis in [facility]) [G70.01]  H/O myasthenia gravis [Z86.69]    Discharge Diagnoses: Active Problems:    Influenza with respiratory manifestation other than pneumonia    Myasthenia gravis in [facility])    H/O myasthenia gravis  Resolved Problems:    * No resolved hospital problems. *      Discharge Medications:    Current Discharge Medication List           Details   mycophenolate (CELLCEPT) 250 MG capsule Take 2 capsules by mouth 2 times daily  Qty: 60 capsule, Refills: 3      predniSONE (DELTASONE) 20 MG tablet Take 3 tablets by mouth daily for 10 days  Qty: 30 tablet, Refills: 0      guaiFENesin-dextromethorphan (ROBITUSSIN DM) 100-10 MG/5ML syrup Take 5 mLs by mouth 3 times daily as needed for Cough  Qty: 120 mL, Refills: 0      acetaminophen (TYLENOL) 325 MG tablet Take 2 tablets by mouth every 6 hours as needed for Pain  Qty: 30 tablet, Refills: 0      !! vitamin D (CHOLECALCIFEROL) 1000 UNIT TABS tablet TAKE 1 TABLET BY MOUTH ONE TIME A DAY  Qty: 30 tablet, Refills: 0    Associated Diagnoses: Osteopenia, unspecified location      vitamin C (ASCORBIC ACID) 500 MG tablet TAKE 1 TABLET BY MOUTH ONE TIME A DAY  Qty: 30 tablet, Refills: 3      !! vitamin D (CHOLECALCIFEROL) 1000 UNIT TABS tablet TAKE 1 TABLET BY MOUTH ONE TIME A DAY  Qty: 30 tablet, Refills: 0    Associated Diagnoses: Osteopenia, unspecified location      fluticasone (FLONASE) 50 MCG/ACT nasal spray 1 spray by Each Nare route daily 1 Spray in each nostril  Qty: 2 Bottle, Refills: 1    Associated Diagnoses: Viral URI with cough;  Nasal congestion      pyridostigmine (MESTINON) 60 MG tablet Take 1 po qid 7 AM , q 11 AM ,q 3 PM, 7 PM  Qty: 120 tablet, Refills: 5      ferrous sulfate 325 (65 Fe) MG tablet TAKE 1 TABLET BY MOUTH 2 TIMES DAILY (WITH MEALS)  Qty: 90 tablet, Refills: 9      blood glucose monitor strips Test 4 times daily Diagnosis  Qty: 100 strip, Refills: 2    Associated Diagnoses: Type 2 diabetes mellitus with complication, without long-term current use of insulin ([facility])      Alcohol Swabs PADS USE AS DIRECTED WHEN CHECKING BLOOD SUGAR DAILY. Qty: 100 each, Refills: 3    Associated Diagnoses: Type 2 diabetes mellitus with complication, without long-term current use of insulin ([facility])      metFORMIN (GLUCOPHAGE) 500 MG tablet Take 1 tablet by mouth 2 times daily (with meals)  Qty: 60 tablet, Refills: 2    Associated Diagnoses: Type 2 diabetes mellitus with complication, without long-term current use of insulin ([facility])      ACCU-CHEK SOFTCLIX LANCETS MISC Daily  Qty: 100 each, Refills: 3    Associated Diagnoses: Type 2 diabetes mellitus with complication, unspecified long term insulin use status      glucose monitoring kit (FREESTYLE) monitoring kit Daily check  Qty: 1 kit, Refills: 0    Associated Diagnoses: Type 2 diabetes mellitus with complication, unspecified long term insulin use status       ! ! - Potential duplicate medications found. Please discuss with provider. Current Discharge Medication List              Hospital Course:  Hospital Course: The patient is a 39 y. o. female who was discharged on 3/30 due to MG exacerbation after being positive for Influenza A. She had pmhx of MG and had flu 7 days ago and was admitted here due to facial muscle weakness, ptosis of eyes and  overall fatigue like symptoms. She is a patient of  and is on   Mestinon 60 mg 3 times a day, prednisone 20 mg daily, CellCept 500 mg twice a day, her NIF on last admission was -40 and VC 1.46L, she received Tamiflue 75 mg dose and prednisone 60 mg tapered to 40 mg then 20 mg  and was discharged after feeling well, however today morning she was coughing and feeling fatigue and decided to come to ER and will be admitted in NICU incase she develops MG exacerbation.     After evaluating the patient in ED with  it  doesn't seem like she is MG exacerbation at the moment and therefore doesn't need IVIG or plasmapheresis at this time, however plan is to increase predisone to 40 mg and Mestinon to 90 mg tid and monitor her in NICU.           4/3:  No acute events overnight.  Patient vitals were stable , she states she feels better , no muscle weakness, ptosis or bulbar signs seen,cough imporved VC -28L, NIF >-40 cm h20, will continue to monitor patient for another day and then discharge  on current medication regime with Op f/u with dr. Christina Bledsoe one week time if stable overnight.                 Last 24 hours:  No acute events overnight. Vitals stable VC 1.4 L NIF > -40 cm h20, patient denies, no cough bulbar symptoms or fatigue but experienced 4-5 episodes of diarrhea in pm and later in evening, however after talking to the patient and explaing the benefits of Mestinon and her verbal understanding plan is  discharged from NICU standpoint with OP f/u with  in 7-10days  time on Cellcept 500 mg bid, mestinon 90 mg in morning and afternoon and 60 mg in night and prednisone 40 mg daily. PHYSICAL EXAM:  CONSTITUTIONAL:  Well developed, well nourished, alert and oriented x 3, in no acute distress. GCS 15. Nontoxic. No dysarthria.  No aphasia   HEAD:  normocephalic, atraumatic    EYES:  PERRLA, EOMI.   ENT:  moist mucous membranes   LUNGS:  Equal air entry bilaterally   CARDIOVASCULAR:  normal s1 / s2   ABDOMEN:  Soft, no rigidity   NECK supple, symmetric, no midline tenderness to palpation    BACK without midline tenderness, step-offs or deformities    EXTREMITIES Normal ROM with no deformities   NEUROLOGIC:  Mental Status:  A & O x3,awake     Cranial Nerves:    cranial nerves II-XII are grossly intact, ptosis resolved     Motor Exam:    Drift:  absent  Tone:  normal     Motor exam is symmetrical 5 out of 5 all extremities bilaterally     Sensory:    Touch:    Right Upper Extremity:  normal  Left Upper Extremity:  normal  Right Lower Extremity:  normal  Left Lower Extremity:  normal     Deep Tendon Reflexes:    Right Bicep:  2+  Left Bicep:  2+  Right Knee:  2+  Left Knee:  2+      SKIN No obvious ecchymosis, rashes, or lesions                 Significant Diagnostics:   Possible MG exacerbation    Disposition:   Home    Patient Instructions:   Patient to continue predisone to 40 mg and Mestinon to 90 mg in morning and afternoon and 60 mg at night and Cellcept 500 mg bid  Patient to f/u  in one week time for medication adjustment if nneded    Activity: activity as tolerated  Diet: regular diet        Nirali Bonner MD

## 2019-04-04 NOTE — PROGRESS NOTES
Daily Progress Note  Neuro Critical Care    Patient Name: Oscar Bello  Patient : 1975  Room/Bed: 0540/0540-01  Allergies: Allergies   Allergen Reactions    Morphine Shortness Of Breath    Flexeril [Cyclobenzaprine]     Norflex [Orphenadrine Citrate]     Nsaids     Penicillins Rash    Vancomycin Rash     Problem List:   Patient Active Problem List   Diagnosis    Pre-diabetes    Myasthenia gravis with exacerbation ([facility]    Myasthenia gravis (HCC)    Osteopenia    Chronic hepatitis C without hepatic coma (HCC)    Influenza with respiratory manifestation other than pneumonia    Myasthenia gravis in crisis ([facility]    H/O myasthenia gravis       [address] [facility] Course:    Hospital Course: The patient is a 39 y. o. female who was discharged on 3/30 due to MG exacerbation after being positive for Influenza A. She had pmhx of MG and had flu 7 days ago and was admitted here due to facial muscle weakness, ptosis of eyes and  overall fatigue like symptoms. She is a patient of  and is on   Mestinon 60 mg 3 times a day, prednisone 20 mg daily, CellCept 500 mg twice a day, her NIF on last admission was -40 and VC 1.46L, she received Tamiflue 75 mg dose and prednisone 60 mg tapered to 40 mg then 20 mg  and was discharged after feeling well, however today morning she was coughing and feeling fatigue and decided to come to ER and will be admitted in NICU incase she develops MG exacerbation.     After evaluating the patient in ED with  it  doesn't seem like she is MG exacerbation at the moment and therefore doesn't need IVIG or plasmapheresis at this time, however plan is to increase predisone to 40 mg and Mestinon to 90 mg tid and monitor her in NICU.           4/3:  No acute events overnight.   Patient vitals were stable , she states she feels better , no muscle weakness, ptosis or bulbar signs seen,cough imporved VC -28L, NIF >-40 cm h20, will continue to monitor patient for another day and then discharge  on current medication regime with Op f/u with dr. Maury Gonsales  in one week time if stable overnight. Last 24 hours:  No acute events overnight. Vitals stable VC 1.4 L NIF > -40 cm h20, patient denies, no cough bulbar symptoms or fatigue but experienced 4-5 episodes of diarrhea in pm and later in evening, however after talking to the patient and explaing the benefits of Mestinon and her verbal understanding plan is  discharged from NICU standpoint with OP f/u with  in 7-10 days time on Cellcept 500 mg bid, mestinon 90 mg in morning and afternoon and 60 mg in night and prednisone 40 mg daily.     MEDICATIONS:     CURRENT MEDICATIONS:  SCHEDULED MEDICATIONS:   sodium chloride flush  10 mL Intravenous 2 times per day    metFORMIN  500 mg Oral BID WC    mycophenolate  500 mg Oral BID    sodium chloride flush  10 mL Intravenous 2 times per day    enoxaparin  40 mg Subcutaneous Daily    predniSONE  40 mg Oral Daily    pyridostigmine  90 mg Oral 3 times per day    insulin lispro  0-12 Units Subcutaneous TID WC    insulin lispro  0-6 Units Subcutaneous Nightly     CONTINUOUS INFUSIONS:   dextrose       PRN MEDICATIONS:   glucose, dextrose, glucagon (rDNA), dextrose, sodium chloride flush, acetaminophen, sodium chloride flush, magnesium hydroxide, ondansetron    VITALS 24 Hours     Temperature Range: Temp: 97.4 °F (36.3 °C) Temp  Av.8 °F (36.6 °C)  Min: 97.4 °F (36.3 °C)  Max: 98.2 °F (36.8 °C)  BP Range: Systolic (30GOY), JZE:828 , Min:114 , FSY:163     Diastolic (78YCT), YPF:13, Min:64, Max:87    Pulse Range: Pulse  Av  Min: 65  Max: 94  Respiration Range: Resp  Av.8  Min: 18  Max: 22  Current Pulse Ox: SpO2: 98 %  24HR Pulse Ox Range: SpO2  Av %  Min: 97 %  Max: 99 %  Patient Vitals for the past 12 hrs:   BP Temp Temp src Pulse Resp SpO2   19 0406 117/67 97.4 °F (36.3 °C) Oral 65 20 98 %   19 0001 114/64 97.8 °F (36.6 Ref Range    POC Glucose 90 65 - 105 mg/dL   POC Glucose Fingerstick    Collection Time: 04/03/19  1:43 PM   Result Value Ref Range    POC Glucose 212 (H) 65 - 105 mg/dL   POC Glucose Fingerstick    Collection Time: 04/03/19  4:28 PM   Result Value Ref Range    POC Glucose 171 (H) 65 - 105 mg/dL   POC Glucose Fingerstick    Collection Time: 04/03/19  7:43 PM   Result Value Ref Range    POC Glucose 201 (H) 65 - 105 mg/dL         RADIOLOGY   Xr Chest Standard (2 Vw)    Result Date: 4/2/2019  EXAMINATION: TWO VIEWS OF THE CHEST 4/2/2019 8:00 am COMPARISON: March 30, 2019 HISTORY: ORDERING SYSTEM PROVIDED HISTORY: cough, recent influenza TECHNOLOGIST PROVIDED HISTORY: cough, recent influenza FINDINGS: Redemonstration of cardiomegaly. Some loss of volume can be seen in the right base with mild atelectasis. No definitive pneumonia. No evidence of pulmonary edema or pleural effusion. Cardiomegaly. Atelectatic changes and loss of volume in the right base. No definitive pneumonia. Xr Chest Portable    Result Date: 4/3/2019  EXAMINATION: SINGLE XRAY VIEW OF THE CHEST 4/3/2019 9:29 am COMPARISON: 2 April 2019 HISTORY: ORDERING SYSTEM PROVIDED HISTORY: evaluate for acute process TECHNOLOGIST PROVIDED HISTORY: evaluate for acute process FINDINGS: AP portable view of the chest time stamped at 917 hours demonstrates overlying cardiac monitoring electrodes. Lung volumes are low. Crowding of the lung markings at the bases are noted secondary to low lung volume. Heart size is normal.  No focal consolidation, effusion, or pneumothorax is noted. Low lung volumes with limited evaluation of the lung bases. Otherwise no evidence of acute cardiopulmonary process. RECOMMENDATION: Formal PA and lateral views of the chest in the department would be optimal.         Labs and Images reviewed with:  [] Dr. Jeremy Castellano. Nicole     [] Dr. Obey Maurice  [x] Dr. Patito Romeo  [] there are no new interval images to review. ASSESSMENT AND PLAN:         ASSESSMENT:      This is a 39 y. o. female with  pmhx of MG presented with cough and fatigue      PLAN/MEDICAL DECISION MAKING:        NEUROLOGIC:  - will continue predisone to 40 mg daily, mestinon 90 mg in am and afternoon and 60 mg at night and CellCept 500 mg bid  -VC and NIF q6  - Neuro checks per protocol     CARDIOVASCULAR:  - Goal SBP <160  - Continue telemetry     PULMONARY:  - monitor for VC and NIF q6  - monitor saturation     RENAL/FLUID/ELECTROLYTE:  - monitor kidney fucntion  - Replace electrolytes PRN  - Daily BMP     GI/NUTRITION:  NUTRITION:  -carb control diet  - Bowel regimen: MOM  - GI prophylaxis: Pepcid 20 mg     ID:  - monitor for fevers  - Daily CBC     HEME:   - H&H 11.3/37.6  - Platelets 480  - Daily CBC     ENDOCRINE:  - Continue to monitor blood glucose, goal <180  -  OTHER:  - PT/OT/ST   - Code Status: full     PROPHYLAXIS:  Stress ulcer: H2 blocker     DVT PROPHYLAXIS:  - SCD sleeves - Thigh High   - MARIANNE stockings - Thigh High            DISPOSITION:  [] To remain ICU:   [x] OK for out of ICU from Neuro Critical Care standpoint    For any changes in exam or patient status please contact Neuro Critical Care.       Lissett Arceo MD  Neuro Critical Care  Pager [phone]  4/4/2019     6:47 AM

## 2019-04-04 NOTE — CARE COORDINATION
Discharge [facility] Case Management Department  Written by: Bhavani Castro RN    Patient Name: Chris Miles  Attending Provider: No att. providers found  Admit Date: 2019  6:55 AM  MRN: [id number]  Account: [de-identified]                     : 1975  Discharge Date: 2019      Disposition: home    Bhavani Castro RN

## 2019-04-05 LAB
CULTURE: NORMAL
CULTURE: NORMAL
Lab: NORMAL
Lab: NORMAL
SPECIMEN DESCRIPTION: NORMAL
SPECIMEN DESCRIPTION: NORMAL

## 2019-04-06 ASSESSMENT — ENCOUNTER SYMPTOMS
SORE THROAT: 0
ABDOMINAL PAIN: 0
RHINORRHEA: 1
SHORTNESS OF BREATH: 0
DIARRHEA: 0
NAUSEA: 0
BLOOD IN STOOL: 0
CONSTIPATION: 0
BACK PAIN: 0
VOMITING: 0
COUGH: 1
CHEST TIGHTNESS: 0

## 2019-04-06 NOTE — ED PROVIDER NOTES
Non-medical: Not on file   Tobacco Use    Smoking status: Never Smoker    Smokeless tobacco: Never Used   Substance and Sexual Activity    Alcohol use: No    Drug use: No    Sexual activity: Never   Lifestyle    Physical activity:     Days per week: Not on file     Minutes per session: Not on file    Stress: Not on file   Relationships    Social connections:     Talks on phone: Not on file     Gets together: Not on file     Attends Church service: Not on file     Active member of club or organization: Not on file     Attends meetings of clubs or organizations: Not on file     Relationship status: Not on file    Intimate partner violence:     Fear of current or ex partner: Not on file     Emotionally abused: Not on file     Physically abused: Not on file     Forced sexual activity: Not on file   Other Topics Concern    Not on file   Social History Narrative    Not on file       No family history on file. Allergies:  Morphine; Flexeril [cyclobenzaprine]; Norflex [orphenadrine citrate]; Nsaids; Penicillins; and Vancomycin    Home Medications:  Prior to Admission medications    Medication Sig Start Date End Date Taking? Authorizing Provider   predniSONE (DELTASONE) 20 MG tablet Take 2 tablets by mouth daily for 10 days 4/4/19 4/14/19 Yes Aba Mike MD   pyridostigmine (MESTINON) 60 MG tablet Take 1.5 tablets by mouth 2 times daily AND 1 tablet every evening.  Take 90 mg am and in afternoon and 60 mg in pm. 4/4/19  Yes Aba Mike MD   mycophenolate (CELLCEPT) 250 MG capsule Take 2 capsules by mouth 2 times daily 3/31/19   Elsa Ramirez MD   guaiFENesin-dextromethorphan [facility] DM) 100-10 MG/5ML syrup Take 5 mLs by mouth 3 times daily as needed for Cough 3/31/19 4/10/19  Elsa Ramirez MD   acetaminophen (TYLENOL) 325 MG tablet Take 2 tablets by mouth every 6 hours as needed for Pain 3/29/19   Akilah Laguerre MD   vitamin D (CHOLECALCIFEROL) 1000 UNIT TABS tablet TAKE 1 TABLET BY MOUTH ONE TIME A DAY 3/27/19   Jona Ha MD   vitamin C (ASCORBIC ACID) 500 MG tablet TAKE 1 TABLET BY MOUTH ONE TIME A DAY 3/27/19   Jona Ha MD   vitamin D (CHOLECALCIFEROL) 1000 UNIT TABS tablet TAKE 1 TABLET BY MOUTH ONE TIME A DAY 3/1/19   Jaclyn Pimentel MD   fluticasone (FLONASE) 50 MCG/ACT nasal spray 1 spray by Each Nare route daily 1 Spray in each nostril 1/29/19   Jona Ha MD   ferrous sulfate 325 (65 Fe) MG tablet TAKE 1 TABLET BY MOUTH 2 TIMES DAILY (WITH MEALS) 10/29/18   Jaclyn Pimentel MD   blood glucose monitor strips Test 4 times daily Diagnosis 10/23/18   Jona Ha MD   Alcohol Swabs PADS USE AS DIRECTED WHEN CHECKING BLOOD SUGAR DAILY. 10/23/18   Jona Ha MD   metFORMIN (GLUCOPHAGE) 500 MG tablet Take 1 tablet by mouth 2 times daily (with meals) 10/23/18   Jona Ha MD   ACCU-CHEK TERI [facility] CTR LANCETS MISC Daily 4/16/18   Mario Sarabia MD   glucose monitoring kit (FREESTYLE) monitoring kit Daily check 1/22/18   Shad Alfredo MD       REVIEW OF SYSTEMS    (2-9 systems for level 4, 10 or more for level 5)      Review of Systems   Constitutional: Positive for activity change, appetite change, chills and fatigue. Negative for fever. HENT: Positive for congestion and rhinorrhea. Negative for sore throat. Eyes: Negative for visual disturbance. Respiratory: Positive for cough. Negative for chest tightness and shortness of breath. Cardiovascular: Negative for chest pain. Gastrointestinal: Negative for abdominal pain, blood in stool, constipation, diarrhea, nausea and vomiting. Endocrine: Negative for polyuria. Genitourinary: Negative for dysuria, frequency, hematuria and urgency. Musculoskeletal: Negative for back pain. Skin: Negative for rash. Neurological: Negative for headaches.        PHYSICAL EXAM   (up to 7 for level 4, 8 or more for level 5)      INITIAL VITALS:   BP (!) 107/57   Pulse 75   Temp 98.6 °F (37 °C) (Oral)   Resp 22   Ht 5' (1.524 m)   Wt 169 lb 12.1 oz (77 kg)   LMP 03/09/2019   SpO2 97%   BMI 33.15 kg/m²     Physical Exam   Constitutional: She is oriented to person, place, and time. She appears well-developed and well-nourished. No distress. Patient is alert and oriented x4, appears ill and fatigued but nontoxic    HENT:   Head: Normocephalic and atraumatic. Eyes: Pupils are equal, round, and reactive to light. Conjunctivae and EOM are normal. Right eye exhibits no discharge. Left eye exhibits no discharge. Neck: Normal range of motion. Neck supple. Cardiovascular: Normal rate, regular rhythm, normal heart sounds and intact distal pulses. Exam reveals no gallop and no friction rub. No murmur heard. Pulmonary/Chest: Effort normal and breath sounds normal. No stridor. No respiratory distress. She has no wheezes. She has no rales. She exhibits no tenderness. Abdominal: Soft. Bowel sounds are normal. She exhibits no distension and no mass. There is no tenderness. There is no rebound and no guarding. No hernia. Musculoskeletal: Normal range of motion. She exhibits no edema, tenderness or deformity. Neurological: She is alert and oriented to person, place, and time. She has normal reflexes. No cranial nerve deficit. Coordination normal.   Cranial nerves 2-12 intact, finger to nose intact, extraocular muscles intact, no pronator drift, heel to shin intact, rapid alternating movement intact, normal sensation, has generalized weakness, mild eyelid drooping bilaterally,   Skin: Skin is warm and dry. She is not diaphoretic.        DIFFERENTIAL  DIAGNOSIS     PLAN (LABS / IMAGING / EKG):  Orders Placed This Encounter   Procedures    URINE CULTURE CLEAN CATCH    XR CHEST STANDARD (2 VW)    XR CHEST PORTABLE    CBC WITH AUTO DIFFERENTIAL    BASIC METABOLIC PANEL    Troponin    Brain Natriuretic Peptide    Urinalysis with Microscopic    HCG Qualitative, Serum    Comprehensive Metabolic Panel w/ Reflex to MG    CBC auto differential    Protein, Urine    Inpatient consult to Neurology    Inpatient consult to Critical Care    OT eval and treat    OT eval and treat    PT eval and treat    PT eval and treat    Respiratory care evaluation only    POC Glucose Fingerstick    POC Glucose Fingerstick    POC Glucose Fingerstick    POC Glucose Fingerstick    POC Glucose Fingerstick    POC Glucose Fingerstick    POC Glucose Fingerstick    EKG 12 Lead    EKG REPORT    Vital Capacity Test    PATIENT STATUS (FROM ED OR OR/PROCEDURAL) Inpatient    PATIENT STATUS (FROM ED OR OR/PROCEDURAL) Inpatient    Transfer patient    Discharge patient       MEDICATIONS ORDERED:  Orders Placed This Encounter   Medications    0.9 % sodium chloride bolus    DISCONTD: sodium chloride flush 0.9 % injection 10 mL    DISCONTD: sodium chloride flush 0.9 % injection 10 mL    DISCONTD: acetaminophen (TYLENOL) tablet 650 mg    DISCONTD: acetaminophen (TYLENOL) tablet 650 mg    DISCONTD: metFORMIN (GLUCOPHAGE) tablet 500 mg    DISCONTD: mycophenolate (CELLCEPT) capsule 500 mg    DISCONTD: predniSONE (DELTASONE) tablet 40 mg    DISCONTD: pyridostigmine (MESTINON) tablet 90 mg    DISCONTD: sodium chloride flush 0.9 % injection 10 mL    DISCONTD: sodium chloride flush 0.9 % injection 10 mL    DISCONTD: magnesium hydroxide (MILK OF MAGNESIA) 400 MG/5ML suspension 30 mL    DISCONTD: ondansetron (ZOFRAN) injection 4 mg    DISCONTD: enoxaparin (LOVENOX) injection 40 mg    DISCONTD: predniSONE (DELTASONE) tablet 40 mg    DISCONTD: pyridostigmine (MESTINON) tablet 90 mg    DISCONTD: pyridostigmine (MESTINON) tablet 90 mg    DISCONTD: insulin lispro (HUMALOG) injection vial 0-12 Units    DISCONTD: insulin lispro (HUMALOG) injection vial 0-6 Units    DISCONTD: dextrose 50 % solution     RONN SILVESTRE: cabinet override    DISCONTD: glucose (GLUTOSE) 40 % oral gel 15 g    DISCONTD: dextrose 50 % solution 12.5 g    DISCONTD: glucagon (rDNA) injection 1 mg    DISCONTD: dextrose 5 % solution    predniSONE (DELTASONE) 20 MG tablet     Sig: Take 2 tablets by mouth daily for 10 days     Dispense:  20 tablet     Refill:  0    pyridostigmine (MESTINON) 60 MG tablet     Sig: Take 1.5 tablets by mouth 2 times daily AND 1 tablet every evening. Take 90 mg am and in afternoon and 60 mg in pm.     Dispense:  60 tablet     Refill:  0       DDX: Myasthenia crisis, pneumonia, influenza, sepsis, bronchitis, Guillain-Barré    DIAGNOSTIC RESULTS / EMERGENCY DEPARTMENT COURSE / MDM     LABS:  Results for orders placed or performed during the hospital encounter of 04/02/19   URINE CULTURE CLEAN CATCH   Result Value Ref Range    Specimen Description . URINE     Special Requests NOT REPORTED     Culture UROGENITAL ISABEL  >[id number] CFU/ML       Culture (A)      STREPTOCOCCI, BETA HEMOLYTIC GROUP B  10 to 50,000 CFU/ML  INCLUDED IN ABOVE     CBC WITH AUTO DIFFERENTIAL   Result Value Ref Range    WBC 8.8 3.5 - 11.3 k/uL    RBC 4.55 3.95 - 5.11 m/uL    Hemoglobin 11.3 (L) 11.9 - 15.1 g/dL    Hematocrit 37.6 36.3 - 47.1 %    MCV 82.6 82.6 - 102.9 fL    MCH 24.8 (L) 25.2 - 33.5 pg    MCHC 30.1 28.4 - 34.8 g/dL    RDW 16.8 (H) 11.8 - 14.4 %    Platelets 454 (H) 617 - 453 k/uL    MPV 10.9 8.1 - 13.5 fL    NRBC Automated 0.0 0.0 per 100 WBC    Differential Type NOT REPORTED     Seg Neutrophils 67 (H) 36 - 65 %    Lymphocytes 22 (L) 24 - 43 %    Monocytes 9 3 - 12 %    Eosinophils % 1 1 - 4 %    Basophils 0 0 - 2 %    Immature Granulocytes 1 (H) 0 %    Segs Absolute 5.92 1.50 - 8.10 k/uL    Absolute Lymph # 1.91 1.10 - 3.70 k/uL    Absolute Mono # 0.81 0.10 - 1.20 k/uL    Absolute Eos # 0.05 0.00 - 0.44 k/uL    Basophils # <0.03 0.00 - 0.20 k/uL    Absolute Immature Granulocyte 0.07 0.00 - 0.30 k/uL    WBC Morphology NOT REPORTED     RBC Morphology ANISOCYTOSIS PRESENT     Platelet Estimate NOT REPORTED    BASIC METABOLIC PANEL   Result Value Ref Range    Glucose 141 (H) 70 - 99 mg/dL    BUN 4 (L) 6 - 20 mg/dL    CREATININE 0.51 0.50 - 0.90 mg/dL    Bun/Cre Ratio NOT REPORTED 9 - 20    Calcium 8.6 8.6 - 10.4 mg/dL    Sodium 135 135 - 144 mmol/L    Potassium 3.3 (L) 3.7 - 5.3 mmol/L    Chloride 99 98 - 107 mmol/L    CO2 22 20 - 31 mmol/L    Anion Gap 14 9 - 17 mmol/L    GFR Non-African American >60 >60 mL/min    GFR African American >60 >60 mL/min    GFR Comment          GFR Staging NOT REPORTED    Troponin   Result Value Ref Range    Troponin, High Sensitivity <6 0 - 14 ng/L    Troponin T NOT REPORTED <0.03 ng/mL    Troponin Interp NOT REPORTED    Troponin   Result Value Ref Range    Troponin, High Sensitivity <6 0 - 14 ng/L    Troponin T NOT REPORTED <0.03 ng/mL    Troponin Interp NOT REPORTED    Brain Natriuretic Peptide   Result Value Ref Range    Pro-BNP <20 <300 pg/mL    BNP Interpretation Pro-BNP Reference Range:    Urinalysis with Microscopic   Result Value Ref Range    Color, UA RED (A) YELLOW    Turbidity UA TURBID (A) CLEAR    Glucose, Ur NEGATIVE NEGATIVE    Bilirubin Urine NEGATIVE NEGATIVE    Ketones, Urine NEGATIVE NEGATIVE    Specific Gravity, UA 1.002 (L) 1.005 - 1.030    Urine Hgb LARGE (A) NEGATIVE    pH, UA 7.5 5.0 - 8.0    Protein, UA 1+ (A) NEGATIVE    Urobilinogen, Urine Normal Normal    Nitrite, Urine NEGATIVE NEGATIVE    Leukocyte Esterase, Urine SMALL (A) NEGATIVE    -          WBC, UA 5 TO 10 0 - 5 /HPF    RBC, UA TOO NUMEROUS TO COUNT 0 - 4 /HPF    Casts UA  0 - 8 /LPF     2 TO 5 HYALINE Reference range defined for non-centrifuged specimen. Crystals UA NOT REPORTED None /HPF    Epithelial Cells UA 2 TO 5 0 - 5 /HPF    Renal Epithelial, Urine NOT REPORTED 0 /HPF    Bacteria, UA NOT REPORTED None    Mucus, UA NOT REPORTED None    Trichomonas, UA NOT REPORTED None    Amorphous, UA NOT REPORTED None    Other Observations UA NOT REPORTED NOT REQ.     Yeast, UA NOT REPORTED None   HCG Qualitative, Serum   Result Value Ref Range    hCG Qual NEGATIVE NEGATIVE POC Glucose 212 (H) 65 - 105 mg/dL   POC Glucose Fingerstick   Result Value Ref Range    POC Glucose 171 (H) 65 - 105 mg/dL   POC Glucose Fingerstick   Result Value Ref Range    POC Glucose 201 (H) 65 - 105 mg/dL   POC Glucose Fingerstick   Result Value Ref Range    POC Glucose 81 65 - 105 mg/dL   POC Glucose Fingerstick   Result Value Ref Range    POC Glucose 149 (H) 65 - 105 mg/dL   EKG 12 Lead   Result Value Ref Range    Ventricular Rate 102 BPM    Atrial Rate 102 BPM    P-R Interval 128 ms    QRS Duration 80 ms    Q-T Interval 350 ms    QTc Calculation (Bazett) 456 ms    P Axis 47 degrees    R Axis 7 degrees    T Axis 38 degrees       IMPRESSION: 78-year-old female presented with complaints of fatigue and cough after recent diagnosis and admission for influenza and exacerbation of myasthenia gravis. Stable vitals. Patient does appear ill and fatigued but has benign neuro exam except for generalized weakness and mild eyelid drooping bilaterally. Clear lungs bilaterally. We'll obtain cardiac workup as well as chest x-ray, UA, and pregnancy test.  Results came back remarkable for sinus tachycardia and otherwise unremarkable labs. UA still pending at this time. I consulted neurology who evaluated patient and give patient an increased dose of mycophenolate and prednisone and recommended neuro ICU admission given that there will be no neuro resident overnight tonight and that patient could quickly decompensated requiring critical care including possible intubation. I then spoke to neuro ICU who was willing to admit patient for further care. The shin and has been updated on plans for admission and further treatment and are in agreement with plan.       RADIOLOGY:  Xr Chest Standard (2 Vw)    Result Date: 4/2/2019  EXAMINATION: TWO VIEWS OF THE CHEST 4/2/2019 8:00 am COMPARISON: March 30, 2019 HISTORY: ORDERING SYSTEM PROVIDED HISTORY: cough, recent influenza TECHNOLOGIST PROVIDED HISTORY: cough, recent influenza FINDINGS: Redemonstration of cardiomegaly. Some loss of volume can be seen in the right base with mild atelectasis. No definitive pneumonia. No evidence of pulmonary edema or pleural effusion. Cardiomegaly. Atelectatic changes and loss of volume in the right base. No definitive pneumonia. Xr Chest Standard (2 Vw)    Result Date: 3/30/2019  EXAMINATION: TWO VIEWS OF THE CHEST 3/30/2019 9:50 am COMPARISON: September 20, 2017 and August 12, 2017 HISTORY: ORDERING SYSTEM PROVIDED HISTORY: Cough, fatigue TECHNOLOGIST PROVIDED HISTORY: Cough, fatigue FINDINGS: The cardiomediastinal silhouette is within normal limits. Shallow inflation with basilar linear opacities, consistent with subsegmental atelectasis. There is no consolidation, pneumothorax or evidence for edema. No evidence for effusion. No acute osseous abnormality is identified. Shallow inflation with findings compatible with subsegmental atelectasis. Xr Chest Portable    Result Date: 4/3/2019  EXAMINATION: SINGLE XRAY VIEW OF THE CHEST 4/3/2019 9:29 am COMPARISON: 2 April 2019 HISTORY: ORDERING SYSTEM PROVIDED HISTORY: evaluate for acute process TECHNOLOGIST PROVIDED HISTORY: evaluate for acute process FINDINGS: AP portable view of the chest time stamped at 917 hours demonstrates overlying cardiac monitoring electrodes. Lung volumes are low. Crowding of the lung markings at the bases are noted secondary to low lung volume. Heart size is normal.  No focal consolidation, effusion, or pneumothorax is noted. Low lung volumes with limited evaluation of the lung bases. Otherwise no evidence of acute cardiopulmonary process.  RECOMMENDATION: Formal PA and lateral views of the chest in the department would be optimal.       EKG  EKG Interpretation    Interpreted by me    Rhythm: Sinus tachycardia  Rate: normal  Axis: normal  Ectopy: none  Conduction: normal  ST Segments: no acute change  T Waves: no acute change  Q Waves: none    Clinical Impression: Sinus tachycardia    All EKG's are interpreted by the Emergency Department Physician who either signs or Co-signsthis chart in the absence of a cardiologist.    EMERGENCY DEPARTMENT COURSE:  Refer to [facility]    PROCEDURES:  None    CONSULTS:  IP CONSULT TO NEUROLOGY  IP CONSULT TO CRITICAL CARE    CRITICAL CARE:  None    FINAL IMPRESSION      1. Influenza with respiratory manifestation other than pneumonia    2.  Myasthenia gravis in crisis [facility])          DISPOSITION / [address] Admitted 04/02/2019 12:42:05 PM      PATIENT REFERRED TO:  Jair Alfredo MD  [address] [place] [address] [id number] [place] [address], [facility], [address] [place] [address]  [phone]    Go on 4/16/2019  12 pm appt      DISCHARGE MEDICATIONS:  Discharge Medication List as of 4/4/2019 12:19 PM          Daniel Bone MD  Emergency Medicine Resident    (Please note that portions of this note were completed with a voice recognition program.  Evelyn Tripathi made to edit the dictations but occasionally words are mis-transcribed.)        Daniel Bone MD  Resident  04/06/19 [id number]

## 2019-04-10 NOTE — DISCHARGE SUMMARY
[address]   Department of Internal Medicine - Staff Internal Medicine Service    INPATIENT DISCHARGE SUMMARY      PATIENT IDENTIFICATION:  NAME: Gill Orozco : 1975 MRN: [id number]  ACCT: [de-identified]     Admit Date: 3/30/2019  Discharge date: 3/31/2019  5:00 PM     Attending Provider: Teresa att. providers found                                     Dictating Provider: Olya Echevarria MD  ______________________________________________________________________________    REASON FOR HOSPITALIZATION:     Principal Problem:    Influenza with respiratory manifestation other than pneumonia  Active Problems:    Pre-diabetes    Myasthenia gravis with exacerbation ([address]    Myasthenia gravis ([address]  Resolved Problems:    * No resolved hospital problems. *        HOSPITAL COURSE AND TREATMENT:    Patient 65-year-old female significant past medical history of myasthenia gravis, hepatitis C, prediabetes presented with fatigue, myalgias, congestion, and cough. Patient reported for past week after contact with sick person or party she began noticing fever, productive cough. Patient presented to ED one day prior, found to be influenza positive, sent home, but reported symptoms worsening overnight resulting in presentation this time. In the ED patient received single dose of Tamiflu. Blood and urine cultures drawn. Chest xray showed shallow inflation compatible with subsegmental atelectasis. Prednisone given. Patient admitted for influenza with potential myasthenic crises. On the floor, patient given additional Tamiflu dose with supportive care. Robitussin given for symptomatic treatment of cough. PFTs done every 6 hours to observe for myasthenia gravis exacerbation. No issues with respirations noted overnight. Symptoms improved next morning and patient was discharged.       Consults: none    Procedures:  none    Any Hospital Acquired Infections: none      PATIENT'S DISCHARGE CONDITION:      stable      DISCHARGE MEDICATIONS    Discharge Medication List as of 3/31/2019  1:42 PM      START taking these medications    Details   guaiFENesin-dextromethorphan (ROBITUSSIN DM) 100-10 MG/5ML syrup Take 5 mLs by mouth 3 times daily as needed for Cough, Disp-120 mL, R-0Normal         CONTINUE these medications which have CHANGED    Details   mycophenolate (CELLCEPT) 250 MG capsule Take 2 capsules by mouth 2 times daily, Disp-60 capsule, R-3Normal      predniSONE (DELTASONE) 20 MG tablet Take 3 tablets by mouth daily for 10 days, Disp-30 tablet, R-0Normal         CONTINUE these medications which have NOT CHANGED    Details   acetaminophen (TYLENOL) 325 MG tablet Take 2 tablets by mouth every 6 hours as needed for Pain, Disp-30 tablet, R-0Print      !! vitamin D (CHOLECALCIFEROL) 1000 UNIT TABS tablet TAKE 1 TABLET BY MOUTH ONE TIME A DAY, Disp-30 tablet, R-0Normal      vitamin C (ASCORBIC ACID) 500 MG tablet TAKE 1 TABLET BY MOUTH ONE TIME A DAY, Disp-30 tablet, R-3Normal      !! vitamin D (CHOLECALCIFEROL) 1000 UNIT TABS tablet TAKE 1 TABLET BY MOUTH ONE TIME A DAY, Disp-30 tablet, R-0Normal      fluticasone (FLONASE) 50 MCG/ACT nasal spray 1 spray by Each Nare route daily 1 Spray in each nostril, Disp-2 Bottle, R-1Normal      pyridostigmine (MESTINON) 60 MG tablet Take 1 po qid 7 AM , q 11 AM ,q 3 PM, 7 PM, Disp-120 tablet, R-5Normal      ferrous sulfate 325 (65 Fe) MG tablet TAKE 1 TABLET BY MOUTH 2 TIMES DAILY (WITH MEALS), Disp-90 tablet, R-9Normal      blood glucose monitor strips Test 4 times daily Diagnosis, Disp-100 strip, R-2, Normal      Alcohol Swabs PADS Disp-100 each, R-3, NormalUSE AS DIRECTED WHEN CHECKING BLOOD SUGAR DAILY.       metFORMIN (GLUCOPHAGE) 500 MG tablet Take 1 tablet by mouth 2 times daily (with meals), Disp-60 tablet, R-2Normal      ACCU-CHEK SOFTCLIX LANCETS MISC Disp-100 each, R-3, NormalDaily      glucose monitoring kit (FREESTYLE) monitoring kit Disp-1 kit, R-0, PrintDaily check       !! - Potential duplicate medications found. Please discuss with provider. STOP taking these medications       guaiFENesin (ROBITUSSIN) 100 MG/5ML SOLN oral solution Comments:   Reason for Stopping:         benzonatate (TESSALON PERLES) 100 MG capsule Comments:   Reason for Stopping:         guaiFENesin (ALTARUSSIN) 100 MG/5ML syrup Comments:   Reason for Stopping:                 DISCHARGE INSTRUCTIONS:     Activity: activity as tolerated    Diet: regular diet    Disposition: home      FOLLOW UP INSTRUCTIONS:    Labs: none    Imaging: none    Post hospital office visit:  In one week with Cory Gomez MD and as soon as possible with Rikki Goodman MD.         IM Attending     Pt seen and examined before discharge   Discharge plan and medications were reviewed and agreed as documented by resident.   Time spent for discharge planning more than 30 minutes     Electronically signed by Debbie Sheffield MD on 4/17/2019 at 12:48 PM

## 2019-04-16 ENCOUNTER — OFFICE VISIT (OUTPATIENT)
Dept: NEUROLOGY | Age: 44
End: 2019-04-16
Payer: MEDICARE

## 2019-04-16 VITALS
BODY MASS INDEX: 33.06 KG/M2 | WEIGHT: 168.4 LBS | DIASTOLIC BLOOD PRESSURE: 84 MMHG | HEIGHT: 60 IN | HEART RATE: 102 BPM | SYSTOLIC BLOOD PRESSURE: 127 MMHG

## 2019-04-16 DIAGNOSIS — G70.00 MYASTHENIA GRAVIS (HCC): Primary | ICD-10-CM

## 2019-04-16 DIAGNOSIS — G56.00 CARPAL TUNNEL SYNDROME, UNSPECIFIED LATERALITY: ICD-10-CM

## 2019-04-16 PROCEDURE — G8427 DOCREV CUR MEDS BY ELIG CLIN: HCPCS | Performed by: PSYCHIATRY & NEUROLOGY

## 2019-04-16 PROCEDURE — 99214 OFFICE O/P EST MOD 30 MIN: CPT | Performed by: PSYCHIATRY & NEUROLOGY

## 2019-04-16 PROCEDURE — 1036F TOBACCO NON-USER: CPT | Performed by: PSYCHIATRY & NEUROLOGY

## 2019-04-16 PROCEDURE — 1111F DSCHRG MED/CURRENT MED MERGE: CPT | Performed by: PSYCHIATRY & NEUROLOGY

## 2019-04-16 PROCEDURE — G8417 CALC BMI ABV UP PARAM F/U: HCPCS | Performed by: PSYCHIATRY & NEUROLOGY

## 2019-04-16 RX ORDER — PREDNISONE 20 MG/1
TABLET ORAL
Qty: 30 TABLET | Refills: 5 | Status: SHIPPED | OUTPATIENT
Start: 2019-04-16 | End: 2019-04-26

## 2019-04-16 RX ORDER — PREDNISONE 20 MG/1
40 TABLET ORAL DAILY
COMMUNITY
End: 2019-09-24 | Stop reason: ALTCHOICE

## 2019-04-16 RX ORDER — PYRIDOSTIGMINE BROMIDE 60 MG/1
60 TABLET ORAL 3 TIMES DAILY
Qty: 60 TABLET | Refills: 3 | Status: SHIPPED | OUTPATIENT
Start: 2019-04-16

## 2019-04-16 ASSESSMENT — ENCOUNTER SYMPTOMS
ALLERGIC/IMMUNOLOGIC NEGATIVE: 1
COUGH: 1
EYES NEGATIVE: 1
GASTROINTESTINAL NEGATIVE: 1

## 2019-04-16 NOTE — PROGRESS NOTES
Subjective:      Patient ID: Nydia Garcia is a 39 y.o. female. HPI     Active problem myasthenia gravis on mestinon , prednisone and cellcept . Hand numbness consistent with carpal tunnel syndrome to undergo EMG along with getting wrist splints . She is to followup with GI wit Hepatitis C. She has had prior thymectomy. The condition is she was admitted to [facility] on April 2 for generalized weakness ,cough , droopy eyes,  bulbar syndrome with trouble swallowing attributed to influenza A . Since discharge she is doing a lot better with no more fatigue with no more swallowing issues or more ptosis . Mestinon ws readjusted to 90-90-60 having greater diarrhea which she feels is from [address] [place] [address] ([facility]) . She has tried bentyl in the past in conjunction with mestinon unable to tolerate . Prednisone was increased to 40 mg po qd . She has been unable to undergo EMG . She has been found to have hepatitis C through gastroenterology. There will be numbness in both hands left more than right which is there in morning on awakening along with days of menstruation. There are no bulbar complaints with no trouble swallowing,  chewing or ocular complaints . She is applying for social disability . She has attempted to work more than four hours unable because of fatigue . Significant medications mycophenylate 500 mg po bid , prednisone 20 mg po qd, mestinon 90 mg po 7 AM , 90 mg q 11AM and 60 mg q 5 PM  . She has been on imuran in the past .Testing MRI of Head with normal brain with right maxillary sinusitis . CRP 1 , TSH normal, ESR 10 ,cholesterol 115 , LDL 48 , TG 88 . Hga1c 6.4 , MONET negative , Anti SSA and anti SSB are negative, ACE 31 (8-52) . CT chest subtle increased density within the anterior mediastinum possibly representing residual thickening or scarring . No discrete anterior mediastinal mass identified .  Acetylcholine binding Ab 478 (0--0.4), blocking Ab 63 (0-26 %) , striated muscle Ab <1:40 .        Past Medical mycophenolate (CELLCEPT) 250 MG capsule Take 2 capsules by mouth 2 times daily 60 capsule 3    acetaminophen (TYLENOL) 325 MG tablet Take 2 tablets by mouth every 6 hours as needed for Pain 30 tablet 0    vitamin D (CHOLECALCIFEROL) 1000 UNIT TABS tablet TAKE 1 TABLET BY MOUTH ONE TIME A DAY 30 tablet 0    vitamin C (ASCORBIC ACID) 500 MG tablet TAKE 1 TABLET BY MOUTH ONE TIME A DAY 30 tablet 3    vitamin D (CHOLECALCIFEROL) 1000 UNIT TABS tablet TAKE 1 TABLET BY MOUTH ONE TIME A DAY 30 tablet 0    fluticasone (FLONASE) 50 MCG/ACT nasal spray 1 spray by Each Nare route daily 1 Spray in each nostril 2 Bottle 1    ferrous sulfate 325 (65 Fe) MG tablet TAKE 1 TABLET BY MOUTH 2 TIMES DAILY (WITH MEALS) 90 tablet 9    blood glucose monitor strips Test 4 times daily Diagnosis 100 strip 2    Alcohol Swabs PADS USE AS DIRECTED WHEN CHECKING BLOOD SUGAR DAILY. 100 each 3    metFORMIN (GLUCOPHAGE) 500 MG tablet Take 1 tablet by mouth 2 times daily (with meals) 60 tablet 2    ACCU-CHEK SOFTCLIX LANCETS MISC Daily 100 each 3    glucose monitoring kit (FREESTYLE) monitoring kit Daily check 1 kit 0     No current facility-administered medications for this visit. Allergies   Allergen Reactions    Morphine Shortness Of Breath    Flexeril [Cyclobenzaprine]     Norflex [Orphenadrine Citrate]     Nsaids     Penicillins Rash    Vancomycin Rash       Review of Systems   Constitutional: Positive for unexpected weight change. HENT: Negative. Eyes: Negative. Respiratory: Positive for cough. Cardiovascular: Negative. Gastrointestinal: Negative. Endocrine: Negative. Genitourinary: Negative. Musculoskeletal: Positive for myalgias. Skin: Negative. Allergic/Immunologic: Negative. Neurological: Negative. Hematological: Negative. Psychiatric/Behavioral: Negative.         Objective:   Physical Exam    Vitals:    04/16/19 1157   BP: 127/84   Pulse: 102     weight: 168 lb 6.4 oz (76.4 kg)  Neurological Examination  Ears /Nose/Throat: external ear . Normal exam  Neck and thyroid . Normal size  Cardiovascular: Auscultation of heart with regular rate and rhythm   Musculoskeletal. Muscle bulk and tone normal   Muscle strength mild giveway upper limbs    No dysmetria or dysdiadokinesis  No tremor   Normal fine motor  Orientation Alert and oriented x 3   Short term memory normal   Attention and concentration normal   Language process and speech normal . No aphasia   Cranial nerve 2 normal acuety and visual fields  Cranial nerve 3, 4 and 6 . Extraocular muscles are intact . Pupils are equal and reactive . Cranial nerve 5 . Intact corneal reflex. Normal facial sensation  Cranial nerve 7 bilateral lower extremity weakness with trouble puffing   Cranial nerve 8. Grossly intact hearing   Cranial nerve 9 and 10. Symmetric palate elevation   Cranial nerve 11 , 5 out of 5 strength   Cranial Nerve 12 midline tongue . No atrophy  Sensation . Normal pinprick and light touch   Deep Tendon Reflexes normal  Plantar response flexor bilaterally    Assessment:      1. Myasthenia gravis ([address]    2. Carpal tunnel syndrome, unspecified laterality    Will have her cut mestinon to 60 mg po tid weaning prednisone back to 20 mg po qd staying on current cellcept .  She is also to proceed with EMG as scheduled       Plan:      As above

## 2019-04-16 NOTE — LETTER
normal brain with right maxillary sinusitis . CRP 1 , TSH normal, ESR 10 ,cholesterol 115 , LDL 48 , TG 88 . Hga1c 6.4 , MONET negative , Anti SSA and anti SSB are negative, ACE 31 (8-52) . CT chest subtle increased density within the anterior mediastinum possibly representing residual thickening or scarring . No discrete anterior mediastinal mass identified . Acetylcholine binding Ab 478 (0--0.4), blocking Ab 63 (0-26 %) , striated muscle Ab <1:40 .        Past Medical History:   Diagnosis Date    Anemia     Chronic hepatitis C without hepatic coma ([facility] 3/22/2019    Headache     Hypertension     Myasthenia gravis ([facility]     Pre-diabetes        Past Surgical History:   Procedure Laterality Date    CARDIAC SURGERY         History reviewed. No pertinent family history.     Social History     Socioeconomic History    Marital status: Single     Spouse name: None    Number of children: None    Years of education: None    Highest education level: None   Occupational History    None   Social Needs    Financial resource strain: None    Food insecurity:     Worry: None     Inability: None    Transportation needs:     Medical: None     Non-medical: None   Tobacco Use    Smoking status: Never Smoker    Smokeless tobacco: Never Used   Substance and Sexual Activity    Alcohol use: No    Drug use: No    Sexual activity: Never   Lifestyle    Physical activity:     Days per week: None     Minutes per session: None    Stress: None   Relationships    Social connections:     Talks on phone: None     Gets together: None     Attends Protestant service: None     Active member of club or organization: None     Attends meetings of clubs or organizations: None     Relationship status: None    Intimate partner violence:     Fear of current or ex partner: None     Emotionally abused: None     Physically abused: None     Forced sexual activity: None   Other Topics Concern    None   Social History Narrative    None Current Outpatient Medications   Medication Sig Dispense Refill    predniSONE (DELTASONE) 20 MG tablet Take 40 mg by mouth daily      predniSONE (DELTASONE) 20 MG tablet Take 1 po qd 30 tablet 5    pyridostigmine (MESTINON) 60 MG tablet Take 1 tablet by mouth 3 times daily 60 tablet 3    pyridostigmine (MESTINON) 60 MG tablet Take 1.5 tablets by mouth 2 times daily AND 1 tablet every evening. Take 90 mg am and in afternoon and 60 mg in pm. 60 tablet 0    mycophenolate (CELLCEPT) 250 MG capsule Take 2 capsules by mouth 2 times daily 60 capsule 3    acetaminophen (TYLENOL) 325 MG tablet Take 2 tablets by mouth every 6 hours as needed for Pain 30 tablet 0    vitamin D (CHOLECALCIFEROL) 1000 UNIT TABS tablet TAKE 1 TABLET BY MOUTH ONE TIME A DAY 30 tablet 0    vitamin C (ASCORBIC ACID) 500 MG tablet TAKE 1 TABLET BY MOUTH ONE TIME A DAY 30 tablet 3    vitamin D (CHOLECALCIFEROL) 1000 UNIT TABS tablet TAKE 1 TABLET BY MOUTH ONE TIME A DAY 30 tablet 0    fluticasone (FLONASE) 50 MCG/ACT nasal spray 1 spray by Each Nare route daily 1 Spray in each nostril 2 Bottle 1    ferrous sulfate 325 (65 Fe) MG tablet TAKE 1 TABLET BY MOUTH 2 TIMES DAILY (WITH MEALS) 90 tablet 9    blood glucose monitor strips Test 4 times daily Diagnosis 100 strip 2    Alcohol Swabs PADS USE AS DIRECTED WHEN CHECKING BLOOD SUGAR DAILY. 100 each 3    metFORMIN (GLUCOPHAGE) 500 MG tablet Take 1 tablet by mouth 2 times daily (with meals) 60 tablet 2    ACCU-CHEK SOFTCLIX LANCETS MISC Daily 100 each 3    glucose monitoring kit (FREESTYLE) monitoring kit Daily check 1 kit 0     No current facility-administered medications for this visit. Allergies   Allergen Reactions    Morphine Shortness Of Breath    Flexeril [Cyclobenzaprine]     Norflex [Orphenadrine Citrate]     Nsaids     Penicillins Rash    Vancomycin Rash       Review of Systems   Constitutional: Positive for unexpected weight change. HENT: Negative. Eyes: Negative. Respiratory: Positive for cough. Cardiovascular: Negative. Gastrointestinal: Negative. Endocrine: Negative. Genitourinary: Negative. Musculoskeletal: Positive for myalgias. Skin: Negative. Allergic/Immunologic: Negative. Neurological: Negative. Hematological: Negative. Psychiatric/Behavioral: Negative. Objective:   Physical Exam    Vitals:    04/16/19 1157   BP: 127/84   Pulse: 102     weight: 168 lb 6.4 oz (76.4 kg)  Neurological Examination  Ears /Nose/Throat: external ear . Normal exam  Neck and thyroid . Normal size  Cardiovascular: Auscultation of heart with regular rate and rhythm   Musculoskeletal. Muscle bulk and tone normal   Muscle strength mild giveway upper limbs    No dysmetria or dysdiadokinesis  No tremor   Normal fine motor  Orientation Alert and oriented x 3   Short term memory normal   Attention and concentration normal   Language process and speech normal . No aphasia   Cranial nerve 2 normal acuety and visual fields  Cranial nerve 3, 4 and 6 . Extraocular muscles are intact . Pupils are equal and reactive . Cranial nerve 5 . Intact corneal reflex. Normal facial sensation  Cranial nerve 7 bilateral lower extremity weakness with trouble puffing   Cranial nerve 8. Grossly intact hearing   Cranial nerve 9 and 10. Symmetric palate elevation   Cranial nerve 11 , 5 out of 5 strength   Cranial Nerve 12 midline tongue . No atrophy  Sensation . Normal pinprick and light touch   Deep Tendon Reflexes normal  Plantar response flexor bilaterally    Assessment:      1. Myasthenia gravis ([address]    2. Carpal tunnel syndrome, unspecified laterality    Will have her cut mestinon to 60 mg po tid weaning prednisone back to 20 mg po qd staying on current cellcept . She is also to proceed with EMG as scheduled       Plan:      As above            If you have questions, please do not hesitate to call me. I look forward to following Nicola Gentile along with you.     Sincerely, Sathya De Guzman MD    CC providers:  Andreas Mares [facility] [address] [place] [id number]  VIA In [place]

## 2019-04-17 NOTE — COMMUNICATION BODY
Subjective:      Patient ID: Cari Vazquez is a 39 y.o. female. HPI     Active problem myasthenia gravis on mestinon , prednisone and cellcept . Hand numbness consistent with carpal tunnel syndrome to undergo EMG along with getting wrist splints . She is to followup with GI wit Hepatitis C. She has had prior thymectomy. The condition is she was admitted to [facility] on April 2 for generalized weakness ,cough , droopy eyes,  bulbar syndrome with trouble swallowing attributed to influenza A . Since discharge she is doing a lot better with no more fatigue with no more swallowing issues or more ptosis . Mestinon ws readjusted to 90-90-60 having greater diarrhea which she feels is from [address] [place] [address] ([facility]) . She has tried bentyl in the past in conjunction with mestinon unable to tolerate . Prednisone was increased to 40 mg po qd . She has been unable to undergo EMG . She has been found to have hepatitis C through gastroenterology. There will be numbness in both hands left more than right which is there in morning on awakening along with days of menstruation. There are no bulbar complaints with no trouble swallowing,  chewing or ocular complaints . She is applying for social disability . She has attempted to work more than four hours unable because of fatigue . Significant medications mycophenylate 500 mg po bid , prednisone 20 mg po qd, mestinon 90 mg po 7 AM , 90 mg q 11AM and 60 mg q 5 PM  . She has been on imuran in the past .Testing MRI of Head with normal brain with right maxillary sinusitis . CRP 1 , TSH normal, ESR 10 ,cholesterol 115 , LDL 48 , TG 88 . Hga1c 6.4 , MONET negative , Anti SSA and anti SSB are negative, ACE 31 (8-52) . CT chest subtle increased density within the anterior mediastinum possibly representing residual thickening or scarring . No discrete anterior mediastinal mass identified .  Acetylcholine binding Ab 478 (0--0.4), blocking Ab 63 (0-26 %) , striated muscle Ab <1:40 .        Past Medical History:   Diagnosis Date    Anemia     Chronic [facility] C without hepatic coma ([facility] 3/22/2019    Headache     Hypertension     [facility]     Pre-diabetes        Past Surgical History:   Procedure Laterality Date    CARDIAC SURGERY         History reviewed. No pertinent family history. Social History     Socioeconomic History    Marital status: Single     Spouse name: None    Number of children: None    Years of education: None    Highest education level: None   Occupational History    None   Social Needs    Financial resource strain: None    Food insecurity:     Worry: None     Inability: None    Transportation needs:     Medical: None     Non-medical: None   Tobacco Use    Smoking status: Never Smoker    Smokeless tobacco: Never Used   Substance and Sexual Activity    Alcohol use: No    Drug use: No    Sexual activity: Never   Lifestyle    Physical activity:     Days per week: None     Minutes per session: None    Stress: None   Relationships    Social connections:     Talks on phone: None     Gets together: None     Attends Hoahaoism service: None     Active member of club or organization: None     Attends meetings of clubs or organizations: None     Relationship status: None    Intimate partner violence:     Fear of current or ex partner: None     Emotionally abused: None     Physically abused: None     Forced sexual activity: None   Other Topics Concern    None   Social History Narrative    None       Current Outpatient Medications   Medication Sig Dispense Refill    predniSONE (DELTASONE) 20 MG tablet Take 40 mg by mouth daily      predniSONE (DELTASONE) 20 MG tablet Take 1 po qd 30 tablet 5    pyridostigmine (MESTINON) 60 MG tablet Take 1 tablet by mouth 3 times daily 60 tablet 3    pyridostigmine (MESTINON) 60 MG tablet Take 1.5 tablets by mouth 2 times daily AND 1 tablet every evening.  Take 90 mg am and in afternoon and 60 mg in pm. 60 tablet 0    mycophenolate (CELLCEPT) 250 MG capsule Take 2 capsules by mouth 2 times daily 60 capsule 3    acetaminophen (TYLENOL) 325 MG tablet Take 2 tablets by mouth every 6 hours as needed for Pain 30 tablet 0    vitamin D (CHOLECALCIFEROL) 1000 UNIT TABS tablet TAKE 1 TABLET BY MOUTH ONE TIME A DAY 30 tablet 0    vitamin C (ASCORBIC ACID) 500 MG tablet TAKE 1 TABLET BY MOUTH ONE TIME A DAY 30 tablet 3    vitamin D (CHOLECALCIFEROL) 1000 UNIT TABS tablet TAKE 1 TABLET BY MOUTH ONE TIME A DAY 30 tablet 0    fluticasone (FLONASE) 50 MCG/ACT nasal spray 1 spray by Each Nare route daily 1 Spray in each nostril 2 Bottle 1    ferrous sulfate 325 (65 Fe) MG tablet TAKE 1 TABLET BY MOUTH 2 TIMES DAILY (WITH MEALS) 90 tablet 9    blood glucose monitor strips Test 4 times daily Diagnosis 100 strip 2    Alcohol Swabs PADS USE AS DIRECTED WHEN CHECKING BLOOD SUGAR DAILY. 100 each 3    metFORMIN (GLUCOPHAGE) 500 MG tablet Take 1 tablet by mouth 2 times daily (with meals) 60 tablet 2    ACCU-CHEK SOFTCLIX LANCETS MISC Daily 100 each 3    glucose monitoring kit (FREESTYLE) monitoring kit Daily check 1 kit 0     No current facility-administered medications for this visit. Allergies   Allergen Reactions    Morphine Shortness Of Breath    Flexeril [Cyclobenzaprine]     Norflex [Orphenadrine Citrate]     Nsaids     Penicillins Rash    Vancomycin Rash       Review of Systems   Constitutional: Positive for unexpected weight change. HENT: Negative. Eyes: Negative. Respiratory: Positive for cough. Cardiovascular: Negative. Gastrointestinal: Negative. Endocrine: Negative. Genitourinary: Negative. Musculoskeletal: Positive for myalgias. Skin: Negative. Allergic/Immunologic: Negative. Neurological: Negative. Hematological: Negative. Psychiatric/Behavioral: Negative.         Objective:   Physical Exam    Vitals:    04/16/19 1157   BP: 127/84   Pulse: 102     weight: 168 lb 6.4 oz (76.4

## 2019-04-19 DIAGNOSIS — E11.8 TYPE 2 DIABETES MELLITUS WITH COMPLICATION, WITHOUT LONG-TERM CURRENT USE OF INSULIN (HCC): ICD-10-CM

## 2019-04-22 NOTE — TELEPHONE ENCOUNTER
Refill requested. Last visit: 1/29/19  Last Med refill: 12/23/18  Does patient have enough medication for 72 hours: No: last filled on 12/23/18    Next Visit Date:  Future Appointments   Date Time Provider Richard Pinto   4/30/2019  8:55 AM [facility]   6/20/2019 10:40 AM Rikki Goodman MD [facility]   6/27/2019  3:30 PM Isam Aliene Nyhan, MD [facility] Maintenance   Topic Date Due    Hepatitis A vaccine (1 of 2 - Risk 2-dose series) 05/27/1975    Pneumococcal 0-64 years Vaccine (1 of 1 - PPSV23) 05/27/1980    Hepatitis B Vaccine (1 of 3 - Risk 3-dose series) 05/27/1993    DTaP/Tdap/Td vaccine (1 - Tdap) 05/27/1993    Diabetic microalbuminuria test  07/27/2019    Lipid screen  07/27/2019    Flu vaccine (Season Ended) 09/01/2019    Diabetic retinal exam  10/09/2019    Diabetic foot exam  10/23/2019    A1C test (Diabetic or Prediabetic)  10/23/2019    Cervical cancer screen  11/08/2022    HIV screen  Completed       Hemoglobin A1C (%)   Date Value   10/23/2018 6.4   04/16/2018 6.2   11/08/2017 7.0 (H)             ( goal A1C is < 7)   Microalb/Crt.  Ratio (mcg/mg creat)   Date Value   07/27/2018 CANNOT BE CALCULATED     LDL Cholesterol (mg/dL)   Date Value   07/27/2018 78   05/04/2017 48       (goal LDL is <100)   AST (U/L)   Date Value   04/03/2019 53 (H)     ALT (U/L)   Date Value   04/03/2019 41 (H)     BUN (mg/dL)   Date Value   04/03/2019 7     BP Readings from Last 3 Encounters:   04/16/19 127/84   04/04/19 (!) 107/57   03/31/19 (!) 121/99          (goal 120/80)    All Future Testing planned in [facility]  Lab Frequency Next Occurrence   PT functional capacity evaluation (FCE) Once 01/23/2019   Transfuse fresh frozen plasma     Transfuse fresh frozen plasma                 Patient Active Problem List:     Pre-diabetes     Myasthenia gravis with exacerbation (HCC)     Myasthenia gravis (HCC)     Osteopenia     Chronic hepatitis C without hepatic coma ([facility]     Influenza with respiratory manifestation other than pneumonia     Myasthenia gravis in crisis [facility])     H/O myasthenia gravis

## 2019-06-27 ENCOUNTER — OFFICE VISIT (OUTPATIENT)
Dept: GASTROENTEROLOGY | Age: 44
End: 2019-06-27
Payer: MEDICARE

## 2019-06-27 ENCOUNTER — HOSPITAL ENCOUNTER (OUTPATIENT)
Age: 44
Setting detail: SPECIMEN
Discharge: HOME | End: 2019-06-27
Payer: MEDICARE

## 2019-06-27 VITALS — HEART RATE: 116 BPM | OXYGEN SATURATION: 98 % | DIASTOLIC BLOOD PRESSURE: 87 MMHG | SYSTOLIC BLOOD PRESSURE: 138 MMHG

## 2019-06-27 DIAGNOSIS — B18.2 HEP C W/O COMA, CHRONIC (HCC): ICD-10-CM

## 2019-06-27 DIAGNOSIS — B18.2 HEP C W/O COMA, CHRONIC (HCC): Primary | ICD-10-CM

## 2019-06-27 DIAGNOSIS — D18.03 LIVER HEMANGIOMA: ICD-10-CM

## 2019-06-27 DIAGNOSIS — K86.2 PANCREATIC CYST: ICD-10-CM

## 2019-06-27 DIAGNOSIS — G70.00 MYASTHENIA GRAVIS (HCC): ICD-10-CM

## 2019-06-27 LAB
ALBUMIN SERPL-MCNC: 4.2 G/DL (ref 3.5–5.2)
ALBUMIN/GLOBULIN RATIO: ABNORMAL (ref 1–2.5)
ALP BLD-CCNC: 73 U/L (ref 35–104)
ALT SERPL-CCNC: 60 U/L (ref 5–33)
AMPHETAMINE SCREEN URINE: NEGATIVE
ANION GAP SERPL CALCULATED.3IONS-SCNC: 13 MMOL/L (ref 9–17)
AST SERPL-CCNC: 61 U/L
BARBITURATE SCREEN URINE: NEGATIVE
BENZODIAZEPINE SCREEN, URINE: NEGATIVE
BILIRUB SERPL-MCNC: 0.27 MG/DL (ref 0.3–1.2)
BILIRUBIN DIRECT: 0.11 MG/DL
BILIRUBIN, INDIRECT: 0.16 MG/DL (ref 0–1)
BUN BLDV-MCNC: 8 MG/DL (ref 6–20)
BUPRENORPHINE URINE: NORMAL
CALCIUM SERPL-MCNC: 8.9 MG/DL (ref 8.6–10.4)
CANNABINOID SCREEN URINE: NEGATIVE
CHLORIDE BLD-SCNC: 102 MMOL/L (ref 98–107)
CO2: 21 MMOL/L (ref 20–31)
COCAINE METABOLITE, URINE: NEGATIVE
CREAT SERPL-MCNC: 0.69 MG/DL (ref 0.5–0.9)
ETHANOL PERCENT: <0.01 %
ETHANOL: <10 MG/DL
GFR AFRICAN AMERICAN: >60 ML/MIN
GFR NON-AFRICAN AMERICAN: >60 ML/MIN
GFR SERPL CREATININE-BSD FRML MDRD: ABNORMAL ML/MIN/{1.73_M2}
GFR SERPL CREATININE-BSD FRML MDRD: ABNORMAL ML/MIN/{1.73_M2}
GLUCOSE BLD-MCNC: 162 MG/DL (ref 70–99)
HAV AB SERPL IA-ACNC: REACTIVE
HEPATITIS C ANTIBODY: REACTIVE
HIV AG/AB: NONREACTIVE
MDMA URINE: NORMAL
METHADONE SCREEN, URINE: NEGATIVE
METHAMPHETAMINE, URINE: NORMAL
OPIATES, URINE: NEGATIVE
OXYCODONE SCREEN URINE: NEGATIVE
PHENCYCLIDINE, URINE: NEGATIVE
POTASSIUM SERPL-SCNC: 4.1 MMOL/L (ref 3.7–5.3)
PROPOXYPHENE, URINE: NORMAL
SODIUM BLD-SCNC: 136 MMOL/L (ref 135–144)
TEST INFORMATION: NORMAL
TOTAL PROTEIN: 7.6 G/DL (ref 6.4–8.3)
TRICYCLIC ANTIDEPRESSANTS, UR: NORMAL

## 2019-06-27 PROCEDURE — 87389 HIV-1 AG W/HIV-1&-2 AB AG IA: CPT

## 2019-06-27 PROCEDURE — 86317 IMMUNOASSAY INFECTIOUS AGENT: CPT

## 2019-06-27 PROCEDURE — 82248 BILIRUBIN DIRECT: CPT

## 2019-06-27 PROCEDURE — 84460 ALANINE AMINO (ALT) (SGPT): CPT

## 2019-06-27 PROCEDURE — 80053 COMPREHEN METABOLIC PANEL: CPT

## 2019-06-27 PROCEDURE — 36415 COLL VENOUS BLD VENIPUNCTURE: CPT

## 2019-06-27 PROCEDURE — 82977 ASSAY OF GGT: CPT

## 2019-06-27 PROCEDURE — 84450 TRANSFERASE (AST) (SGOT): CPT

## 2019-06-27 PROCEDURE — 80307 DRUG TEST PRSMV CHEM ANLYZR: CPT

## 2019-06-27 PROCEDURE — 99214 OFFICE O/P EST MOD 30 MIN: CPT | Performed by: INTERNAL MEDICINE

## 2019-06-27 PROCEDURE — 87522 HEPATITIS C REVRS TRNSCRPJ: CPT

## 2019-06-27 PROCEDURE — 83883 ASSAY NEPHELOMETRY NOT SPEC: CPT

## 2019-06-27 PROCEDURE — 84520 ASSAY OF UREA NITROGEN: CPT

## 2019-06-27 PROCEDURE — 86708 HEPATITIS A ANTIBODY: CPT

## 2019-06-27 PROCEDURE — G8428 CUR MEDS NOT DOCUMENT: HCPCS | Performed by: INTERNAL MEDICINE

## 2019-06-27 PROCEDURE — 85025 COMPLETE CBC W/AUTO DIFF WBC: CPT

## 2019-06-27 PROCEDURE — 86803 HEPATITIS C AB TEST: CPT

## 2019-06-27 PROCEDURE — G0480 DRUG TEST DEF 1-7 CLASSES: HCPCS

## 2019-06-27 PROCEDURE — G8417 CALC BMI ABV UP PARAM F/U: HCPCS | Performed by: INTERNAL MEDICINE

## 2019-06-27 PROCEDURE — 1036F TOBACCO NON-USER: CPT | Performed by: INTERNAL MEDICINE

## 2019-06-27 ASSESSMENT — ENCOUNTER SYMPTOMS
RECTAL PAIN: 0
VOMITING: 0
ABDOMINAL DISTENTION: 0
NAUSEA: 0
TROUBLE SWALLOWING: 0
ABDOMINAL PAIN: 0
CONSTIPATION: 0
DIARRHEA: 0
CHOKING: 0
BLOOD IN STOOL: 0
WHEEZING: 0
COUGH: 0
ANAL BLEEDING: 0

## 2019-06-27 NOTE — PROGRESS NOTES
GI CLINIC FOLLOW UP    INTERVAL HISTORY:   No referring provider defined for this encounter. Chief Complaint   Patient presents with    Hepatitis C     Patient is here today to f/u for hep c with phone interperater [id number]    Cyst     Patient states she has a pancreatic cyst per MRI at [facility] 574: Nasreen Pineda is a 38 y.o. female , referred for evaluation of hep C , pancreatitis  Patient is seen for follow-up we have to communicate with her through the phone   We saw her last visit for hepatitis C we ordered labs and ultrasound on her but she get admitted and to leave the hospital with abdominal pain and pancreatitis  Was in [facility] with pancreatitis   Diagnosed with Acute pancreatitis and dehydration: No cause for the acute pancreatitis found as she denied alcohol use, had history of cholecystectomy 10 years ago, and had normal triglyceride a normal calcium. Ultrasound of the abdomen showed heterogeneous liver with a focal lesion in the left lobe of the liver. MRI abdomen/MRCP showed no evidence of acute pancreatitis but multiple cysts throughout the pancreatic parenchyma that likely reflect benign pancreatic cysts or side branch intraductal papillary mucinous neoplasm. It also showed a left hepatic lobe hemangioma measuring 2.3 cm.      AFP is normal   Medically denying any abdominal pain denying any nausea or vomiting the pain she had when she had the pancreatitis was only for 3 to 4 days  Her hepatitis C is genotype 1a 1B she is HIV negative  Positive hepatitis C RNA  Normal alpha-fetoprotein as stated  She is not immune to hepatitis B  But she is immune to hepatitis A  She is not showing any labs indicative of hepatitis B infection  She is anxious to be treated  She does have anemia and its iron deficiency anemia most likely related to her heavy periods which she follow with GYN on that      Past Medical,Family, and Social History reviewed and does contribute to the patient presentingcondition. Impression:    1. No evidence of acute pancreatitis. 2. Multiple cysts are seen throughout the pancreatic parenchyma. These do not no definite communication with the pancreatic duct. Findings likely reflect benign pancreatic cysts or side branch intraductal papillary mucinous neoplasm. 3. Left hepatic lobe hemangioma measures 2.3 cm. Workstation:[id number]    Finalized by Kesha Graff MD on 4/28/2019 6:36 PM    US liver 4/2019:    Findings:  Gallbladder surgically absent>. CBD diameter is 0.4 cm. Hepatopetal flow in the portal vein is noted. Main portal vein velocity is approximately 27.6 cm/s. The liver is hyperechoic possibly due to fatty infiltration. There is a lesion in the left lobe of the liver that appears solid and hypoechoic. It measures 2.3 x 2.8 x 2 cm. This cannot be further characterized on ultrasound and MRI MRCP of the liver is recommended with IV contrast.Eovist may be more appropriate as a contrast agent. Pancreas is obscured by bowel gas. IMPRESSION:  Postcholecystectomy state. Heterogenous liver which may be due to chronic liver disease or fatty infiltration. Focal lesion in the left lobe of the liver requires further characterization with MRI and IV contrast if the patient is capable of undergoing MRI imaging. [id number]    Finalized by Juan Briceno,      Patient's PMH/PSH,SH,PSYCH Hx, MEDs, ALLERGIES, and ROS were all reviewed and updated in the appropriate sections.     PAST MEDICAL HISTORY:  Past Medical History:   Diagnosis Date    Anemia     Chronic hepatitis C without hepatic coma ([address] 3/22/2019    Headache     Hypertension     Myasthenia gravis ([address]     Pre-diabetes        Past Surgical History:   Procedure Laterality Date    CARDIAC SURGERY         CURRENT MEDICATIONS:    Current Outpatient Medications:     metFORMIN (GLUCOPHAGE) 500 MG tablet, TAKE 1 TABLET BY MOUTH TWICE DAILY WITH MEALS., Disp: 60 tablet, Rfl: 2    predniSONE (DELTASONE) 20 MG tablet, Take 40 mg by mouth daily, Disp: , Rfl:     pyridostigmine (MESTINON) 60 MG tablet, Take 1 tablet by mouth 3 times daily, Disp: 60 tablet, Rfl: 3    pyridostigmine (MESTINON) 60 MG tablet, Take 1.5 tablets by mouth 2 times daily AND 1 tablet every evening. Take 90 mg am and in afternoon and 60 mg in pm., Disp: 60 tablet, Rfl: 0    mycophenolate (CELLCEPT) 250 MG capsule, Take 2 capsules by mouth 2 times daily, Disp: 60 capsule, Rfl: 3    acetaminophen (TYLENOL) 325 MG tablet, Take 2 tablets by mouth every 6 hours as needed for Pain, Disp: 30 tablet, Rfl: 0    vitamin D (CHOLECALCIFEROL) 1000 UNIT TABS tablet, TAKE 1 TABLET BY MOUTH ONE TIME A DAY, Disp: 30 tablet, Rfl: 0    vitamin C (ASCORBIC ACID) 500 MG tablet, TAKE 1 TABLET BY MOUTH ONE TIME A DAY, Disp: 30 tablet, Rfl: 3    vitamin D (CHOLECALCIFEROL) 1000 UNIT TABS tablet, TAKE 1 TABLET BY MOUTH ONE TIME A DAY, Disp: 30 tablet, Rfl: 0    fluticasone (FLONASE) 50 MCG/ACT nasal spray, 1 spray by Each Nare route daily 1 Spray in each nostril, Disp: 2 Bottle, Rfl: 1    ferrous sulfate 325 (65 Fe) MG tablet, TAKE 1 TABLET BY MOUTH 2 TIMES DAILY (WITH MEALS), Disp: 90 tablet, Rfl: 9    blood glucose monitor strips, Test 4 times daily Diagnosis, Disp: 100 strip, Rfl: 2    Alcohol Swabs PADS, USE AS DIRECTED WHEN CHECKING BLOOD SUGAR DAILY. , Disp: 100 each, Rfl: 3    ACCU-CHEK SOFTCLIX LANCETS MISC, Daily, Disp: 100 each, Rfl: 3    glucose monitoring kit (FREESTYLE) monitoring kit, Daily check, Disp: 1 kit, Rfl: 0    ALLERGIES:   Allergies   Allergen Reactions    Morphine Shortness Of Breath    Flexeril [Cyclobenzaprine]     Norflex [Orphenadrine Citrate]     Nsaids     Penicillins Rash    Vancomycin Rash       FAMILY HISTORY: No family history on file.       SOCIAL HISTORY:   Social History     Socioeconomic History    Marital status: Single     Spouse name: Not on file    Number of children: Not on file    Years of education: Not on file    Highest education level: Not on file   Occupational History    Not on file   Social Needs    Financial resource strain: Not on file    Food insecurity:     Worry: Not on file     Inability: Not on file    Transportation needs:     Medical: Not on file     Non-medical: Not on file   Tobacco Use    Smoking status: Never Smoker    Smokeless tobacco: Never Used   Substance and Sexual Activity    Alcohol use: No    Drug use: No    Sexual activity: Never   Lifestyle    Physical activity:     Days per week: Not on file     Minutes per session: Not on file    Stress: Not on file   Relationships    Social connections:     Talks on phone: Not on file     Gets together: Not on file     Attends Yazidi service: Not on file     Active member of club or organization: Not on file     Attends meetings of clubs or organizations: Not on file     Relationship status: Not on file    Intimate partner violence:     Fear of current or ex partner: Not on file     Emotionally abused: Not on file     Physically abused: Not on file     Forced sexual activity: Not on file   Other Topics Concern    Not on file   Social History Narrative    Not on file       REVIEW OF SYSTEMS: A 12-point review of systemswas obtained and pertinent positives and negatives were enumerated above in the history of present illness. All other reviewed systems / symptoms were negative. Review of Systems   Constitutional: Negative for appetite change, fatigue and unexpected weight change. HENT: Negative for trouble swallowing. Respiratory: Negative for cough, choking and wheezing. Cardiovascular: Negative for chest pain, palpitations and leg swelling. Gastrointestinal: Negative for abdominal distention, abdominal pain, anal bleeding, blood in stool, constipation, diarrhea, nausea, rectal pain and vomiting. Genitourinary: Negative for difficulty urinating. Allergic/Immunologic: Negative for environmental allergies and food allergies. Neurological: Negative for dizziness, weakness, light-headedness, numbness and headaches. Hematological: Does not bruise/bleed easily. Psychiatric/Behavioral: Negative for sleep disturbance. The patient is not nervous/anxious. LABORATORY DATA: Reviewed  Lab Results   Component Value Date    WBC 9.8 04/03/2019    HGB 10.4 (L) 04/03/2019    HCT 35.1 (L) 04/03/2019    MCV 84.6 04/03/2019     (H) 04/03/2019     04/03/2019    K 3.8 04/03/2019     04/03/2019    CO2 24 04/03/2019    BUN 7 04/03/2019    CREATININE 0.54 04/03/2019    LABALBU 3.7 04/03/2019    BILITOT 0.25 (L) 04/03/2019    ALKPHOS 61 04/03/2019    AST 53 (H) 04/03/2019    ALT 41 (H) 04/03/2019    INR 0.9 03/30/2019         Lab Results   Component Value Date    RBC 4.15 04/03/2019    HGB 10.4 (L) 04/03/2019    MCV 84.6 04/03/2019    MCH 25.1 (L) 04/03/2019    MCHC 29.6 04/03/2019    RDW 17.0 (H) 04/03/2019    MPV 11.2 04/03/2019    BASOPCT 0 04/03/2019    LYMPHSABS 3.24 04/03/2019    MONOSABS 1.21 (H) 04/03/2019    NEUTROABS 5.13 04/03/2019    EOSABS 0.03 04/03/2019    BASOSABS 0.04 04/03/2019         DIAGNOSTIC TESTING:     No results found. PHYSICAL EXAMINATION: Vital signs reviewed per the nursing documentation. /87   Pulse 116   SpO2 98%   There is no height or weight on file to calculate BMI. Physical Exam   Constitutional: She is oriented to person, place, and time. She appears well-developed and well-nourished. No distress. HENT:   Head: Normocephalic. Mouth/Throat: No oropharyngeal exudate. Eyes: Pupils are equal, round, and reactive to light. No scleral icterus. Neck: Normal range of motion. Neck supple. No JVD present. No tracheal deviation present. No thyromegaly present. Cardiovascular: Normal rate, regular rhythm, normal heart sounds and intact distal pulses. No murmur heard.   Pulmonary/Chest: Effort normal and breath sounds normal. No respiratory distress. She has no wheezes. Abdominal: Soft. Bowel sounds are normal. She exhibits no distension. There is no tenderness. There is no rebound and no guarding. No ascites   Musculoskeletal: Normal range of motion. She exhibits no edema. Neurological: She is alert and oriented to person, place, and time. She has normal reflexes. Skin: Skin is warm. No rash noted. She is not diaphoretic. No erythema. No pallor. She is not diaphoretic   Psychiatric: She has a normal mood and affect. Her behavior is normal. Judgment and thought content normal.   Nursing note and vitals reviewed. IMPRESSION: Ms. Leigha Nguyen is a 38 y.o. female with    Diagnosis Orders   1. Hep C w/o coma, chronic (HCC)  CBC With Auto Differential    Comprehensive Metabolic w/Bili Profile    Liver Fibrosis, Chronic Viral Hepatitis    Hepatitis A Antibody, Total    Hepatitis B Surface Antibody    Hepatitis C RNA, Quantitative, PCR    Hepatitis C Antibody    US LIVER    Urine Drug Screen    Ethanol    HIV Screen   2. Myasthenia gravis ([address]     3. Liver hemangioma     4. Pancreatic cyst       We will proceed with the above testing and try to authorize medication to treat her for hepatitis C    She will need to be vaccinated for hepatitis B she was told to check that with her primary care physician or in the health department    We will repeat her MRI in few months and follow on the pancreatic cysts, we might consider also endoscopic ultrasound    We will watch the hemangioma in the liver  The alpha-fetoprotein is been normal    Told to follow with her GYN on her heavy menses and the iron deficiency anemia      Thank you for allowing me to participate in the care of Ms. Leigha Nguyen. For any further questions please do not hesitate to contact me. I have reviewed and agree with the ROS entered by the MA/LPN. Note is dictated utilizing voice recognition software.

## 2019-06-28 LAB
ABSOLUTE EOS #: 0 K/UL (ref 0–0.4)
ABSOLUTE IMMATURE GRANULOCYTE: ABNORMAL K/UL (ref 0–0.3)
ABSOLUTE LYMPH #: 1.52 K/UL (ref 1–4.8)
ABSOLUTE MONO #: 0.59 K/UL (ref 0.1–1.3)
BASOPHILS # BLD: 1 % (ref 0–2)
BASOPHILS ABSOLUTE: 0.12 K/UL (ref 0–0.2)
DIFFERENTIAL TYPE: ABNORMAL
EOSINOPHILS RELATIVE PERCENT: 0 % (ref 0–4)
HBV SURFACE AB TITR SER: 52.48 MIU/ML
HCT VFR BLD CALC: 34.7 % (ref 36–46)
HEMOGLOBIN: 10.3 G/DL (ref 12–16)
IMMATURE GRANULOCYTES: ABNORMAL %
LYMPHOCYTES # BLD: 13 % (ref 24–44)
MCH RBC QN AUTO: 21.7 PG (ref 26–34)
MCHC RBC AUTO-ENTMCNC: 29.7 G/DL (ref 31–37)
MCV RBC AUTO: 73 FL (ref 80–100)
MONOCYTES # BLD: 5 % (ref 1–7)
MORPHOLOGY: ABNORMAL
NRBC AUTOMATED: ABNORMAL PER 100 WBC
PDW BLD-RTO: 30 % (ref 11.5–14.9)
PLATELET # BLD: 599 K/UL (ref 150–450)
PLATELET ESTIMATE: ABNORMAL
PMV BLD AUTO: 8.7 FL (ref 6–12)
RBC # BLD: 4.75 M/UL (ref 4–5.2)
RBC # BLD: ABNORMAL 10*6/UL
SEG NEUTROPHILS: 81 % (ref 36–66)
SEGMENTED NEUTROPHILS ABSOLUTE COUNT: 9.47 K/UL (ref 1.3–9.1)
WBC # BLD: 11.7 K/UL (ref 3.5–11)
WBC # BLD: ABNORMAL 10*3/UL

## 2019-07-01 LAB
ALANINE AMINOTRANSFERASE, FIBROMETER: 68 U/L (ref 5–40)
ALPHA-2-MACROGLOBULIN, FIBROMETER: 236 MG/DL (ref 131–293)
ASPARTATE AMINOTRANSFERASE, FIBROMETER: 65 U/L (ref 9–40)
CIRRHOMETER PATIENT SCORE: 0.01
DIRECT EXAM: ABNORMAL
DIRECT EXAM: ABNORMAL
EER FIBROMETER REPORT: ABNORMAL
FIBROMETER INTERPRETATION: ABNORMAL
FIBROMETER PATIENT SCORE: 0.53
FIBROMETER PLATELET COUNT: 599
FIBROMETER PROTHROMBIN INDEX: 113 % (ref 90–120)
FIBROSIS METAVIR CLASSIFICATION: ABNORMAL
GAMMA GLUTAMYL TRANSFERASE, FIBROMETER: 133 U/L (ref 7–33)
INFLAMETER METAVIR CLASSIFICATION: ABNORMAL
INFLAMETER PATIENT SCORE: 0.07
Lab: ABNORMAL
SPECIMEN DESCRIPTION: ABNORMAL
UREA NITROGEN, FIBROMETER: 8 MG/DL (ref 7–20)

## 2019-07-02 ENCOUNTER — HOSPITAL ENCOUNTER (OUTPATIENT)
Dept: ULTRASOUND IMAGING | Age: 44
Discharge: HOME | End: 2019-07-04
Payer: MEDICARE

## 2019-07-02 DIAGNOSIS — B18.2 HEP C W/O COMA, CHRONIC (HCC): ICD-10-CM

## 2019-07-02 PROCEDURE — 76705 ECHO EXAM OF ABDOMEN: CPT

## 2019-07-12 ENCOUNTER — TELEPHONE (OUTPATIENT)
Dept: GASTROENTEROLOGY | Age: 44
End: 2019-07-12

## 2019-07-12 NOTE — TELEPHONE ENCOUNTER
Writer faxed paperwork to  to begin PA on 7/8/19    Rejected PA forms sent to [facility] are faxed back to [facility] Carlene AVENDANO 7/12/19

## 2019-08-07 ENCOUNTER — TELEPHONE (OUTPATIENT)
Dept: GASTROENTEROLOGY | Age: 44
End: 2019-08-07

## 2019-08-07 NOTE — TELEPHONE ENCOUNTER
8/7/19 Left message for patient to call office and confirm appointment for 8/8/19, location, copay, id, and 24 hour notice for cancellation-jt

## 2019-08-08 ENCOUNTER — OFFICE VISIT (OUTPATIENT)
Dept: GASTROENTEROLOGY | Age: 44
End: 2019-08-08
Payer: MEDICARE

## 2019-08-08 VITALS
BODY MASS INDEX: 31.44 KG/M2 | WEIGHT: 161 LBS | SYSTOLIC BLOOD PRESSURE: 145 MMHG | DIASTOLIC BLOOD PRESSURE: 95 MMHG | HEART RATE: 105 BPM

## 2019-08-08 DIAGNOSIS — K86.2 PANCREATIC CYST: ICD-10-CM

## 2019-08-08 DIAGNOSIS — G70.00 MYASTHENIA GRAVIS (HCC): ICD-10-CM

## 2019-08-08 DIAGNOSIS — E61.1 IRON DEFICIENCY: ICD-10-CM

## 2019-08-08 DIAGNOSIS — D18.03 LIVER HEMANGIOMA: ICD-10-CM

## 2019-08-08 DIAGNOSIS — B18.2 CHRONIC HEPATITIS C WITHOUT HEPATIC COMA (HCC): Primary | ICD-10-CM

## 2019-08-08 PROCEDURE — 1036F TOBACCO NON-USER: CPT | Performed by: INTERNAL MEDICINE

## 2019-08-08 PROCEDURE — G8417 CALC BMI ABV UP PARAM F/U: HCPCS | Performed by: INTERNAL MEDICINE

## 2019-08-08 PROCEDURE — 99214 OFFICE O/P EST MOD 30 MIN: CPT | Performed by: INTERNAL MEDICINE

## 2019-08-08 PROCEDURE — G8428 CUR MEDS NOT DOCUMENT: HCPCS | Performed by: INTERNAL MEDICINE

## 2019-08-08 ASSESSMENT — ENCOUNTER SYMPTOMS
CONSTIPATION: 0
DIARRHEA: 1
COUGH: 0
CHOKING: 0
NAUSEA: 0
ANAL BLEEDING: 0
ABDOMINAL DISTENTION: 0
ABDOMINAL PAIN: 0
TROUBLE SWALLOWING: 0
WHEEZING: 0
VOMITING: 0
RECTAL PAIN: 0
BLOOD IN STOOL: 0

## 2019-08-08 NOTE — PROGRESS NOTES
utilizing voice recognition software. Unfortunately this leads to occasional typographical errors. Please contact our office if you have any questions.       Casper Sullivan MD  [facility]  O: #[phone]

## 2019-08-12 NOTE — TELEPHONE ENCOUNTER
Request from writer to  for letter patient described in last visit needing her and dr Kristal Garcia sent

## 2019-09-24 ENCOUNTER — OFFICE VISIT (OUTPATIENT)
Dept: GASTROENTEROLOGY | Age: 44
End: 2019-09-24
Payer: MEDICARE

## 2019-09-24 VITALS
BODY MASS INDEX: 32.03 KG/M2 | WEIGHT: 164 LBS | DIASTOLIC BLOOD PRESSURE: 84 MMHG | HEART RATE: 111 BPM | SYSTOLIC BLOOD PRESSURE: 138 MMHG

## 2019-09-24 DIAGNOSIS — B18.2 CHRONIC HEPATITIS C WITHOUT HEPATIC COMA (HCC): Primary | ICD-10-CM

## 2019-09-24 DIAGNOSIS — K86.2 PANCREATIC CYST: ICD-10-CM

## 2019-09-24 DIAGNOSIS — E61.1 IRON DEFICIENCY: ICD-10-CM

## 2019-09-24 DIAGNOSIS — G70.00 MYASTHENIA GRAVIS (HCC): ICD-10-CM

## 2019-09-24 DIAGNOSIS — D18.03 LIVER HEMANGIOMA: ICD-10-CM

## 2019-09-24 PROCEDURE — G8427 DOCREV CUR MEDS BY ELIG CLIN: HCPCS | Performed by: INTERNAL MEDICINE

## 2019-09-24 PROCEDURE — 1036F TOBACCO NON-USER: CPT | Performed by: INTERNAL MEDICINE

## 2019-09-24 PROCEDURE — 99213 OFFICE O/P EST LOW 20 MIN: CPT | Performed by: INTERNAL MEDICINE

## 2019-09-24 PROCEDURE — G8417 CALC BMI ABV UP PARAM F/U: HCPCS | Performed by: INTERNAL MEDICINE

## 2019-09-24 RX ORDER — SODIUM, POTASSIUM,MAG SULFATES 17.5-3.13G
SOLUTION, RECONSTITUTED, ORAL ORAL
Qty: 1 BOTTLE | Refills: 0 | Status: SHIPPED | OUTPATIENT
Start: 2019-09-24 | End: 2020-07-20 | Stop reason: ALTCHOICE

## 2019-09-24 RX ORDER — PREDNISONE 10 MG/1
10 TABLET ORAL DAILY
COMMUNITY

## 2019-09-24 ASSESSMENT — ENCOUNTER SYMPTOMS
WHEEZING: 0
NAUSEA: 0
RECTAL PAIN: 0
COUGH: 0
ANAL BLEEDING: 0
VOMITING: 0
ABDOMINAL PAIN: 0
DIARRHEA: 0
TROUBLE SWALLOWING: 0
CHOKING: 0
CONSTIPATION: 0
ABDOMINAL DISTENTION: 0
BLOOD IN STOOL: 0

## 2019-09-24 NOTE — PROGRESS NOTES
GI CLINIC FOLLOW UP    INTERVAL HISTORY:   Uche Mims DO  [id number] [address], [address] [place] [address]    Chief Complaint   Patient presents with    Hepatitis C     Patient is here today to f/u on Hep C. Her insurance denied medication. All appeals are exhausted. [employer] is now working on getting medication under financial assistance program.  # [id number]           HISTORY OF PRESENT ILLNESS: Sofia Guillen is a 38 y.o. female , referred for evaluation of  Hepatitis C  Patient is here for follow-up unfortunately her insurance not approving the medication for some on clear reason  Appeals has been exhausted  [address] are trying to get the medication from the companies through an indigent program  This patient had myasthenia gravis  Elevated liver enzymes  She had chronic iron deficiency anemia  Was thought to be related vaginal bleed as she does have fibroid but she does not bleed regularly and this is infrequent, she had an EGD done at [facility] per her report was negative  No GI symptoms  No sign or symptoms to indicate end-stage liver disease    Patient does have iron deficiency anemia she had an EGD done at [facility]  Past Medical,Family, and Social History reviewed and does contribute to the patient presentingcondition. Patient's PMH/PSH,SH,PSYCH Hx, MEDs, ALLERGIES, and ROS were all reviewed and updated in the appropriate sections.     PAST MEDICAL HISTORY:  Past Medical History:   Diagnosis Date    Anemia     Chronic hepatitis C without hepatic coma ([facility] 3/22/2019    Headache     Hypertension     Myasthenia gravis ([facility]     Pre-diabetes        Past Surgical History:   Procedure Laterality Date    CARDIAC SURGERY         CURRENT MEDICATIONS:    Current Outpatient Medications:     predniSONE (DELTASONE) 10 MG tablet, Take 10 mg by mouth daily, Disp: , Rfl:     metFORMIN (GLUCOPHAGE) 500 MG tablet, TAKE 1 TABLET BY MOUTH TWICE DAILY WITH

## 2019-10-10 ENCOUNTER — TELEPHONE (OUTPATIENT)
Dept: GASTROENTEROLOGY | Age: 44
End: 2019-10-10

## 2019-10-23 ENCOUNTER — TELEPHONE (OUTPATIENT)
Dept: GASTROENTEROLOGY | Age: 44
End: 2019-10-23

## 2019-10-23 NOTE — TELEPHONE ENCOUNTER
Received email stating the following: We received denial from patient assistance program from [address] [place] [address], they informed us due to patient's most recent denial letter still requesting missing information. Verified with insurance co. they would need all updated labs (CBC, CMP, Genotype, Viral Load and Urine drug/alcohol screening), also Either Liver Biopsy or Elastography, and lastly documentation in clinical notes she will need the smart note you guys have. New orders placed per Dr Douglas Souza. Patient is called using , Fawn Giang #[id number]. Message was left on mobile. Home number was then tried. Patient was contacted and informed of new orders. Number was given for scheduling. She is also instructed to contact office once labs and US are complete. Patient verbalizes understanding.

## 2019-10-25 ENCOUNTER — TELEPHONE (OUTPATIENT)
Dept: GASTROENTEROLOGY | Age: 44
End: 2019-10-25

## 2019-10-25 ENCOUNTER — HOSPITAL ENCOUNTER (OUTPATIENT)
Dept: ULTRASOUND IMAGING | Age: 44
Discharge: HOME | End: 2019-10-27
Payer: MEDICARE

## 2019-10-25 ENCOUNTER — HOSPITAL ENCOUNTER (OUTPATIENT)
Age: 44
Setting detail: SPECIMEN
Discharge: HOME | End: 2019-10-25
Payer: MEDICARE

## 2019-10-25 DIAGNOSIS — B18.2 CHRONIC HEPATITIS C WITHOUT HEPATIC COMA (HCC): ICD-10-CM

## 2019-10-25 LAB
ABSOLUTE EOS #: 0.2 K/UL (ref 0–0.4)
ABSOLUTE IMMATURE GRANULOCYTE: ABNORMAL K/UL (ref 0–0.3)
ABSOLUTE LYMPH #: 1.5 K/UL (ref 1–4.8)
ABSOLUTE MONO #: 0.8 K/UL (ref 0.1–1.3)
ALBUMIN SERPL-MCNC: 4.1 G/DL (ref 3.5–5.2)
ALBUMIN/GLOBULIN RATIO: ABNORMAL (ref 1–2.5)
ALP BLD-CCNC: 67 U/L (ref 35–104)
ALT SERPL-CCNC: 50 U/L (ref 5–33)
AMPHETAMINE SCREEN URINE: NEGATIVE
ANION GAP SERPL CALCULATED.3IONS-SCNC: 12 MMOL/L (ref 9–17)
AST SERPL-CCNC: 51 U/L
BARBITURATE SCREEN URINE: NEGATIVE
BASOPHILS # BLD: 1 % (ref 0–2)
BASOPHILS ABSOLUTE: 0.1 K/UL (ref 0–0.2)
BENZODIAZEPINE SCREEN, URINE: NEGATIVE
BILIRUB SERPL-MCNC: 0.29 MG/DL (ref 0.3–1.2)
BILIRUBIN DIRECT: 0.13 MG/DL
BILIRUBIN, INDIRECT: 0.16 MG/DL (ref 0–1)
BUN BLDV-MCNC: 6 MG/DL (ref 6–20)
BUPRENORPHINE URINE: NORMAL
CALCIUM SERPL-MCNC: 8.9 MG/DL (ref 8.6–10.4)
CANNABINOID SCREEN URINE: NEGATIVE
CHLORIDE BLD-SCNC: 105 MMOL/L (ref 98–107)
CO2: 25 MMOL/L (ref 20–31)
COCAINE METABOLITE, URINE: NEGATIVE
CREAT SERPL-MCNC: 0.69 MG/DL (ref 0.5–0.9)
DIFFERENTIAL TYPE: ABNORMAL
EOSINOPHILS RELATIVE PERCENT: 3 % (ref 0–4)
ETHANOL PERCENT: <0.01 %
ETHANOL: <10 MG/DL
GFR AFRICAN AMERICAN: >60 ML/MIN
GFR NON-AFRICAN AMERICAN: >60 ML/MIN
GFR SERPL CREATININE-BSD FRML MDRD: ABNORMAL ML/MIN/{1.73_M2}
GFR SERPL CREATININE-BSD FRML MDRD: ABNORMAL ML/MIN/{1.73_M2}
GLUCOSE BLD-MCNC: 122 MG/DL (ref 70–99)
HCT VFR BLD CALC: 40 % (ref 36–46)
HEMOGLOBIN: 13 G/DL (ref 12–16)
IMMATURE GRANULOCYTES: ABNORMAL %
LYMPHOCYTES # BLD: 17 % (ref 24–44)
MCH RBC QN AUTO: 29.2 PG (ref 26–34)
MCHC RBC AUTO-ENTMCNC: 32.6 G/DL (ref 31–37)
MCV RBC AUTO: 89.4 FL (ref 80–100)
MDMA URINE: NORMAL
METHADONE SCREEN, URINE: NEGATIVE
METHAMPHETAMINE, URINE: NORMAL
MONOCYTES # BLD: 9 % (ref 1–7)
NRBC AUTOMATED: ABNORMAL PER 100 WBC
OPIATES, URINE: NEGATIVE
OXYCODONE SCREEN URINE: NEGATIVE
PDW BLD-RTO: 16.3 % (ref 11.5–14.9)
PHENCYCLIDINE, URINE: NEGATIVE
PLATELET # BLD: 439 K/UL (ref 150–450)
PLATELET ESTIMATE: ABNORMAL
PMV BLD AUTO: 8.8 FL (ref 6–12)
POTASSIUM SERPL-SCNC: 4.3 MMOL/L (ref 3.7–5.3)
PROPOXYPHENE, URINE: NORMAL
RBC # BLD: 4.47 M/UL (ref 4–5.2)
RBC # BLD: ABNORMAL 10*6/UL
SEG NEUTROPHILS: 70 % (ref 36–66)
SEGMENTED NEUTROPHILS ABSOLUTE COUNT: 6.3 K/UL (ref 1.3–9.1)
SODIUM BLD-SCNC: 142 MMOL/L (ref 135–144)
TEST INFORMATION: NORMAL
TOTAL PROTEIN: 7.4 G/DL (ref 6.4–8.3)
TRICYCLIC ANTIDEPRESSANTS, UR: NORMAL
WBC # BLD: 8.9 K/UL (ref 3.5–11)
WBC # BLD: ABNORMAL 10*3/UL

## 2019-10-25 PROCEDURE — 76981 USE PARENCHYMA: CPT

## 2019-10-25 PROCEDURE — 84450 TRANSFERASE (AST) (SGOT): CPT

## 2019-10-25 PROCEDURE — 85025 COMPLETE CBC W/AUTO DIFF WBC: CPT

## 2019-10-25 PROCEDURE — 82977 ASSAY OF GGT: CPT

## 2019-10-25 PROCEDURE — 36415 COLL VENOUS BLD VENIPUNCTURE: CPT

## 2019-10-25 PROCEDURE — 83883 ASSAY NEPHELOMETRY NOT SPEC: CPT

## 2019-10-25 PROCEDURE — 84520 ASSAY OF UREA NITROGEN: CPT

## 2019-10-25 PROCEDURE — 80053 COMPREHEN METABOLIC PANEL: CPT

## 2019-10-25 PROCEDURE — 87902 NFCT AGT GNTYP ALYS HEP C: CPT

## 2019-10-25 PROCEDURE — 87522 HEPATITIS C REVRS TRNSCRPJ: CPT

## 2019-10-25 PROCEDURE — 84460 ALANINE AMINO (ALT) (SGPT): CPT

## 2019-10-25 PROCEDURE — G0480 DRUG TEST DEF 1-7 CLASSES: HCPCS

## 2019-10-25 PROCEDURE — 80307 DRUG TEST PRSMV CHEM ANLYZR: CPT

## 2019-10-25 PROCEDURE — 82248 BILIRUBIN DIRECT: CPT

## 2019-10-28 LAB
ALANINE AMINOTRANSFERASE, FIBROMETER: 58 U/L (ref 5–40)
ALPHA-2-MACROGLOBULIN, FIBROMETER: 229 MG/DL (ref 131–293)
ASPARTATE AMINOTRANSFERASE, FIBROMETER: 58 U/L (ref 9–40)
CIRRHOMETER PATIENT SCORE: 0.01
EER FIBROMETER REPORT: ABNORMAL
FIBROMETER INTERPRETATION: ABNORMAL
FIBROMETER PATIENT SCORE: 0.44
FIBROMETER PLATELET COUNT: 444
FIBROMETER PROTHROMBIN INDEX: 103 % (ref 90–120)
FIBROSIS METAVIR CLASSIFICATION: ABNORMAL
GAMMA GLUTAMYL TRANSFERASE, FIBROMETER: 88 U/L (ref 7–33)
INFLAMETER METAVIR CLASSIFICATION: ABNORMAL
INFLAMETER PATIENT SCORE: 0.15
UREA NITROGEN, FIBROMETER: 7 MG/DL (ref 7–20)

## 2019-10-29 ENCOUNTER — TELEPHONE (OUTPATIENT)
Dept: GASTROENTEROLOGY | Age: 44
End: 2019-10-29

## 2019-10-29 LAB
DIRECT EXAM: ABNORMAL
DIRECT EXAM: ABNORMAL
HCV QUANTITATIVE: NORMAL
HEPATITIS C GENOTYPE: NORMAL
Lab: ABNORMAL
SPECIMEN DESCRIPTION: ABNORMAL

## 2019-11-20 ENCOUNTER — TELEPHONE (OUTPATIENT)
Dept: GASTROENTEROLOGY | Age: 44
End: 2019-11-20

## 2019-11-20 DIAGNOSIS — K86.2 PANCREATIC CYST: Primary | ICD-10-CM

## 2019-12-09 NOTE — TELEPHONE ENCOUNTER
Script changed to David Perez per insurance preference, approved by Dr Dominick Morris. New script faxed to [facility].

## 2020-01-03 ENCOUNTER — CARE COORDINATION (OUTPATIENT)
Dept: CARE COORDINATION | Age: 45
End: 2020-01-03

## 2020-01-03 ENCOUNTER — TELEPHONE (OUTPATIENT)
Dept: GASTROENTEROLOGY | Age: 45
End: 2020-01-03

## 2020-01-03 NOTE — TELEPHONE ENCOUNTER
Writer called [employer] ( Service) and spoke w/ Donna Bagley (oper [phone]) in regards to calling & confirming 1/7/20 colon proc with pt. Donna Bagley from service spoke w/ Val Antonio over the phone and she confirms she will be here for colon proc sched at [facility] w/ [facility] 1/7/20 @ 8:45am arrival time. Pt has no questions regarding her bowel prep instructions.

## 2020-02-21 ENCOUNTER — CARE COORDINATION (OUTPATIENT)
Dept: CARE COORDINATION | Age: 45
End: 2020-02-21

## 2020-02-21 NOTE — CARE COORDINATION
Sharad Gonzalez MD   glucose monitoring kit (FREESTYLE) monitoring kit Daily check 1/22/18   Hany Espinoza MD       Future Appointments   Date Time Provider Richard Millie   2/25/2020 11:15 AM Hiren Roberts MD [facility]

## 2020-02-24 ENCOUNTER — PATIENT MESSAGE (OUTPATIENT)
Dept: GASTROENTEROLOGY | Age: 45
End: 2020-02-24

## 2020-02-24 ENCOUNTER — TELEPHONE (OUTPATIENT)
Dept: GASTROENTEROLOGY | Age: 45
End: 2020-02-24

## 2020-02-24 NOTE — TELEPHONE ENCOUNTER
2/24/20 Tried calling to cf appt for 2/25/20 and patient answered and does not speak very good english, tried calling Alyce Beauchamp and phone out of service-jt

## 2020-07-01 ENCOUNTER — HOSPITAL ENCOUNTER (OUTPATIENT)
Age: 45
Discharge: HOME | End: 2020-07-01
Payer: MEDICARE

## 2020-07-01 PROCEDURE — 36415 COLL VENOUS BLD VENIPUNCTURE: CPT

## 2020-07-01 PROCEDURE — 87522 HEPATITIS C REVRS TRNSCRPJ: CPT

## 2020-07-03 LAB
DIRECT EXAM: NORMAL
Lab: NORMAL
SPECIMEN DESCRIPTION: NORMAL

## 2020-07-20 ENCOUNTER — OFFICE VISIT (OUTPATIENT)
Dept: GASTROENTEROLOGY | Age: 45
End: 2020-07-20
Payer: MEDICARE

## 2020-07-20 VITALS — TEMPERATURE: 97.2 F | RESPIRATION RATE: 16 BRPM | WEIGHT: 172 LBS | BODY MASS INDEX: 33.77 KG/M2 | HEIGHT: 60 IN

## 2020-07-20 PROCEDURE — G8417 CALC BMI ABV UP PARAM F/U: HCPCS | Performed by: INTERNAL MEDICINE

## 2020-07-20 PROCEDURE — 1036F TOBACCO NON-USER: CPT | Performed by: INTERNAL MEDICINE

## 2020-07-20 PROCEDURE — 99214 OFFICE O/P EST MOD 30 MIN: CPT | Performed by: INTERNAL MEDICINE

## 2020-07-20 PROCEDURE — G8427 DOCREV CUR MEDS BY ELIG CLIN: HCPCS | Performed by: INTERNAL MEDICINE

## 2020-07-20 ASSESSMENT — ENCOUNTER SYMPTOMS
RECTAL PAIN: 0
VOMITING: 0
BLOOD IN STOOL: 0
ANAL BLEEDING: 0
TROUBLE SWALLOWING: 0
CHOKING: 0
ABDOMINAL DISTENTION: 0
COUGH: 0
ABDOMINAL PAIN: 0
NAUSEA: 0
CONSTIPATION: 0
WHEEZING: 0
DIARRHEA: 0

## 2020-07-20 NOTE — PROGRESS NOTES
GI CLINIC FOLLOW UP    INTERVAL HISTORY:   Noelle Stubbs DO  [id number] [place] [address], [address] [place] [address]    Chief Complaint   Patient presents with    Hepatitis C     Patient is here to f/u on Hep C. She finished 8 weeks of Mavyret. HISTORY OF PRESENT ILLNESS: Wanda Rothman is a 54 y.o. female , referred for evaluation of*hepatitis C and anemia  Hep C GT1a1b, F2  Finished tx with Lewis Reynolds, she took the treatment from -3/7  RNA now is negative 2020 4 which is a sustained responder. Reviewing her labs she is still anemic  . This patient had myasthenia gravis  Elevated liver enzymes  She had chronic iron deficiency anemia but no sign of bleeding, no black stool no blood in the stool no hematochezia no hematemesis,  Was thought to be related to vaginal bleed as she does have fibroid   HGB on [facility] lab 2020 still 9.9       Last visit we started the treatment and we schedule her for colonoscopy  Findings:  Terminal ileum: normal    Cecum/Ascending colon: abnormal: One right colon lesion some mucosa looked like lipoma measured 1.5 cm biopsies were taken    Transverse colon: normal    Descending/Sigmoid colon: normal    Rectum/Anus: examined in normal and retroflexed positions and was normal    Withdrawal Time was (minutes): 10    The colon was decompressed and the scope was removed. The patient tolerated the procedure well. Recommendations/Plan:   1. Lifestyle and dietary modifications as discussed  2. F/U Biopsies  3. F/U In Office in 3-4 weeks  4. Discussed with the family  5. Colonoscopy Recall :10 year    Electronically signed by Ruthann Dang MD on 2020 at 10:12 AM     Past MedicPatient Name: Roni Colvin. : 1975 (Age: 38) Gender: F Taken: 2020 Reported: 2020 Physician(s): Ruthann Dagn MD ([phone]) Copy To:  Accession #: [id number] [facility]. Rec.  #: [id number] Acct: # [de-identified]   Final Pathologic Diagnosis  Right colon, biopsy:       Colonic mucosa with intact crypt architecture and with no significant histopathologic changes.       No colitis including microscopic colitis, inflammatory bowel disease, granuloma or dysplasia.       Features of lipoma are not seen in this predominantly mucosal biopsy sample.              **Report Electronically Signed Out**  wak/1/8/2020 Jc Schaffer MD al,Family, and Social History reviewed and does contribute to the patient presentingcondition. Patient's PMH/PSH,SH,PSYCH Hx, MEDs, ALLERGIES, and ROS were all reviewed and updated in the appropriate sections.     PAST MEDICAL HISTORY:  Past Medical History:   Diagnosis Date    Anemia     Chronic hepatitis C without hepatic coma ([facility] 3/22/2019    Headache     Hypertension     Myasthenia gravis ([facility]     Pre-diabetes        Past Surgical History:   Procedure Laterality Date    CARDIAC SURGERY         CURRENT MEDICATIONS:    Current Outpatient Medications:     predniSONE (DELTASONE) 10 MG tablet, Take 10 mg by mouth daily, Disp: , Rfl:     metFORMIN (GLUCOPHAGE) 500 MG tablet, TAKE 1 TABLET BY MOUTH TWICE DAILY WITH MEALS., Disp: 60 tablet, Rfl: 2    pyridostigmine (MESTINON) 60 MG tablet, Take 1 tablet by mouth 3 times daily, Disp: 60 tablet, Rfl: 3    mycophenolate (CELLCEPT) 250 MG capsule, Take 2 capsules by mouth 2 times daily, Disp: 60 capsule, Rfl: 3    vitamin D (CHOLECALCIFEROL) 1000 UNIT TABS tablet, TAKE 1 TABLET BY MOUTH ONE TIME A DAY, Disp: 30 tablet, Rfl: 0    vitamin C (ASCORBIC ACID) 500 MG tablet, TAKE 1 TABLET BY MOUTH ONE TIME A DAY, Disp: 30 tablet, Rfl: 3    vitamin D (CHOLECALCIFEROL) 1000 UNIT TABS tablet, TAKE 1 TABLET BY MOUTH ONE TIME A DAY, Disp: 30 tablet, Rfl: 0    ferrous sulfate 325 (65 Fe) MG tablet, TAKE 1 TABLET BY MOUTH 2 TIMES DAILY (WITH MEALS), Disp: 90 tablet, Rfl: 9    blood glucose monitor strips, Test 4 times daily Diagnosis, Disp: 100 strip, Rfl: 2    Alcohol Swabs PADS, USE AS DIRECTED the history of present illness. All other reviewed systems / symptoms were negative. Review of Systems   Constitutional: Negative for appetite change, fatigue and unexpected weight change. HENT: Negative for trouble swallowing. Respiratory: Negative for cough, choking and wheezing. Cardiovascular: Negative for chest pain, palpitations and leg swelling. Gastrointestinal: Negative for abdominal distention, abdominal pain, anal bleeding, blood in stool, constipation, diarrhea, nausea, rectal pain and vomiting. Genitourinary: Negative for difficulty urinating. Allergic/Immunologic: Negative for environmental allergies and food allergies. Neurological: Negative for dizziness, weakness, light-headedness, numbness and headaches. Hematological: Bruises/bleeds easily. Psychiatric/Behavioral: Positive for sleep disturbance (from anxiety). The patient is nervous/anxious. LABORATORY DATA: Reviewed  Lab Results   Component Value Date    WBC 8.9 10/25/2019    HGB 13.0 10/25/2019    HCT 40.0 10/25/2019    MCV 89.4 10/25/2019     10/25/2019     10/25/2019    K 4.3 10/25/2019     10/25/2019    CO2 25 10/25/2019    BUN 6 10/25/2019    CREATININE 0.69 10/25/2019    LABALBU 4.1 10/25/2019    BILITOT 0.29 (L) 10/25/2019    ALKPHOS 67 10/25/2019    AST 51 (H) 10/25/2019    ALT 50 (H) 10/25/2019    INR 0.9 03/30/2019         Lab Results   Component Value Date    RBC 4.47 10/25/2019    HGB 13.0 10/25/2019    MCV 89.4 10/25/2019    MCH 29.2 10/25/2019    MCHC 32.6 10/25/2019    RDW 16.3 (H) 10/25/2019    MPV 8.8 10/25/2019    BASOPCT 1 10/25/2019    LYMPHSABS 1.50 10/25/2019    MONOSABS 0.80 10/25/2019    NEUTROABS 6.30 10/25/2019    EOSABS 0.20 10/25/2019    BASOSABS 0.10 10/25/2019         DIAGNOSTIC TESTING:     No results found. PHYSICAL EXAMINATION: Vital signs reviewed per the nursing documentation.      Temp 97.2 °F (36.2 °C)   Resp 16   Ht 5' (1.524 m)   Wt 172 lb (78 kg)

## 2020-07-27 ENCOUNTER — HOSPITAL ENCOUNTER (OUTPATIENT)
Age: 45
Discharge: HOME | End: 2020-07-27
Payer: MEDICARE

## 2020-07-27 LAB
IRON SATURATION: 5 % (ref 20–55)
IRON: 19 UG/DL (ref 37–145)
TOTAL IRON BINDING CAPACITY: 400 UG/DL (ref 250–450)
UNSATURATED IRON BINDING CAPACITY: 381 UG/DL (ref 112–347)

## 2020-07-27 PROCEDURE — 83550 IRON BINDING TEST: CPT

## 2020-07-27 PROCEDURE — 36415 COLL VENOUS BLD VENIPUNCTURE: CPT

## 2020-07-27 PROCEDURE — 83540 ASSAY OF IRON: CPT

## 2020-09-10 ENCOUNTER — OFFICE VISIT (OUTPATIENT)
Dept: DERMATOLOGY | Age: 45
End: 2020-09-10
Payer: MEDICARE

## 2020-09-10 ENCOUNTER — HOSPITAL ENCOUNTER (OUTPATIENT)
Age: 45
Setting detail: SPECIMEN
Discharge: HOME | End: 2020-09-10
Payer: MEDICARE

## 2020-09-10 VITALS
OXYGEN SATURATION: 97 % | BODY MASS INDEX: 33.57 KG/M2 | HEIGHT: 60 IN | WEIGHT: 171 LBS | HEART RATE: 113 BPM | SYSTOLIC BLOOD PRESSURE: 146 MMHG | TEMPERATURE: 98.7 F | DIASTOLIC BLOOD PRESSURE: 90 MMHG

## 2020-09-10 PROCEDURE — 11420 EXC H-F-NK-SP B9+MARG 0.5/<: CPT | Performed by: DERMATOLOGY

## 2020-09-10 RX ORDER — FLUTICASONE PROPIONATE 50 MCG
1 SPRAY, SUSPENSION (ML) NASAL DAILY
COMMUNITY
Start: 2020-03-03

## 2020-09-10 RX ORDER — LIDOCAINE HYDROCHLORIDE AND EPINEPHRINE 10; 10 MG/ML; UG/ML
0.5 INJECTION, SOLUTION INFILTRATION; PERINEURAL ONCE
Status: COMPLETED | OUTPATIENT
Start: 2020-09-10 | End: 2020-09-10

## 2020-09-10 RX ADMIN — LIDOCAINE HYDROCHLORIDE AND EPINEPHRINE 0.5 ML: 10; 10 INJECTION, SOLUTION INFILTRATION; PERINEURAL at 13:50

## 2020-09-10 NOTE — PROGRESS NOTES
Dermatology Procedure Note   [facility] PHYSICIANS  [facility] HEALTH DERMATOLOGY  [address]  [address] [facility] [address]  Dept: [phone]  Dept Fax: [phone]    Punch removal of irritated cyst of right neck (visibly inflamed)    Procedure Date: 9/10/2020  Procedure Time: 1:47 PM    Procedure Practitioner: Mary Mcdowell MD    Procedure: Punch excision    Pre-Procedure Diagnosis: Cyst    Post-Procedure Diagnosis: Same as Pre-Procedure Diagnosis    Informed Consent: The procedure and its risks were explained including but not limited to pain, bleeding, infection, permanent scar, permanent pigment alteration and recurrence. Consent to proceed with the procedure was obtained from the patient or the parent by the practitioner    Time Out:  A time out was conducted immediately before starting the procedure that confirmed a final verification of the correct patient, correct procedure, and correct site. Procedure Details:  Punch Excision: The procedure and its risks were explained including but not limited to pain, bleeding, infection, permanent scar, permanent pigment alteration and need for an additional procedure. Consent to proceed with the procedure was obtained from the patient or the parent. After cleaning with alcohol the lesion was anesthetized with 1% lidocaine with epinephrine and a 4 mm punch was performed. Hemostasis was achieved with suture closure of the defect with 5-0 gut and Vaseline and a bandage were applied.     Procedure Performed By: Mary Mcdowell MD    Estimated Blood Loss: Minimal    Pathologic Specimen: H&E    Procedure Tolerance: Good    Complication(s): None    Electronically signed by Mary Mcdowell MD on 9/10/20 at 1:47 PM EDT

## 2020-09-10 NOTE — PATIENT INSTRUCTIONS

## 2020-09-14 LAB — DERMATOLOGY PATHOLOGY REPORT: NORMAL

## 2021-01-29 NOTE — PLAN OF CARE
Last fill: 09/18/20  Last visit: 09/25/20 Problem: Discharge Planning:  Goal: Discharged to appropriate level of care  Description  Discharged to appropriate level of care  Outcome: Ongoing     Problem: Mobility - Impaired:  Goal: Able to ambulate independently  Description  Able to ambulate independently  Outcome: Ongoing  Goal: Able to ambulate with minimal assistance  Description  Able to ambulate with minimal assistance  Outcome: Ongoing  Goal: Ability to appropriately use an adaptive device for ambulation will improve  Description  Ability to appropriately use an adaptive device for ambulation will improve  Outcome: Ongoing  Goal: Able to verbalize acceptance of life and situations over which he or she has no control  Description  Absence of contracture deformity  Outcome: Ongoing     Problem: Swallowing - Impaired:  Goal: Able to swallow without choking  Description  Able to swallow without choking  Outcome: Ongoing  Goal: Absence of aspiration  Description  Absence of aspiration  Outcome: Ongoing

## 2021-05-21 NOTE — PROGRESS NOTES
intake/output:    Intake/Output Summary (Last 24 hours) at 03/13/18 1119  Last data filed at 03/13/18 0900   Gross per 24 hour   Intake              286 ml   Output              200 ml   Net               86 ml     Last 3 weights: Wt Readings from Last 3 Encounters:   03/11/18 171 lb (77.6 kg)   03/06/18 171 lb (77.6 kg)   03/06/18 171 lb (77.6 kg)       Physical Examination:   General appearance - alert and in no distress  Eyes - pupils equal and reactive, extraocular eye movements intact  Mouth - mucous membranes moist, pharynx normal without lesions  Neck - supple, no significant adenopathy  Chest - clear to auscultation, symmetric air entry, no wheezes   Heart - normal rate, regular rhythm, normal S1, S2  Abdomen - soft, nontender, nondistended  Neurological - alert, oriented, normal speech, no focal deficits  Extremities - no pedal edema, no clubbing or cyanosis  Skin - normal coloration, no rashes       Labs:-    CBC:   Recent Labs      03/11/18   1057  03/12/18   0603  03/13/18   0641   WBC  11.1  11.4*  9.6   HGB  14.3  13.5  13.8   PLT  See Reflexed IPF Result  See Reflexed IPF Result  370     BMP:    Recent Labs      03/11/18   1057  03/12/18   0603   NA  139  141   K  4.2  3.8   CL  102  101   CO2  24  25   BUN  7  10   CREATININE  0.50  0.64   GLUCOSE  156*  85     Calcium:  Recent Labs      03/12/18   0603   CALCIUM  9.0     Ionized Calcium:No results for input(s): IONCA in the last 72 hours. Magnesium:No results for input(s): MG in the last 72 hours. Phosphorus:No results for input(s): PHOS in the last 72 hours. BNP:No results for input(s): BNP in the last 72 hours. Glucose:  Recent Labs      03/12/18   0658  03/12/18   1115  03/12/18   1534   POCGLU  93  163*  159*     HgbA1C: No results for input(s): LABA1C in the last 72 hours. INR: No results for input(s): INR in the last 72 hours.   Hepatic:   Recent Labs      03/11/18   1057   ALKPHOS  56   ALT  71*   AST  59*   PROT  6.8   BILITOT  0.29* show

## 2021-08-24 ENCOUNTER — TELEPHONE (OUTPATIENT)
Dept: ORTHOPEDIC SURGERY | Age: 46
End: 2021-08-24

## 2021-09-08 ENCOUNTER — PATIENT MESSAGE (OUTPATIENT)
Dept: ORTHOPEDIC SURGERY | Age: 46
End: 2021-09-08

## 2021-09-08 ENCOUNTER — OFFICE VISIT (OUTPATIENT)
Dept: ORTHOPEDIC SURGERY | Age: 46
End: 2021-09-08
Payer: MEDICARE

## 2021-09-08 VITALS — HEIGHT: 60 IN | BODY MASS INDEX: 33.57 KG/M2 | WEIGHT: 171 LBS

## 2021-09-08 DIAGNOSIS — G56.03 BILATERAL CARPAL TUNNEL SYNDROME: Primary | ICD-10-CM

## 2021-09-08 PROCEDURE — 99204 OFFICE O/P NEW MOD 45 MIN: CPT | Performed by: PHYSICIAN ASSISTANT

## 2021-09-08 PROCEDURE — 1036F TOBACCO NON-USER: CPT | Performed by: PHYSICIAN ASSISTANT

## 2021-09-08 PROCEDURE — G8417 CALC BMI ABV UP PARAM F/U: HCPCS | Performed by: PHYSICIAN ASSISTANT

## 2021-09-08 PROCEDURE — G8427 DOCREV CUR MEDS BY ELIG CLIN: HCPCS | Performed by: PHYSICIAN ASSISTANT

## 2021-09-08 NOTE — PROGRESS NOTES
[facility], [address] [place], [address] [place] [address], [address] [facility]           Dept Phone: [phone]           Dept Fax:  [phone]           [place]          Dept Phone: [phone]           Dept Fax:  [phone]      Chief Compliant:  Chief Complaint   Patient presents with    Hand Pain     bilateral        History of Present Illness: This is a 51 y.o. female who presents to the clinic today for evaluation of bilateral hand and wrist pain. Patient is primarily Estonian-speaking history is obtained with the assistance of a this is been present for approximately 3 weeks without any injury or trauma. Of note patient reports that she has a history of myasthenia gravis and was on prednisone daily for nearly 30 years however approximately 1 month ago this was discontinued. She reports that not only does she have quite a bit of pain in the hands and wrist over the last 3 weeks she also has diffuse polyarthralgia as well since discontinuing the prednisone. In regards to hand pain the right is greater than the left and is very similar on both sides. Pain is most severe to the volar/palmar aspect of the wrist and hand bilaterally. Associated with numbness and tingling in the first second and third digits. She reports that pain and numbness and tingling seem to be most severe at night often waking her up multiple times throughout the night. Also aggravated by any attempted reading or prolonged telephone calls. Pain date does seem to be relieved by rest.  She notes occasional radiation of pain to bilateral elbows. Patient was seen at local ED 2 weeks ago where she had x-rays of the right wrist that were negative for any acute fracture or other abnormality.   Patient was given a nocturnal splint which she has been wearing which does provide mild relief of pain. Patient denies any significant swelling or warmth in the hand or wrist.  No redness no fever or chills. Past History:    Current Outpatient Medications:     fluticasone (FLONASE) 50 MCG/ACT nasal spray, 1 spray by Nasal route daily, Disp: , Rfl:     sertraline (ZOLOFT) 50 MG tablet, , Disp: , Rfl:     metFORMIN (GLUCOPHAGE) 500 MG tablet, TAKE 1 TABLET BY MOUTH TWICE DAILY WITH MEALS., Disp: 60 tablet, Rfl: 2    pyridostigmine (MESTINON) 60 MG tablet, Take 1 tablet by mouth 3 times daily, Disp: 60 tablet, Rfl: 3    mycophenolate (CELLCEPT) 250 MG capsule, Take 2 capsules by mouth 2 times daily, Disp: 60 capsule, Rfl: 3    vitamin C (ASCORBIC ACID) 500 MG tablet, TAKE 1 TABLET BY MOUTH ONE TIME A DAY, Disp: 30 tablet, Rfl: 3    vitamin D (CHOLECALCIFEROL) 1000 UNIT TABS tablet, TAKE 1 TABLET BY MOUTH ONE TIME A DAY, Disp: 30 tablet, Rfl: 0    ferrous sulfate 325 (65 Fe) MG tablet, TAKE 1 TABLET BY MOUTH 2 TIMES DAILY (WITH MEALS), Disp: 90 tablet, Rfl: 9    predniSONE (DELTASONE) 10 MG tablet, Take 10 mg by mouth daily (Patient not taking: Reported on 9/8/2021), Disp: , Rfl:     blood glucose monitor strips, Test 4 times daily Diagnosis, Disp: 100 strip, Rfl: 2    Alcohol Swabs PADS, USE AS DIRECTED WHEN CHECKING BLOOD SUGAR DAILY. , Disp: 100 each, Rfl: 3    ACCU-CHEK SOFTCLIX LANCETS MISC, Daily, Disp: 100 each, Rfl: 3    glucose monitoring kit (FREESTYLE) monitoring kit, Daily check, Disp: 1 kit, Rfl: 0  Allergies   Allergen Reactions    Anesthetics, Amide      Complications due to her myasthenia gravis    Morphine Shortness Of Breath    Other      All muscle relaxers    Flexeril [Cyclobenzaprine]     Norflex [Orphenadrine Citrate]     Nsaids     Penicillins Rash    Vancomycin Rash     Social History     Socioeconomic History    Marital status: Single     Spouse name: Not on file    Number of children: Not on file    Years of education: Not on file    urgency, or hematuria. Neurological: Negative for headache, numbness, or weakness. Integumentary: Negative for rash, itching, laceration, or abrasion. Musculoskeletal: Positive for Hand Pain (bilateral)       Physical Exam:  Constitutional: Patient is oriented to person, place, and time. Patient appears well-developed and well nourished. HENT: Negative otherwise noted  Head: Normocephalic and Atraumatic  Nose: Normal  Eyes: Conjunctivae and EOM are normal  Neck: Normal range of motion Neck supple. Respiratory/Cardio: Effort normal. No respiratory distress. Musculoskeletal:    right Hand/Wrist    Tenderness: Moderate tenderness over the volar wrist and the thenar eminence. No tenderness to the distal palm or dorsal wrist.       Range of Motion:      Pronation: 80     Supination: 80     Flexion: 70     Extension: 50     Hand Joints: normal       Muscle Strength      : 5/5     Wrist Extension: 5/5     Wrist Flexion: 4/5       Sensation: normal   Phalen's Sign: Positive   Tinel's Sign (Medial Nerve): Positive   Finkelstein's Test: Negative     Left Hand/Wrist    Tenderness: Moderate tenderness over the volar wrist and the thenar eminence. No tenderness to the distal palm or dorsal wrist.       Range of Motion:      Pronation: 80     Supination: 80     Flexion: 70     Extension: 50     Hand Joints: normal       Muscle Strength      : 5/5     Wrist Extension: 5/5     Wrist Flexion: 4/5       Sensation: normal   Phalen's Sign: Positive   Tinel's Sign (Medial Nerve): Positive   Finkelstein's Test: Negative     Neurological: Patient is alert and oriented to person, place, and time. Normal strenght. No sensory deficit. Skin: Skin is warm and dry  Psychiatric: Behavior is normal. Thought content normal.  Nursing note and vitals reviewed. Labs and Imaging:     XR taken today:  No results found.      Previous Imagin2021XR WRIST RT MIN 3 VWS     3 views the right wrist are obtained. Susan Juares is no acute osseous, articular, or soft tissue abnormality. IMPRESSION:     Negative exam.          Orders Placed This Encounter   Procedures   Karuna Arvizu MD, Physical Medicine and Rehabilitation, [place]     Referral Priority:   Routine     Referral Type:   Eval and Treat     Referral Reason:   Specialty Services Required     Referred to Provider:   Amber Cid MD     Requested Specialty:   Physical Medicine and Rehab     Number of Visits Requested:   1    EMG     Standing Status:   Future     Standing Expiration Date:   9/8/2022     Order Specific Question:   Which body part? Answer:   emg bilateral upper extremity       Assessment and Plan:  1. Bilateral carpal tunnel syndrome          PLAN:  This is a 51 y.o. female who presents to the clinic today for evaluation of bilateral wrist pain right greater than left concerning for carpal tunnel syndrome. Patient was on chronic prednisone for myasthenia gravis recently discontinued 4 weeks ago approximately 1 week after discontinuing this her pain in the wrist began associate with numbness and tingling. 1.  Examination and history are consistent with carpal tunnel syndrome patient has failed nocturnal splinting on the right recommend further diagnostic valuation of bilateral upper extremities with EMG/NCV.  2.  Patient fitted for a nocturnal wrist splint in our office today  3. Discussed possibility of starting patient on a daily NSAID however she is already taking diclofenac so we will continue this at this time. 4.  Follow-up after EMGs otherwise patient may call return sooner for any questions or concerns. Please note that this chart was generated using voice recognition Dragon dictation software. Although every effort was made to ensure the accuracy of this automated transcription, some errors in transcription may have occurred.

## 2021-09-09 RX ORDER — DICLOFENAC SODIUM 75 MG/1
75 TABLET, DELAYED RELEASE ORAL 2 TIMES DAILY
Qty: 28 TABLET | Refills: 0 | OUTPATIENT
Start: 2021-09-09

## 2021-09-09 RX ORDER — DICLOFENAC SODIUM 75 MG/1
75 TABLET, DELAYED RELEASE ORAL 2 TIMES DAILY WITH MEALS
Qty: 28 TABLET | Refills: 0 | Status: SHIPPED | OUTPATIENT
Start: 2021-09-09 | End: 2021-09-23

## 2021-09-09 NOTE — TELEPHONE ENCOUNTER
Do you want to provide patient with a script for Diclofenac? Patient is stating she is not currently on this medication. [facility]acy [facility].

## 2021-09-13 ENCOUNTER — PATIENT MESSAGE (OUTPATIENT)
Dept: ORTHOPEDIC SURGERY | Age: 46
End: 2021-09-13

## 2021-09-13 ENCOUNTER — TELEPHONE (OUTPATIENT)
Dept: PHYSICAL MEDICINE AND REHAB | Age: 46
End: 2021-09-13

## 2021-09-13 DIAGNOSIS — G56.03 BILATERAL CARPAL TUNNEL SYNDROME: Primary | ICD-10-CM

## 2021-09-13 NOTE — TELEPHONE ENCOUNTER
eCci Montes De Oca from [facility] received a message from [employer] stating she would like to schedule an EMG. Please contact her at earliest convenience to discuss. Phone number on file has been verified. Upper sorbian speaking  is needed. Thank you.

## 2021-09-15 NOTE — TELEPHONE ENCOUNTER
Patient may benefit from carpal tunnel injection if she cannot get in for EMG until then. Can see patient next week for injections if she desires to pursue this.

## 2021-09-22 ENCOUNTER — OFFICE VISIT (OUTPATIENT)
Dept: ORTHOPEDIC SURGERY | Age: 46
End: 2021-09-22
Payer: MEDICARE

## 2021-09-22 VITALS — WEIGHT: 171 LBS | HEIGHT: 60 IN | BODY MASS INDEX: 33.57 KG/M2

## 2021-09-22 DIAGNOSIS — G56.03 BILATERAL CARPAL TUNNEL SYNDROME: Primary | ICD-10-CM

## 2021-09-22 PROCEDURE — G8427 DOCREV CUR MEDS BY ELIG CLIN: HCPCS | Performed by: PHYSICIAN ASSISTANT

## 2021-09-22 PROCEDURE — 99214 OFFICE O/P EST MOD 30 MIN: CPT | Performed by: PHYSICIAN ASSISTANT

## 2021-09-22 PROCEDURE — 20526 THER INJECTION CARP TUNNEL: CPT | Performed by: PHYSICIAN ASSISTANT

## 2021-09-22 PROCEDURE — G8417 CALC BMI ABV UP PARAM F/U: HCPCS | Performed by: PHYSICIAN ASSISTANT

## 2021-09-22 PROCEDURE — 1036F TOBACCO NON-USER: CPT | Performed by: PHYSICIAN ASSISTANT

## 2021-09-22 RX ORDER — LIDOCAINE HYDROCHLORIDE 10 MG/ML
1 INJECTION, SOLUTION INFILTRATION; PERINEURAL ONCE
Status: COMPLETED | OUTPATIENT
Start: 2021-09-22 | End: 2021-09-22

## 2021-09-22 RX ORDER — BUPIVACAINE HYDROCHLORIDE 5 MG/ML
1 INJECTION, SOLUTION PERINEURAL ONCE
Status: COMPLETED | OUTPATIENT
Start: 2021-09-22 | End: 2021-09-22

## 2021-09-22 RX ORDER — BETAMETHASONE SODIUM PHOSPHATE AND BETAMETHASONE ACETATE 3; 3 MG/ML; MG/ML
6 INJECTION, SUSPENSION INTRA-ARTICULAR; INTRALESIONAL; INTRAMUSCULAR; SOFT TISSUE ONCE
Status: COMPLETED | OUTPATIENT
Start: 2021-09-22 | End: 2021-09-22

## 2021-09-22 RX ADMIN — BETAMETHASONE SODIUM PHOSPHATE AND BETAMETHASONE ACETATE 6 MG: 3; 3 INJECTION, SUSPENSION INTRA-ARTICULAR; INTRALESIONAL; INTRAMUSCULAR; SOFT TISSUE at 13:24

## 2021-09-22 RX ADMIN — BETAMETHASONE SODIUM PHOSPHATE AND BETAMETHASONE ACETATE 6 MG: 3; 3 INJECTION, SUSPENSION INTRA-ARTICULAR; INTRALESIONAL; INTRAMUSCULAR; SOFT TISSUE at 13:25

## 2021-09-22 RX ADMIN — BUPIVACAINE HYDROCHLORIDE 5 MG: 5 INJECTION, SOLUTION PERINEURAL at 13:27

## 2021-09-22 RX ADMIN — BUPIVACAINE HYDROCHLORIDE 5 MG: 5 INJECTION, SOLUTION PERINEURAL at 13:26

## 2021-09-22 RX ADMIN — LIDOCAINE HYDROCHLORIDE 1 ML: 10 INJECTION, SOLUTION INFILTRATION; PERINEURAL at 13:28

## 2021-09-22 RX ADMIN — LIDOCAINE HYDROCHLORIDE 1 ML: 10 INJECTION, SOLUTION INFILTRATION; PERINEURAL at 13:27

## 2021-09-22 NOTE — PROGRESS NOTES
[facility], [address] [place], [address] [facility], [address] [facility]           Dept Phone: [phone]           Dept Fax:  [address], [place]          Dept Phone: [phone]           Dept Fax:  [phone]      Chief Compliant:  Chief Complaint   Patient presents with    Hand Pain     bilateral        History of Present Illness:  David Vallecillo returns today. This is a 51 y.o. female who presents to the clinic today for follow up of bilateral wrist and hand pain. Patient was last seen on 9/8/2021 found to have symptoms consistent with carpal tunnel syndrome. Patient was referred for EMG and fitted for nocturnal splinting unfortunately EMG could not be scheduled until early November so she contacted our office for sooner reevaluation. It was recommended if she was severely painful she may benefit from carpal tunnel injections which she elected to proceed with and what she presents for today. Patient reports unfortunately her pain has improved minimally with nocturnal splinting and diclofenac. She continues to have severe numbness and tingling in the first second and third digits of both hands right slightly greater than left. Patient denies any new injury, trauma or fall. He has progressed to the point where she is dropping objects and pain is waking her up at night despite the nocturnal splinting. .       Review of Systems   Constitutional: Negative for fever, chills, sweats, recent illness, or recent injury. Neurological: Negative for headaches, numbness, or weakness. Integumentary: Negative for rash, itching, ecchymosis, abrasions, or laceration. Musculoskeletal: Positive for Hand Pain (bilateral)       Physical Exam:  Constitutional: Patient is oriented to person, place, and time.  Patient appears well-developed and well nourished. Musculoskeletal:    right Hand/Wrist     Tenderness: Moderate tenderness over the volar wrist and the thenar eminence. No tenderness to the distal palm or dorsal wrist.         Range of Motion:       Pronation: 80     Supination: 80     Flexion: 70     Extension: 50     Hand Joints: normal         Muscle Strength       : 5/5     Wrist Extension: 5/5     Wrist Flexion: 4/5         Sensation: normal   Phalen's Sign: Positive   Tinel's Sign (Medial Nerve): Positive   Finkelstein's Test: Negative      Left Hand/Wrist     Tenderness: Moderate tenderness over the volar wrist and the thenar eminence. No tenderness to the distal palm or dorsal wrist.         Range of Motion:       Pronation: 80     Supination: 80     Flexion: 70     Extension: 50     Hand Joints: normal         Muscle Strength       : 5/5     Wrist Extension: 5/5     Wrist Flexion: 4/5         Sensation: normal   Phalen's Sign: Positive   Tinel's Sign (Medial Nerve): Positive   Finkelstein's Test: Negative       Neurological: Patient is alert and oriented to person, place, and time. Normal strenght. No sensory deficit. Skin: Skin is warm and dry  Psychiatric: Behavior is normal. Thought content normal.  Nursing note and vitals reviewed. Labs and Imaging:     XR taken today:  No results found. Assessment and Plan:  1. Bilateral carpal tunnel syndrome              PLAN:  This is a 51 y.o. female who presents to the clinic today for follow up bilateral carpal tunnel syndrome. 1.  EMG has been ordered and is currently scheduled for November 16 of 2021  2. Bilateral carpal tunnel injections done as outlined below  3. Continue with nocturnal splinting and diclofenac sodium  4.   Follow-up after EMGs however patient may call return sooner for any questions or concerns    Procedure Note: bilateral carpal tunnel Celestone Injection   An informed verbal consent for the procedure was obtained and risks including, but not limited to: allergy to medications, injection, bleeding, stiffness of joint, recurrence of symptoms, loss of function, swelling, drainage, irrigation, need for surgery and pseudo-septic inflammation, were explained to the patient. Also, discussed was the potential for further injections, irrigation and debridement and surgery. Alternate means of treatment have also been discussed with the patient. Administrations This Visit     betamethasone acetate-betamethasone sodium phosphate (CELESTONE) injection 6 mg     Admin Date  09/22/2021  13:24 Action  Given Dose  6 mg Route  Intra-articular Site  Wrist Left Administered By  Rise Siemens, LPN    Ordering Provider: MILAD Nazario. [place] [address]: [id number]    Lot#: [id number]    : [employer]    Patient Supplied?: No           Admin Date  09/22/2021  13:25 Action  Given Dose  6 mg Route  Intra-articular Site  Wrist Right Administered By  Rise Siemens, LPN    Ordering Provider: MILAD Nazario. [facility]: [id number]    Lot#: [id number]    : [employer] [place]    Patient Supplied?: No          bupivacaine (MARCAINE) 0.5 % injection 5 mg     Admin Date  09/22/2021  13:26 Action  Given Dose  5 mg Route  Intra-articular Site  Wrist Left Administered By  Rise Siemens, LPN    Ordering Provider: MILAD Nazario. [facility]: [id number]    Lot#: [id number]    : HOSPIRA    Patient Supplied?: No           Admin Date  09/22/2021  13:27 Action  Given Dose  5 mg Route  Intra-articular Site  Wrist Right Administered By  Rise Siemens, LPN    Ordering Provider: MILAD Nazario    NDC: 3210-2596-52    Lot#: [id number]    : HOSPIRA    Patient Supplied?: No          lidocaine 1 % injection 1 mL     Admin Date  09/22/2021  13:27 Action  Given Dose  1 mL Route  Intra-articular Site  Wrist Left Administered By  Rise Siemens, LPN    Ordering Provider: Vikki Nazario. [facility]: [id number]    Lot#: [id number]    : HASH Patient Supplied?: No           Admin Date  09/22/2021  13:28 Action  Given Dose  1 mL Route  Intra-articular Site  Wrist Right Administered By  Judith Mendez LPN    Ordering Provider: Earma Boxer, [place] [address]: [id number]    Lot#: [id number]    : [id number] [facility]    Patient Supplied?: No                Following an appropriate discussion with the patient regarding the risks and benefits of the procedure she consented to proceed. her bilateral volar wrist just ulnar to the palmaris longus tendon is sterilely prepped with Betadine and alcohol. Subsequently needle was inserted into the carpal tunnel space and a 3 cc mixture of 1 cc of lidocaine, 1 cc of Marcaine and 1 cc of Celestone was injected into the space with careful avoidance of the median nerve. .A band aid was applied to the injection site. she tolerated the injection with no immediate adverse reactions. Please note that this chart was generated using voice recognition Dragon dictation software. Although every effort was made to ensure the accuracy of this automated transcription, some errors in transcription may have occurred.

## 2021-10-05 NOTE — TELEPHONE ENCOUNTER
Appears injections have helped some of the pain. If she continues to be symptomatic, please provide her with other providers she can contact to possibly have EMG sooner.

## 2021-10-25 ENCOUNTER — TELEPHONE (OUTPATIENT)
Dept: PHYSICAL MEDICINE AND REHAB | Age: 46
End: 2021-10-25

## 2021-10-25 NOTE — TELEPHONE ENCOUNTER
If patient is unable to wait until EMG due to symptoms, would be happy to provide referral to hand surgeon for second opinion to see if she can get in sooner than the EMG appt.

## 2021-10-27 NOTE — TELEPHONE ENCOUNTER
I went on [email or web address] website to make sure Dr. Emely Aranda is on plan, he is. I entered referral in for Dr. Keli Lay at [address] [place] [address] [phone] at phone number . I faxed over referral and Jason's notes from 9/8 & 9/22 to Dr. Mary Montano office.

## 2021-12-28 NOTE — ED NOTES
Pt to ED c/o flu like symptoms. Pt reporting fatigue with cough x3 days. Pt denies abdominal pain, n/v/d. Pt reporting feeling very achy. Dr. Jamar Stokes at bedside. Pt resting on cart with call light within reach. NAD noted, RR even and NL.  Will continue to monitor     Genesis Gerard RN  03/29/19 6161 What is the patient's vaccine status? no    Is the patient symptomatic?  yes     If so, what are the symptoms and when did symptoms start? Fever, cough 12/27/21    When was the exposure? 12/25/21    Were one or both people involved unmasked for more than 15 minutes when exposed?   Yes

## 2022-03-21 LAB — GLUCOSE BLD-MCNC: 122 MG/DL (ref 70–100)

## 2022-03-22 LAB — PREGNANCY TEST URINE, POC: NEGATIVE

## 2023-01-03 NOTE — PROGRESS NOTES
Dermatology Patient Note  [address] [facility]  [address] [facility] [address] [address]  Dept: [phone]  Dept Fax: [phone]      VISITDATE: 1/11/2023   REFERRING PROVIDER: No ref. provider found      Germaine Kyle is a 49 y.o. female  who presents today in the office for:    Follow-up (Pt states she gets white spots on her skin that comes and goes on the lt arm and rt  foot also in the ABD. Pt states the spots sometimes itch, and pt states once she stopped having periods the spots and itching began)      HISTORY OF PRESENT ILLNESS:  As above.  was present for the duration of the visit. Patient reports she has previously seen doctors for this condition. She states that she has warts and white spots on her skin. It started happening when she stopped getting periods and her skin became very dry. She reports that she has warts that fall off for years. They appear and then fall off. Reports she has the lesion on her back for years. She also reports lesions on her right arm and neck and would like them to be removed. Patient was last seen in 2020 for excision of cyst on right neck.      MEDICAL PROBLEMS:  Patient Active Problem List    Diagnosis Date Noted    Myasthenia gravis in crisis ([facility] 04/02/2019    H/O myasthenia gravis 04/02/2019    Influenza with respiratory manifestation other than pneumonia 03/30/2019    Chronic hepatitis C without hepatic coma ([facility] 03/22/2019    Osteopenia 07/20/2018    [facility] ([facility] 03/06/2018    Myasthenia gravis with exacerbation ([facility] 01/12/2018    Pre-diabetes 07/10/2017       CURRENT MEDICATIONS:   Current Outpatient Medications   Medication Sig Dispense Refill    acetaminophen (TYLENOL) 500 MG tablet acetaminophen 500 mg tablet      albuterol (PROVENTIL) (2.5 MG/3ML) 0.083% nebulizer solution albuterol sulfate 2.5 mg/3 mL (0.083 %) solution for nebulization citalopram (CELEXA) 10 MG tablet citalopram 10 mg tablet      escitalopram (LEXAPRO) 10 MG tablet escitalopram 10 mg tablet      fluorometholone (FML) 0.1 % ophthalmic suspension Instill 1 gtt in OU QID x 2 weeks, then BID x 2 weeks, then d/c      guaiFENesin (MUCINEX) 600 MG extended release tablet Mucus Relief  mg tablet, extended release   TAKE 1 TABLET BY MOUTH EVERY 12 HOURS FOR 30 DAYS      ibuprofen (ADVIL;MOTRIN) 800 MG tablet ibuprofen 800 mg tablet      Oxymetazoline HCl (NASAL SPRAY) 0.05 % SOLN 2 sprays by Nasal route 2 times daily      tamoxifen (SOLTAMOX) 10 MG/5ML solution Take 20 mg by mouth daily      fluticasone (FLONASE) 50 MCG/ACT nasal spray 1 spray by Nasal route daily      metFORMIN (GLUCOPHAGE) 500 MG tablet TAKE 1 TABLET BY MOUTH TWICE DAILY WITH MEALS. 60 tablet 2    pyridostigmine (MESTINON) 60 MG tablet Take 1 tablet by mouth 3 times daily 60 tablet 3    mycophenolate (CELLCEPT) 250 MG capsule Take 2 capsules by mouth 2 times daily 60 capsule 3    vitamin C (ASCORBIC ACID) 500 MG tablet TAKE 1 TABLET BY MOUTH ONE TIME A DAY 30 tablet 3    vitamin D (CHOLECALCIFEROL) 1000 UNIT TABS tablet TAKE 1 TABLET BY MOUTH ONE TIME A DAY 30 tablet 0    ferrous sulfate 325 (65 Fe) MG tablet TAKE 1 TABLET BY MOUTH 2 TIMES DAILY (WITH MEALS) 90 tablet 9    blood glucose monitor strips Test 4 times daily Diagnosis 100 strip 2    Alcohol Swabs PADS USE AS DIRECTED WHEN CHECKING BLOOD SUGAR DAILY.  100 each 3    ACCU-CHEK SOFTCLIX LANCETS MISC Daily 100 each 3    benzonatate (TESSALON) 100 MG capsule benzonatate 100 mg capsule (Patient not taking: Reported on 1/11/2023)      chlorhexidine (PERIDEX) 0.12 % solution  (Patient not taking: Reported on 1/11/2023)      ciprofloxacin-hydrocortisone (CIPRO HC) 0.2-1 % otic suspension Cipro HC 0.2 %-1 % ear drops,suspension (Patient not taking: Reported on 1/11/2023)      loratadine (CLARITIN) 10 MG tablet loratadine 10 mg tablet (Patient not taking: Reported on 1/11/2023)      diclofenac (VOLTAREN) 75 MG EC tablet Take 1 tablet by mouth 2 times daily (with meals) for 14 days 28 tablet 0    sertraline (ZOLOFT) 50 MG tablet  (Patient not taking: Reported on 1/11/2023)      predniSONE (DELTASONE) 10 MG tablet Take 10 mg by mouth daily (Patient not taking: No sig reported)      glucose monitoring kit (FREESTYLE) monitoring kit Daily check 1 kit 0     No current facility-administered medications for this visit. ALLERGIES:   Allergies   Allergen Reactions    Anesthetics, Amide      Complications due to her myasthenia gravis    Morphine Shortness Of Breath    Other      All muscle relaxers    Flexeril [Cyclobenzaprine]     Iodine     Norflex [Orphenadrine Citrate]     Nsaids     Orphenadrine     Penicillins Rash    Vancomycin Rash       SOCIAL HISTORY:  Social History     Tobacco Use    Smoking status: Never    Smokeless tobacco: Never   Substance Use Topics    Alcohol use: No       Pertinent ROS:  Review of Systems  Skin: Denies any new changing, growing or bleeding lesions or rashes except as described in the HPI   Constitutional: Denies fevers, chills, and malaise. PHYSICAL EXAM:   BP (!) 148/105   Pulse (!) 106   Temp 98 °F (36.7 °C) (Temporal)   Ht 5' (1.524 m)   Wt 182 lb 6.4 oz (82.7 kg)   SpO2 98%   BMI 35.62 kg/m²     The patient is generally well appearing, well nourished, alert and conversational. Affect is normal.    Cutaneous Exam:  Physical Exam  Waist-up skin: the head/face,neck, both arms, chest, back, abdomen, digits and/or nails, was examined. Facial covering was not removed during examination. Diagnoses/exam findings/medical history pertinent to this visit are listed below:    Assessment:   Diagnosis Orders   1. Seborrheic keratosis        2. Stucco keratoses        3.  Inflamed seborrheic keratosis             Plan:  Seborrheic keratoses of back, ext, face, neck, chest  - reassurance and education     Stucco keratoses of ext  - reassurance and education     Inflamed seborrheic keratoses  Cryotherapy: After verbal consent was obtained including discussion of the risks (lesion persistence, lesion recurrence and hypo/hyperpigmentation) and benefits (resolution of the lesion), and alternative therapies, 1 total Inflamed Seborrheic Keratosis on the left arm were treated with liquid nitrogen in a spray gun for a single 6 second freeze-thaw cycle for each lesion. The patient tolerated the procedure well and there were no immediate complications. RTC prn    No future appointments. There are no Patient Instructions on file for this visit. Dominic Abreu, personally scribed the services dictated to me by Dr. Livia Chacko in this documentation. I, Dr. Livia Chacko, personally performed the services described in this documentation, as scribed by [facility] Rafael in my presence, and it is both accurate and complete.     Electronically signed by Patel Raya MD on 1/11/2023 at 1:09 PM

## 2023-01-11 ENCOUNTER — OFFICE VISIT (OUTPATIENT)
Dept: DERMATOLOGY | Age: 48
End: 2023-01-11
Payer: MEDICARE

## 2023-01-11 VITALS
HEIGHT: 60 IN | SYSTOLIC BLOOD PRESSURE: 148 MMHG | DIASTOLIC BLOOD PRESSURE: 105 MMHG | OXYGEN SATURATION: 98 % | TEMPERATURE: 98 F | HEART RATE: 106 BPM | BODY MASS INDEX: 35.81 KG/M2 | WEIGHT: 182.4 LBS

## 2023-01-11 DIAGNOSIS — L82.1 SEBORRHEIC KERATOSIS: Primary | ICD-10-CM

## 2023-01-11 DIAGNOSIS — L82.0 INFLAMED SEBORRHEIC KERATOSIS: ICD-10-CM

## 2023-01-11 DIAGNOSIS — L85.1 STUCCO KERATOSES: ICD-10-CM

## 2023-01-11 PROCEDURE — 17110 DESTRUCTION B9 LES UP TO 14: CPT | Performed by: DERMATOLOGY

## 2023-01-11 PROCEDURE — G8417 CALC BMI ABV UP PARAM F/U: HCPCS | Performed by: DERMATOLOGY

## 2023-01-11 PROCEDURE — G8427 DOCREV CUR MEDS BY ELIG CLIN: HCPCS | Performed by: DERMATOLOGY

## 2023-01-11 PROCEDURE — G8484 FLU IMMUNIZE NO ADMIN: HCPCS | Performed by: DERMATOLOGY

## 2023-01-11 PROCEDURE — 1036F TOBACCO NON-USER: CPT | Performed by: DERMATOLOGY

## 2023-01-11 PROCEDURE — 99202 OFFICE O/P NEW SF 15 MIN: CPT | Performed by: DERMATOLOGY

## 2023-01-11 RX ORDER — CIPROFLOXACIN/HYDROCORTISONE 0.2 %-1 %
SUSPENSION, DROPS(FINAL DOSAGE FORM)(ML) OTIC (EAR)
COMMUNITY

## 2023-01-11 RX ORDER — ESCITALOPRAM OXALATE 10 MG/1
TABLET ORAL
COMMUNITY

## 2023-01-11 RX ORDER — GUAIFENESIN 600 MG/1
TABLET, EXTENDED RELEASE ORAL
COMMUNITY

## 2023-01-11 RX ORDER — BENZONATATE 100 MG/1
CAPSULE ORAL
COMMUNITY

## 2023-01-11 RX ORDER — CITALOPRAM 10 MG/1
TABLET ORAL
COMMUNITY

## 2023-01-11 RX ORDER — IBUPROFEN 800 MG/1
TABLET ORAL
COMMUNITY
Start: 2021-11-29

## 2023-01-11 RX ORDER — ALBUTEROL SULFATE 2.5 MG/3ML
SOLUTION RESPIRATORY (INHALATION)
COMMUNITY

## 2023-01-11 RX ORDER — LORATADINE 10 MG/1
TABLET ORAL
COMMUNITY

## 2023-01-11 RX ORDER — OXYMETAZOLINE HYDROCHLORIDE 0.05 G/100ML
2 SPRAY NASAL 2 TIMES DAILY
COMMUNITY
Start: 2021-07-21

## 2023-01-11 RX ORDER — CHLORHEXIDINE GLUCONATE 0.12 MG/ML
RINSE ORAL
COMMUNITY
Start: 2022-09-03

## 2023-01-11 RX ORDER — ACETAMINOPHEN 500 MG
TABLET ORAL
COMMUNITY

## 2023-01-11 RX ORDER — FLUOROMETHOLONE 0.1 %
SUSPENSION, DROPS(FINAL DOSAGE FORM)(ML) OPHTHALMIC (EYE)
COMMUNITY
Start: 2021-02-25

## 2023-01-11 NOTE — PATIENT INSTRUCTIONS
Cryotherapy    Liquid Nitrogen - \"freeze\" (Cryotherapy)  Your doctor has treated your skin lesions with a very cold substance. The liquid nitrogen is so cold that it may feel like the skin is burning during application. A clear blister or blood blister may form after treatment and may later form a scab. Leave the area alone. Usually this scab will fall of within 1-2 weeks. The area should be kept clean and can be covered with Vaseline and a Band-Aid if needed. If a large blister develops it is ok to use a clean needle to gently pop the blister. Please call our office with any concerns at [phone]. Statement Selected

## 2025-06-09 NOTE — COMMUNICATION BODY
Negative. Genitourinary: Negative. Musculoskeletal: Negative. Skin: Negative. Allergic/Immunologic: Negative. Neurological: Negative. Hematological: Negative. Psychiatric/Behavioral: Positive for dysphoric mood. Objective:   Physical Exam    Vitals:    11/29/18 0830   BP: 113/82   Pulse: 79     weight: 172 lb 3.2 oz (78.1 kg)  Neurological Examination  Ears /Nose/Throat: external ear . Normal exam  Neck and thyroid . Normal size  Cardiovascular: Auscultation of heart with regular rate and rhythm   Musculoskeletal. Muscle bulk and tone normal   Muscle strength mild giveway all limbs    No dysmetria or dysdiadokinesis  No tremor   Normal fine motor  Orientation Alert and oriented x 3   Short term memory normal   Attention and concentration normal   Language process and speech normal . No aphasia   Cranial nerve 2 normal acuety and visual fields  Cranial nerve 3, 4 and 6 . Extraocular muscles are intact . Pupils are equal and reactive . Cranial nerve 5 . Intact corneal reflex. Normal facial sensation  Cranial nerve 7 bilateral lower extremity weakness with trouble puffing   Cranial nerve 8. Grossly intact hearing   Cranial nerve 9 and 10. Symmetric palate elevation   Cranial nerve 11 , 5 out of 5 strength   Cranial Nerve 12 midline tongue . No atrophy  Sensation . Normal pinprick and light touch   Deep Tendon Reflexes normal  Plantar response flexor bilaterally    Assessment:      1. Myasthenia gravis ([address]    2. Elevated liver enzymes    Will have her remain on current regimen .  For elevated enzymes will make referral to gastroenterology       Plan:      Orders Placed This Encounter   Procedures   Ruby Fischer MD, Gastroenterology [place]     Referral Priority:   Routine     Referral Type:   Consult for Advice and Opinion     Referral Reason:   Specialty Services Required     Referred to Provider:   Fouzia Oro MD     Requested Specialty:   Gastroenterology     Number of Visits
Home